# Patient Record
Sex: MALE | Race: OTHER | NOT HISPANIC OR LATINO
[De-identification: names, ages, dates, MRNs, and addresses within clinical notes are randomized per-mention and may not be internally consistent; named-entity substitution may affect disease eponyms.]

---

## 2017-03-13 ENCOUNTER — APPOINTMENT (OUTPATIENT)
Dept: UROLOGY | Facility: CLINIC | Age: 82
End: 2017-03-13

## 2017-03-13 VITALS
TEMPERATURE: 98.2 F | WEIGHT: 200 LBS | HEIGHT: 70 IN | SYSTOLIC BLOOD PRESSURE: 172 MMHG | BODY MASS INDEX: 28.63 KG/M2 | DIASTOLIC BLOOD PRESSURE: 84 MMHG | HEART RATE: 59 BPM | RESPIRATION RATE: 16 BRPM

## 2017-03-14 LAB
PSA SERPL-MCNC: <0.01 NG/ML
TESTOST SERPL-MCNC: <2.5 NG/DL

## 2017-09-11 ENCOUNTER — APPOINTMENT (OUTPATIENT)
Dept: UROLOGY | Facility: CLINIC | Age: 82
End: 2017-09-11
Payer: MEDICARE

## 2017-09-11 ENCOUNTER — OUTPATIENT (OUTPATIENT)
Dept: OUTPATIENT SERVICES | Facility: HOSPITAL | Age: 82
LOS: 1 days | End: 2017-09-11
Payer: MEDICARE

## 2017-09-11 VITALS
TEMPERATURE: 97.9 F | SYSTOLIC BLOOD PRESSURE: 171 MMHG | RESPIRATION RATE: 16 BRPM | WEIGHT: 190 LBS | HEIGHT: 70 IN | HEART RATE: 55 BPM | DIASTOLIC BLOOD PRESSURE: 79 MMHG | BODY MASS INDEX: 27.2 KG/M2

## 2017-09-11 VITALS — DIASTOLIC BLOOD PRESSURE: 92 MMHG | HEART RATE: 58 BPM | SYSTOLIC BLOOD PRESSURE: 195 MMHG

## 2017-09-11 DIAGNOSIS — R35.0 FREQUENCY OF MICTURITION: ICD-10-CM

## 2017-09-11 LAB
PSA SERPL-MCNC: <0.01 NG/ML
TESTOST SERPL-MCNC: 2.9 NG/DL

## 2017-09-11 PROCEDURE — 11983 REMOVE/INSERT DRUG IMPLANT: CPT

## 2017-09-11 PROCEDURE — 99213 OFFICE O/P EST LOW 20 MIN: CPT | Mod: 25

## 2017-09-11 RX ORDER — HISTRELIN ACETATE 50 MG/1
50 IMPLANT SUBCUTANEOUS
Qty: 0 | Refills: 0 | Status: COMPLETED | OUTPATIENT
Start: 2017-09-11

## 2017-09-11 RX ORDER — HISTRELIN ACETATE 50 MG/1
50 IMPLANT SUBCUTANEOUS
Qty: 1 | Refills: 0 | Status: COMPLETED | OUTPATIENT
Start: 2017-09-11 | End: 2017-09-11

## 2017-09-11 RX ADMIN — HISTRELIN ACETATE 0 MG: 50 IMPLANT SUBCUTANEOUS at 00:00

## 2017-09-12 PROCEDURE — 11983 REMOVE/INSERT DRUG IMPLANT: CPT

## 2017-09-14 DIAGNOSIS — C61 MALIGNANT NEOPLASM OF PROSTATE: ICD-10-CM

## 2018-03-19 ENCOUNTER — APPOINTMENT (OUTPATIENT)
Dept: UROLOGY | Facility: CLINIC | Age: 83
End: 2018-03-19
Payer: MEDICARE

## 2018-03-19 VITALS
HEART RATE: 46 BPM | BODY MASS INDEX: 27.2 KG/M2 | RESPIRATION RATE: 17 BRPM | TEMPERATURE: 97.4 F | HEIGHT: 70 IN | SYSTOLIC BLOOD PRESSURE: 179 MMHG | WEIGHT: 190 LBS | DIASTOLIC BLOOD PRESSURE: 79 MMHG

## 2018-03-19 PROCEDURE — 99214 OFFICE O/P EST MOD 30 MIN: CPT

## 2018-04-08 LAB
PSA SERPL-MCNC: <0.01 NG/ML
TESTOST SERPL-MCNC: <2.5 NG/DL

## 2018-07-02 ENCOUNTER — APPOINTMENT (OUTPATIENT)
Dept: CARDIOLOGY | Facility: CLINIC | Age: 83
End: 2018-07-02
Payer: MEDICARE

## 2018-07-02 ENCOUNTER — NON-APPOINTMENT (OUTPATIENT)
Age: 83
End: 2018-07-02

## 2018-07-02 VITALS
BODY MASS INDEX: 33.64 KG/M2 | WEIGHT: 235 LBS | DIASTOLIC BLOOD PRESSURE: 88 MMHG | HEIGHT: 70 IN | RESPIRATION RATE: 16 BRPM | SYSTOLIC BLOOD PRESSURE: 170 MMHG | HEART RATE: 58 BPM

## 2018-07-02 PROCEDURE — 93306 TTE W/DOPPLER COMPLETE: CPT

## 2018-07-02 PROCEDURE — 93000 ELECTROCARDIOGRAM COMPLETE: CPT

## 2018-07-02 PROCEDURE — 99204 OFFICE O/P NEW MOD 45 MIN: CPT

## 2018-09-14 ENCOUNTER — CLINICAL ADVICE (OUTPATIENT)
Age: 83
End: 2018-09-14

## 2018-09-17 ENCOUNTER — APPOINTMENT (OUTPATIENT)
Dept: UROLOGY | Facility: CLINIC | Age: 83
End: 2018-09-17
Payer: MEDICARE

## 2018-09-17 ENCOUNTER — APPOINTMENT (OUTPATIENT)
Dept: UROLOGY | Facility: CLINIC | Age: 83
End: 2018-09-17

## 2018-09-17 ENCOUNTER — OUTPATIENT (OUTPATIENT)
Dept: OUTPATIENT SERVICES | Facility: HOSPITAL | Age: 83
LOS: 1 days | End: 2018-09-17
Payer: MEDICARE

## 2018-09-17 VITALS
BODY MASS INDEX: 24.34 KG/M2 | OXYGEN SATURATION: 100 % | DIASTOLIC BLOOD PRESSURE: 75 MMHG | HEIGHT: 70 IN | HEART RATE: 69 BPM | RESPIRATION RATE: 16 BRPM | WEIGHT: 170 LBS | SYSTOLIC BLOOD PRESSURE: 144 MMHG

## 2018-09-17 DIAGNOSIS — R35.0 FREQUENCY OF MICTURITION: ICD-10-CM

## 2018-09-17 LAB
ALBUMIN SERPL ELPH-MCNC: 4.5 G/DL
ALP BLD-CCNC: 96 U/L
ALT SERPL-CCNC: 17 U/L
ANION GAP SERPL CALC-SCNC: 16 MMOL/L
AST SERPL-CCNC: 17 U/L
BILIRUB SERPL-MCNC: 0.5 MG/DL
BUN SERPL-MCNC: 21 MG/DL
CALCIUM SERPL-MCNC: 10.2 MG/DL
CHLORIDE SERPL-SCNC: 104 MMOL/L
CO2 SERPL-SCNC: 26 MMOL/L
CREAT SERPL-MCNC: 0.97 MG/DL
GLUCOSE SERPL-MCNC: 109 MG/DL
POTASSIUM SERPL-SCNC: 4.8 MMOL/L
PROT SERPL-MCNC: 7.6 G/DL
PSA SERPL-MCNC: <0.01 NG/ML
SODIUM SERPL-SCNC: 146 MMOL/L
TESTOST SERPL-MCNC: 5.8 NG/DL

## 2018-09-17 PROCEDURE — 11983 REMOVE/INSERT DRUG IMPLANT: CPT

## 2018-09-17 RX ORDER — HISTRELIN ACETATE 50 MG/1
50 IMPLANT SUBCUTANEOUS
Qty: 0 | Refills: 0 | Status: COMPLETED | OUTPATIENT
Start: 2018-09-17

## 2018-09-17 RX ORDER — HISTRELIN ACETATE 50 MG/1
50 IMPLANT SUBCUTANEOUS
Qty: 1 | Refills: 0 | Status: COMPLETED | OUTPATIENT
Start: 2018-09-17 | End: 2018-09-17

## 2018-09-17 RX ADMIN — HISTRELIN ACETATE 0 MG: 50 IMPLANT SUBCUTANEOUS at 00:00

## 2018-09-18 LAB
BASOPHILS # BLD AUTO: 0.03 K/UL
BASOPHILS NFR BLD AUTO: 0.5 %
EOSINOPHIL # BLD AUTO: 0.23 K/UL
EOSINOPHIL NFR BLD AUTO: 3.6 %
HCT VFR BLD CALC: 44.2 %
HGB BLD-MCNC: 14.2 G/DL
IMM GRANULOCYTES NFR BLD AUTO: 0.3 %
LYMPHOCYTES # BLD AUTO: 1.3 K/UL
LYMPHOCYTES NFR BLD AUTO: 20.6 %
MAN DIFF?: NORMAL
MCHC RBC-ENTMCNC: 31.7 PG
MCHC RBC-ENTMCNC: 32.1 GM/DL
MCV RBC AUTO: 98.7 FL
MONOCYTES # BLD AUTO: 0.5 K/UL
MONOCYTES NFR BLD AUTO: 7.9 %
NEUTROPHILS # BLD AUTO: 4.23 K/UL
NEUTROPHILS NFR BLD AUTO: 67.1 %
PLATELET # BLD AUTO: 249 K/UL
RBC # BLD: 4.48 M/UL
RBC # FLD: 14 %
WBC # FLD AUTO: 6.31 K/UL

## 2018-09-19 DIAGNOSIS — C61 MALIGNANT NEOPLASM OF PROSTATE: ICD-10-CM

## 2019-02-20 ENCOUNTER — INPATIENT (INPATIENT)
Facility: HOSPITAL | Age: 84
LOS: 7 days | Discharge: HOME | DRG: 193 | End: 2019-02-28
Attending: GENERAL ACUTE CARE HOSPITAL | Admitting: GENERAL ACUTE CARE HOSPITAL
Payer: MEDICARE

## 2019-02-20 VITALS
HEART RATE: 78 BPM | OXYGEN SATURATION: 95 % | TEMPERATURE: 102 F | SYSTOLIC BLOOD PRESSURE: 167 MMHG | WEIGHT: 210.1 LBS | HEIGHT: 68 IN | DIASTOLIC BLOOD PRESSURE: 78 MMHG | RESPIRATION RATE: 20 BRPM

## 2019-02-20 DIAGNOSIS — E78.00 PURE HYPERCHOLESTEROLEMIA, UNSPECIFIED: ICD-10-CM

## 2019-02-20 DIAGNOSIS — I10 ESSENTIAL (PRIMARY) HYPERTENSION: ICD-10-CM

## 2019-02-20 DIAGNOSIS — R50.9 FEVER, UNSPECIFIED: ICD-10-CM

## 2019-02-20 DIAGNOSIS — Z96.649 PRESENCE OF UNSPECIFIED ARTIFICIAL HIP JOINT: Chronic | ICD-10-CM

## 2019-02-20 DIAGNOSIS — F03.90 UNSPECIFIED DEMENTIA WITHOUT BEHAVIORAL DISTURBANCE: ICD-10-CM

## 2019-02-20 DIAGNOSIS — G93.41 METABOLIC ENCEPHALOPATHY: ICD-10-CM

## 2019-02-20 DIAGNOSIS — Z29.9 ENCOUNTER FOR PROPHYLACTIC MEASURES, UNSPECIFIED: ICD-10-CM

## 2019-02-20 LAB
ALBUMIN SERPL ELPH-MCNC: 4.4 G/DL — SIGNIFICANT CHANGE UP (ref 3.3–5)
ALP SERPL-CCNC: 78 U/L — SIGNIFICANT CHANGE UP (ref 40–120)
ALT FLD-CCNC: 24 U/L — SIGNIFICANT CHANGE UP (ref 10–45)
ANION GAP SERPL CALC-SCNC: 15 MMOL/L — SIGNIFICANT CHANGE UP (ref 5–17)
APPEARANCE UR: CLEAR — SIGNIFICANT CHANGE UP
APTT BLD: 28.7 SEC — SIGNIFICANT CHANGE UP (ref 27.5–36.3)
AST SERPL-CCNC: 21 U/L — SIGNIFICANT CHANGE UP (ref 10–40)
BACTERIA # UR AUTO: NEGATIVE — SIGNIFICANT CHANGE UP
BASOPHILS # BLD AUTO: 0 K/UL — SIGNIFICANT CHANGE UP (ref 0–0.2)
BASOPHILS NFR BLD AUTO: 0 % — SIGNIFICANT CHANGE UP (ref 0–2)
BILIRUB SERPL-MCNC: 0.7 MG/DL — SIGNIFICANT CHANGE UP (ref 0.2–1.2)
BILIRUB UR-MCNC: NEGATIVE — SIGNIFICANT CHANGE UP
BUN SERPL-MCNC: 22 MG/DL — SIGNIFICANT CHANGE UP (ref 7–23)
CALCIUM SERPL-MCNC: 9.6 MG/DL — SIGNIFICANT CHANGE UP (ref 8.4–10.5)
CHLORIDE SERPL-SCNC: 102 MMOL/L — SIGNIFICANT CHANGE UP (ref 96–108)
CO2 SERPL-SCNC: 24 MMOL/L — SIGNIFICANT CHANGE UP (ref 22–31)
COLOR SPEC: YELLOW — SIGNIFICANT CHANGE UP
CREAT SERPL-MCNC: 1 MG/DL — SIGNIFICANT CHANGE UP (ref 0.5–1.3)
DIFF PNL FLD: NEGATIVE — SIGNIFICANT CHANGE UP
EOSINOPHIL # BLD AUTO: 0 K/UL — SIGNIFICANT CHANGE UP (ref 0–0.5)
EOSINOPHIL NFR BLD AUTO: 0.4 % — SIGNIFICANT CHANGE UP (ref 0–6)
EPI CELLS # UR: 1 /HPF — SIGNIFICANT CHANGE UP
FLUAV H1 2009 PAND RNA SPEC QL NAA+PROBE: DETECTED
GAS PNL BLDV: SIGNIFICANT CHANGE UP
GLUCOSE SERPL-MCNC: 134 MG/DL — HIGH (ref 70–99)
GLUCOSE UR QL: NEGATIVE — SIGNIFICANT CHANGE UP
HCT VFR BLD CALC: 39.1 % — SIGNIFICANT CHANGE UP (ref 39–50)
HGB BLD-MCNC: 13.7 G/DL — SIGNIFICANT CHANGE UP (ref 13–17)
HYALINE CASTS # UR AUTO: 1 /LPF — SIGNIFICANT CHANGE UP (ref 0–2)
INR BLD: 1 RATIO — SIGNIFICANT CHANGE UP (ref 0.88–1.16)
KETONES UR-MCNC: NEGATIVE — SIGNIFICANT CHANGE UP
LACTATE SERPL-SCNC: 1.8 MMOL/L — SIGNIFICANT CHANGE UP (ref 0.7–2)
LEUKOCYTE ESTERASE UR-ACNC: NEGATIVE — SIGNIFICANT CHANGE UP
LYMPHOCYTES # BLD AUTO: 0.4 K/UL — LOW (ref 1–3.3)
LYMPHOCYTES # BLD AUTO: 5 % — LOW (ref 13–44)
MAGNESIUM SERPL-MCNC: 2 MG/DL — SIGNIFICANT CHANGE UP (ref 1.6–2.6)
MCHC RBC-ENTMCNC: 32.1 PG — SIGNIFICANT CHANGE UP (ref 27–34)
MCHC RBC-ENTMCNC: 35.2 GM/DL — SIGNIFICANT CHANGE UP (ref 32–36)
MCV RBC AUTO: 91.2 FL — SIGNIFICANT CHANGE UP (ref 80–100)
MONOCYTES # BLD AUTO: 0.7 K/UL — SIGNIFICANT CHANGE UP (ref 0–0.9)
MONOCYTES NFR BLD AUTO: 8.4 % — SIGNIFICANT CHANGE UP (ref 2–14)
NEUTROPHILS # BLD AUTO: 6.8 K/UL — SIGNIFICANT CHANGE UP (ref 1.8–7.4)
NEUTROPHILS NFR BLD AUTO: 86.2 % — HIGH (ref 43–77)
NITRITE UR-MCNC: NEGATIVE — SIGNIFICANT CHANGE UP
NT-PROBNP SERPL-SCNC: 532 PG/ML — HIGH (ref 0–300)
PH UR: 6 — SIGNIFICANT CHANGE UP (ref 5–8)
PHOSPHATE SERPL-MCNC: 3 MG/DL — SIGNIFICANT CHANGE UP (ref 2.5–4.5)
PLATELET # BLD AUTO: 160 K/UL — SIGNIFICANT CHANGE UP (ref 150–400)
POTASSIUM SERPL-MCNC: 3.7 MMOL/L — SIGNIFICANT CHANGE UP (ref 3.5–5.3)
POTASSIUM SERPL-SCNC: 3.7 MMOL/L — SIGNIFICANT CHANGE UP (ref 3.5–5.3)
PROCALCITONIN SERPL-MCNC: 0.19 NG/ML — HIGH (ref 0.02–0.1)
PROT SERPL-MCNC: 7.4 G/DL — SIGNIFICANT CHANGE UP (ref 6–8.3)
PROT UR-MCNC: ABNORMAL
PROTHROM AB SERPL-ACNC: 11.5 SEC — SIGNIFICANT CHANGE UP (ref 10–12.9)
RAPID RVP RESULT: DETECTED
RBC # BLD: 4.28 M/UL — SIGNIFICANT CHANGE UP (ref 4.2–5.8)
RBC # FLD: 13.2 % — SIGNIFICANT CHANGE UP (ref 10.3–14.5)
RBC CASTS # UR COMP ASSIST: 0 /HPF — SIGNIFICANT CHANGE UP (ref 0–4)
SODIUM SERPL-SCNC: 141 MMOL/L — SIGNIFICANT CHANGE UP (ref 135–145)
SP GR SPEC: 1.02 — SIGNIFICANT CHANGE UP (ref 1.01–1.02)
UROBILINOGEN FLD QL: NEGATIVE — SIGNIFICANT CHANGE UP
WBC # BLD: 7.8 K/UL — SIGNIFICANT CHANGE UP (ref 3.8–10.5)
WBC # FLD AUTO: 7.8 K/UL — SIGNIFICANT CHANGE UP (ref 3.8–10.5)
WBC UR QL: 0 /HPF — SIGNIFICANT CHANGE UP (ref 0–5)

## 2019-02-20 PROCEDURE — 71250 CT THORAX DX C-: CPT | Mod: 26

## 2019-02-20 PROCEDURE — 93010 ELECTROCARDIOGRAM REPORT: CPT

## 2019-02-20 PROCEDURE — 99285 EMERGENCY DEPT VISIT HI MDM: CPT | Mod: 25

## 2019-02-20 PROCEDURE — 70450 CT HEAD/BRAIN W/O DYE: CPT | Mod: 26

## 2019-02-20 PROCEDURE — 99223 1ST HOSP IP/OBS HIGH 75: CPT

## 2019-02-20 PROCEDURE — 71045 X-RAY EXAM CHEST 1 VIEW: CPT | Mod: 26

## 2019-02-20 RX ORDER — DONEPEZIL HYDROCHLORIDE 10 MG/1
10 TABLET, FILM COATED ORAL AT BEDTIME
Qty: 0 | Refills: 0 | Status: DISCONTINUED | OUTPATIENT
Start: 2019-02-20 | End: 2019-02-28

## 2019-02-20 RX ORDER — ACETAMINOPHEN 500 MG
1000 TABLET ORAL ONCE
Qty: 0 | Refills: 0 | Status: COMPLETED | OUTPATIENT
Start: 2019-02-20 | End: 2019-02-20

## 2019-02-20 RX ORDER — ACETAMINOPHEN 500 MG
650 TABLET ORAL EVERY 6 HOURS
Qty: 0 | Refills: 0 | Status: DISCONTINUED | OUTPATIENT
Start: 2019-02-20 | End: 2019-02-28

## 2019-02-20 RX ORDER — METOPROLOL TARTRATE 50 MG
50 TABLET ORAL
Qty: 0 | Refills: 0 | Status: DISCONTINUED | OUTPATIENT
Start: 2019-02-20 | End: 2019-02-22

## 2019-02-20 RX ORDER — LOSARTAN POTASSIUM 100 MG/1
50 TABLET, FILM COATED ORAL DAILY
Qty: 0 | Refills: 0 | Status: DISCONTINUED | OUTPATIENT
Start: 2019-02-20 | End: 2019-02-25

## 2019-02-20 RX ORDER — ENOXAPARIN SODIUM 100 MG/ML
40 INJECTION SUBCUTANEOUS DAILY
Qty: 0 | Refills: 0 | Status: DISCONTINUED | OUTPATIENT
Start: 2019-02-20 | End: 2019-02-28

## 2019-02-20 RX ORDER — AZITHROMYCIN 500 MG/1
500 TABLET, FILM COATED ORAL ONCE
Qty: 0 | Refills: 0 | Status: COMPLETED | OUTPATIENT
Start: 2019-02-20 | End: 2019-02-20

## 2019-02-20 RX ORDER — SODIUM CHLORIDE 9 MG/ML
2000 INJECTION INTRAMUSCULAR; INTRAVENOUS; SUBCUTANEOUS ONCE
Qty: 0 | Refills: 0 | Status: COMPLETED | OUTPATIENT
Start: 2019-02-20 | End: 2019-02-20

## 2019-02-20 RX ORDER — CEFTRIAXONE 500 MG/1
1 INJECTION, POWDER, FOR SOLUTION INTRAMUSCULAR; INTRAVENOUS ONCE
Qty: 0 | Refills: 0 | Status: COMPLETED | OUTPATIENT
Start: 2019-02-20 | End: 2019-02-20

## 2019-02-20 RX ORDER — SIMVASTATIN 20 MG/1
40 TABLET, FILM COATED ORAL AT BEDTIME
Qty: 0 | Refills: 0 | Status: DISCONTINUED | OUTPATIENT
Start: 2019-02-20 | End: 2019-02-28

## 2019-02-20 RX ORDER — TAMSULOSIN HYDROCHLORIDE 0.4 MG/1
0.4 CAPSULE ORAL AT BEDTIME
Qty: 0 | Refills: 0 | Status: DISCONTINUED | OUTPATIENT
Start: 2019-02-20 | End: 2019-02-26

## 2019-02-20 RX ORDER — ACETAMINOPHEN 500 MG
975 TABLET ORAL ONCE
Qty: 0 | Refills: 0 | Status: COMPLETED | OUTPATIENT
Start: 2019-02-20 | End: 2019-02-20

## 2019-02-20 RX ORDER — FAMOTIDINE 10 MG/ML
20 INJECTION INTRAVENOUS
Qty: 0 | Refills: 0 | Status: DISCONTINUED | OUTPATIENT
Start: 2019-02-20 | End: 2019-02-28

## 2019-02-20 RX ORDER — ESCITALOPRAM OXALATE 10 MG/1
5 TABLET, FILM COATED ORAL DAILY
Qty: 0 | Refills: 0 | Status: DISCONTINUED | OUTPATIENT
Start: 2019-02-20 | End: 2019-02-28

## 2019-02-20 RX ORDER — MEMANTINE HYDROCHLORIDE 10 MG/1
10 TABLET ORAL
Qty: 0 | Refills: 0 | Status: DISCONTINUED | OUTPATIENT
Start: 2019-02-20 | End: 2019-02-28

## 2019-02-20 RX ORDER — HYDROCHLOROTHIAZIDE 25 MG
12.5 TABLET ORAL DAILY
Qty: 0 | Refills: 0 | Status: DISCONTINUED | OUTPATIENT
Start: 2019-02-20 | End: 2019-02-23

## 2019-02-20 RX ADMIN — TAMSULOSIN HYDROCHLORIDE 0.4 MILLIGRAM(S): 0.4 CAPSULE ORAL at 22:12

## 2019-02-20 RX ADMIN — DONEPEZIL HYDROCHLORIDE 10 MILLIGRAM(S): 10 TABLET, FILM COATED ORAL at 22:09

## 2019-02-20 RX ADMIN — Medication 400 MILLIGRAM(S): at 22:09

## 2019-02-20 RX ADMIN — MEMANTINE HYDROCHLORIDE 10 MILLIGRAM(S): 10 TABLET ORAL at 22:09

## 2019-02-20 RX ADMIN — SODIUM CHLORIDE 500 MILLILITER(S): 9 INJECTION INTRAMUSCULAR; INTRAVENOUS; SUBCUTANEOUS at 16:29

## 2019-02-20 RX ADMIN — Medication 50 MILLIGRAM(S): at 22:09

## 2019-02-20 RX ADMIN — CEFTRIAXONE 100 GRAM(S): 500 INJECTION, POWDER, FOR SOLUTION INTRAMUSCULAR; INTRAVENOUS at 17:54

## 2019-02-20 RX ADMIN — Medication 975 MILLIGRAM(S): at 16:29

## 2019-02-20 RX ADMIN — SIMVASTATIN 40 MILLIGRAM(S): 20 TABLET, FILM COATED ORAL at 22:09

## 2019-02-20 RX ADMIN — AZITHROMYCIN 250 MILLIGRAM(S): 500 TABLET, FILM COATED ORAL at 19:49

## 2019-02-20 RX ADMIN — Medication 75 MILLIGRAM(S): at 22:09

## 2019-02-20 NOTE — H&P ADULT - ASSESSMENT
83 yr old male with fevers, AMS (now resolved), and cough, with a wife who was just diagnosed with influenza.  Suspect influenza or another respiratory virus.  RVP currently pending, will start Tamiflu.  Was treated empirically for PNA in the ER, however given the CT findings would not continue abx at this time.

## 2019-02-20 NOTE — ED PROVIDER NOTE - ATTENDING CONTRIBUTION TO CARE
I have seen and evaluated this patient with the advance practice clinician.   I agree with the findings  unless other wise stated. I have amended notes where needed.  After my face to face bedside evaluation, I am noting: Pt with AMS and fever 101.8 per daughter who is at bedside. Per patient daughter patient had difficulty with normal tasks today and wife is admitted to [facility] ED for flu. Patient denies any complaints. Per daughter patient had flu shot this year Pt Pt does not have nuchal rigidity no rash Lungs scattered basal rales ? pneumonia Labs and xray findings reviewed needs to be hospitalized for definitive care --Parisi

## 2019-02-20 NOTE — H&P ADULT - PROBLEM SELECTOR PLAN 2
suspect secondary to fever, family reports he is at his baseline now.  Continue to monitor, Tylenol PRN.

## 2019-02-20 NOTE — ED ADULT NURSE REASSESSMENT NOTE - NS ED NURSE REASSESS COMMENT FT1
Aleena Doll (granddaughter) : contact if need anything,  cell:  [phone].    Daughter's number in patient profile info, call if necessary.

## 2019-02-20 NOTE — ED ADULT NURSE REASSESSMENT NOTE - NS ED NURSE REASSESS COMMENT FT1
Normal saline infusing. Pt and family aware of plan of care. Pt appears to have more color in face. Safety and comfort measures provided. Family at bedside.

## 2019-02-20 NOTE — ED ADULT NURSE NOTE - INTERVENTIONS DEFINITIONS
[place] to call system/Instruct patient to call for assistance/Monitor for mental status changes and reorient to person, place, and time/Physically safe environment: no spills, clutter or unnecessary equipment

## 2019-02-20 NOTE — H&P ADULT - NSHPLABSRESULTS_GEN_ALL_CORE
LABS:                        13.7   7.8   )-----------( 160      ( 2019 16:29 )             39.1     02-    141  |  102  |  22  ----------------------------<  134<H>  3.7   |  24  |  1.00    Ca    9.6      2019 16:29  Phos  3.0       Mg     2.0         TPro  7.4  /  Alb  4.4  /  TBili  0.7  /  DBili  x   /  AST  21  /  ALT  24  /  AlkPhos  78  02-20    PT/INR - ( 2019 16:35 )   PT: 11.5 sec;   INR: 1.00 ratio         PTT - ( 2019 16:35 )  PTT:28.7 sec      Urinalysis Basic - ( 2019 16:32 )    Color: Yellow / Appearance: Clear / S.024 / pH: x  Gluc: x / Ketone: Negative  / Bili: Negative / Urobili: Negative   Blood: x / Protein: Trace / Nitrite: Negative   Leuk Esterase: Negative / RBC: 0 /hpf / WBC 0 /HPF   Sq Epi: x / Non Sq Epi: 1 /hpf / Bacteria: Negative        RADIOLOGY & ADDITIONAL TESTS:    Imaging Personally Reviewed:  CT Chest, CXR - no infiltrate

## 2019-02-20 NOTE — ED ADULT NURSE REASSESSMENT NOTE - NS ED NURSE REASSESS COMMENT FT1
Straight cath complete. 2 RNs at bedside. Sterile technique preformed. Pt tolerated well. Successful after one attempt.

## 2019-02-20 NOTE — ED ADULT NURSE NOTE - OBJECTIVE STATEMENT
82 y/o Male presents to the ED in a wheelchair, A&Ox2, after his family found him to be febrile and confused. Pt wife is admitted to the hospital for the flu and pt family got worried after pt developed a phlegmy cough that started yesterday. Pt was also vomiting earlier today. Pt went to PCP and was found to be febrile at 101.3, PCP sent him here. Pt is oriented to person and place but not time, pt family reports this as baseline due to beginning onset of dementia. Family says he seems confused at times and does not at others. Pt appears slightly pale. Pt feels warm to touch. Pt rectal temp of 101.4. MD aware. Lung sounds diminished in all lobes. Abdomen soft, nontender, nondistended, bowel sounds present in all quadrants. Pt Heart sounds WNL. +2 pitting edema noted on bilateral lower extremities, pt family reports this as baseline. Pt has hx of HTN. Pupils 3mm PERRL, Equal strength and sensation in all extremities. Pt unable to urinate for us at this time. Pt denies any pain, chest pain, shortness of breath, numbness, tingling, dizziness, headache. Safety and comfort measures provided. Family at bedside. Call bell within reach.

## 2019-02-20 NOTE — H&P ADULT - NSHPPHYSICALEXAM_GEN_ALL_CORE
Vital Signs Last 24 Hrs  T(C): 37.6 (20 Feb 2019 18:10), Max: 38.8 (20 Feb 2019 14:23)  T(F): 99.7 (20 Feb 2019 18:10), Max: 101.8 (20 Feb 2019 14:23)  HR: 81 (20 Feb 2019 18:10) (78 - 96)  BP: 143/65 (20 Feb 2019 18:10) (143/65 - 170/91)  BP(mean): --  RR: 20 (20 Feb 2019 18:11) (20 - 20)  SpO2: 95% (20 Feb 2019 18:11) (93% - 95%)  CAPILLARY BLOOD GLUCOSE        I&O's Summary      PHYSICAL EXAM:  GENERAL: NAD, well-developed, A and O x 2 (unsure of date)  HEAD:  Atraumatic, Normocephalic  EYES: EOMI, PERRLA, conjunctiva and sclera clear  NECK: Supple, No JVD  CHEST/LUNG: Clear to auscultation bilaterally; No wheeze  HEART: Regular rate and rhythm; No murmurs, rubs, or gallops  ABDOMEN: Soft, Nontender, Nondistended; Bowel sounds present  EXTREMITIES:  2+ Peripheral Pulses, No clubbing, cyanosis, 1+ pedal edema b/l  PSYCH: AAOx2  NEUROLOGY: non-focal  SKIN: No rashes or lesions

## 2019-02-20 NOTE — H&P ADULT - PROBLEM SELECTOR PLAN 1
suspect influenza, will start empiric Tamiflu for now, droplet isolation  May consider continuing Tamiflu regardless of RVP results given his recent exposure (his wife)  No evidence of PNA on imaging, will hold on further abx for now, follow up blood cultures.  Urine Cx pending however UA was negative, no indication of a UTI.

## 2019-02-20 NOTE — ED PROVIDER NOTE - OBJECTIVE STATEMENT
82 y/o male PMHx  mitral valve regurgitation, prostate cancer, hypertension, hyperlipidemia, normal nuclear stress test 7/2018 and EF 69% brought in by EMS from PMD office Dr. Mac Narayan as patient had AMS and fever 101.8 per daughter who is at bedside. Per patient daughter patient had difficulty with normal tasks today and wife is admitted to [facility] ED for flu. Patient denies any complaints 82 y/o male PMHx  mitral valve regurgitation, prostate cancer, hypertension, hyperlipidemia, normal nuclear stress test 7/2018 and EF 69% brought in by EMS from PMD office Dr. Mac Narayan as patient had AMS and fever 101.8 per daughter who is at bedside. Per patient daughter patient had difficulty with normal tasks today and wife is admitted to [facility] ED for flu. Patient denies any complaints. Per daughter patient had flu shot this year

## 2019-02-20 NOTE — ED ADULT TRIAGE NOTE - CHIEF COMPLAINT QUOTE
cough since yesterday, confused this morning, very tired was seen by PCP Dr. Mac Narayan today and was sent  here to r/o Pneumonia

## 2019-02-20 NOTE — ED PROVIDER NOTE - CHIEF COMPLAINT
The patient is a 83y Male complaining of confusion. The patient is a 83y Male complaining of confusion. info from PMD and family

## 2019-02-20 NOTE — ED PROVIDER NOTE - CLINICAL SUMMARY MEDICAL DECISION MAKING FREE TEXT BOX
Pt with AMS and fever 101.8 per daughter who is at bedside. Per patient daughter patient had difficulty with normal tasks today and wife is admitted to [facility] ED for flu. Patient denies any complaints. Per daughter patient had flu shot this year Pt Pt does not have nuchal rigidity no rash Lungs scattered basal rales ? pneumonia Labs and xray findings reviewed needs to be hospitalized for definitive care --Parisi

## 2019-02-20 NOTE — H&P ADULT - HISTORY OF PRESENT ILLNESS
83 yr old male with PMH sig for dementia, HTN, HLD whose wife was admitted yesterday for influenza, today presents with cough, fevers and altered MS.  Patient was in his usual state of health yesterday when his son noticed that he seemed more tired than normal.  This morning he was noted to be confused, coughing, and febrile to 102 at home.  He was taken to his PCPs office where he was again noted to be confused, febrile and instructed to go to the ER to r/o PNA.  In the ED he was given Azithromycin, Ceftriaxone, and a CXR and Chest CT were done that did not reveal a pulmonary infiltrate.  RVP panel is currently pending.  His family states that he currently is at his baseline mental status, which is A and O x 2-3, somewhat forgetful, but overall functional at home with his ADLs.  Patient did receive a flu shot this year.  He denies any HA, abd pain, dysuria.  He states that he had some chest discomfort with coughing.

## 2019-02-20 NOTE — ED ADULT NURSE REASSESSMENT NOTE - NS ED NURSE REASSESS COMMENT FT1
Care assumed of patient at this time. Patient has new IV placed. Pt is given hospital gown. Patient is encouraged to stay in hospital bed.

## 2019-02-21 LAB
CULTURE RESULTS: NO GROWTH — SIGNIFICANT CHANGE UP
SPECIMEN SOURCE: SIGNIFICANT CHANGE UP

## 2019-02-21 RX ORDER — IPRATROPIUM/ALBUTEROL SULFATE 18-103MCG
3 AEROSOL WITH ADAPTER (GRAM) INHALATION EVERY 6 HOURS
Qty: 0 | Refills: 0 | Status: DISCONTINUED | OUTPATIENT
Start: 2019-02-21 | End: 2019-02-25

## 2019-02-21 RX ADMIN — Medication 50 MILLIGRAM(S): at 05:48

## 2019-02-21 RX ADMIN — FAMOTIDINE 20 MILLIGRAM(S): 10 INJECTION INTRAVENOUS at 05:47

## 2019-02-21 RX ADMIN — FAMOTIDINE 20 MILLIGRAM(S): 10 INJECTION INTRAVENOUS at 17:49

## 2019-02-21 RX ADMIN — LOSARTAN POTASSIUM 50 MILLIGRAM(S): 100 TABLET, FILM COATED ORAL at 05:47

## 2019-02-21 RX ADMIN — SIMVASTATIN 40 MILLIGRAM(S): 20 TABLET, FILM COATED ORAL at 21:37

## 2019-02-21 RX ADMIN — Medication 3 MILLILITER(S): at 17:49

## 2019-02-21 RX ADMIN — Medication 50 MILLIGRAM(S): at 17:49

## 2019-02-21 RX ADMIN — Medication 3 MILLILITER(S): at 12:22

## 2019-02-21 RX ADMIN — DONEPEZIL HYDROCHLORIDE 10 MILLIGRAM(S): 10 TABLET, FILM COATED ORAL at 21:37

## 2019-02-21 RX ADMIN — TAMSULOSIN HYDROCHLORIDE 0.4 MILLIGRAM(S): 0.4 CAPSULE ORAL at 21:37

## 2019-02-21 RX ADMIN — Medication 75 MILLIGRAM(S): at 05:47

## 2019-02-21 RX ADMIN — ESCITALOPRAM OXALATE 5 MILLIGRAM(S): 10 TABLET, FILM COATED ORAL at 08:37

## 2019-02-21 RX ADMIN — Medication 12.5 MILLIGRAM(S): at 05:47

## 2019-02-21 RX ADMIN — Medication 3 MILLILITER(S): at 23:27

## 2019-02-21 RX ADMIN — ENOXAPARIN SODIUM 40 MILLIGRAM(S): 100 INJECTION SUBCUTANEOUS at 08:37

## 2019-02-21 RX ADMIN — MEMANTINE HYDROCHLORIDE 10 MILLIGRAM(S): 10 TABLET ORAL at 05:48

## 2019-02-21 RX ADMIN — Medication 30 MILLIGRAM(S): at 21:37

## 2019-02-21 RX ADMIN — Medication 40 MILLIGRAM(S): at 12:22

## 2019-02-21 RX ADMIN — Medication 75 MILLIGRAM(S): at 17:48

## 2019-02-21 RX ADMIN — MEMANTINE HYDROCHLORIDE 10 MILLIGRAM(S): 10 TABLET ORAL at 17:49

## 2019-02-21 NOTE — PROGRESS NOTE ADULT - SUBJECTIVE AND OBJECTIVE BOX
Patient is a 83y old  Male who presents with a chief complaint of Fever (21 Feb 2019 15:15)                                                               INTERVAL HPI/OVERNIGHT EVENTS:    REVIEW OF SYSTEMS:     CONSTITUTIONAL: No weakness, fevers or chills  RESPIRATORY:irpving cough and  shortness of breath  CARDIOVASCULAR: No chest pain or palpitations  GASTROINTESTINAL: No abdominal pain  . No nausea, vomiting, or hematemesis; No diarrhea or constipation. No melena or hematochezia.  GENITOURINARY: No dysuria, frequency or hematuria  NEUROLOGICAL: No numbness or weakness                                                                                                                                                                                                                                                                                  Medications:  MEDICATIONS  (STANDING):  ALBUTerol/ipratropium for Nebulization 3 milliLiter(s) Nebulizer every 6 hours  donepezil 10 milliGRAM(s) Oral at bedtime  enoxaparin Injectable 40 milliGRAM(s) SubCutaneous daily  escitalopram 5 milliGRAM(s) Oral daily  famotidine    Tablet 20 milliGRAM(s) Oral two times a day  hydrochlorothiazide 12.5 milliGRAM(s) Oral daily  losartan 50 milliGRAM(s) Oral daily  memantine 10 milliGRAM(s) Oral two times a day  methylPREDNISolone sodium succinate Injectable 30 milliGRAM(s) IV Push three times a day  metoprolol tartrate 50 milliGRAM(s) Oral two times a day  oseltamivir 75 milliGRAM(s) Oral two times a day  simvastatin 40 milliGRAM(s) Oral at bedtime  tamsulosin 0.4 milliGRAM(s) Oral at bedtime    MEDICATIONS  (PRN):  acetaminophen   Tablet .. 650 milliGRAM(s) Oral every 6 hours PRN Temp greater or equal to 38C (100.4F)       Allergies    No Known Allergies    Intolerances      Vital Signs Last 24 Hrs  T(C): 36.9 (21 Feb 2019 19:52), Max: 37.2 (21 Feb 2019 00:14)  T(F): 98.4 (21 Feb 2019 19:52), Max: 98.9 (21 Feb 2019 00:14)  HR: 55 (21 Feb 2019 19:52) (55 - 62)  BP: 159/75 (21 Feb 2019 19:52) (138/71 - 171/67)  BP(mean): --  RR: 17 (21 Feb 2019 19:52) (17 - 20)  SpO2: 94% (21 Feb 2019 19:52) (92% - 97%)  CAPILLARY BLOOD GLUCOSE          Physical Exam:    Daily Height in cm: 175.26 (21 Feb 2019 19:52)    General: NAD  HEENT:  Nonicteric, PERRLA  CV:  RRR, S1S2   Lungs:  wheezing B   Abdomen:  Soft, non-tender, no distended, positive BS  Extremities:  2+ pulses, no c/c, no edema  Skin:  Warm and dry, no rashes  :  No mckeon  Neuro:  AAOx3, non-focal, grossly intact                                                                                                                                                                                                                                                                                                LABS:                               13.7   7.8   )-----------( 160      ( 20 Feb 2019 16:29 )             39.1                      02-20    141  |  102  |  22  ----------------------------<  134<H>  3.7   |  24  |  1.00    Ca    9.6      20 Feb 2019 16:29  Phos  3.0     02-20  Mg     2.0     02-20    TPro  7.4  /  Alb  4.4  /  TBili  0.7  /  DBili  x   /  AST  21  /  ALT  24  /  AlkPhos  78  02-20                       RADIOLOGY & ADDITIONAL TESTS         I personally reviewed: [  ]EKG   [  ]CXR    [  ] CT      A/P:         Discussed with :     [place] consultants' Notes   Time spent :

## 2019-02-21 NOTE — PROGRESS NOTE ADULT - ASSESSMENT
83 yr old male with PMH sig for prostate ca ,  dementia, HTN, HLD whose wife was admitted yesterday for influenza, today presents with cough, fevers and altered MS.  found to have influenza     1- encephhalopathy : toxic metablic sec to influenza infection superimposed on underlying dementia   improved and seems to be at baseline     2- influenza : start steroids given bronchospasms on exam   nebs  cont domenico flu     3- HTN :monitor for now     4- Dementia : cont meds  supportive.care    discussed tih np , pt and family   d/w

## 2019-02-21 NOTE — PATIENT PROFILE ADULT - NSPROMUTPARTICIPCAREFT_GEN_A_NUR
daughter to be involved daughter's (2), son, and grandaughter Aleena Doll, 8 ramses RN to be involved

## 2019-02-21 NOTE — CONSULT NOTE ADULT - SUBJECTIVE AND OBJECTIVE BOX
PULMONARY CONSULT  Ross Mitchell MD  [phone]    Initial HPI on admission:  HPI:  83 yr old male with PMH sig for dementia, HTN, HLD whose wife was admitted yesterday for influenza, today presents with cough, fevers and altered MS.  Patient was in his usual state of health yesterday when his son noticed that he seemed more tired than normal.  This morning he was noted to be confused, coughing, and febrile to 102 at home.  He was taken to his PCPs office where he was again noted to be confused, febrile and instructed to go to the ER to r/o PNA.  In the ED he was given Azithromycin, Ceftriaxone, and a CXR and Chest CT were done that did not reveal a pulmonary infiltrate.  RVP panel is currently pending.  His family states that he currently is at his baseline mental status, which is A and O x 2-3, somewhat forgetful, but overall functional at home with his ADLs.  Patient did receive a flu shot this year.  He denies any HA, abd pain, dysuria.  He states that he had some chest discomfort with coughing. (2019 18:41)    Patient admitted with dizziness, weakness, cough. Wife inpatient with influenza A. PCR positive influenza A  CT chest without evidence of pneumonia  c/o chronic LE edema    PAST MEDICAL & SURGICAL HISTORY:  Mitral valve regurgitation  Prostate cancer  HLD (hyperlipidemia)  HTN (hypertension)  History of hip replacement    FAMILY HISTORY:  No pertinent family history in first degree relatives    Social history: Non smoker, lives with wife     Review of Systems:  Other Review of Systems: All other review of systems negative, except as noted in HPI	      Allergies and Intolerances:        Allergies:  	No Known A  Medications:  MEDICATIONS  (STANDING):  ALBUTerol/ipratropium for Nebulization 3 milliLiter(s) Nebulizer every 6 hours  donepezil 10 milliGRAM(s) Oral at bedtime  enoxaparin Injectable 40 milliGRAM(s) SubCutaneous daily  escitalopram 5 milliGRAM(s) Oral daily  famotidine    Tablet 20 milliGRAM(s) Oral two times a day  hydrochlorothiazide 12.5 milliGRAM(s) Oral daily  losartan 50 milliGRAM(s) Oral daily  memantine 10 milliGRAM(s) Oral two times a day  methylPREDNISolone sodium succinate Injectable 40 milliGRAM(s) IV Push every 8 hours  metoprolol tartrate 50 milliGRAM(s) Oral two times a day  oseltamivir 75 milliGRAM(s) Oral two times a day  simvastatin 40 milliGRAM(s) Oral at bedtime  tamsulosin 0.4 milliGRAM(s) Oral at bedtime    MEDICATIONS  (PRN):  acetaminophen   Tablet .. 650 milliGRAM(s) Oral every 6 hours PRN Temp greater or equal to 38C (100.4F)    Vital Signs Last 24 Hrs  T(C): 36.9 (2019 08:13), Max: 38.4 (2019 21:45)  T(F): 98.5 (2019 08:13), Max: 101.1 (2019 21:45)  HR: 57 (2019 08:13) (55 - 81)  BP: 171/67 (2019 08:13) (119/56 - 171/67)  BP(mean): --  RR: 20 (2019 08:13) (18 - 20)  SpO2: 97% (2019 08:13) (92% - 99%)    LABS:                        13.7   7.8   )-----------( 160      ( 2019 16:29 )             39.1     02-20    141  |  102  |  22  ----------------------------<  134<H>  3.7   |  24  |  1.00    Ca    9.6      2019 16:29  Phos  3.0     02-20  Mg     2.0     02-20    TPro  7.4  /  Alb  4.4  /  TBili  0.7  /  DBili  x   /  AST  21  /  ALT  24  /  AlkPhos  78  02-20      PT/INR - ( 2019 16:35 )   PT: 11.5 sec;   INR: 1.00 ratio         PTT - ( 2019 16:35 )  PTT:28.7 sec  Urinalysis Basic - ( 2019 16:32 )    Color: Yellow / Appearance: Clear / S.024 / pH: x  Gluc: x / Ketone: Negative  / Bili: Negative / Urobili: Negative   Blood: x / Protein: Trace / Nitrite: Negative   Leuk Esterase: Negative / RBC: 0 /hpf / WBC 0 /HPF   Sq Epi: x / Non Sq Epi: 1 /hpf / Bacteria: Negative      Procalcitonin, Serum: 0.19 ng/mL (19 @ 16:32)    Serum Pro-Brain Natriuretic Peptide: 532 pg/mL (19 @ 16:29)    Physical Examination:    General: Non toxic, No acute distress.      HEENT: Pupils equal, reactive to light.  Symmetric.    PULM: scattered exp rhonchi, few wheezes, basilar predominant    CVS: Regular rate and rhythm, no murmurs, rubs, or gallops    ABD: Soft, nondistended, nontender, normoactive bowel sounds, no masses    EXT: No edema, nontender    SKIN: Warm and well perfused, no rashes noted.    NEURO: Alert, oriented, interactive, nonfocal    RADIOLOGY REVIEWED PERSONALLY  CXR:    CT chest:      PROCEDURE DATE:  2019        INTERPRETATION:  CLINICAL INFORMATION: Altered mental status with fever   and cough.    COMPARISON: Chest radiograph 2019, CT abdomen and pelvis 2006.    PROCEDURE:   CT of the Chest was performed without intravenous contrast.  Sagittal and coronal reformats were performed.      FINDINGS:    CHEST:     LUNGS AND LARGE AIRWAYS: Patent central airways. Minimal bibasilar   dependent atelectasis. Suboptimal evaluation of the lungs due to motion   artifact.  PLEURA: No pleural effusion.  VESSELS: Atheromatous disease of the thoracic and abdominal aorta with   dense calcifications at the level of the renal arteries.  HEART: Heart size is normal.No pericardial effusion. Aortic valve, mitral   annular, and coronary artery calcifications.  MEDIASTINUM AND FUENTES: No lymphadenopathy.  CHEST WALL AND LOWER NECK: Bilateral gynecomastia, left greater than   right. Clips in the left axilla.  VISUALIZED UPPER ABDOMEN: Diffuse hepatic steatosis. Moderate vascular   calcifications. Parapelvic cysts in the left kidney.  BONES: Degenerative changes of the spine.    IMPRESSION:   Grossly clear lungs.    TTE:

## 2019-02-21 NOTE — CONSULT NOTE ADULT - ASSESSMENT
Infleunza A tracheobronchitis with mild bronchospasm  No clinical suspicion or radiographic evidence of bacterial pneumonia  MVR  Chronic LE edema - ? CHF  Dementia    REC    Observe off antibiotics  Duoneb qid  Decrease solumedrol to 30 mg q 8: taper per status  Tamiflu

## 2019-02-22 LAB
ALBUMIN SERPL ELPH-MCNC: 3.8 G/DL — SIGNIFICANT CHANGE UP (ref 3.3–5)
ALP SERPL-CCNC: 69 U/L — SIGNIFICANT CHANGE UP (ref 40–120)
ALT FLD-CCNC: 31 U/L — SIGNIFICANT CHANGE UP (ref 10–45)
ANION GAP SERPL CALC-SCNC: 15 MMOL/L — SIGNIFICANT CHANGE UP (ref 5–17)
AST SERPL-CCNC: 34 U/L — SIGNIFICANT CHANGE UP (ref 10–40)
BILIRUB SERPL-MCNC: 0.3 MG/DL — SIGNIFICANT CHANGE UP (ref 0.2–1.2)
BUN SERPL-MCNC: 17 MG/DL — SIGNIFICANT CHANGE UP (ref 7–23)
CALCIUM SERPL-MCNC: 9.1 MG/DL — SIGNIFICANT CHANGE UP (ref 8.4–10.5)
CHLORIDE SERPL-SCNC: 104 MMOL/L — SIGNIFICANT CHANGE UP (ref 96–108)
CO2 SERPL-SCNC: 22 MMOL/L — SIGNIFICANT CHANGE UP (ref 22–31)
CREAT SERPL-MCNC: 0.75 MG/DL — SIGNIFICANT CHANGE UP (ref 0.5–1.3)
GLUCOSE BLDC GLUCOMTR-MCNC: 120 MG/DL — HIGH (ref 70–99)
GLUCOSE BLDC GLUCOMTR-MCNC: 168 MG/DL — HIGH (ref 70–99)
GLUCOSE BLDC GLUCOMTR-MCNC: 208 MG/DL — HIGH (ref 70–99)
GLUCOSE BLDC GLUCOMTR-MCNC: 279 MG/DL — HIGH (ref 70–99)
GLUCOSE SERPL-MCNC: 184 MG/DL — HIGH (ref 70–99)
HCT VFR BLD CALC: 37 % — LOW (ref 39–50)
HGB BLD-MCNC: 12.9 G/DL — LOW (ref 13–17)
MAGNESIUM SERPL-MCNC: 2.2 MG/DL — SIGNIFICANT CHANGE UP (ref 1.6–2.6)
MCHC RBC-ENTMCNC: 32.3 PG — SIGNIFICANT CHANGE UP (ref 27–34)
MCHC RBC-ENTMCNC: 35 GM/DL — SIGNIFICANT CHANGE UP (ref 32–36)
MCV RBC AUTO: 92.3 FL — SIGNIFICANT CHANGE UP (ref 80–100)
PHOSPHATE SERPL-MCNC: 3.9 MG/DL — SIGNIFICANT CHANGE UP (ref 2.5–4.5)
PLATELET # BLD AUTO: 136 K/UL — LOW (ref 150–400)
POTASSIUM SERPL-MCNC: 4.1 MMOL/L — SIGNIFICANT CHANGE UP (ref 3.5–5.3)
POTASSIUM SERPL-SCNC: 4.1 MMOL/L — SIGNIFICANT CHANGE UP (ref 3.5–5.3)
PROT SERPL-MCNC: 6.7 G/DL — SIGNIFICANT CHANGE UP (ref 6–8.3)
RBC # BLD: 4.01 M/UL — LOW (ref 4.2–5.8)
RBC # FLD: 13.3 % — SIGNIFICANT CHANGE UP (ref 10.3–14.5)
SODIUM SERPL-SCNC: 141 MMOL/L — SIGNIFICANT CHANGE UP (ref 135–145)
TROPONIN T, HIGH SENSITIVITY RESULT: 8 NG/L — SIGNIFICANT CHANGE UP (ref 0–51)
WBC # BLD: 4.1 K/UL — SIGNIFICANT CHANGE UP (ref 3.8–10.5)
WBC # FLD AUTO: 4.1 K/UL — SIGNIFICANT CHANGE UP (ref 3.8–10.5)

## 2019-02-22 PROCEDURE — 99232 SBSQ HOSP IP/OBS MODERATE 35: CPT | Mod: GC

## 2019-02-22 PROCEDURE — 93010 ELECTROCARDIOGRAM REPORT: CPT | Mod: 76

## 2019-02-22 RX ORDER — HYDRALAZINE HCL 50 MG
10 TABLET ORAL ONCE
Qty: 0 | Refills: 0 | Status: COMPLETED | OUTPATIENT
Start: 2019-02-22 | End: 2019-02-22

## 2019-02-22 RX ORDER — HYDRALAZINE HCL 50 MG
25 TABLET ORAL EVERY 8 HOURS
Qty: 0 | Refills: 0 | Status: DISCONTINUED | OUTPATIENT
Start: 2019-02-22 | End: 2019-02-24

## 2019-02-22 RX ORDER — HYDRALAZINE HCL 50 MG
25 TABLET ORAL ONCE
Qty: 0 | Refills: 0 | Status: COMPLETED | OUTPATIENT
Start: 2019-02-22 | End: 2019-02-22

## 2019-02-22 RX ADMIN — FAMOTIDINE 20 MILLIGRAM(S): 10 INJECTION INTRAVENOUS at 06:07

## 2019-02-22 RX ADMIN — Medication 3 MILLILITER(S): at 18:15

## 2019-02-22 RX ADMIN — Medication 25 MILLIGRAM(S): at 22:45

## 2019-02-22 RX ADMIN — SIMVASTATIN 40 MILLIGRAM(S): 20 TABLET, FILM COATED ORAL at 22:45

## 2019-02-22 RX ADMIN — Medication 25 MILLIGRAM(S): at 07:56

## 2019-02-22 RX ADMIN — Medication 12.5 MILLIGRAM(S): at 06:07

## 2019-02-22 RX ADMIN — TAMSULOSIN HYDROCHLORIDE 0.4 MILLIGRAM(S): 0.4 CAPSULE ORAL at 22:45

## 2019-02-22 RX ADMIN — LOSARTAN POTASSIUM 50 MILLIGRAM(S): 100 TABLET, FILM COATED ORAL at 06:07

## 2019-02-22 RX ADMIN — Medication 3 MILLILITER(S): at 12:10

## 2019-02-22 RX ADMIN — MEMANTINE HYDROCHLORIDE 10 MILLIGRAM(S): 10 TABLET ORAL at 06:07

## 2019-02-22 RX ADMIN — Medication 75 MILLIGRAM(S): at 06:07

## 2019-02-22 RX ADMIN — DONEPEZIL HYDROCHLORIDE 10 MILLIGRAM(S): 10 TABLET, FILM COATED ORAL at 22:45

## 2019-02-22 RX ADMIN — ESCITALOPRAM OXALATE 5 MILLIGRAM(S): 10 TABLET, FILM COATED ORAL at 15:55

## 2019-02-22 RX ADMIN — ENOXAPARIN SODIUM 40 MILLIGRAM(S): 100 INJECTION SUBCUTANEOUS at 12:10

## 2019-02-22 RX ADMIN — Medication 3 MILLILITER(S): at 06:07

## 2019-02-22 RX ADMIN — Medication 25 MILLIGRAM(S): at 13:44

## 2019-02-22 RX ADMIN — Medication 30 MILLIGRAM(S): at 06:07

## 2019-02-22 RX ADMIN — Medication 20 MILLIGRAM(S): at 18:15

## 2019-02-22 RX ADMIN — Medication 10 MILLIGRAM(S): at 10:13

## 2019-02-22 RX ADMIN — Medication 3 MILLILITER(S): at 22:45

## 2019-02-22 RX ADMIN — Medication 75 MILLIGRAM(S): at 18:38

## 2019-02-22 RX ADMIN — MEMANTINE HYDROCHLORIDE 10 MILLIGRAM(S): 10 TABLET ORAL at 18:38

## 2019-02-22 RX ADMIN — FAMOTIDINE 20 MILLIGRAM(S): 10 INJECTION INTRAVENOUS at 18:15

## 2019-02-22 NOTE — PROVIDER CONTACT NOTE (OTHER) - ACTION/TREATMENT ORDERED:
Lauren PERKINS aware and acknowledged. As per provider, administer Hydralazine 25mg PO now. Will continue to monitor.

## 2019-02-22 NOTE — CONSULT NOTE ADULT - ASSESSMENT
ASSESSMENT/PLAN:  83M PMH sig for prostate ca, dementia, HTN, HLD who presented with fevers, cough, fatigue, found to have influenza. On the morning of 2/22/19 a routine VS check revealed HR in 30s, /90. RRT was called. Patient's mentation at baseline. Asymptomatic. EKG with sinus bradycardia with arrhythmia.     #Sinus bradycardia  - Hold BB  - Check TSH  - Maintain K>4, Mg>2  - Monitor on tele	      CAROLINA Rider  Cardiology Fellow   [id number]

## 2019-02-22 NOTE — CONSULT NOTE ADULT - SUBJECTIVE AND OBJECTIVE BOX
CHIEF COMPLAINT: cough, fatigue    HISTORY OF PRESENT ILLNESS:  83M PMH sig for prostate ca, dementia, HTN, HLD who presented with fevers, cough, fatigue, found to have influenza. On the morning of 2/22/19 a routine VS check revealed HR in 30s, /90. RRT was called. Patient's mentation at baseline. He denies CP, SOB, palpitations, lightheadedness/dizziness, pre-syncope/syncope. EKG with sinus bradycardia with arrhythmia.     Allergies  No Known Allergies    MEDICATIONS:  enoxaparin Injectable 40 milliGRAM(s) SubCutaneous daily  hydrALAZINE 25 milliGRAM(s) Oral every 8 hours  hydrochlorothiazide 12.5 milliGRAM(s) Oral daily  losartan 50 milliGRAM(s) Oral daily  tamsulosin 0.4 milliGRAM(s) Oral at bedtime  oseltamivir 75 milliGRAM(s) Oral two times a day  ALBUTerol/ipratropium for Nebulization 3 milliLiter(s) Nebulizer every 6 hours  acetaminophen   Tablet .. 650 milliGRAM(s) Oral every 6 hours PRN  donepezil 10 milliGRAM(s) Oral at bedtime  escitalopram 5 milliGRAM(s) Oral daily  memantine 10 milliGRAM(s) Oral two times a day  famotidine    Tablet 20 milliGRAM(s) Oral two times a day  methylPREDNISolone sodium succinate Injectable 30 milliGRAM(s) IV Push three times a day  simvastatin 40 milliGRAM(s) Oral at bedtime      PAST MEDICAL & SURGICAL HISTORY:  Mitral valve regurgitation  Prostate cancer  HLD (hyperlipidemia)  HTN (hypertension)  History of hip replacement      FAMILY HISTORY:  No pertinent family history in first degree relatives      SOCIAL HISTORY:    Non-smoker  Denies EtOH, illicit drugs    REVIEW OF SYSTEMS:  General: no fatigue/malaise, weight loss/gain.  Skin: no rashes.  Ophthalmologic: no blurred vision, no loss of vision. 	  ENT: no sore throat, rhinorrhea, sinus congestion.  Respiratory: no SOB, cough or wheeze.  Gastrointestinal:  no N/V/D, no melena/hematemesis/hematochezia.  Genitourinary: no dysuria/hesitancy or hematuria.  Musculoskeletal: no myalgias or arthralgias.  Neurological: no changes in vision or hearing, no lightheadedness/dizziness, no syncope/near syncope	  Psychiatric: no unusual stress/anxiety.   Hematology/Lymphatics: no unusual bleeding, bruising and no lymphadenopathy.  Endocrine: no unusual thirst.   All others negative except as stated above and in HPI.    PHYSICAL EXAM:  T(C): 36.5 (02-22-19 @ 06:30), Max: 37.1 (02-21-19 @ 16:00)  HR: 35 (02-22-19 @ 06:30) (35 - 59)  BP: 198/82 (02-22-19 @ 06:30) (159/75 - 198/82)  RR: 18 (02-22-19 @ 06:30) (17 - 18)  SpO2: 97% (02-22-19 @ 06:30) (91% - 97%)  Wt(kg): --  I&O's Summary    21 Feb 2019 07:01  -  22 Feb 2019 07:00  --------------------------------------------------------  IN: 120 mL / OUT: 0 mL / NET: 120 mL        Appearance: no acute distress  HEENT:   Normal oral mucosa, PERRL, EOMI	  Lymphatic: No lymphadenopathy  Cardiovascular: bradycardic, regular, Normal S1 S2, No JVD, No murmurs, No edema  Respiratory: Lungs clear to auscultation	  Psychiatry: A & O x 3, Mood & affect appropriate  Gastrointestinal:  Soft, Non-tender, + BS	  Skin: No rashes, No ecchymoses, No cyanosis	  Neurologic: Non-focal  Extremities: Normal range of motion, No clubbing, cyanosis or edema  Vascular: Peripheral pulses palpable 2+ bilaterally      LABS:	 	    CBC Full  -  ( 22 Feb 2019 06:07 )  WBC Count : 4.1 K/uL  Hemoglobin : 12.9 g/dL  Hematocrit : 37.0 %  Platelet Count - Automated : 136 K/uL  Mean Cell Volume : 92.3 fl  Mean Cell Hemoglobin : 32.3 pg  Mean Cell Hemoglobin Concentration : 35.0 gm/dL  Auto Neutrophil # : x  Auto Lymphocyte # : x  Auto Monocyte # : x  Auto Eosinophil # : x  Auto Basophil # : x  Auto Neutrophil % : x  Auto Lymphocyte % : x  Auto Monocyte % : x  Auto Eosinophil % : x  Auto Basophil % : x    02-22    141  |  104  |  17  ----------------------------<  184<H>  4.1   |  22  |  0.75  02-20    141  |  102  |  22  ----------------------------<  134<H>  3.7   |  24  |  1.00    Ca    9.1      22 Feb 2019 06:07  Ca    9.6      20 Feb 2019 16:29  Phos  3.9     02-22  Phos  3.0     02-20  Mg     2.2     02-22  Mg     2.0     02-20    TPro  6.7  /  Alb  3.8  /  TBili  0.3  /  DBili  x   /  AST  34  /  ALT  31  /  AlkPhos  69  02-22  TPro  7.4  /  Alb  4.4  /  TBili  0.7  /  DBili  x   /  AST  21  /  ALT  24  /  AlkPhos  78  02-20      proBNP:   Lipid Profile:   HgA1c:   TSH:       CARDIAC MARKERS:            EKG:  Sinus daniel with arrhythmia       	  ASSESSMENT/PLAN:  83M PMH sig for prostate ca, dementia, HTN, HLD who presented with fevers, cough, fatigue, found to have influenza. On the morning of 2/22/19 a routine VS check revealed HR in 30s, /90. RRT was called. Patient's mentation at baseline. Asymptomatic. EKG with sinus bradycardia with arrhythmia.     #Sinus bradycardia  - Hold BB  - Check TSH  - Maintain K>4, Mg>2  - Monitor on tele	      CAROLINA Rider  Cardiology Fellow   [id number] CHIEF COMPLAINT: cough, fatigue    HISTORY OF PRESENT ILLNESS:  83M PMH sig for prostate ca, dementia, HTN, HLD who presented with fevers, cough, fatigue, found to have influenza. On the morning of 2/22/19 a routine VS check revealed HR in 30s, /90. RRT was called. Patient's mentation at baseline. He denies CP, SOB, palpitations, lightheadedness/dizziness, pre-syncope/syncope. EKG with sinus bradycardia with arrhythmia.     Allergies  No Known Allergies    MEDICATIONS:  enoxaparin Injectable 40 milliGRAM(s) SubCutaneous daily  hydrALAZINE 25 milliGRAM(s) Oral every 8 hours  hydrochlorothiazide 12.5 milliGRAM(s) Oral daily  losartan 50 milliGRAM(s) Oral daily  tamsulosin 0.4 milliGRAM(s) Oral at bedtime  oseltamivir 75 milliGRAM(s) Oral two times a day  ALBUTerol/ipratropium for Nebulization 3 milliLiter(s) Nebulizer every 6 hours  acetaminophen   Tablet .. 650 milliGRAM(s) Oral every 6 hours PRN  donepezil 10 milliGRAM(s) Oral at bedtime  escitalopram 5 milliGRAM(s) Oral daily  memantine 10 milliGRAM(s) Oral two times a day  famotidine    Tablet 20 milliGRAM(s) Oral two times a day  methylPREDNISolone sodium succinate Injectable 30 milliGRAM(s) IV Push three times a day  simvastatin 40 milliGRAM(s) Oral at bedtime      PAST MEDICAL & SURGICAL HISTORY:  Mitral valve regurgitation  Prostate cancer  HLD (hyperlipidemia)  HTN (hypertension)  History of hip replacement      FAMILY HISTORY:  No pertinent family history in first degree relatives      SOCIAL HISTORY:    Non-smoker  Denies EtOH, illicit drugs    REVIEW OF SYSTEMS:  General: no fatigue/malaise, weight loss/gain.  Skin: no rashes.  Ophthalmologic: no blurred vision, no loss of vision. 	  ENT: no sore throat, rhinorrhea, sinus congestion.  Respiratory: no SOB, cough or wheeze.  Gastrointestinal:  no N/V/D, no melena/hematemesis/hematochezia.  Genitourinary: no dysuria/hesitancy or hematuria.  Musculoskeletal: no myalgias or arthralgias.  Neurological: no changes in vision or hearing, no lightheadedness/dizziness, no syncope/near syncope	  Psychiatric: no unusual stress/anxiety.   Hematology/Lymphatics: no unusual bleeding, bruising and no lymphadenopathy.  Endocrine: no unusual thirst.   All others negative except as stated above and in HPI.    PHYSICAL EXAM:  T(C): 36.5 (02-22-19 @ 06:30), Max: 37.1 (02-21-19 @ 16:00)  HR: 35 (02-22-19 @ 06:30) (35 - 59)  BP: 198/82 (02-22-19 @ 06:30) (159/75 - 198/82)  RR: 18 (02-22-19 @ 06:30) (17 - 18)  SpO2: 97% (02-22-19 @ 06:30) (91% - 97%)  Wt(kg): --  I&O's Summary    21 Feb 2019 07:01  -  22 Feb 2019 07:00  --------------------------------------------------------  IN: 120 mL / OUT: 0 mL / NET: 120 mL        Appearance: no acute distress  HEENT:   Normal oral mucosa, PERRL, EOMI	  Lymphatic: No lymphadenopathy  Cardiovascular: bradycardic, regular, Normal S1 S2, No JVD, No murmurs, No edema  Respiratory: Lungs clear to auscultation	  Psychiatry: A & O x 3, Mood & affect appropriate  Gastrointestinal:  Soft, Non-tender, + BS	  Skin: No rashes, No ecchymoses, No cyanosis	  Neurologic: Non-focal  Extremities: Normal range of motion, No clubbing, cyanosis or edema  Vascular: Peripheral pulses palpable 2+ bilaterally      LABS:	 	    CBC Full  -  ( 22 Feb 2019 06:07 )  WBC Count : 4.1 K/uL  Hemoglobin : 12.9 g/dL  Hematocrit : 37.0 %  Platelet Count - Automated : 136 K/uL  Mean Cell Volume : 92.3 fl  Mean Cell Hemoglobin : 32.3 pg  Mean Cell Hemoglobin Concentration : 35.0 gm/dL  Auto Neutrophil # : x  Auto Lymphocyte # : x  Auto Monocyte # : x  Auto Eosinophil # : x  Auto Basophil # : x  Auto Neutrophil % : x  Auto Lymphocyte % : x  Auto Monocyte % : x  Auto Eosinophil % : x  Auto Basophil % : x    02-22    141  |  104  |  17  ----------------------------<  184<H>  4.1   |  22  |  0.75  02-20    141  |  102  |  22  ----------------------------<  134<H>  3.7   |  24  |  1.00    Ca    9.1      22 Feb 2019 06:07  Ca    9.6      20 Feb 2019 16:29  Phos  3.9     02-22  Phos  3.0     02-20  Mg     2.2     02-22  Mg     2.0     02-20    TPro  6.7  /  Alb  3.8  /  TBili  0.3  /  DBili  x   /  AST  34  /  ALT  31  /  AlkPhos  69  02-22  TPro  7.4  /  Alb  4.4  /  TBili  0.7  /  DBili  x   /  AST  21  /  ALT  24  /  AlkPhos  78  02-20      proBNP:   Lipid Profile:   HgA1c:   TSH:       CARDIAC MARKERS:            EKG:  Sinus daniel with arrhythmia

## 2019-02-22 NOTE — CHART NOTE - NSCHARTNOTEFT_GEN_A_CORE
RRT called for newly discovered marked bradycardia w/ HR of 30s-40s. In brief, patient is a 84 y/o M w/ a PMHx of dementia, HTN, and HLD who p/w fevers and AMS, found to have Influenza AH3 infection. Patient's vitals were routinely being checked this AM and he was found to have a HR in the 30s. An RRT was subsequently called.    Upon arrival of the rapid response team, patient was seen laying comfortably in bed. He denied any acute complaints which include dizziness/lightheadedness, vision changes, chest pain, shortness of breath, or nausea. Patient's other VS were significant for HTN (SBP = 200-210), SpO2 > 95%, and a FS of 168. Pacer pads were applied to the patient. An EKG was obtained which showed a sinus arrythmia/bradycardia (HR of 35) w/ no overt ischemic changes. Review of patient's chart showed that his HR yesterday fluctuated between 55-62 and his medication list included PO Lopressor 50mg BID (last dose on 2/21 at 17:49). Patient's morning labs were drawn and Cristina were added. Cardiology was consulted who evaluated the patient at bedside and recommended that patient be monitored on telemetry. For patient's BP, he was given his home BP meds Losartan 50mg x1 and HCTZ 12.5mg x1. Lopressor was discontinued. Patient was then transferred to a telemetry floor and the RRT was ended.    For follow-up:  - Telemetry monitoring  - Monitor patient's BP closely. If patient's SBP does not modestly improve, consider giving PO Hydralazine PRN  - Follow-up Cardiology recs  - Avoid AV juan luis blocking agents     Plan was d/w patient and primary team GERTRUDIS Lai MD PGY3  Pager: [phone]

## 2019-02-22 NOTE — CHART NOTE - NSCHARTNOTEFT_GEN_A_CORE
MEDICINE SHANTEL MARIN  Patient is an 83 year old male who presents with a chief complaint of fever. (21 Feb 2019 22:50)       Event Summary:  Called by RN to report patient noted with bradycardia this AM - heart rate sustaining in the 30s and hypertension - BP of 188/82.  Patient seen and examined at the bedside.  He is alert.  Patient states that he feels hungry, otherwise no other complaints at this time.  Denies dizziness, headache, chest pain, palpitations, shortness of breath, abdominal pain, nausea, vomiting and diarrhea.       Vital Signs Last 24 Hrs  T(C): 36.5 (22 Feb 2019 06:30), Max: 37.1 (21 Feb 2019 16:00)  T(F): 97.7 (22 Feb 2019 06:30), Max: 98.7 (21 Feb 2019 16:00)  HR: 35 (22 Feb 2019 06:30) (35 - 59)  BP: 186/80 (22 Feb 2019 05:32) (159/75 - 188/82)  RR: 18 (22 Feb 2019 06:30) (17 - 20)  SpO2: 97% (22 Feb 2019 06:30) (91% - 97%)      PHYSICAL EXAM:  GENERAL: Laying down, in no acute distress  HEART: +S1/S2.  Regular rate and rhythm.  +bradycardia.  No murmurs, rubs or gallops  CHEST/LUNG: Breath sounds clear to auscultation bilaterally.  No wheezes, rales, rhonchi or crackles  ABDOMEN: Bowel sounds present in all 4 quadrants.  Soft, Nontender, Nondistended      HPI:  Patient is an 83 year old male with a PMHx of dementia, HTN and HLD whose wife was admitted yesterday for influenza who presented with cough, fevers and altered mental status. (20 Feb 2019 18:41)  Now with bradycardia / hypertension.      PLAN: Bradycardia / Hypertension  - Patient asymptomatic   - STAT EKG ordered showing sinus bradycardia @35bpm  - Repeat BP note with  and heart rate maintaining low 30s  - RRT called.  Please refer to RRT note for full interventions  - Cardiology consulted by RRT team.  Follow up with recommendations  - DaughterAsia made aware  - Discussed above with Dr. Herrera.  Recommending to start PO Hydralazine 25mg TID for better BP control  - Patient transferred to [facility] for telemetry monitoring  - Will continue to monitor closely  - Primary team to follow up in AM      #[id number]  Fany Bae PA-C  Medicine Department

## 2019-02-22 NOTE — PROGRESS NOTE ADULT - SUBJECTIVE AND OBJECTIVE BOX
Follow-up Pulm Progress Note  Ross Mitchell MD  [phone]    Feels well: denies SOB/Wheezing today  AFebrile, on Tamiflu    Medications:  Vital Signs Last 24 Hrs  T(C): 36.5 (22 Feb 2019 06:30), Max: 37.1 (21 Feb 2019 16:00)  T(F): 97.7 (22 Feb 2019 06:30), Max: 98.7 (21 Feb 2019 16:00)  HR: 63 (22 Feb 2019 10:53) (35 - 63)  BP: 167/70 (22 Feb 2019 10:53) (159/75 - 198/82)  BP(mean): --  RR: 18 (22 Feb 2019 10:53) (17 - 18)  SpO2: 95% (22 Feb 2019 10:53) (91% - 97%)      02-21 @ 07:01  -  02-22 @ 07:00  --------------------------------------------------------  IN: 120 mL / OUT: 0 mL / NET: 120 mL        LABS:                        12.9   4.1   )-----------( 136      ( 22 Feb 2019 06:07 )             37.0     02-22    141  |  104  |  17  ----------------------------<  184<H>  4.1   |  22  |  0.75    Ca    9.1      22 Feb 2019 06:07  Phos  3.9     02-22  Mg     2.2     02-22    TPro  6.7  /  Alb  3.8  /  TBili  0.3  /  DBili  x   /  AST  34  /  ALT  31  /  AlkPhos  69  02-22      PT/INR - ( 20 Feb 2019 16:35 )   PT: 11.5 sec;   INR: 1.00 ratio         PTT - ( 20 Feb 2019 16:35 )  PTT:28.7 sec  Procalcitonin, Serum: 0.19 ng/mL (02-20-19 @ 16:32)    Serum Pro-Brain Natriuretic Peptide: 532 pg/mL (02-20-19 @ 16:29)      CULTURES:  Culture Results:   No growth (02-20 @ 23:25)  Culture Results:   No growth to date. (02-20 @ 19:07)  Culture Results:   No growth to date. (02-20 @ 19:07)    Most recent blood culture -- 02-20 @ 23:25   -- -- .Urine Clean Catch (Midstream) 02-20 @ 23:25  Most recent blood culture -- 02-20 @ 19:07   -- -- .Blood Blood-Peripheral 02-20 @ 19:07      Physical Examination:  PULM: Clear without wheeze or rhonchi  CVS: Regular rate and rhythm, no murmurs, rubs, or gallops  ABD: Soft, non-tender  EXT:  No clubbing, cyanosis, or edema    RADIOLOGY REVIEWED  CXR:    CT chest:    TTE:

## 2019-02-22 NOTE — CONSULT NOTE ADULT - ATTENDING COMMENTS
Patient seen and examined. Agree with assessment and plan as outlined above. Patient presents with flu. He states he is feeling much better. Last night was noted on routine vital check to be bradycardic to the 30's. BP was also elevated. Patient is on metoprolol 50 mg BID. He is asymptomatic. He denies any history that would suggest he is symptomatic from bradycardia. No dizziness. No syncope. No PERES. He exercises regularly without issue. Can hold metoprolol for now. No indication for PPM. Increase losartan to 100 mg and HCTZ to 25 mg. He was started on hydralazine, may be preferable to change to qd medication like Norvasc.

## 2019-02-22 NOTE — PROGRESS NOTE ADULT - SUBJECTIVE AND OBJECTIVE BOX
Patient is a 83y old  Male who presents with a chief complaint of Fever (22 Feb 2019 11:18)                                                               INTERVAL HPI/OVERNIGHT EVENTS: evnets noted : bradycardia overnight and HTN urgency     REVIEW OF SYSTEMS:     CONSTITUTIONAL: No weakness, fevers or chills  RESPIRATORY: mild  cough, wheezing,  No shortness of breath  CARDIOVASCULAR: No chest pain or palpitations  GASTROINTESTINAL: No abdominal pain  . No nausea, vomiting, or hematemesis; No diarrhea or constipation. No melena or hematochezia.  GENITOURINARY: No dysuria, frequency or hematuria  NEUROLOGICAL: No numbness or weakness                                                                                                                                                                                                                                                                              Medications:  MEDICATIONS  (STANDING):  ALBUTerol/ipratropium for Nebulization 3 milliLiter(s) Nebulizer every 6 hours  donepezil 10 milliGRAM(s) Oral at bedtime  enoxaparin Injectable 40 milliGRAM(s) SubCutaneous daily  escitalopram 5 milliGRAM(s) Oral daily  famotidine    Tablet 20 milliGRAM(s) Oral two times a day  hydrALAZINE 25 milliGRAM(s) Oral every 8 hours  hydrochlorothiazide 12.5 milliGRAM(s) Oral daily  losartan 50 milliGRAM(s) Oral daily  memantine 10 milliGRAM(s) Oral two times a day  oseltamivir 75 milliGRAM(s) Oral two times a day  predniSONE   Tablet 20 milliGRAM(s) Oral two times a day  simvastatin 40 milliGRAM(s) Oral at bedtime  tamsulosin 0.4 milliGRAM(s) Oral at bedtime    MEDICATIONS  (PRN):  acetaminophen   Tablet .. 650 milliGRAM(s) Oral every 6 hours PRN Temp greater or equal to 38C (100.4F)       Allergies    No Known Allergies    Intolerances      Vital Signs Last 24 Hrs  T(C): 36.4 (22 Feb 2019 12:27), Max: 37.1 (21 Feb 2019 16:00)  T(F): 97.5 (22 Feb 2019 12:27), Max: 98.7 (21 Feb 2019 16:00)  HR: 58 (22 Feb 2019 13:42) (35 - 63)  BP: 145/58 (22 Feb 2019 13:42) (145/58 - 198/82)  BP(mean): --  RR: 18 (22 Feb 2019 13:42) (17 - 18)  SpO2: 94% (22 Feb 2019 13:42) (91% - 97%)  CAPILLARY BLOOD GLUCOSE      POCT Blood Glucose.: 279 mg/dL (22 Feb 2019 11:58)  POCT Blood Glucose.: 168 mg/dL (22 Feb 2019 05:53)      02-21 @ 07:01 - 02-22 @ 07:00  --------------------------------------------------------  IN: 120 mL / OUT: 0 mL / NET: 120 mL    02-22 @ 07:01  - 02-22 @ 15:15  --------------------------------------------------------  IN: 480 mL / OUT: 0 mL / NET: 480 mL      Physical Exam:    Daily Height in cm: 175.26 (21 Feb 2019 19:52)    General:  Well appearing, NAD, not cachetic  HEENT:  Nonicteric, PERRLA  CV:  RRR, S1S2   Lungs:  scattered wheezing   Abdomen:  Soft, non-tender, no distended, positive BS  Extremities:  2+ pulses, no c/c, no edema  Skin:  Warm and dry, no rashes  :  No mckeon  Neuro:  AAOx3, non-focal, grossly intact                                                                                                                                                                                                                                                                                                LABS:                               12.9   4.1   )-----------( 136      ( 22 Feb 2019 06:07 )             37.0                      02-22    141  |  104  |  17  ----------------------------<  184<H>  4.1   |  22  |  0.75    Ca    9.1      22 Feb 2019 06:07  Phos  3.9     02-22  Mg     2.2     02-22    TPro  6.7  /  Alb  3.8  /  TBili  0.3  /  DBili  x   /  AST  34  /  ALT  31  /  AlkPhos  69  02-22

## 2019-02-22 NOTE — PROGRESS NOTE ADULT - ASSESSMENT
83 yr old male with PMH sig for prostate ca ,  dementia, HTN, HLD whose wife was admitted yesterday for influenza, today presents with cough, fevers and altered MS.  found to have influenza     1- encephhalopathy : toxic metablic sec to influenza infection superimposed on underlying dementia   improved and seems to be at baseline     2- influenza : start steroids given bronchospasms on exam   nebs  cont domenico flu       3- HTN urgency  : off bb for hbradycardia ... cont hydralazine and mnitor closely     4- Dementia : cont meds  supportive.care    5- bradycardia:  hold BB   cont tele     discussed np , pt  and grand daughter    d/w   possible dc in 24 hours if BP and HR improve

## 2019-02-23 LAB
ANION GAP SERPL CALC-SCNC: 15 MMOL/L — SIGNIFICANT CHANGE UP (ref 5–17)
BUN SERPL-MCNC: 24 MG/DL — HIGH (ref 7–23)
CALCIUM SERPL-MCNC: 9.4 MG/DL — SIGNIFICANT CHANGE UP (ref 8.4–10.5)
CHLORIDE SERPL-SCNC: 103 MMOL/L — SIGNIFICANT CHANGE UP (ref 96–108)
CO2 SERPL-SCNC: 25 MMOL/L — SIGNIFICANT CHANGE UP (ref 22–31)
CREAT SERPL-MCNC: 0.87 MG/DL — SIGNIFICANT CHANGE UP (ref 0.5–1.3)
GLUCOSE BLDC GLUCOMTR-MCNC: 134 MG/DL — HIGH (ref 70–99)
GLUCOSE BLDC GLUCOMTR-MCNC: 182 MG/DL — HIGH (ref 70–99)
GLUCOSE BLDC GLUCOMTR-MCNC: 188 MG/DL — HIGH (ref 70–99)
GLUCOSE BLDC GLUCOMTR-MCNC: 207 MG/DL — HIGH (ref 70–99)
GLUCOSE SERPL-MCNC: 160 MG/DL — HIGH (ref 70–99)
HCT VFR BLD CALC: 39.4 % — SIGNIFICANT CHANGE UP (ref 39–50)
HGB BLD-MCNC: 12.6 G/DL — LOW (ref 13–17)
MAGNESIUM SERPL-MCNC: 2 MG/DL — SIGNIFICANT CHANGE UP (ref 1.6–2.6)
MCHC RBC-ENTMCNC: 30.9 PG — SIGNIFICANT CHANGE UP (ref 27–34)
MCHC RBC-ENTMCNC: 32 GM/DL — SIGNIFICANT CHANGE UP (ref 32–36)
MCV RBC AUTO: 96.6 FL — SIGNIFICANT CHANGE UP (ref 80–100)
PHOSPHATE SERPL-MCNC: 3.1 MG/DL — SIGNIFICANT CHANGE UP (ref 2.5–4.5)
PLATELET # BLD AUTO: 161 K/UL — SIGNIFICANT CHANGE UP (ref 150–400)
POTASSIUM SERPL-MCNC: 4.2 MMOL/L — SIGNIFICANT CHANGE UP (ref 3.5–5.3)
POTASSIUM SERPL-SCNC: 4.2 MMOL/L — SIGNIFICANT CHANGE UP (ref 3.5–5.3)
RBC # BLD: 4.08 M/UL — LOW (ref 4.2–5.8)
RBC # FLD: 14.3 % — SIGNIFICANT CHANGE UP (ref 10.3–14.5)
SODIUM SERPL-SCNC: 143 MMOL/L — SIGNIFICANT CHANGE UP (ref 135–145)
WBC # BLD: 7.39 K/UL — SIGNIFICANT CHANGE UP (ref 3.8–10.5)
WBC # FLD AUTO: 7.39 K/UL — SIGNIFICANT CHANGE UP (ref 3.8–10.5)

## 2019-02-23 PROCEDURE — 99232 SBSQ HOSP IP/OBS MODERATE 35: CPT | Mod: GC

## 2019-02-23 RX ORDER — HYDRALAZINE HCL 50 MG
10 TABLET ORAL ONCE
Qty: 0 | Refills: 0 | Status: COMPLETED | OUTPATIENT
Start: 2019-02-23 | End: 2019-02-23

## 2019-02-23 RX ORDER — HYDROCHLOROTHIAZIDE 25 MG
25 TABLET ORAL DAILY
Qty: 0 | Refills: 0 | Status: DISCONTINUED | OUTPATIENT
Start: 2019-02-23 | End: 2019-02-24

## 2019-02-23 RX ADMIN — Medication 3 MILLILITER(S): at 05:58

## 2019-02-23 RX ADMIN — Medication 25 MILLIGRAM(S): at 21:39

## 2019-02-23 RX ADMIN — Medication 25 MILLIGRAM(S): at 13:46

## 2019-02-23 RX ADMIN — Medication 25 MILLIGRAM(S): at 06:03

## 2019-02-23 RX ADMIN — Medication 12.5 MILLIGRAM(S): at 05:58

## 2019-02-23 RX ADMIN — Medication 75 MILLIGRAM(S): at 16:56

## 2019-02-23 RX ADMIN — TAMSULOSIN HYDROCHLORIDE 0.4 MILLIGRAM(S): 0.4 CAPSULE ORAL at 21:39

## 2019-02-23 RX ADMIN — ESCITALOPRAM OXALATE 5 MILLIGRAM(S): 10 TABLET, FILM COATED ORAL at 11:03

## 2019-02-23 RX ADMIN — Medication 20 MILLIGRAM(S): at 16:57

## 2019-02-23 RX ADMIN — FAMOTIDINE 20 MILLIGRAM(S): 10 INJECTION INTRAVENOUS at 16:55

## 2019-02-23 RX ADMIN — Medication 25 MILLIGRAM(S): at 16:54

## 2019-02-23 RX ADMIN — MEMANTINE HYDROCHLORIDE 10 MILLIGRAM(S): 10 TABLET ORAL at 16:56

## 2019-02-23 RX ADMIN — Medication 3 MILLILITER(S): at 11:03

## 2019-02-23 RX ADMIN — Medication 3 MILLILITER(S): at 16:55

## 2019-02-23 RX ADMIN — Medication 10 MILLIGRAM(S): at 19:02

## 2019-02-23 RX ADMIN — DONEPEZIL HYDROCHLORIDE 10 MILLIGRAM(S): 10 TABLET, FILM COATED ORAL at 21:39

## 2019-02-23 RX ADMIN — Medication 75 MILLIGRAM(S): at 05:58

## 2019-02-23 RX ADMIN — SIMVASTATIN 40 MILLIGRAM(S): 20 TABLET, FILM COATED ORAL at 21:39

## 2019-02-23 RX ADMIN — Medication 20 MILLIGRAM(S): at 05:58

## 2019-02-23 RX ADMIN — LOSARTAN POTASSIUM 50 MILLIGRAM(S): 100 TABLET, FILM COATED ORAL at 05:58

## 2019-02-23 RX ADMIN — MEMANTINE HYDROCHLORIDE 10 MILLIGRAM(S): 10 TABLET ORAL at 05:58

## 2019-02-23 RX ADMIN — FAMOTIDINE 20 MILLIGRAM(S): 10 INJECTION INTRAVENOUS at 05:58

## 2019-02-23 RX ADMIN — ENOXAPARIN SODIUM 40 MILLIGRAM(S): 100 INJECTION SUBCUTANEOUS at 11:03

## 2019-02-23 NOTE — PROGRESS NOTE ADULT - ASSESSMENT
83 yr old male with PMH sig for prostate ca ,  dementia, HTN, HLD whose wife was admitted yesterday for influenza, today presents with cough, fevers and altered MS.  found to have influenza     1- encephhalopathy : toxic metablic sec to influenza infection superimposed on underlying dementia   improved and seems to be at baseline     2- influenza : start steroids given bronchospasms on exam   nebs  cont domenico flu       3- HTN urgency  : off bb for hbradycardia ... cont hydralazine and mnitor closely     4- Dementia : cont meds  supportive.care    5- bradycardia:  hold BB   cont tele     discussed np , pt  and grand daughter    d/w   possible dc in 24 hours if BP and HR improve 83 yr old male with PMH sig for prostate ca ,  dementia, HTN, HLD whose wife was admitted yesterday for influenza, today presents with cough, fevers and altered MS.  found to have influenza     1- encephhalopathy : toxic metablic sec to influenza infection superimposed on underlying dementia   improved and seems to be at baseline     2- influenza : cont  steroids given bronchospasms on exam   nebs  cont domenico flu       3- HTN urgency  : off bb for hbradycardia ... cont hydralazine and mnitor closely     4- Dementia : cont meds  supportive.care    5- bradycardia:  hold BB   cont tele :  f/u with cardio for brief episode of PAT

## 2019-02-23 NOTE — PHYSICAL THERAPY INITIAL EVALUATION ADULT - ADDITIONAL COMMENTS
Pt lives with spouse in a private house with + 3 steps to enter, + handrail and steps to second floor with + handrail.

## 2019-02-23 NOTE — PROGRESS NOTE ADULT - SUBJECTIVE AND OBJECTIVE BOX
Date of Admission:    24H hour events:     MEDICATIONS:  enoxaparin Injectable 40 milliGRAM(s) SubCutaneous daily  hydrALAZINE 25 milliGRAM(s) Oral every 8 hours  hydrochlorothiazide 12.5 milliGRAM(s) Oral daily  losartan 50 milliGRAM(s) Oral daily  tamsulosin 0.4 milliGRAM(s) Oral at bedtime  oseltamivir 75 milliGRAM(s) Oral two times a day  ALBUTerol/ipratropium for Nebulization 3 milliLiter(s) Nebulizer every 6 hours  acetaminophen   Tablet .. 650 milliGRAM(s) Oral every 6 hours PRN  donepezil 10 milliGRAM(s) Oral at bedtime  escitalopram 5 milliGRAM(s) Oral daily  memantine 10 milliGRAM(s) Oral two times a day  famotidine    Tablet 20 milliGRAM(s) Oral two times a day  predniSONE   Tablet 20 milliGRAM(s) Oral two times a day  simvastatin 40 milliGRAM(s) Oral at bedtime      REVIEW OF SYSTEMS:  Complete 10point ROS negative.    PHYSICAL EXAM:  T(C): 36.5 (02-23-19 @ 04:21), Max: 36.7 (02-22-19 @ 20:07)  HR: 59 (02-23-19 @ 05:55) (48 - 63)  BP: 176/93 (02-23-19 @ 05:55) (145/58 - 190/80)  RR: 18 (02-23-19 @ 04:21) (18 - 18)  SpO2: 92% (02-23-19 @ 04:21) (92% - 95%)  Wt(kg): --  I&O's Summary    22 Feb 2019 07:01  -  23 Feb 2019 07:00  --------------------------------------------------------  IN: 600 mL / OUT: 0 mL / NET: 600 mL        Appearance: Normal	  HEENT:   Normal oral mucosa, PERRL, EOMI	  Lymphatic: No lymphadenopathy  Cardiovascular: Normal S1 S2, No JVD, No murmurs, No edema  Respiratory: Lungs clear to auscultation	  Psychiatry: A & O x 3, Mood & affect appropriate  Gastrointestinal:  Soft, Non-tender, + BS	  Skin: No rashes, No ecchymoses, No cyanosis	  Neurologic: Non-focal  Extremities: Normal range of motion, No clubbing, cyanosis or edema  Vascular: Peripheral pulses palpable 2+ bilaterally        LABS:	 	    CBC Full  -  ( 22 Feb 2019 06:07 )  WBC Count : 4.1 K/uL  Hemoglobin : 12.9 g/dL  Hematocrit : 37.0 %  Platelet Count - Automated : 136 K/uL  Mean Cell Volume : 92.3 fl  Mean Cell Hemoglobin : 32.3 pg  Mean Cell Hemoglobin Concentration : 35.0 gm/dL    02-23    143  |  103  |  24<H>  ----------------------------<  160<H>  4.2   |  25  |  0.87  02-22    141  |  104  |  17  ----------------------------<  184<H>  4.1   |  22  |  0.75    Ca    9.4      23 Feb 2019 06:19  Ca    9.1      22 Feb 2019 06:07  Phos  3.9     02-22  Mg     2.2     02-22    TPro  6.7  /  Alb  3.8  /  TBili  0.3  /  DBili  x   /  AST  34  /  ALT  31  /  AlkPhos  69  02-22    proBNP: Serum Pro-Brain Natriuretic Peptide: 532 pg/mL (02-20 @ 16:29)    TELEMETRY: Date of Admission:    24H hour events: Hypertensive overnight. Asymptomatic. Reports feeling well improved, eager to be discharged and to visit his wife who is also hospitalized for flu.     MEDICATIONS:  enoxaparin Injectable 40 milliGRAM(s) SubCutaneous daily  hydrALAZINE 25 milliGRAM(s) Oral every 8 hours  hydrochlorothiazide 12.5 milliGRAM(s) Oral daily  losartan 50 milliGRAM(s) Oral daily  tamsulosin 0.4 milliGRAM(s) Oral at bedtime  oseltamivir 75 milliGRAM(s) Oral two times a day  ALBUTerol/ipratropium for Nebulization 3 milliLiter(s) Nebulizer every 6 hours  acetaminophen   Tablet .. 650 milliGRAM(s) Oral every 6 hours PRN  donepezil 10 milliGRAM(s) Oral at bedtime  escitalopram 5 milliGRAM(s) Oral daily  memantine 10 milliGRAM(s) Oral two times a day  famotidine    Tablet 20 milliGRAM(s) Oral two times a day  predniSONE   Tablet 20 milliGRAM(s) Oral two times a day  simvastatin 40 milliGRAM(s) Oral at bedtime      REVIEW OF SYSTEMS:  Complete 10point ROS negative.    PHYSICAL EXAM:  T(C): 36.5 (02-23-19 @ 04:21), Max: 36.7 (02-22-19 @ 20:07)  HR: 59 (02-23-19 @ 05:55) (48 - 63)  BP: 176/93 (02-23-19 @ 05:55) (145/58 - 190/80)  RR: 18 (02-23-19 @ 04:21) (18 - 18)  SpO2: 92% (02-23-19 @ 04:21) (92% - 95%)  Wt(kg): --  I&O's Summary    22 Feb 2019 07:01  -  23 Feb 2019 07:00  --------------------------------------------------------  IN: 600 mL / OUT: 0 mL / NET: 600 mL        Appearance: Normal	  HEENT:   Normal oral mucosa, PERRL, EOMI	  Lymphatic: No lymphadenopathy  Cardiovascular: Normal S1 S2, No JVD, No murmurs, No edema  Respiratory: Lungs clear to auscultation	  Psychiatry: A & O x 3, Mood & affect appropriate  Gastrointestinal:  Soft, Non-tender, + BS	  Skin: No rashes, No ecchymoses, No cyanosis	  Neurologic: Non-focal  Extremities: Normal range of motion, No clubbing, cyanosis or edema  Vascular: Peripheral pulses palpable 2+ bilaterally        LABS:	 	    CBC Full  -  ( 22 Feb 2019 06:07 )  WBC Count : 4.1 K/uL  Hemoglobin : 12.9 g/dL  Hematocrit : 37.0 %  Platelet Count - Automated : 136 K/uL  Mean Cell Volume : 92.3 fl  Mean Cell Hemoglobin : 32.3 pg  Mean Cell Hemoglobin Concentration : 35.0 gm/dL    02-23    143  |  103  |  24<H>  ----------------------------<  160<H>  4.2   |  25  |  0.87  02-22    141  |  104  |  17  ----------------------------<  184<H>  4.1   |  22  |  0.75    Ca    9.4      23 Feb 2019 06:19  Ca    9.1      22 Feb 2019 06:07  Phos  3.9     02-22  Mg     2.2     02-22    TPro  6.7  /  Alb  3.8  /  TBili  0.3  /  DBili  x   /  AST  34  /  ALT  31  /  AlkPhos  69  02-22    proBNP: Serum Pro-Brain Natriuretic Peptide: 532 pg/mL (02-20 @ 16:29)    TELEMETRY: 	SB 40-60s

## 2019-02-23 NOTE — PROGRESS NOTE ADULT - ASSESSMENT
83M PMH sig for prostate ca, dementia, HTN, HLD who presented with fevers, cough, fatigue, found to have influenza cardiology consulted for sinus bradycardia, asymptomatic.     #Sinus bradycardia with significant hypertension; asymptomatic; No hx of syncope or evidence of chronotropic incompetence; Per patient report he exercises regularly without symptom; No indication for PPM;  - Continue to hold BB  - Increase HCTZ to 25mg qd   - Currently on Hydralazine 25mg TID, would be preferable to change to daily medication Norvasc  - Will likely require outpatient uptitration of antihypertensives.     Dispo per primary team, likely home today.

## 2019-02-23 NOTE — PROGRESS NOTE ADULT - ATTENDING COMMENTS
I interviewed and examined Mr. Doll.  His case was discussed with Dr. Morris.  Please obtain TTE to assess ventricular function.

## 2019-02-23 NOTE — PHYSICAL THERAPY INITIAL EVALUATION ADULT - PERTINENT HX OF CURRENT PROBLEM, REHAB EVAL
83 yr old male with PMH sig for dementia, HTN, HLD whose wife was admitted yesterday for influenza, today presents with cough, fevers and altered MS.  Patient was in his usual state of health yesterday when his son noticed that he seemed more tired than normal.  This morning he was noted to be confused, coughing, and febrile to 102 at home.  He was taken to his PCPs office where he was again noted to be confused, febrile and instructed to go to the ER to r/o PNA.

## 2019-02-23 NOTE — PROGRESS NOTE ADULT - SUBJECTIVE AND OBJECTIVE BOX
Patient is a 83y old  Male who presents with a chief complaint of Fever (23 Feb 2019 07:36)                                                               INTERVAL HPI/OVERNIGHT EVENTS:    REVIEW OF SYSTEMS:     CONSTITUTIONAL: No weakness, fevers or chills  EYES/ENT: No visual changes , no ear ache   NECK: No pain or stiffness  RESPIRATORY: No cough, wheezing,  No shortness of breath  CARDIOVASCULAR: No chest pain or palpitations  GASTROINTESTINAL: No abdominal pain  . No nausea, vomiting, or hematemesis; No diarrhea or constipation. No melena or hematochezia.  GENITOURINARY: No dysuria, frequency or hematuria  NEUROLOGICAL: No numbness or weakness  SKIN: No itching, burning, rashes, or lesions                                                                                                                                                                                                                                                                                 Medications:  MEDICATIONS  (STANDING):  ALBUTerol/ipratropium for Nebulization 3 milliLiter(s) Nebulizer every 6 hours  donepezil 10 milliGRAM(s) Oral at bedtime  enoxaparin Injectable 40 milliGRAM(s) SubCutaneous daily  escitalopram 5 milliGRAM(s) Oral daily  famotidine    Tablet 20 milliGRAM(s) Oral two times a day  hydrALAZINE 25 milliGRAM(s) Oral every 8 hours  hydrochlorothiazide 25 milliGRAM(s) Oral daily  losartan 50 milliGRAM(s) Oral daily  memantine 10 milliGRAM(s) Oral two times a day  oseltamivir 75 milliGRAM(s) Oral two times a day  predniSONE   Tablet 20 milliGRAM(s) Oral two times a day  simvastatin 40 milliGRAM(s) Oral at bedtime  tamsulosin 0.4 milliGRAM(s) Oral at bedtime    MEDICATIONS  (PRN):  acetaminophen   Tablet .. 650 milliGRAM(s) Oral every 6 hours PRN Temp greater or equal to 38C (100.4F)       Allergies    No Known Allergies    Intolerances      Vital Signs Last 24 Hrs  T(C): 36.4 (23 Feb 2019 20:42), Max: 36.6 (23 Feb 2019 10:00)  T(F): 97.6 (23 Feb 2019 20:42), Max: 97.8 (23 Feb 2019 10:00)  HR: 63 (23 Feb 2019 20:42) (52 - 75)  BP: 152/76 (23 Feb 2019 20:42) (152/76 - 190/80)  BP(mean): --  RR: 18 (23 Feb 2019 20:42) (18 - 18)  SpO2: 90% (23 Feb 2019 20:42) (90% - 95%)  CAPILLARY BLOOD GLUCOSE      POCT Blood Glucose.: 182 mg/dL (23 Feb 2019 21:25)  POCT Blood Glucose.: 188 mg/dL (23 Feb 2019 16:32)  POCT Blood Glucose.: 207 mg/dL (23 Feb 2019 11:48)  POCT Blood Glucose.: 134 mg/dL (23 Feb 2019 07:42)      02-22 @ 07:01 - 02-23 @ 07:00  --------------------------------------------------------  IN: 600 mL / OUT: 0 mL / NET: 600 mL    02-23 @ 07:01  - 02-23 @ 23:53  --------------------------------------------------------  IN: 480 mL / OUT: 0 mL / NET: 480 mL      Physical Exam:    Daily     Daily   General:  Well appearing, NAD, not cachetic  HEENT:  Nonicteric, PERRLA  CV:  RRR, S1S2   Lungs:  CTA B/L, no wheezes, rales, rhonchi  Abdomen:  Soft, non-tender, no distended, positive BS  Extremities:  2+ pulses, no c/c, no edema  Skin:  Warm and dry, no rashes  :  No mckeon  Neuro:  AAOx3, non-focal, grossly intact                                                                                                                                                                                                                                                                                                LABS:                               12.6   7.39  )-----------( 161      ( 23 Feb 2019 09:21 )             39.4                      02-23    143  |  103  |  24<H>  ----------------------------<  160<H>  4.2   |  25  |  0.87    Ca    9.4      23 Feb 2019 06:19  Phos  3.1     02-23  Mg     2.0     02-23    TPro  6.7  /  Alb  3.8  /  TBili  0.3  /  DBili  x   /  AST  34  /  ALT  31  /  AlkPhos  69  02-22                       RADIOLOGY & ADDITIONAL TESTS         I personally reviewed: [  ]EKG   [  ]CXR    [  ] CT      A/P:         Discussed with :     [facility] consultants' Notes   Time spent : Patient is a 83y old  Male who presents with a chief complaint of Fever (23 Feb 2019 07:36)                                                               INTERVAL HPI/OVERNIGHT EVENTS:    REVIEW OF SYSTEMS:     CONSTITUTIONAL: No weakness, fevers or chills  RESPIRATORY: No cough, wheezing,  No shortness of breath  CARDIOVASCULAR: No chest pain or palpitations  GASTROINTESTINAL: No abdominal pain  . No nausea, vomiting, or hematemesis; No diarrhea or constipation. No melena or hematochezia.  GENITOURINARY: No dysuria, frequency or hematuria  NEUROLOGICAL: No numbness or weakness                                                                                                                                                                                                                                                                          Medications:  MEDICATIONS  (STANDING):  ALBUTerol/ipratropium for Nebulization 3 milliLiter(s) Nebulizer every 6 hours  donepezil 10 milliGRAM(s) Oral at bedtime  enoxaparin Injectable 40 milliGRAM(s) SubCutaneous daily  escitalopram 5 milliGRAM(s) Oral daily  famotidine    Tablet 20 milliGRAM(s) Oral two times a day  hydrALAZINE 25 milliGRAM(s) Oral every 8 hours  hydrochlorothiazide 25 milliGRAM(s) Oral daily  losartan 50 milliGRAM(s) Oral daily  memantine 10 milliGRAM(s) Oral two times a day  oseltamivir 75 milliGRAM(s) Oral two times a day  predniSONE   Tablet 20 milliGRAM(s) Oral two times a day  simvastatin 40 milliGRAM(s) Oral at bedtime  tamsulosin 0.4 milliGRAM(s) Oral at bedtime    MEDICATIONS  (PRN):  acetaminophen   Tablet .. 650 milliGRAM(s) Oral every 6 hours PRN Temp greater or equal to 38C (100.4F)       Allergies    No Known Allergies    Intolerances      Vital Signs Last 24 Hrs  T(C): 36.4 (23 Feb 2019 20:42), Max: 36.6 (23 Feb 2019 10:00)  T(F): 97.6 (23 Feb 2019 20:42), Max: 97.8 (23 Feb 2019 10:00)  HR: 63 (23 Feb 2019 20:42) (52 - 75)  BP: 152/76 (23 Feb 2019 20:42) (152/76 - 190/80)  BP(mean): --  RR: 18 (23 Feb 2019 20:42) (18 - 18)  SpO2: 90% (23 Feb 2019 20:42) (90% - 95%)  CAPILLARY BLOOD GLUCOSE      POCT Blood Glucose.: 182 mg/dL (23 Feb 2019 21:25)  POCT Blood Glucose.: 188 mg/dL (23 Feb 2019 16:32)  POCT Blood Glucose.: 207 mg/dL (23 Feb 2019 11:48)  POCT Blood Glucose.: 134 mg/dL (23 Feb 2019 07:42)      02-22 @ 07:01 - 02-23 @ 07:00  --------------------------------------------------------  IN: 600 mL / OUT: 0 mL / NET: 600 mL    02-23 @ 07:01 - 02-23 @ 23:53  --------------------------------------------------------  IN: 480 mL / OUT: 0 mL / NET: 480 mL      Physical Exam:    General:  NAD   HEENT:  Nonicteric, PERRLA  CV:  RRR, S1S2   Lungs: scattered wheezing   Abdomen:  Soft, non-tender, no distended, positive BS  Extremities:  2+ pulses, no c/c, no edema  Skin:  Warm and dry, no rashes  :  No mckeon  Neuro:  AAOx3, non-focal, grossly intact                                                                                                                                                                                                                                                                                                LABS:                               12.6   7.39  )-----------( 161      ( 23 Feb 2019 09:21 )             39.4                      02-23    143  |  103  |  24<H>  ----------------------------<  160<H>  4.2   |  25  |  0.87    Ca    9.4      23 Feb 2019 06:19  Phos  3.1     02-23  Mg     2.0     02-23    TPro  6.7  /  Alb  3.8  /  TBili  0.3  /  DBili  x   /  AST  34  /  ALT  31  /  AlkPhos  69  02-22

## 2019-02-23 NOTE — PHYSICAL THERAPY INITIAL EVALUATION ADULT - PRECAUTIONS/LIMITATIONS, REHAB EVAL
Pt started on Abx empirically butr then discontinued b/c CT Chest : neg. RVP pendingu cardiac precautions/Pt started on Abx empirically butr then discontinued b/c CT Chest : neg. RVP pendingu cardiac precautions/Pt started on Abx empirically but then discontinued b/c CT Chest : neg. RVP: + Flu started on Tamiflu. RRT 2/22 for bradycardia, cardiology managing

## 2019-02-24 LAB
ANION GAP SERPL CALC-SCNC: 17 MMOL/L — SIGNIFICANT CHANGE UP (ref 5–17)
ANION GAP SERPL CALC-SCNC: 18 MMOL/L — HIGH (ref 5–17)
BUN SERPL-MCNC: 20 MG/DL — SIGNIFICANT CHANGE UP (ref 7–23)
BUN SERPL-MCNC: 20 MG/DL — SIGNIFICANT CHANGE UP (ref 7–23)
CALCIUM SERPL-MCNC: 9.6 MG/DL — SIGNIFICANT CHANGE UP (ref 8.4–10.5)
CALCIUM SERPL-MCNC: 9.8 MG/DL — SIGNIFICANT CHANGE UP (ref 8.4–10.5)
CHLORIDE SERPL-SCNC: 96 MMOL/L — SIGNIFICANT CHANGE UP (ref 96–108)
CHLORIDE SERPL-SCNC: 98 MMOL/L — SIGNIFICANT CHANGE UP (ref 96–108)
CO2 SERPL-SCNC: 23 MMOL/L — SIGNIFICANT CHANGE UP (ref 22–31)
CO2 SERPL-SCNC: 24 MMOL/L — SIGNIFICANT CHANGE UP (ref 22–31)
CREAT SERPL-MCNC: 0.85 MG/DL — SIGNIFICANT CHANGE UP (ref 0.5–1.3)
CREAT SERPL-MCNC: 0.85 MG/DL — SIGNIFICANT CHANGE UP (ref 0.5–1.3)
GLUCOSE BLDC GLUCOMTR-MCNC: 123 MG/DL — HIGH (ref 70–99)
GLUCOSE BLDC GLUCOMTR-MCNC: 159 MG/DL — HIGH (ref 70–99)
GLUCOSE BLDC GLUCOMTR-MCNC: 174 MG/DL — HIGH (ref 70–99)
GLUCOSE BLDC GLUCOMTR-MCNC: 182 MG/DL — HIGH (ref 70–99)
GLUCOSE SERPL-MCNC: 155 MG/DL — HIGH (ref 70–99)
GLUCOSE SERPL-MCNC: 156 MG/DL — HIGH (ref 70–99)
HCT VFR BLD CALC: 42.1 % — SIGNIFICANT CHANGE UP (ref 39–50)
HGB BLD-MCNC: 13.9 G/DL — SIGNIFICANT CHANGE UP (ref 13–17)
MAGNESIUM SERPL-MCNC: 2.1 MG/DL — SIGNIFICANT CHANGE UP (ref 1.6–2.6)
MCHC RBC-ENTMCNC: 30.5 PG — SIGNIFICANT CHANGE UP (ref 27–34)
MCHC RBC-ENTMCNC: 33 GM/DL — SIGNIFICANT CHANGE UP (ref 32–36)
MCV RBC AUTO: 92.5 FL — SIGNIFICANT CHANGE UP (ref 80–100)
PHOSPHATE SERPL-MCNC: 3.2 MG/DL — SIGNIFICANT CHANGE UP (ref 2.5–4.5)
PLATELET # BLD AUTO: 185 K/UL — SIGNIFICANT CHANGE UP (ref 150–400)
POTASSIUM SERPL-MCNC: 3.3 MMOL/L — LOW (ref 3.5–5.3)
POTASSIUM SERPL-MCNC: 3.4 MMOL/L — LOW (ref 3.5–5.3)
POTASSIUM SERPL-MCNC: 4.3 MMOL/L — SIGNIFICANT CHANGE UP (ref 3.5–5.3)
POTASSIUM SERPL-SCNC: 3.3 MMOL/L — LOW (ref 3.5–5.3)
POTASSIUM SERPL-SCNC: 3.4 MMOL/L — LOW (ref 3.5–5.3)
POTASSIUM SERPL-SCNC: 4.3 MMOL/L — SIGNIFICANT CHANGE UP (ref 3.5–5.3)
RBC # BLD: 4.55 M/UL — SIGNIFICANT CHANGE UP (ref 4.2–5.8)
RBC # FLD: 14.1 % — SIGNIFICANT CHANGE UP (ref 10.3–14.5)
SODIUM SERPL-SCNC: 137 MMOL/L — SIGNIFICANT CHANGE UP (ref 135–145)
SODIUM SERPL-SCNC: 139 MMOL/L — SIGNIFICANT CHANGE UP (ref 135–145)
WBC # BLD: 8.5 K/UL — SIGNIFICANT CHANGE UP (ref 3.8–10.5)
WBC # FLD AUTO: 8.5 K/UL — SIGNIFICANT CHANGE UP (ref 3.8–10.5)

## 2019-02-24 PROCEDURE — 93010 ELECTROCARDIOGRAM REPORT: CPT

## 2019-02-24 RX ORDER — POTASSIUM CHLORIDE 20 MEQ
40 PACKET (EA) ORAL EVERY 4 HOURS
Qty: 0 | Refills: 0 | Status: COMPLETED | OUTPATIENT
Start: 2019-02-24 | End: 2019-02-24

## 2019-02-24 RX ORDER — HYDRALAZINE HCL 50 MG
10 TABLET ORAL ONCE
Qty: 0 | Refills: 0 | Status: COMPLETED | OUTPATIENT
Start: 2019-02-24 | End: 2019-02-25

## 2019-02-24 RX ORDER — METOPROLOL TARTRATE 50 MG
12.5 TABLET ORAL EVERY 12 HOURS
Qty: 0 | Refills: 0 | Status: DISCONTINUED | OUTPATIENT
Start: 2019-02-24 | End: 2019-02-25

## 2019-02-24 RX ORDER — METOPROLOL TARTRATE 50 MG
12.5 TABLET ORAL EVERY 12 HOURS
Qty: 0 | Refills: 0 | Status: DISCONTINUED | OUTPATIENT
Start: 2019-02-24 | End: 2019-02-24

## 2019-02-24 RX ORDER — HYDRALAZINE HCL 50 MG
50 TABLET ORAL THREE TIMES A DAY
Qty: 0 | Refills: 0 | Status: DISCONTINUED | OUTPATIENT
Start: 2019-02-24 | End: 2019-02-26

## 2019-02-24 RX ADMIN — Medication 75 MILLIGRAM(S): at 17:23

## 2019-02-24 RX ADMIN — Medication 25 MILLIGRAM(S): at 05:24

## 2019-02-24 RX ADMIN — ESCITALOPRAM OXALATE 5 MILLIGRAM(S): 10 TABLET, FILM COATED ORAL at 12:29

## 2019-02-24 RX ADMIN — SIMVASTATIN 40 MILLIGRAM(S): 20 TABLET, FILM COATED ORAL at 21:40

## 2019-02-24 RX ADMIN — Medication 3 MILLILITER(S): at 00:16

## 2019-02-24 RX ADMIN — ENOXAPARIN SODIUM 40 MILLIGRAM(S): 100 INJECTION SUBCUTANEOUS at 12:28

## 2019-02-24 RX ADMIN — FAMOTIDINE 20 MILLIGRAM(S): 10 INJECTION INTRAVENOUS at 05:22

## 2019-02-24 RX ADMIN — Medication 40 MILLIEQUIVALENT(S): at 09:45

## 2019-02-24 RX ADMIN — Medication 20 MILLIGRAM(S): at 17:24

## 2019-02-24 RX ADMIN — Medication 75 MILLIGRAM(S): at 05:22

## 2019-02-24 RX ADMIN — FAMOTIDINE 20 MILLIGRAM(S): 10 INJECTION INTRAVENOUS at 17:23

## 2019-02-24 RX ADMIN — Medication 50 MILLIGRAM(S): at 21:42

## 2019-02-24 RX ADMIN — LOSARTAN POTASSIUM 50 MILLIGRAM(S): 100 TABLET, FILM COATED ORAL at 09:44

## 2019-02-24 RX ADMIN — Medication 3 MILLILITER(S): at 05:22

## 2019-02-24 RX ADMIN — DONEPEZIL HYDROCHLORIDE 10 MILLIGRAM(S): 10 TABLET, FILM COATED ORAL at 21:40

## 2019-02-24 RX ADMIN — Medication 20 MILLIGRAM(S): at 05:22

## 2019-02-24 RX ADMIN — MEMANTINE HYDROCHLORIDE 10 MILLIGRAM(S): 10 TABLET ORAL at 17:24

## 2019-02-24 RX ADMIN — Medication 25 MILLIGRAM(S): at 13:29

## 2019-02-24 RX ADMIN — Medication 25 MILLIGRAM(S): at 05:22

## 2019-02-24 RX ADMIN — Medication 40 MILLIEQUIVALENT(S): at 13:30

## 2019-02-24 RX ADMIN — Medication 3 MILLILITER(S): at 17:23

## 2019-02-24 RX ADMIN — MEMANTINE HYDROCHLORIDE 10 MILLIGRAM(S): 10 TABLET ORAL at 05:22

## 2019-02-24 RX ADMIN — TAMSULOSIN HYDROCHLORIDE 0.4 MILLIGRAM(S): 0.4 CAPSULE ORAL at 21:40

## 2019-02-24 RX ADMIN — Medication 3 MILLILITER(S): at 12:28

## 2019-02-24 RX ADMIN — Medication 3 MILLILITER(S): at 23:19

## 2019-02-24 NOTE — PROGRESS NOTE ADULT - ASSESSMENT
83 yr old male with PMH sig for prostate ca ,  dementia, HTN, HLD whose wife was admitted yesterday for influenza, today presents with cough, fevers and altered MS.  found to have influenza     1- encephhalopathy : toxic metablic sec to influenza infection superimposed on underlying dementia   improved and seems to be at baseline     2- influenza : cont  steroids given bronchospasms on exam   nebs  cont domenico flu       3- HTN urgency  : off bb for hbradycardia ... increase  hydralazine and mnitor closely     4- Dementia : cont meds  supportive.care    5- bradycardia:  hold BB   cont tele :  f/u with cardio for brief episode of PAT

## 2019-02-24 NOTE — PROGRESS NOTE ADULT - SUBJECTIVE AND OBJECTIVE BOX
Patient is a 83y old  Male who presents with a chief complaint of Fever (23 Feb 2019 23:53)                                                               INTERVAL HPI/OVERNIGHT EVENTS:    REVIEW OF SYSTEMS:     CONSTITUTIONAL: No weakness, fevers or chills  EYES/ENT: No visual changes , no ear ache   NECK: No pain or stiffness  RESPIRATORY: No cough, wheezing,  No shortness of breath  CARDIOVASCULAR: No chest pain or palpitations  GASTROINTESTINAL: No abdominal pain  . No nausea, vomiting, or hematemesis; No diarrhea or constipation. No melena or hematochezia.  GENITOURINARY: No dysuria, frequency or hematuria  NEUROLOGICAL: No numbness or weakness  SKIN: No itching, burning, rashes, or lesions                                                                                                                                                                                                                                                                                 Medications:  MEDICATIONS  (STANDING):  ALBUTerol/ipratropium for Nebulization 3 milliLiter(s) Nebulizer every 6 hours  donepezil 10 milliGRAM(s) Oral at bedtime  enoxaparin Injectable 40 milliGRAM(s) SubCutaneous daily  escitalopram 5 milliGRAM(s) Oral daily  famotidine    Tablet 20 milliGRAM(s) Oral two times a day  hydrALAZINE 50 milliGRAM(s) Oral three times a day  losartan 50 milliGRAM(s) Oral daily  memantine 10 milliGRAM(s) Oral two times a day  metoprolol tartrate 12.5 milliGRAM(s) Oral every 12 hours  oseltamivir 75 milliGRAM(s) Oral two times a day  predniSONE   Tablet 20 milliGRAM(s) Oral two times a day  simvastatin 40 milliGRAM(s) Oral at bedtime  tamsulosin 0.4 milliGRAM(s) Oral at bedtime    MEDICATIONS  (PRN):  acetaminophen   Tablet .. 650 milliGRAM(s) Oral every 6 hours PRN Temp greater or equal to 38C (100.4F)       Allergies    No Known Allergies    Intolerances      Vital Signs Last 24 Hrs  T(C): 36.4 (24 Feb 2019 13:28), Max: 36.4 (23 Feb 2019 20:42)  T(F): 97.6 (24 Feb 2019 13:28), Max: 97.6 (23 Feb 2019 20:42)  HR: 74 (24 Feb 2019 13:28) (63 - 79)  BP: 168/84 (24 Feb 2019 13:28) (152/76 - 171/70)  BP(mean): --  RR: 18 (24 Feb 2019 13:28) (18 - 18)  SpO2: 95% (24 Feb 2019 13:28) (90% - 95%)  CAPILLARY BLOOD GLUCOSE      POCT Blood Glucose.: 123 mg/dL (24 Feb 2019 16:38)  POCT Blood Glucose.: 159 mg/dL (24 Feb 2019 11:36)  POCT Blood Glucose.: 182 mg/dL (24 Feb 2019 08:05)  POCT Blood Glucose.: 182 mg/dL (23 Feb 2019 21:25)      02-23 @ 07:01 - 02-24 @ 07:00  --------------------------------------------------------  IN: 480 mL / OUT: 0 mL / NET: 480 mL    02-24 @ 07:01 - 02-24 @ 19:00  --------------------------------------------------------  IN: 480 mL / OUT: 0 mL / NET: 480 mL      Physical Exam:    Daily     Daily   General:  Well appearing, NAD, not cachetic  HEENT:  Nonicteric, PERRLA  CV:  RRR, S1S2   Lungs:  CTA B/L, no wheezes, rales, rhonchi  Abdomen:  Soft, non-tender, no distended, positive BS  Extremities:  2+ pulses, no c/c, no edema  Skin:  Warm and dry, no rashes  :  No mckeon  Neuro:  AAOx3, non-focal, grossly intact                                                                                                                                                                                                                                                                                                LABS:                               13.9   8.50  )-----------( 185      ( 24 Feb 2019 09:08 )             42.1                      02-24    x   |  x   |  x   ----------------------------<  x   4.3   |  x   |  x     Ca    9.6      24 Feb 2019 08:22  Phos  3.2     02-24  Mg     2.1     02-24                         RADIOLOGY & ADDITIONAL TESTS         I personally reviewed: [  ]EKG   [  ]CXR    [  ] CT      A/P:         Discussed with :     [facility] consultants' Notes   Time spent : Patient is a 83y old  Male who presents with a chief complaint of Fever (23 Feb 2019 23:53)                                                               INTERVAL HPI/OVERNIGHT EVENTS:    REVIEW OF SYSTEMS:     CONSTITUTIONAL: No weakness, fevers or chills  RESPIRATORY: No cough, wheezing,  No shortness of breath  CARDIOVASCULAR: No chest pain or palpitations  GASTROINTESTINAL: No abdominal pain  . No nausea, vomiting, or hematemesis; No diarrhea or constipation. No melena or hematochezia.  GENITOURINARY: No dysuria, frequency or hematuria  NEUROLOGICAL: No numbness or weakness  SKIN: No itching, burning, rashes, or lesions                                                                                                                                                                                                                                                                                 Medications:  MEDICATIONS  (STANDING):  ALBUTerol/ipratropium for Nebulization 3 milliLiter(s) Nebulizer every 6 hours  donepezil 10 milliGRAM(s) Oral at bedtime  enoxaparin Injectable 40 milliGRAM(s) SubCutaneous daily  escitalopram 5 milliGRAM(s) Oral daily  famotidine    Tablet 20 milliGRAM(s) Oral two times a day  hydrALAZINE 50 milliGRAM(s) Oral three times a day  losartan 50 milliGRAM(s) Oral daily  memantine 10 milliGRAM(s) Oral two times a day  metoprolol tartrate 12.5 milliGRAM(s) Oral every 12 hours  oseltamivir 75 milliGRAM(s) Oral two times a day  predniSONE   Tablet 20 milliGRAM(s) Oral two times a day  simvastatin 40 milliGRAM(s) Oral at bedtime  tamsulosin 0.4 milliGRAM(s) Oral at bedtime    MEDICATIONS  (PRN):  acetaminophen   Tablet .. 650 milliGRAM(s) Oral every 6 hours PRN Temp greater or equal to 38C (100.4F)       Allergies    No Known Allergies    Intolerances      Vital Signs Last 24 Hrs  T(C): 36.4 (24 Feb 2019 13:28), Max: 36.4 (23 Feb 2019 20:42)  T(F): 97.6 (24 Feb 2019 13:28), Max: 97.6 (23 Feb 2019 20:42)  HR: 74 (24 Feb 2019 13:28) (63 - 79)  BP: 168/84 (24 Feb 2019 13:28) (152/76 - 171/70)  BP(mean): --  RR: 18 (24 Feb 2019 13:28) (18 - 18)  SpO2: 95% (24 Feb 2019 13:28) (90% - 95%)  CAPILLARY BLOOD GLUCOSE      POCT Blood Glucose.: 123 mg/dL (24 Feb 2019 16:38)  POCT Blood Glucose.: 159 mg/dL (24 Feb 2019 11:36)  POCT Blood Glucose.: 182 mg/dL (24 Feb 2019 08:05)  POCT Blood Glucose.: 182 mg/dL (23 Feb 2019 21:25)      02-23 @ 07:01 - 02-24 @ 07:00  --------------------------------------------------------  IN: 480 mL / OUT: 0 mL / NET: 480 mL    02-24 @ 07:01  -  02-24 @ 19:00  --------------------------------------------------------  IN: 480 mL / OUT: 0 mL / NET: 480 mL      Physical Exam:    General:  NAD   HEENT:  Nonicteric, PERRLA  CV:  RRR, S1S2   Lungs:  CTA B/L, no wheezes, rales, rhonchi  Abdomen:  Soft, non-tender, no distended, positive BS  Extremities:  2+ pulses, no c/c, no edema  Skin:  Warm and dry, no rashes  :  No mckeon  Neuro:  AAOx3, non-focal, grossly intact                                                                                                                                                                                                                                                                                                LABS:                               13.9   8.50  )-----------( 185      ( 24 Feb 2019 09:08 )             42.1                      02-24    x   |  x   |  x   ----------------------------<  x   4.3   |  x   |  x     Ca    9.6      24 Feb 2019 08:22  Phos  3.2     02-24  Mg     2.1     02-24

## 2019-02-25 LAB
ANION GAP SERPL CALC-SCNC: 17 MMOL/L — SIGNIFICANT CHANGE UP (ref 5–17)
BUN SERPL-MCNC: 25 MG/DL — HIGH (ref 7–23)
CALCIUM SERPL-MCNC: 10 MG/DL — SIGNIFICANT CHANGE UP (ref 8.4–10.5)
CHLORIDE SERPL-SCNC: 99 MMOL/L — SIGNIFICANT CHANGE UP (ref 96–108)
CO2 SERPL-SCNC: 21 MMOL/L — LOW (ref 22–31)
CREAT SERPL-MCNC: 0.89 MG/DL — SIGNIFICANT CHANGE UP (ref 0.5–1.3)
CULTURE RESULTS: SIGNIFICANT CHANGE UP
CULTURE RESULTS: SIGNIFICANT CHANGE UP
GLUCOSE BLDC GLUCOMTR-MCNC: 108 MG/DL — HIGH (ref 70–99)
GLUCOSE BLDC GLUCOMTR-MCNC: 146 MG/DL — HIGH (ref 70–99)
GLUCOSE BLDC GLUCOMTR-MCNC: 173 MG/DL — HIGH (ref 70–99)
GLUCOSE SERPL-MCNC: 205 MG/DL — HIGH (ref 70–99)
HCT VFR BLD CALC: 46.1 % — SIGNIFICANT CHANGE UP (ref 39–50)
HGB BLD-MCNC: 14.8 G/DL — SIGNIFICANT CHANGE UP (ref 13–17)
MAGNESIUM SERPL-MCNC: 2.2 MG/DL — SIGNIFICANT CHANGE UP (ref 1.6–2.6)
MCHC RBC-ENTMCNC: 30.1 PG — SIGNIFICANT CHANGE UP (ref 27–34)
MCHC RBC-ENTMCNC: 32.1 GM/DL — SIGNIFICANT CHANGE UP (ref 32–36)
MCV RBC AUTO: 93.9 FL — SIGNIFICANT CHANGE UP (ref 80–100)
PLATELET # BLD AUTO: 209 K/UL — SIGNIFICANT CHANGE UP (ref 150–400)
POTASSIUM SERPL-MCNC: 3.9 MMOL/L — SIGNIFICANT CHANGE UP (ref 3.5–5.3)
POTASSIUM SERPL-SCNC: 3.9 MMOL/L — SIGNIFICANT CHANGE UP (ref 3.5–5.3)
RBC # BLD: 4.91 M/UL — SIGNIFICANT CHANGE UP (ref 4.2–5.8)
RBC # FLD: 14 % — SIGNIFICANT CHANGE UP (ref 10.3–14.5)
SODIUM SERPL-SCNC: 137 MMOL/L — SIGNIFICANT CHANGE UP (ref 135–145)
SPECIMEN SOURCE: SIGNIFICANT CHANGE UP
SPECIMEN SOURCE: SIGNIFICANT CHANGE UP
WBC # BLD: 6.8 K/UL — SIGNIFICANT CHANGE UP (ref 3.8–10.5)
WBC # FLD AUTO: 6.8 K/UL — SIGNIFICANT CHANGE UP (ref 3.8–10.5)

## 2019-02-25 PROCEDURE — 99232 SBSQ HOSP IP/OBS MODERATE 35: CPT | Mod: GC

## 2019-02-25 RX ORDER — LOSARTAN POTASSIUM 100 MG/1
50 TABLET, FILM COATED ORAL ONCE
Qty: 0 | Refills: 0 | Status: COMPLETED | OUTPATIENT
Start: 2019-02-25 | End: 2019-02-25

## 2019-02-25 RX ORDER — AMIODARONE HYDROCHLORIDE 400 MG/1
200 TABLET ORAL EVERY 12 HOURS
Qty: 0 | Refills: 0 | Status: DISCONTINUED | OUTPATIENT
Start: 2019-02-25 | End: 2019-02-28

## 2019-02-25 RX ORDER — LOSARTAN POTASSIUM 100 MG/1
100 TABLET, FILM COATED ORAL DAILY
Qty: 0 | Refills: 0 | Status: DISCONTINUED | OUTPATIENT
Start: 2019-02-25 | End: 2019-02-28

## 2019-02-25 RX ADMIN — SIMVASTATIN 40 MILLIGRAM(S): 20 TABLET, FILM COATED ORAL at 21:59

## 2019-02-25 RX ADMIN — Medication 3 MILLILITER(S): at 06:34

## 2019-02-25 RX ADMIN — MEMANTINE HYDROCHLORIDE 10 MILLIGRAM(S): 10 TABLET ORAL at 17:58

## 2019-02-25 RX ADMIN — Medication 75 MILLIGRAM(S): at 06:34

## 2019-02-25 RX ADMIN — TAMSULOSIN HYDROCHLORIDE 0.4 MILLIGRAM(S): 0.4 CAPSULE ORAL at 21:59

## 2019-02-25 RX ADMIN — Medication 75 MILLIGRAM(S): at 17:58

## 2019-02-25 RX ADMIN — FAMOTIDINE 20 MILLIGRAM(S): 10 INJECTION INTRAVENOUS at 17:58

## 2019-02-25 RX ADMIN — ENOXAPARIN SODIUM 40 MILLIGRAM(S): 100 INJECTION SUBCUTANEOUS at 11:42

## 2019-02-25 RX ADMIN — Medication 10 MILLIGRAM(S): at 00:10

## 2019-02-25 RX ADMIN — LOSARTAN POTASSIUM 50 MILLIGRAM(S): 100 TABLET, FILM COATED ORAL at 14:03

## 2019-02-25 RX ADMIN — MEMANTINE HYDROCHLORIDE 10 MILLIGRAM(S): 10 TABLET ORAL at 06:34

## 2019-02-25 RX ADMIN — FAMOTIDINE 20 MILLIGRAM(S): 10 INJECTION INTRAVENOUS at 06:34

## 2019-02-25 RX ADMIN — AMIODARONE HYDROCHLORIDE 200 MILLIGRAM(S): 400 TABLET ORAL at 18:53

## 2019-02-25 RX ADMIN — ESCITALOPRAM OXALATE 5 MILLIGRAM(S): 10 TABLET, FILM COATED ORAL at 11:42

## 2019-02-25 RX ADMIN — Medication 20 MILLIGRAM(S): at 06:35

## 2019-02-25 RX ADMIN — DONEPEZIL HYDROCHLORIDE 10 MILLIGRAM(S): 10 TABLET, FILM COATED ORAL at 21:59

## 2019-02-25 RX ADMIN — LOSARTAN POTASSIUM 50 MILLIGRAM(S): 100 TABLET, FILM COATED ORAL at 03:18

## 2019-02-25 RX ADMIN — Medication 50 MILLIGRAM(S): at 21:59

## 2019-02-25 RX ADMIN — Medication 50 MILLIGRAM(S): at 14:03

## 2019-02-25 RX ADMIN — Medication 50 MILLIGRAM(S): at 06:34

## 2019-02-25 NOTE — CONSULT NOTE ADULT - ASSESSMENT
Patient is a 83 yr old male with PMH HTN, HLD, mitral valve regurgitation, prostate cancer,  dementia, presented with cough, fevers 102 and altered MS, treated empiracally for PNA.  On 2/22/19 a routine VS check revealed HR in 30s, /90. RRT was called. EKG with sinus bradycardia with arrhythmia and hypertensive (SBP = 200-210) his medication list included PO Lopressor 50mg BID last dose administered on 2/21 at 5pm. EP consulted for Tachy-daniel syndrome      # TBS  Tele: SR 50- 80's.  Review of tele strips consistent with PAT with - 160's Asymptomatic   - Continue to hold lopressor (last done 2/21 @ 5pm)  - Start Amiodarone 200mg BID for frequent episodes of AT and monitior telemetry closely. Monitor LFTs, TSH and will need PFT and opthalmology outpatient eval while on Amio  - Plan discussed with EP attending and medicine team.              [id number] Patient is a 83 yr old male with PMH HTN, HLD, mitral valve regurgitation, prostate cancer,  dementia, presented with cough, fevers 102 and altered MS, treated empiracally for PNA.  On 2/22/19 a routine VS check revealed HR in 30s, /90. RRT was called. EKG with sinus bradycardia. Lopressor 50mg BID last dose administered on 2/21 at 17:49). EP consulted for TBS -Tachy-daniel syndrome    # TBS  Tele: SR 50- 80's.  Review of tele strips consistent with PAT with - 160's Asymptomatic   - Continue to hold lopressor (last done 2/21 @ 5pm)  - Start Amiodarone 200mg BID for frequent episodes of AT and monitior telemetry closely. Monitor LFTs, TSH and will need PFT and opthalmology outpatient eval while on Amio  - Plan discussed with EP attending and medicine team.              [id number] Patient is a 83 yr old male with PMH HTN, HLD, mitral valve regurgitation, prostate cancer,  dementia, presented with cough, fevers 102 and altered MS, treated empiracally for PNA.  On 2/22/19 a routine VS check revealed HR in 30s, /90. RRT was called. EKG with sinus bradycardia. Lopressor 50mg BID last dose administered on 2/21 at 17:49). EP consulted for TBS -Tachy-daniel syndrome    # TBS  Tele: SR 50- 80's.  Review of tele strips consistent with PAT with - 160's Asymptomatic   - Continue to hold lopressor (last done 2/21 @ 5pm)  - ECHO Pending   - Start Amiodarone 200mg BID for frequent episodes of AT and monitior telemetry closely. Monitor LFTs, TSH and will need PFT and opthalmology outpatient eval while on Amio  - Plan discussed with EP attending and medicine team.              [id number]

## 2019-02-25 NOTE — CHART NOTE - NSCHARTNOTEFT_GEN_A_CORE
MEDICINE NP    SHANTEL CARRANZA  83y Male    Patient is a 83y old  Male who presents with a chief complaint of Fever (25 Feb 2019 06:09)       > Event Summary:  Notified by RN, Patient with         -Vital Signs Last 24 Hrs  T(C): 36.4 (25 Feb 2019 06:20), Max: 36.4 (24 Feb 2019 13:28)  T(F): 97.6 (25 Feb 2019 06:20), Max: 97.6 (24 Feb 2019 13:28)  HR: 51 (25 Feb 2019 06:20) (51 - 81)  BP: 170/80 (25 Feb 2019 06:20) (158/87 - 200/90)  BP(mean): --  RR: 18 (25 Feb 2019 06:20) (18 - 20)  SpO2: 95% (25 Feb 2019 06:20) (93% - 95%)    > Physical Assessment:  General: Awake, No acute distress.   Neurology: A&Ox3, nonfocal  CV: +S1S2, RRR.  No peripheral edema  Respiratory: Even, unlabored.  CTA B/L.    Abdomen:  +BS.  Soft, NT, ND.  No palpable mass  MSK: CHRISTINE x4.   Skin: Warm and Dry         > Assessment & Plan:  - HPI:     -Plan:               DENILSON Rodriguez-BC  Medicine Department MEDICINE NP    SHANTEL CARRANZA  83y Male    Patient is a 83y old  Male who presents with a chief complaint of Fever (25 Feb 2019 06:09)       > Event Summary:  11P:30:  Notified by RN, Patient with HR 37 x 6secs on tele.  VS  200/90 - HR 81.    RN reports patient asymptomatic, noted w/ 's on ambulation and 50's at rest.    Patient seen at bedside, AAOX3, denies headache, dizziness, visual changes, chest pain, sob.   -Hydralazine 10mg IV x1 and re-evaluated    02:39A:  Notified by RN, Patient w/ repeat /80 - 81.    Patient seen at bedside, no changes from baseline.  Repeat manual /80, HR 50's on Tele overnight  -@6A Cozaar now and re-evaluated      Betty Ku KATIA-BC  Medicine Department  #[id number]      >> ADDEND:  (25 Feb 2019 06: 40)   Notified by RN, repeat /80 - HR 51  AM Vital Signs :  T(C): 36.4 T(F): 97.6 HR: 51 BP: 170/80  (158/87 - 200/90)  RR: 18  SpO2: 95% (25 Feb 2019 06:20)   -Will give AM Hydralazine now and re-evaluate  -Will hold Lopressor given Bradycardia overnight and f/u with Cardiology  -Consider EPS in AM for Tachy-Scottie syndrome  -D/w Attending  -Will endorse to incoming day team     DENILSON Rodriguez-BC  Medicine Department  #[id number]

## 2019-02-25 NOTE — PROGRESS NOTE ADULT - ASSESSMENT
83 yr old male with PMH sig for prostate ca ,  dementia, HTN, HLD whose wife was admitted yesterday for influenza, today presents with cough, fevers and altered MS.  found to have influenza     1- encephhalopathy : toxic metablic sec to influenza infection superimposed on underlying dementia   improved and seems to be at baseline     2- influenza : cont  steroids given bronchospasms on exam   nebs  cont domenico flu       3- HTN urgency  : off bb for hbradycardia ... increased  hydralazine,     increase losartan and hold off on HCTZ     4- Dementia : cont meds  supportive.care    5- bradycardia:   appreciate EP inpu t  monitor for now 83 yr old male with PMH sig for prostate ca ,  dementia, HTN, HLD whose wife was admitted yesterday for influenza, today presents with cough, fevers and altered MS.  found to have influenza     1- encephhalopathy : toxic metablic sec to influenza infection superimposed on underlying dementia   improved and seems to be at baseline     2- influenza : cont  steroids given bronchospasms on exam   nebs  cont domenico flu       3- HTN urgency  : off bb for hbradycardia ... increased  hydralazine,     increase losartan and hold off on HCTZ     4- Dementia : cont meds  supportive.care    5- Tachy -daniel :   appreciate EP inpu t  cont amio   holding BB

## 2019-02-25 NOTE — PROGRESS NOTE ADULT - SUBJECTIVE AND OBJECTIVE BOX
Date of Admission:    24H hour events: HR down to 37 overnight while sleeping. 1 episode up to 140 BPM, appears afib. Feels well this morning.     MEDICATIONS:  ALBUTerol/ipratropium for Nebulization 3 milliLiter(s) Nebulizer every 6 hours  donepezil 10 milliGRAM(s) Oral at bedtime  enoxaparin Injectable 40 milliGRAM(s) SubCutaneous daily  escitalopram 5 milliGRAM(s) Oral daily  famotidine    Tablet 20 milliGRAM(s) Oral two times a day  hydrALAZINE 50 milliGRAM(s) Oral three times a day  losartan 50 milliGRAM(s) Oral daily  memantine 10 milliGRAM(s) Oral two times a day  metoprolol tartrate 12.5 milliGRAM(s) Oral every 12 hours  oseltamivir 75 milliGRAM(s) Oral two times a day  predniSONE   Tablet 20 milliGRAM(s) Oral two times a day  simvastatin 40 milliGRAM(s) Oral at bedtime  tamsulosin 0.4 milliGRAM(s) Oral at bedtime    MEDICATIONS  (PRN):  acetaminophen   Tablet .. 650 milliGRAM(s) Oral every 6 hours PRN Temp greater or equal to 38C (100.4F)      REVIEW OF SYSTEMS:  General: no fatigue/malaise, weight loss/gain.  Skin: no rashes.  Ophthalmologic: no blurred vision, no loss of vision. 	  ENT: no sore throat, rhinorrhea, sinus congestion.  Respiratory: no SOB, cough or wheeze.  Gastrointestinal:  no N/V/D, no melena/hematemesis/hematochezia.  Genitourinary: no dysuria/hesitancy or hematuria.  Musculoskeletal: no myalgias or arthralgias.  Neurological: no changes in vision or hearing, no lightheadedness/dizziness, no syncope/near syncope	  Psychiatric: no unusual stress/anxiety.   Hematology/Lymphatics: no unusual bleeding, bruising and no lymphadenopathy.  Endocrine: no unusual thirst.   All others negative except as stated above and in HPI.    PHYSICAL EXAM:  Vital Signs Last 24 Hrs  T(C): 36.4 (24 Feb 2019 20:34), Max: 36.4 (24 Feb 2019 13:28)  T(F): 97.6 (24 Feb 2019 20:34), Max: 97.6 (24 Feb 2019 13:28)  HR: 54 (25 Feb 2019 03:10) (54 - 81)  BP: 172/80 (25 Feb 2019 03:10) (158/87 - 200/90)  BP(mean): --  RR: 20 (25 Feb 2019 03:10) (18 - 20)  SpO2: 94% (24 Feb 2019 20:34) (93% - 95%)    I&O's Summary    23 Feb 2019 07:01  -  24 Feb 2019 07:00  --------------------------------------------------------  IN: 480 mL / OUT: 0 mL / NET: 480 mL    24 Feb 2019 07:01  -  25 Feb 2019 06:12  --------------------------------------------------------  IN: 960 mL / OUT: 500 mL / NET: 460 mL      Appearance: Normal	  HEENT:   Normal oral mucosa, PERRL, EOMI	  Lymphatic: No lymphadenopathy  Cardiovascular: Normal S1 S2, No JVD, No murmurs, No edema  Respiratory: Lungs clear to auscultation	  Psychiatry: A & O x 3, Mood & affect appropriate  Gastrointestinal:  Soft, Non-tender, + BS	  Skin: No rashes, No ecchymoses, No cyanosis	  Neurologic: Non-focal  Extremities: Normal range of motion, No clubbing, cyanosis or edema  Vascular: Peripheral pulses palpable 2+ bilaterally        LABS:	 	                          13.9   8.50  )-----------( 185      ( 24 Feb 2019 09:08 )             42.1     02-24    x   |  x   |  x   ----------------------------<  x   4.3   |  x   |  x     Ca    9.6      24 Feb 2019 08:22  Phos  3.2     02-24  Mg     2.1     02-24      02-23    143  |  103  |  24<H>  ----------------------------<  160<H>  4.2   |  25  |  0.87  02-22    141  |  104  |  17  ----------------------------<  184<H>  4.1   |  22  |  0.75    Ca    9.4      23 Feb 2019 06:19  Ca    9.1      22 Feb 2019 06:07  Phos  3.9     02-22  Mg     2.2     02-22    TPro  6.7  /  Alb  3.8  /  TBili  0.3  /  DBili  x   /  AST  34  /  ALT  31  /  AlkPhos  69  02-22    proBNP: Serum Pro-Brain Natriuretic Peptide: 532 pg/mL (02-20 @ 16:29)    TELEMETRY: 	Sinus 37-50. ?Afib 140 Date of Admission:    24H hour events: HR down to 37 overnight while sleeping. Several episodes of narrow complex tachycardia up to 160 BPM, appears afib. No complaints this morning. Anxious to go home     MEDICATIONS:  ALBUTerol/ipratropium for Nebulization 3 milliLiter(s) Nebulizer every 6 hours  donepezil 10 milliGRAM(s) Oral at bedtime  enoxaparin Injectable 40 milliGRAM(s) SubCutaneous daily  escitalopram 5 milliGRAM(s) Oral daily  famotidine    Tablet 20 milliGRAM(s) Oral two times a day  hydrALAZINE 50 milliGRAM(s) Oral three times a day  losartan 50 milliGRAM(s) Oral daily  memantine 10 milliGRAM(s) Oral two times a day  metoprolol tartrate 12.5 milliGRAM(s) Oral every 12 hours  oseltamivir 75 milliGRAM(s) Oral two times a day  predniSONE   Tablet 20 milliGRAM(s) Oral two times a day  simvastatin 40 milliGRAM(s) Oral at bedtime  tamsulosin 0.4 milliGRAM(s) Oral at bedtime    MEDICATIONS  (PRN):  acetaminophen   Tablet .. 650 milliGRAM(s) Oral every 6 hours PRN Temp greater or equal to 38C (100.4F)      REVIEW OF SYSTEMS:  General: no fatigue/malaise, weight loss/gain.  Skin: no rashes.  Ophthalmologic: no blurred vision, no loss of vision. 	  ENT: no sore throat, rhinorrhea, sinus congestion.  Respiratory: no SOB, cough or wheeze.  Gastrointestinal:  no N/V/D, no melena/hematemesis/hematochezia.  Genitourinary: no dysuria/hesitancy or hematuria.  Musculoskeletal: no myalgias or arthralgias.  Neurological: no changes in vision or hearing, no lightheadedness/dizziness, no syncope/near syncope	  Psychiatric: no unusual stress/anxiety.   Hematology/Lymphatics: no unusual bleeding, bruising and no lymphadenopathy.  Endocrine: no unusual thirst.   All others negative except as stated above and in HPI.    PHYSICAL EXAM:  Vital Signs Last 24 Hrs  T(C): 36.4 (24 Feb 2019 20:34), Max: 36.4 (24 Feb 2019 13:28)  T(F): 97.6 (24 Feb 2019 20:34), Max: 97.6 (24 Feb 2019 13:28)  HR: 54 (25 Feb 2019 03:10) (54 - 81)  BP: 172/80 (25 Feb 2019 03:10) (158/87 - 200/90)  BP(mean): --  RR: 20 (25 Feb 2019 03:10) (18 - 20)  SpO2: 94% (24 Feb 2019 20:34) (93% - 95%)    I&O's Summary    23 Feb 2019 07:01  -  24 Feb 2019 07:00  --------------------------------------------------------  IN: 480 mL / OUT: 0 mL / NET: 480 mL    24 Feb 2019 07:01  -  25 Feb 2019 06:12  --------------------------------------------------------  IN: 960 mL / OUT: 500 mL / NET: 460 mL      Appearance: Normal	  HEENT:   Normal oral mucosa, PERRL, EOMI	  Lymphatic: No lymphadenopathy  Cardiovascular: Normal S1 S2, No JVD, No murmurs, No edema  Respiratory: Lungs clear to auscultation	  Psychiatry: A & O x 3, Mood & affect appropriate  Gastrointestinal:  Soft, Non-tender, + BS	  Skin: No rashes, No ecchymoses, No cyanosis	  Neurologic: Non-focal  Extremities: Normal range of motion, No clubbing, cyanosis or edema  Vascular: Peripheral pulses palpable 2+ bilaterally        LABS:	 	                          13.9   8.50  )-----------( 185      ( 24 Feb 2019 09:08 )             42.1     02-24    x   |  x   |  x   ----------------------------<  x   4.3   |  x   |  x     Ca    9.6      24 Feb 2019 08:22  Phos  3.2     02-24  Mg     2.1     02-24      02-23    143  |  103  |  24<H>  ----------------------------<  160<H>  4.2   |  25  |  0.87  02-22    141  |  104  |  17  ----------------------------<  184<H>  4.1   |  22  |  0.75    Ca    9.4      23 Feb 2019 06:19  Ca    9.1      22 Feb 2019 06:07  Phos  3.9     02-22  Mg     2.2     02-22    TPro  6.7  /  Alb  3.8  /  TBili  0.3  /  DBili  x   /  AST  34  /  ALT  31  /  AlkPhos  69  02-22    proBNP: Serum Pro-Brain Natriuretic Peptide: 532 pg/mL (02-20 @ 16:29)    TELEMETRY: 	Sinus 37-50. ?Afib 140 Date of Admission:    24H hour events: HR down to 37 overnight while sleeping. Several episodes of narrow complex tachycardia up to 160 BPM, appears afib. No complaints this morning. Anxious to go home     MEDICATIONS:  ALBUTerol/ipratropium for Nebulization 3 milliLiter(s) Nebulizer every 6 hours  donepezil 10 milliGRAM(s) Oral at bedtime  enoxaparin Injectable 40 milliGRAM(s) SubCutaneous daily  escitalopram 5 milliGRAM(s) Oral daily  famotidine    Tablet 20 milliGRAM(s) Oral two times a day  hydrALAZINE 50 milliGRAM(s) Oral three times a day  losartan 50 milliGRAM(s) Oral daily  memantine 10 milliGRAM(s) Oral two times a day  metoprolol tartrate 12.5 milliGRAM(s) Oral every 12 hours  oseltamivir 75 milliGRAM(s) Oral two times a day  predniSONE   Tablet 20 milliGRAM(s) Oral two times a day  simvastatin 40 milliGRAM(s) Oral at bedtime  tamsulosin 0.4 milliGRAM(s) Oral at bedtime    MEDICATIONS  (PRN):  acetaminophen   Tablet .. 650 milliGRAM(s) Oral every 6 hours PRN Temp greater or equal to 38C (100.4F)      REVIEW OF SYSTEMS:  General: no fatigue/malaise, weight loss/gain.  Skin: no rashes.  Ophthalmologic: no blurred vision, no loss of vision. 	  ENT: no sore throat, rhinorrhea, sinus congestion.  Respiratory: no SOB, cough or wheeze.  Gastrointestinal:  no N/V/D, no melena/hematemesis/hematochezia.  Genitourinary: no dysuria/hesitancy or hematuria.  Musculoskeletal: no myalgias or arthralgias.  Neurological: no changes in vision or hearing, no lightheadedness/dizziness, no syncope/near syncope	  Psychiatric: no unusual stress/anxiety.   Hematology/Lymphatics: no unusual bleeding, bruising and no lymphadenopathy.  Endocrine: no unusual thirst.   All others negative except as stated above and in HPI.    PHYSICAL EXAM:  Vital Signs Last 24 Hrs  T(C): 36.4 (24 Feb 2019 20:34), Max: 36.4 (24 Feb 2019 13:28)  T(F): 97.6 (24 Feb 2019 20:34), Max: 97.6 (24 Feb 2019 13:28)  HR: 54 (25 Feb 2019 03:10) (54 - 81)  BP: 172/80 (25 Feb 2019 03:10) (158/87 - 200/90)  RR: 20 (25 Feb 2019 03:10) (18 - 20)  SpO2: 94% (24 Feb 2019 20:34) (93% - 95%)    Appearance: Normal	  HEENT:   Normal oral mucosa, PERRL, EOMI	  Lymphatic: No lymphadenopathy  Cardiovascular: Normal S1 S2, No JVD, No murmurs, No edema  Respiratory: Lungs clear to auscultation	  Psychiatry: A & O x 3, Mood & affect appropriate  Gastrointestinal:  Soft, Non-tender, + BS	  Skin: No rashes, No ecchymoses, No cyanosis	  Neurologic: Non-focal  Extremities: Normal range of motion, No clubbing, cyanosis or edema  Vascular: Peripheral pulses palpable 2+ bilaterally      I&O's Summary  24 Feb 2019 07:01  -  25 Feb 2019 06:12  --------------------------------------------------------  IN: 960 mL / OUT: 500 mL / NET: 460 mL    TELEMETRY: 	Sinus 37-50. ?Afib 140      LABS:	 	                      13.9   8.50  )-----------( 185      ( 24 Feb 2019 09:08 )             42.1     02-24  x   |  x   |  x   ----------------------------<  x   4.3   |  x   |  x     Ca    9.6      24 Feb 2019 08:22  Phos  3.2     02-24  Mg     2.1     02-24    02-23  143  |  103  |  24<H>  ----------------------------<  160<H>  4.2   |  25  |  0.87    02-22  141  |  104  |  17  ----------------------------<  184<H>  4.1   |  22  |  0.75    Ca    9.4      23 Feb 2019 06:19  Ca    9.1      22 Feb 2019 06:07    Phos  3.9     02-22  Mg     2.2     02-22    TPro  6.7  /  Alb  3.8  /  TBili  0.3  /  DBili  x   /  AST  34  /  ALT  31  /  AlkPhos  69  02-22    proBNP: Serum Pro-Brain Natriuretic Peptide: 532 pg/mL (02-20 @ 16:29)

## 2019-02-25 NOTE — PROGRESS NOTE ADULT - ASSESSMENT
83M PMH sig for prostate ca, dementia, HTN, HLD who presented with fevers, cough, fatigue, found to have influenza cardiology consulted for sinus bradycardia, asymptomatic.     #Sinus bradycardia with significant hypertension; asymptomatic; No hx of syncope or evidence of chronotropic incompetence; Per patient report he exercises regularly without symptom; 1 episode of tachycardia to 140 overnight  - Continue to hold BB  - Continue HCTZ to 25mg qd   - Currently on Hydralazine 25mg TID, would be preferable to change to daily medication Norvasc  - Will likely require outpatient uptitration of antihypertensives.     Chance Salgado MD   Cardiology Fellow  #[id number] 83M PMH sig for prostate ca, dementia, HTN, HLD who presented with fevers, cough, fatigue, found to have influenza cardiology consulted for sinus bradycardia, asymptomatic.     #Sinus bradycardia with significant hypertension; asymptomatic; No hx of syncope or evidence of chronotropic incompetence; Per patient report he exercises regularly without symptom; 1 episode of tachycardia to 140 overnight  - Continue to hold BB  - Continue HCTZ to 25mg qd   - Currently on Hydralazine 25mg TID, would be preferable to change to daily medication Norvasc  - F/u TTE  - Will likely require outpatient uptitration of antihypertensives.     Chance Salgado MD   Cardiology Fellow  #[id number] 83M PMH sig for prostate ca, dementia, HTN, HLD who presented with fevers, cough, fatigue, found to have influenza cardiology consulted for sinus bradycardia, asymptomatic.     #Tachy daniel syndrome: P/w sinus bradycardia to 30s with significant hypertension; asymptomatic; No hx of syncope or evidence of chronotropic incompetence; Per patient report he exercises regularly without symptom; Several episodes of narrow complex tachycardia up to 160s overnight, appears to be afib w/ RVR  -BB have been held since admission due to continued intermittent bradycardia to the 30s  -EP consult   -F/u TTE    #HTN  - Continue HCTZ to 25mg qd   - Currently on Hydralazine 25mg TID, would be preferable to change to daily medication Norvasc  - Will likely require outpatient uptitration of antihypertensives.     Chance Salgado MD   Cardiology Fellow  #[id number] 83M PMH sig for prostate ca, dementia, HTN, HLD.  Presented with fevers, cough, fatigue and found to have influenza.  Cardiology consulted for sinus bradycardia, asymptomatic.     #1. Tachy daniel syndrome: P/w sinus bradycardia to 30s with significant hypertension; asymptomatic; No hx of syncope or evidence of chronotropic incompetence; Per patient report he exercises regularly without symptom; Several episodes of narrow complex tachycardia up to 160s overnight, appears to be afib w/ RVR  -BB have been held since admission due to continued intermittent bradycardia to the 30s  -EP consult   -F/u TTE    #2. HTN  - Continue HCTZ to 25mg qd   - Currently on hydralazine 25mg TID, would be preferable to change to daily medication Norvasc  - Will likely require outpatient uptitration of antihypertensives.       Chance Salgado MD   Cardiology Fellow  #[id number]    Craig Pollard M.D.  Cardiology Consult Service  [phone]

## 2019-02-25 NOTE — PROGRESS NOTE ADULT - SUBJECTIVE AND OBJECTIVE BOX
Follow-up Pulm Progress Note  Ross Mitchell MD  [phone]    Events noted: Tachy/Scottie noted 2/24  Today OOB, feels well, no resp complaints    Vital Signs Last 24 Hrs  T(C): 36.6 (25 Feb 2019 11:41), Max: 36.6 (25 Feb 2019 11:41)  T(F): 97.8 (25 Feb 2019 11:41), Max: 97.8 (25 Feb 2019 11:41)  HR: 65 (25 Feb 2019 11:41) (51 - 81)  BP: 186/86 (25 Feb 2019 11:41) (158/87 - 200/90)  BP(mean): --  RR: 18 (25 Feb 2019 11:41) (18 - 20)  SpO2: 96% (25 Feb 2019 11:41) (94% - 96%)                         13.9   8.50  )-----------( 185      ( 24 Feb 2019 09:08 )             42.1       02-24    x   |  x   |  x   ----------------------------<  x   4.3   |  x   |  x     Ca    9.6      24 Feb 2019 08:22  Phos  3.2     02-24  Mg     2.1     02-24      TPro  6.7  /  Alb  3.8  /  TBili  0.3  /  DBili  x   /  AST  34  /  ALT  31  /  AlkPhos  69  02-22      PT/INR - ( 20 Feb 2019 16:35 )   PT: 11.5 sec;   INR: 1.00 ratio         PTT - ( 20 Feb 2019 16:35 )  PTT:28.7 sec  Procalcitonin, Serum: 0.19 ng/mL (02-20-19 @ 16:32)    Serum Pro-Brain Natriuretic Peptide: 532 pg/mL (02-20-19 @ 16:29)      CULTURES:  Culture Results:   No growth (02-20 @ 23:25)  Culture Results:   No growth to date. (02-20 @ 19:07)  Culture Results:   No growth to date. (02-20 @ 19:07)    Most recent blood culture -- 02-20 @ 23:25   -- -- .Urine Clean Catch (Midstream) 02-20 @ 23:25  Most recent blood culture -- 02-20 @ 19:07   -- -- .Blood Blood-Peripheral 02-20 @ 19:07      Physical Examination:  PULM: Clear without wheeze or rhonchi  CVS: Regular rate and rhythm, no murmurs, rubs, or gallops  ABD: Soft, non-tender  EXT:  No clubbing, cyanosis, or edema    RADIOLOGY REVIEWED  CXR:    CT chest:    TTE:

## 2019-02-25 NOTE — CONSULT NOTE ADULT - SUBJECTIVE AND OBJECTIVE BOX
HPI:  83 yr old male with PMH sig for dementia, HTN, HLD whose wife was admitted for influenza, presented with cough, fevers 102 and altered MS.  In the ED he was given Azithromycin, Ceftriaxone, and a CXR and Chest CT were done that did not reveal a pulmonary infiltrate.          PAST MEDICAL & SURGICAL HISTORY:  Mitral valve regurgitation  Prostate cancer  HLD (hyperlipidemia)  HTN (hypertension)  History of hip replacement      ROS:  General: Pt denies recent weight loss/fever/chills  Neurological: denies numbness or  sensation loss  Cardiovascular: denies chest pain/palpitations/leg edema  Respiratory and Thorax: denies SOB/cough/wheezing  Gastrointestinal: denies abdominal pain/diarrhea/constipation/bloody stool  Hematologic: denies abnormal bleeding  	      MEDICATIONS  (STANDING):  donepezil 10 milliGRAM(s) Oral at bedtime  enoxaparin Injectable 40 milliGRAM(s) SubCutaneous daily  escitalopram 5 milliGRAM(s) Oral daily  famotidine    Tablet 20 milliGRAM(s) Oral two times a day  hydrALAZINE 50 milliGRAM(s) Oral three times a day  losartan 100 milliGRAM(s) Oral daily  losartan 50 milliGRAM(s) Oral once  memantine 10 milliGRAM(s) Oral two times a day  oseltamivir 75 milliGRAM(s) Oral two times a day  simvastatin 40 milliGRAM(s) Oral at bedtime  tamsulosin 0.4 milliGRAM(s) Oral at bedtime    MEDICATIONS  (PRN):  acetaminophen   Tablet .. 650 milliGRAM(s) Oral every 6 hours PRN Temp greater or equal to 38C (100.4F)      Allergies    No Known Allergies    Intolerances        SOCIAL HISTORY:    FAMILY HISTORY:  No pertinent family history in first degree relatives      Vital Signs Last 24 Hrs  T(C): 36.6 (25 Feb 2019 11:41), Max: 36.6 (25 Feb 2019 11:41)  T(F): 97.8 (25 Feb 2019 11:41), Max: 97.8 (25 Feb 2019 11:41)  HR: 65 (25 Feb 2019 11:41) (51 - 81)  BP: 186/86 (25 Feb 2019 11:41) (158/87 - 200/90)  BP(mean): --  RR: 18 (25 Feb 2019 11:41) (18 - 20)  SpO2: 96% (25 Feb 2019 11:41) (94% - 96%)    Physical Exam:  Neuro: Alert and oriented x 2  Cardiac: S1, S2  Resp: bilateral lung sounds clear  Abd: Soft, non- distended  Ext: No edema noted.       LABS:                        13.9   8.50  )-----------( 185      ( 24 Feb 2019 09:08 )             42.1     02-25    137  |  99  |  25<H>  ----------------------------<  205<H>  3.9   |  21<L>  |  0.89    Ca    10.0      25 Feb 2019 11:33  Phos  3.2     02-24  Mg     2.2     02-25            RADIOLOGY & ADDITIONAL STUDIES: HPI:  Patient is a 83 yr old male with PMH HTN, HLD, prostate cancer,  dementia, whose wife was admitted for influenza, presented with cough, fevers 102 and altered MS.  In the ED he was given Azithromycin, Ceftriaxone, and a CXR and Chest CT were done that did not reveal a pulmonary infiltrate.          PAST MEDICAL & SURGICAL HISTORY:  Mitral valve regurgitation  Prostate cancer  HLD (hyperlipidemia)  HTN (hypertension)  History of hip replacement      ROS:  General: Pt denies recent weight loss/fever/chills  Neurological: denies numbness or  sensation loss  Cardiovascular: denies chest pain/palpitations/leg edema  Respiratory and Thorax: denies SOB/cough/wheezing  Gastrointestinal: denies abdominal pain/diarrhea/constipation/bloody stool  Hematologic: denies abnormal bleeding  	      MEDICATIONS  (STANDING):  donepezil 10 milliGRAM(s) Oral at bedtime  enoxaparin Injectable 40 milliGRAM(s) SubCutaneous daily  escitalopram 5 milliGRAM(s) Oral daily  famotidine    Tablet 20 milliGRAM(s) Oral two times a day  hydrALAZINE 50 milliGRAM(s) Oral three times a day  losartan 100 milliGRAM(s) Oral daily  losartan 50 milliGRAM(s) Oral once  memantine 10 milliGRAM(s) Oral two times a day  oseltamivir 75 milliGRAM(s) Oral two times a day  simvastatin 40 milliGRAM(s) Oral at bedtime  tamsulosin 0.4 milliGRAM(s) Oral at bedtime    MEDICATIONS  (PRN):  acetaminophen   Tablet .. 650 milliGRAM(s) Oral every 6 hours PRN Temp greater or equal to 38C (100.4F)      Allergies    No Known Allergies    Intolerances        SOCIAL HISTORY:    FAMILY HISTORY:  No pertinent family history in first degree relatives      Vital Signs Last 24 Hrs  T(C): 36.6 (25 Feb 2019 11:41), Max: 36.6 (25 Feb 2019 11:41)  T(F): 97.8 (25 Feb 2019 11:41), Max: 97.8 (25 Feb 2019 11:41)  HR: 65 (25 Feb 2019 11:41) (51 - 81)  BP: 186/86 (25 Feb 2019 11:41) (158/87 - 200/90)  BP(mean): --  RR: 18 (25 Feb 2019 11:41) (18 - 20)  SpO2: 96% (25 Feb 2019 11:41) (94% - 96%)    Physical Exam:  Neuro: Alert and oriented x 2  Cardiac: S1, S2  Resp: bilateral lung sounds clear  Abd: Soft, non- distended  Ext: No edema noted.       LABS:                        13.9   8.50  )-----------( 185      ( 24 Feb 2019 09:08 )             42.1     02-25    137  |  99  |  25<H>  ----------------------------<  205<H>  3.9   |  21<L>  |  0.89    Ca    10.0      25 Feb 2019 11:33  Phos  3.2     02-24  Mg     2.2     02-25            RADIOLOGY & ADDITIONAL STUDIES: HPI:  Patient is a 83 yr old male with PMH HTN, HLD, mitral valve regurgitation, prostate cancer,  dementia, whose wife was admitted for influenza, presented with cough, fevers 102 and altered MS.  In the ED he was given Azithromycin, Ceftriaxone, and a CXR and Chest CT were done that did not reveal a pulmonary infiltrate.  On 2/22/19 a routine VS check revealed HR in 30s, /90. RRT was called. . EKG with sinus bradycardia with arrhythmia and hypertensive (SBP = 200-210) his medication list included PO Lopressor 50mg BID (last dose on 2/21 at 17:49). EP consulted for TBS           PAST MEDICAL & SURGICAL HISTORY:  Mitral valve regurgitation  Prostate cancer  HLD (hyperlipidemia)  HTN (hypertension)  History of hip replacement      ROS:  General: Pt denies recent weight loss/fever/chills  Neurological: denies numbness or  sensation loss  Cardiovascular: denies chest pain/palpitations/leg edema  Respiratory and Thorax: denies SOB/cough/wheezing  Gastrointestinal: denies abdominal pain/diarrhea/constipation/bloody stool  Hematologic: denies abnormal bleeding  	      MEDICATIONS  (STANDING):  donepezil 10 milliGRAM(s) Oral at bedtime  enoxaparin Injectable 40 milliGRAM(s) SubCutaneous daily  escitalopram 5 milliGRAM(s) Oral daily  famotidine    Tablet 20 milliGRAM(s) Oral two times a day  hydrALAZINE 50 milliGRAM(s) Oral three times a day  losartan 100 milliGRAM(s) Oral daily  losartan 50 milliGRAM(s) Oral once  memantine 10 milliGRAM(s) Oral two times a day  oseltamivir 75 milliGRAM(s) Oral two times a day  simvastatin 40 milliGRAM(s) Oral at bedtime  tamsulosin 0.4 milliGRAM(s) Oral at bedtime    MEDICATIONS  (PRN):  acetaminophen   Tablet .. 650 milliGRAM(s) Oral every 6 hours PRN Temp greater or equal to 38C (100.4F)      Allergies    No Known Allergies    Intolerances        SOCIAL HISTORY:    FAMILY HISTORY:  No pertinent family history in first degree relatives      Vital Signs Last 24 Hrs  T(C): 36.6 (25 Feb 2019 11:41), Max: 36.6 (25 Feb 2019 11:41)  T(F): 97.8 (25 Feb 2019 11:41), Max: 97.8 (25 Feb 2019 11:41)  HR: 65 (25 Feb 2019 11:41) (51 - 81)  BP: 186/86 (25 Feb 2019 11:41) (158/87 - 200/90)  BP(mean): --  RR: 18 (25 Feb 2019 11:41) (18 - 20)  SpO2: 96% (25 Feb 2019 11:41) (94% - 96%)    Physical Exam:  Neuro: Alert and oriented x 2-3, forgetfulness  Cardiac: S1, S2 RR  Resp: bilateral lung sounds clear  Abd: Soft, non- distended  Ext: No edema noted.       LABS:                        13.9   8.50  )-----------( 185      ( 24 Feb 2019 09:08 )             42.1     02-25    137  |  99  |  25<H>  ----------------------------<  205<H>  3.9   |  21<L>  |  0.89    Ca    10.0      25 Feb 2019 11:33  Phos  3.2     02-24  Mg     2.2     02-25            RADIOLOGY & ADDITIONAL STUDIES: HPI:  Patient is a 83 yr old male with PMH HTN, HLD, mitral valve regurgitation, prostate cancer,  dementia, presented with cough, fevers 102 and altered MS, treated empiracally for PNA.  On 2/22/19 a routine VS check revealed HR in 30s, /90. RRT was called. EKG with sinus bradycardia with arrhythmia and hypertensive (SBP = 200-210) his medication list included PO Lopressor 50mg BID last dose administered on 2/21 at 17:49). EP consulted for TBS     Outpatient Cards: Dr. Osito Winston  PCP Dr. Mac Narayan ([facility])        PAST MEDICAL & SURGICAL HISTORY:  Mitral valve regurgitation  Prostate cancer  HLD (hyperlipidemia)  HTN (hypertension)  History of hip replacement      ROS:  General: Pt denies recent weight loss/fever/chills  Neurological: denies numbness or  sensation loss  Cardiovascular: denies chest pain/palpitations/leg edema  Respiratory and Thorax: denies SOB/cough/wheezing  Gastrointestinal: denies abdominal pain/diarrhea/constipation/bloody stool  Hematologic: denies abnormal bleeding  	      MEDICATIONS  (STANDING):  donepezil 10 milliGRAM(s) Oral at bedtime  enoxaparin Injectable 40 milliGRAM(s) SubCutaneous daily  escitalopram 5 milliGRAM(s) Oral daily  famotidine    Tablet 20 milliGRAM(s) Oral two times a day  hydrALAZINE 50 milliGRAM(s) Oral three times a day  losartan 100 milliGRAM(s) Oral daily  losartan 50 milliGRAM(s) Oral once  memantine 10 milliGRAM(s) Oral two times a day  oseltamivir 75 milliGRAM(s) Oral two times a day  simvastatin 40 milliGRAM(s) Oral at bedtime  tamsulosin 0.4 milliGRAM(s) Oral at bedtime    MEDICATIONS  (PRN):  acetaminophen   Tablet .. 650 milliGRAM(s) Oral every 6 hours PRN Temp greater or equal to 38C (100.4F)      Allergies    No Known Allergies    Intolerances        SOCIAL HISTORY:    FAMILY HISTORY:  No pertinent family history in first degree relatives      Vital Signs Last 24 Hrs  T(C): 36.6 (25 Feb 2019 11:41), Max: 36.6 (25 Feb 2019 11:41)  T(F): 97.8 (25 Feb 2019 11:41), Max: 97.8 (25 Feb 2019 11:41)  HR: 65 (25 Feb 2019 11:41) (51 - 81)  BP: 186/86 (25 Feb 2019 11:41) (158/87 - 200/90)  BP(mean): --  RR: 18 (25 Feb 2019 11:41) (18 - 20)  SpO2: 96% (25 Feb 2019 11:41) (94% - 96%)    Physical Exam:  Neuro: Alert and oriented x 2-3, forgetfulness at times  Cardiac: S1, S2 RR  Resp: bilateral lung sounds clear  Abd: Soft, non- distended  Ext: No edema noted.       LABS:                        13.9   8.50  )-----------( 185      ( 24 Feb 2019 09:08 )             42.1     02-25    137  |  99  |  25<H>  ----------------------------<  205<H>  3.9   |  21<L>  |  0.89    Ca    10.0      25 Feb 2019 11:33  Phos  3.2     02-24  Mg     2.2     02-25            RADIOLOGY & ADDITIONAL STUDIES: HPI:  Patient is a 83 yr old male with PMH HTN, HLD, mitral valve regurgitation, prostate cancer,  dementia, presented with cough, fevers 102 and altered MS, treated empiracally for PNA.  On 2/22/19 a routine VS check revealed HR in 30s, /90. RRT was called. EKG with sinus bradycardia. Lopressor 50mg BID last dose administered on 2/21 at 17:49). EP consulted for TBS     Outpatient Cards: Dr. Osito Winston  PCP Dr. Mac Narayan ([facility])        PAST MEDICAL & SURGICAL HISTORY:  Mitral valve regurgitation  Prostate cancer  HLD (hyperlipidemia)  HTN (hypertension)  History of hip replacement      ROS:  General: Pt denies recent weight loss/fever/chills  Neurological: denies numbness or  sensation loss  Cardiovascular: denies chest pain/palpitations/leg edema  Respiratory and Thorax: denies SOB/cough/wheezing  Gastrointestinal: denies abdominal pain/diarrhea/constipation/bloody stool  Hematologic: denies abnormal bleeding  	      MEDICATIONS  (STANDING):  donepezil 10 milliGRAM(s) Oral at bedtime  enoxaparin Injectable 40 milliGRAM(s) SubCutaneous daily  escitalopram 5 milliGRAM(s) Oral daily  famotidine    Tablet 20 milliGRAM(s) Oral two times a day  hydrALAZINE 50 milliGRAM(s) Oral three times a day  losartan 100 milliGRAM(s) Oral daily  losartan 50 milliGRAM(s) Oral once  memantine 10 milliGRAM(s) Oral two times a day  oseltamivir 75 milliGRAM(s) Oral two times a day  simvastatin 40 milliGRAM(s) Oral at bedtime  tamsulosin 0.4 milliGRAM(s) Oral at bedtime    MEDICATIONS  (PRN):  acetaminophen   Tablet .. 650 milliGRAM(s) Oral every 6 hours PRN Temp greater or equal to 38C (100.4F)      Allergies    No Known Allergies    Intolerances        SOCIAL HISTORY:    FAMILY HISTORY:  No pertinent family history in first degree relatives      Vital Signs Last 24 Hrs  T(C): 36.6 (25 Feb 2019 11:41), Max: 36.6 (25 Feb 2019 11:41)  T(F): 97.8 (25 Feb 2019 11:41), Max: 97.8 (25 Feb 2019 11:41)  HR: 65 (25 Feb 2019 11:41) (51 - 81)  BP: 186/86 (25 Feb 2019 11:41) (158/87 - 200/90)  BP(mean): --  RR: 18 (25 Feb 2019 11:41) (18 - 20)  SpO2: 96% (25 Feb 2019 11:41) (94% - 96%)    Physical Exam:  Neuro: Alert and oriented x 2-3, forgetfulness at times  Cardiac: S1, S2 RR  Resp: bilateral lung sounds clear  Abd: Soft, non- distended  Ext: No edema noted.       LABS:                        13.9   8.50  )-----------( 185      ( 24 Feb 2019 09:08 )             42.1     02-25    137  |  99  |  25<H>  ----------------------------<  205<H>  3.9   |  21<L>  |  0.89    Ca    10.0      25 Feb 2019 11:33  Phos  3.2     02-24  Mg     2.2     02-25            RADIOLOGY & ADDITIONAL STUDIES: HPI:  Patient is a 83 yr old male with PMH HTN, HLD, mitral valve regurgitation, prostate cancer,  dementia, presented with cough, fevers 102 and altered MS, treated empiracally for PNA. Noted to be + FLU and completed TamiFlu course. On 2/22/19 a routine VS check revealed HR in 30s, /90. RRT was called. EKG with sinus bradycardia. Lopressor 50mg BID last dose administered on 2/21 at 17:49). EP consulted for TBS. Pt denies any lightheadedness, dizziness, CP, palpitations or SOB.     Outpatient Cards: Dr. Osito Winston  PCP Dr. Mac Narayan ([facility])        PAST MEDICAL & SURGICAL HISTORY:  Mitral valve regurgitation  Prostate cancer  HLD (hyperlipidemia)  HTN (hypertension)  History of hip replacement      ROS:  General: Pt denies recent weight loss/fever/chills  Neurological: denies numbness or  sensation loss  Cardiovascular: denies chest pain/palpitations/leg edema  Respiratory and Thorax: denies SOB/cough/wheezing  Gastrointestinal: denies abdominal pain/diarrhea/constipation/bloody stool  Hematologic: denies abnormal bleeding  	      MEDICATIONS  (STANDING):  donepezil 10 milliGRAM(s) Oral at bedtime  enoxaparin Injectable 40 milliGRAM(s) SubCutaneous daily  escitalopram 5 milliGRAM(s) Oral daily  famotidine    Tablet 20 milliGRAM(s) Oral two times a day  hydrALAZINE 50 milliGRAM(s) Oral three times a day  losartan 100 milliGRAM(s) Oral daily  losartan 50 milliGRAM(s) Oral once  memantine 10 milliGRAM(s) Oral two times a day  oseltamivir 75 milliGRAM(s) Oral two times a day  simvastatin 40 milliGRAM(s) Oral at bedtime  tamsulosin 0.4 milliGRAM(s) Oral at bedtime    MEDICATIONS  (PRN):  acetaminophen   Tablet .. 650 milliGRAM(s) Oral every 6 hours PRN Temp greater or equal to 38C (100.4F)      Allergies    No Known Allergies    Intolerances        SOCIAL HISTORY:    FAMILY HISTORY:  No pertinent family history in first degree relatives      Vital Signs Last 24 Hrs  T(C): 36.6 (25 Feb 2019 11:41), Max: 36.6 (25 Feb 2019 11:41)  T(F): 97.8 (25 Feb 2019 11:41), Max: 97.8 (25 Feb 2019 11:41)  HR: 65 (25 Feb 2019 11:41) (51 - 81)  BP: 186/86 (25 Feb 2019 11:41) (158/87 - 200/90)  BP(mean): --  RR: 18 (25 Feb 2019 11:41) (18 - 20)  SpO2: 96% (25 Feb 2019 11:41) (94% - 96%)    Physical Exam:  Neuro: Alert and oriented x 2-3, forgetfulness at times  Cardiac: S1, S2 RR  Resp: bilateral lung sounds clear  Abd: Soft, non- distended  Ext: No edema noted.       LABS:                        13.9   8.50  )-----------( 185      ( 24 Feb 2019 09:08 )             42.1     02-25    137  |  99  |  25<H>  ----------------------------<  205<H>  3.9   |  21<L>  |  0.89    Ca    10.0      25 Feb 2019 11:33  Phos  3.2     02-24  Mg     2.2     02-25            RADIOLOGY & ADDITIONAL STUDIES:

## 2019-02-25 NOTE — PROGRESS NOTE ADULT - SUBJECTIVE AND OBJECTIVE BOX
Patient is a 83y old  Male who presents with a chief complaint of Fever (25 Feb 2019 12:59)                                                               INTERVAL HPI/OVERNIGHT EVENTS:    REVIEW OF SYSTEMS:     CONSTITUTIONAL: No weakness, fevers or chills    RESPIRATORY: No cough, wheezing,  No shortness of breath  CARDIOVASCULAR: No chest pain or palpitations  GASTROINTESTINAL: No abdominal pain  . No nausea, vomiting, or hematemesis; No diarrhea or constipation. No melena or hematochezia.  GENITOURINARY: No dysuria, frequency or hematuria  NEUROLOGICAL: No numbness or weakness                                                                                                                                                                                                                                                                                  Medications:  MEDICATIONS  (STANDING):  amiodarone    Tablet 200 milliGRAM(s) Oral every 12 hours  donepezil 10 milliGRAM(s) Oral at bedtime  enoxaparin Injectable 40 milliGRAM(s) SubCutaneous daily  escitalopram 5 milliGRAM(s) Oral daily  famotidine    Tablet 20 milliGRAM(s) Oral two times a day  hydrALAZINE 50 milliGRAM(s) Oral three times a day  losartan 100 milliGRAM(s) Oral daily  memantine 10 milliGRAM(s) Oral two times a day  simvastatin 40 milliGRAM(s) Oral at bedtime  tamsulosin 0.4 milliGRAM(s) Oral at bedtime    MEDICATIONS  (PRN):  acetaminophen   Tablet .. 650 milliGRAM(s) Oral every 6 hours PRN Temp greater or equal to 38C (100.4F)       Allergies    No Known Allergies    Intolerances      Vital Signs Last 24 Hrs  T(C): 36.3 (25 Feb 2019 20:20), Max: 36.6 (25 Feb 2019 11:41)  T(F): 97.3 (25 Feb 2019 20:20), Max: 97.8 (25 Feb 2019 11:41)  HR: 62 (25 Feb 2019 21:57) (51 - 81)  BP: 159/82 (25 Feb 2019 21:57) (157/90 - 200/90)  BP(mean): --  RR: 18 (25 Feb 2019 20:20) (18 - 20)  SpO2: 94% (25 Feb 2019 20:20) (94% - 96%)  CAPILLARY BLOOD GLUCOSE      POCT Blood Glucose.: 173 mg/dL (25 Feb 2019 16:22)  POCT Blood Glucose.: 146 mg/dL (25 Feb 2019 11:51)  POCT Blood Glucose.: 108 mg/dL (25 Feb 2019 07:38)      02-24 @ 07:01 - 02-25 @ 07:00  --------------------------------------------------------  IN: 1200 mL / OUT: 500 mL / NET: 700 mL    02-25 @ 07:01 - 02-25 @ 23:34  --------------------------------------------------------  IN: 240 mL / OUT: 0 mL / NET: 240 mL      Physical Exam:    General: NAD   HEENT:  Nonicteric, PERRLA  CV:  RRR, S1S2   Lungs:  CTA B/L, no wheezes, rales, rhonchi  Abdomen:  Soft, non-tender, no distended, positive BS  Extremities:  2+ pulses, no c/c, no edema  Skin:  Warm and dry, no rashes  :  No mckeon  Neuro:  AAOx3, non-focal, grossly intact                                                                                                                                                                                                                                                                                                LABS:                               14.8   6.80  )-----------( 209      ( 25 Feb 2019 16:47 )             46.1                      02-25    137  |  99  |  25<H>  ----------------------------<  205<H>  3.9   |  21<L>  |  0.89    Ca    10.0      25 Feb 2019 11:33  Phos  3.2     02-24  Mg     2.2     02-25                      Discussed with :     [employer] consultants' Notes   Time spent :

## 2019-02-25 NOTE — PROGRESS NOTE ADULT - NSHPATTENDINGPLANDISCUSS_GEN_ALL_CORE
Plan discussed with cardiology fellow, Dr. JORDANA Salgado; patient seen and examined.       I was physically present for the key portions of the evaluation and management (E/M) service provided.    I agree with the above history, physical, and plan which I have reviewed and edited where appropriate.

## 2019-02-25 NOTE — PROGRESS NOTE ADULT - ASSESSMENT
Influenza A infection: rapidly improved, wheezing fully resolved on solumedrol    REC    DC duoneb  DC presnisone  Clear for DC from pulm POV - Influenza A infection: rapidly improved, wheezing fully resolved on solumedrol  Tchy-daniel on telemtry: AF noted this AM  REC    DC duoneb  DC presnisone  Clear for DC from pulm POV -   AF and cardiac managment per primary team

## 2019-02-26 LAB
ANION GAP SERPL CALC-SCNC: 16 MMOL/L — SIGNIFICANT CHANGE UP (ref 5–17)
BUN SERPL-MCNC: 23 MG/DL — SIGNIFICANT CHANGE UP (ref 7–23)
CALCIUM SERPL-MCNC: 9.4 MG/DL — SIGNIFICANT CHANGE UP (ref 8.4–10.5)
CHLORIDE SERPL-SCNC: 100 MMOL/L — SIGNIFICANT CHANGE UP (ref 96–108)
CO2 SERPL-SCNC: 24 MMOL/L — SIGNIFICANT CHANGE UP (ref 22–31)
CREAT SERPL-MCNC: 1 MG/DL — SIGNIFICANT CHANGE UP (ref 0.5–1.3)
GLUCOSE BLDC GLUCOMTR-MCNC: 101 MG/DL — HIGH (ref 70–99)
GLUCOSE BLDC GLUCOMTR-MCNC: 118 MG/DL — HIGH (ref 70–99)
GLUCOSE BLDC GLUCOMTR-MCNC: 119 MG/DL — HIGH (ref 70–99)
GLUCOSE BLDC GLUCOMTR-MCNC: 146 MG/DL — HIGH (ref 70–99)
GLUCOSE BLDC GLUCOMTR-MCNC: 178 MG/DL — HIGH (ref 70–99)
GLUCOSE SERPL-MCNC: 107 MG/DL — HIGH (ref 70–99)
MAGNESIUM SERPL-MCNC: 2.2 MG/DL — SIGNIFICANT CHANGE UP (ref 1.6–2.6)
POTASSIUM SERPL-MCNC: 3.4 MMOL/L — LOW (ref 3.5–5.3)
POTASSIUM SERPL-SCNC: 3.4 MMOL/L — LOW (ref 3.5–5.3)
SODIUM SERPL-SCNC: 140 MMOL/L — SIGNIFICANT CHANGE UP (ref 135–145)
TSH SERPL-MCNC: 4.34 UIU/ML — HIGH (ref 0.27–4.2)

## 2019-02-26 PROCEDURE — 93306 TTE W/DOPPLER COMPLETE: CPT | Mod: 26

## 2019-02-26 PROCEDURE — 99232 SBSQ HOSP IP/OBS MODERATE 35: CPT | Mod: GC

## 2019-02-26 PROCEDURE — 93010 ELECTROCARDIOGRAM REPORT: CPT

## 2019-02-26 RX ORDER — TERAZOSIN HYDROCHLORIDE 10 MG/1
5 CAPSULE ORAL AT BEDTIME
Qty: 0 | Refills: 0 | Status: DISCONTINUED | OUTPATIENT
Start: 2019-02-26 | End: 2019-02-28

## 2019-02-26 RX ORDER — HYDRALAZINE HCL 50 MG
100 TABLET ORAL THREE TIMES A DAY
Qty: 0 | Refills: 0 | Status: DISCONTINUED | OUTPATIENT
Start: 2019-02-26 | End: 2019-02-26

## 2019-02-26 RX ORDER — METOPROLOL TARTRATE 50 MG
12.5 TABLET ORAL
Qty: 0 | Refills: 0 | Status: DISCONTINUED | OUTPATIENT
Start: 2019-02-26 | End: 2019-02-27

## 2019-02-26 RX ORDER — POTASSIUM CHLORIDE 20 MEQ
40 PACKET (EA) ORAL ONCE
Qty: 0 | Refills: 0 | Status: COMPLETED | OUTPATIENT
Start: 2019-02-26 | End: 2019-02-26

## 2019-02-26 RX ORDER — HYDROCHLOROTHIAZIDE 25 MG
12.5 TABLET ORAL DAILY
Qty: 0 | Refills: 0 | Status: DISCONTINUED | OUTPATIENT
Start: 2019-02-26 | End: 2019-02-28

## 2019-02-26 RX ORDER — HYDRALAZINE HCL 50 MG
50 TABLET ORAL THREE TIMES A DAY
Qty: 0 | Refills: 0 | Status: DISCONTINUED | OUTPATIENT
Start: 2019-02-26 | End: 2019-02-28

## 2019-02-26 RX ADMIN — Medication 50 MILLIGRAM(S): at 22:25

## 2019-02-26 RX ADMIN — TERAZOSIN HYDROCHLORIDE 5 MILLIGRAM(S): 10 CAPSULE ORAL at 22:25

## 2019-02-26 RX ADMIN — DONEPEZIL HYDROCHLORIDE 10 MILLIGRAM(S): 10 TABLET, FILM COATED ORAL at 22:25

## 2019-02-26 RX ADMIN — Medication 12.5 MILLIGRAM(S): at 17:02

## 2019-02-26 RX ADMIN — FAMOTIDINE 20 MILLIGRAM(S): 10 INJECTION INTRAVENOUS at 17:02

## 2019-02-26 RX ADMIN — ENOXAPARIN SODIUM 40 MILLIGRAM(S): 100 INJECTION SUBCUTANEOUS at 11:44

## 2019-02-26 RX ADMIN — SIMVASTATIN 40 MILLIGRAM(S): 20 TABLET, FILM COATED ORAL at 22:25

## 2019-02-26 RX ADMIN — LOSARTAN POTASSIUM 100 MILLIGRAM(S): 100 TABLET, FILM COATED ORAL at 06:15

## 2019-02-26 RX ADMIN — AMIODARONE HYDROCHLORIDE 200 MILLIGRAM(S): 400 TABLET ORAL at 06:11

## 2019-02-26 RX ADMIN — Medication 50 MILLIGRAM(S): at 06:10

## 2019-02-26 RX ADMIN — ESCITALOPRAM OXALATE 5 MILLIGRAM(S): 10 TABLET, FILM COATED ORAL at 11:44

## 2019-02-26 RX ADMIN — Medication 40 MILLIEQUIVALENT(S): at 09:17

## 2019-02-26 RX ADMIN — MEMANTINE HYDROCHLORIDE 10 MILLIGRAM(S): 10 TABLET ORAL at 17:02

## 2019-02-26 RX ADMIN — Medication 12.5 MILLIGRAM(S): at 14:28

## 2019-02-26 RX ADMIN — MEMANTINE HYDROCHLORIDE 10 MILLIGRAM(S): 10 TABLET ORAL at 06:10

## 2019-02-26 RX ADMIN — FAMOTIDINE 20 MILLIGRAM(S): 10 INJECTION INTRAVENOUS at 06:10

## 2019-02-26 RX ADMIN — AMIODARONE HYDROCHLORIDE 200 MILLIGRAM(S): 400 TABLET ORAL at 17:02

## 2019-02-26 RX ADMIN — Medication 50 MILLIGRAM(S): at 13:04

## 2019-02-26 NOTE — PROGRESS NOTE ADULT - ASSESSMENT
Influenza A infection: rapidly improved, wheezing fully resolved on solumedrol  Tchy-daniel on telemtry: AF noted this AM  REC      Clear for DC from pulm POV -   AF and cardiac managment per primary team

## 2019-02-26 NOTE — PROGRESS NOTE ADULT - SUBJECTIVE AND OBJECTIVE BOX
INTERVAL HPI/OVERNIGHT EVENTS: Pt seen resting comfortably in bed    MEDICATIONS  (STANDING):  amiodarone    Tablet 200 milliGRAM(s) Oral every 12 hours  donepezil 10 milliGRAM(s) Oral at bedtime  enoxaparin Injectable 40 milliGRAM(s) SubCutaneous daily  escitalopram 5 milliGRAM(s) Oral daily  famotidine    Tablet 20 milliGRAM(s) Oral two times a day  hydrALAZINE 50 milliGRAM(s) Oral three times a day  losartan 100 milliGRAM(s) Oral daily  memantine 10 milliGRAM(s) Oral two times a day  simvastatin 40 milliGRAM(s) Oral at bedtime  tamsulosin 0.4 milliGRAM(s) Oral at bedtime    MEDICATIONS  (PRN):  acetaminophen   Tablet .. 650 milliGRAM(s) Oral every 6 hours PRN Temp greater or equal to 38C (100.4F)      Allergies    No Known Allergies    Intolerances      ROS:  General: Pt denies fever/chills  Cardiovascular: denies chest pain/palpitations/leg edema  Respiratory and Thorax: denies SOB/cough/wheezing  Gastrointestinal: denies abdominal pain/diarrhea/constipation/bloody stool  Skin: denies rashes/sores  Hematologic: denies abnormal bleeding    Vital Signs Last 24 Hrs  T(C): 36.3 (26 Feb 2019 04:44), Max: 36.6 (25 Feb 2019 11:41)  T(F): 97.3 (26 Feb 2019 04:44), Max: 97.8 (25 Feb 2019 11:41)  HR: 58 (26 Feb 2019 08:09) (55 - 70)  BP: 164/82 (26 Feb 2019 08:09) (157/90 - 194/87)  BP(mean): --  RR: 18 (26 Feb 2019 04:44) (18 - 18)  SpO2: 94% (26 Feb 2019 04:44) (94% - 96%)    Physical Exam:  Neurological: Alert & Oriented x 3  Respiratory: CTA B/L, No wheezing/crackles/rhonchi  Cardiovascular: (+) S1 & S2, RRR  Gastrointestinal: soft, NT, nondistended, (+) BS  Extremities: No pedal edema  Skin:  normal skin color and pigmentation, no skin lesions          LABS:                        14.8   6.80  )-----------( 209      ( 25 Feb 2019 16:47 )             46.1     02-26    140  |  100  |  23  ----------------------------<  107<H>  3.4<L>   |  24  |  1.00    Ca    9.4      26 Feb 2019 06:31  Mg     2.2     02-26

## 2019-02-26 NOTE — PROGRESS NOTE ADULT - SUBJECTIVE AND OBJECTIVE BOX
Patient is a 83y old  Male who presents with a chief complaint of Fever (26 Feb 2019 10:43)                                                               INTERVAL HPI/OVERNIGHT EVENTS:    REVIEW OF SYSTEMS:     CONSTITUTIONAL: No weakness, fevers or chills  RESPIRATORY: No cough, wheezing,  No shortness of breath  CARDIOVASCULAR: No chest pain or palpitations  GASTROINTESTINAL: No abdominal pain  . No nausea, vomiting, or hematemesis; No diarrhea or constipation. No melena or hematochezia.  GENITOURINARY: No dysuria, frequency or hematuria  NEUROLOGICAL: No numbness or weakness                                                                                                                                                                                                                                                                                 Medications:  MEDICATIONS  (STANDING):  amiodarone    Tablet 200 milliGRAM(s) Oral every 12 hours  donepezil 10 milliGRAM(s) Oral at bedtime  enoxaparin Injectable 40 milliGRAM(s) SubCutaneous daily  escitalopram 5 milliGRAM(s) Oral daily  famotidine    Tablet 20 milliGRAM(s) Oral two times a day  hydrALAZINE 50 milliGRAM(s) Oral three times a day  hydrochlorothiazide 12.5 milliGRAM(s) Oral daily  losartan 100 milliGRAM(s) Oral daily  memantine 10 milliGRAM(s) Oral two times a day  metoprolol tartrate 12.5 milliGRAM(s) Oral two times a day  simvastatin 40 milliGRAM(s) Oral at bedtime  terazosin 5 milliGRAM(s) Oral at bedtime    MEDICATIONS  (PRN):  acetaminophen   Tablet .. 650 milliGRAM(s) Oral every 6 hours PRN Temp greater or equal to 38C (100.4F)       Allergies    No Known Allergies    Intolerances      Vital Signs Last 24 Hrs  T(C): 36.4 (26 Feb 2019 20:35), Max: 36.4 (26 Feb 2019 11:42)  T(F): 97.6 (26 Feb 2019 20:35), Max: 97.6 (26 Feb 2019 20:35)  HR: 57 (26 Feb 2019 20:35) (55 - 82)  BP: 144/83 (26 Feb 2019 20:35) (144/83 - 194/87)  BP(mean): --  RR: 18 (26 Feb 2019 20:35) (18 - 18)  SpO2: 95% (26 Feb 2019 20:35) (94% - 95%)  CAPILLARY BLOOD GLUCOSE      POCT Blood Glucose.: 119 mg/dL (26 Feb 2019 21:10)  POCT Blood Glucose.: 118 mg/dL (26 Feb 2019 16:28)  POCT Blood Glucose.: 146 mg/dL (26 Feb 2019 11:52)  POCT Blood Glucose.: 101 mg/dL (26 Feb 2019 07:51)      02-25 @ 07:01 - 02-26 @ 07:00  --------------------------------------------------------  IN: 240 mL / OUT: 0 mL / NET: 240 mL    02-26 @ 07:01 - 02-26 @ 22:53  --------------------------------------------------------  IN: 480 mL / OUT: 0 mL / NET: 480 mL      Physical Exam:    General:  NAD  HEENT:  Nonicteric, PERRLA  CV:  RRR, S1S2   Lungs:  CTA B/L, no wheezes, rales, rhonchi  Abdomen:  Soft, non-tender, no distended, positive BS  Extremities:  2+ pulses, no c/c, no edema  Skin:  Warm and dry, no rashes  :  No mckeon                                                                                                                                                                                                                                                                                              LABS:                               14.8   6.80  )-----------( 209      ( 25 Feb 2019 16:47 )             46.1                      02-26    140  |  100  |  23  ----------------------------<  107<H>  3.4<L>   |  24  |  1.00    Ca    9.4      26 Feb 2019 06:31  Mg     2.2     02-26

## 2019-02-26 NOTE — PROGRESS NOTE ADULT - SUBJECTIVE AND OBJECTIVE BOX
Date of Admission: 2/20/19    24H hour events: No events, patient w/o complaint. No CP, SOB, PERES, LE swelling, palpitations. Telemetry monitoring w/only brief episode of tachycardia 110 - 130 for a minute this AM.     MEDICATIONS:  amiodarone    Tablet 200 milliGRAM(s) Oral every 12 hours  enoxaparin Injectable 40 milliGRAM(s) SubCutaneous daily  hydrALAZINE 50 milliGRAM(s) Oral three times a day  losartan 100 milliGRAM(s) Oral daily  tamsulosin 0.4 milliGRAM(s) Oral at bedtime        acetaminophen   Tablet .. 650 milliGRAM(s) Oral every 6 hours PRN  donepezil 10 milliGRAM(s) Oral at bedtime  escitalopram 5 milliGRAM(s) Oral daily  memantine 10 milliGRAM(s) Oral two times a day    famotidine    Tablet 20 milliGRAM(s) Oral two times a day    simvastatin 40 milliGRAM(s) Oral at bedtime        REVIEW OF SYSTEMS:  General: no fatigue/malaise, weight loss/gain.  Skin: no rashes.  Ophthalmologic: no blurred vision, no loss of vision. 	  ENT: no sore throat, rhinorrhea, sinus congestion.  Respiratory: no SOB, cough or wheeze.  Gastrointestinal:  no N/V/D, no melena/hematemesis/hematochezia.  Genitourinary: no dysuria/hesitancy or hematuria.  Musculoskeletal: no myalgias or arthralgias.  Neurological: no changes in vision or hearing, no lightheadedness/dizziness, no syncope/near syncope	  Psychiatric: no unusual stress/anxiety.   Hematology/Lymphatics: no unusual bleeding, bruising and no lymphadenopathy.  Endocrine: no unusual thirst.   All others negative except as stated above and in HPI.      PHYSICAL EXAM:  T(C): 36.4 (02-26-19 @ 11:42), Max: 36.4 (02-26-19 @ 11:42)  HR: 59 (02-26-19 @ 11:42) (55 - 70)  BP: 163/90 (02-26-19 @ 11:42) (157/90 - 194/87)  RR: 18 (02-26-19 @ 11:42) (18 - 18)  SpO2: 94% (02-26-19 @ 11:42) (94% - 94%)  Wt(kg): --  I&O's Summary    25 Feb 2019 07:01  -  26 Feb 2019 07:00  --------------------------------------------------------  IN: 240 mL / OUT: 0 mL / NET: 240 mL    26 Feb 2019 07:01  -  26 Feb 2019 13:30  --------------------------------------------------------  IN: 480 mL / OUT: 0 mL / NET: 480 mL        Appearance: Normal	  HEENT:   Normal oral mucosa, PERRL, EOMI	  Lymphatic: No lymphadenopathy  Cardiovascular: Normal S1 S2, No JVD, No murmurs, No edema  Respiratory: Lungs clear to auscultation	  Psychiatry: A & O x 3, Mood & affect appropriate  Gastrointestinal:  Soft, Non-tender, + BS	  Skin: No rashes, No ecchymoses, No cyanosis	  Neurologic: Non-focal  Extremities: Normal range of motion, No clubbing, cyanosis or edema  Vascular: Peripheral pulses palpable 2+ bilaterally        LABS:	 	    CBC Full  -  ( 25 Feb 2019 16:47 )  WBC Count : 6.80 K/uL  Hemoglobin : 14.8 g/dL  Hematocrit : 46.1 %  Platelet Count - Automated : 209 K/uL  Mean Cell Volume : 93.9 fl  Mean Cell Hemoglobin : 30.1 pg  Mean Cell Hemoglobin Concentration : 32.1 gm/dL  Auto Neutrophil # : x  Auto Lymphocyte # : x  Auto Monocyte # : x  Auto Eosinophil # : x  Auto Basophil # : x  Auto Neutrophil % : x  Auto Lymphocyte % : x  Auto Monocyte % : x  Auto Eosinophil % : x  Auto Basophil % : x    02-26    140  |  100  |  23  ----------------------------<  107<H>  3.4<L>   |  24  |  1.00  02-25    137  |  99  |  25<H>  ----------------------------<  205<H>  3.9   |  21<L>  |  0.89    Ca    9.4      26 Feb 2019 06:31  Ca    10.0      25 Feb 2019 11:33  Mg     2.2     02-26  Mg     2.2     02-25        TSH: Thyroid Stimulating Hormone, Serum: 4.34 uIU/mL (02-26-19) Date of Admission: 2/20/19    24H hour events: No events, patient w/o complaint. No CP, SOB, PERES, LE swelling, palpitations. Telemetry monitoring w/only brief episode of tachycardia 110 - 130 for a minute this AM.     MEDICATIONS:  amiodarone    Tablet 200 milliGRAM(s) Oral every 12 hours  enoxaparin Injectable 40 milliGRAM(s) SubCutaneous daily  hydrALAZINE 50 milliGRAM(s) Oral three times a day  losartan 100 milliGRAM(s) Oral daily  tamsulosin 0.4 milliGRAM(s) Oral at bedtime  acetaminophen   Tablet .. 650 milliGRAM(s) Oral every 6 hours PRN  donepezil 10 milliGRAM(s) Oral at bedtime  escitalopram 5 milliGRAM(s) Oral daily  memantine 10 milliGRAM(s) Oral two times a day  famotidine    Tablet 20 milliGRAM(s) Oral two times a day  simvastatin 40 milliGRAM(s) Oral at bedtime      REVIEW OF SYSTEMS:  General: no fatigue/malaise, weight loss/gain.  Skin: no rashes.  Ophthalmologic: no blurred vision, no loss of vision. 	  ENT: no sore throat, rhinorrhea, sinus congestion.  Respiratory: no SOB, cough or wheeze.  Gastrointestinal:  no N/V/D, no melena/hematemesis/hematochezia.  Genitourinary: no dysuria/hesitancy or hematuria.  Musculoskeletal: no myalgias or arthralgias.  Neurological: no changes in vision or hearing, no lightheadedness/dizziness, no syncope/near syncope	  Psychiatric: no unusual stress/anxiety.   Hematology/Lymphatics: no unusual bleeding, bruising and no lymphadenopathy.  Endocrine: no unusual thirst.   All others negative except as stated above and in HPI.      PHYSICAL EXAM:  T(C): 36.4 (02-26-19 @ 11:42), Max: 36.4 (02-26-19 @ 11:42)  HR: 59 (02-26-19 @ 11:42) (55 - 70)  BP: 163/90 (02-26-19 @ 11:42) (157/90 - 194/87)  RR: 18 (02-26-19 @ 11:42) (18 - 18)  SpO2: 94% (02-26-19 @ 11:42) (94% - 94%)    Appearance: Normal	  HEENT:   Normal oral mucosa, PERRL, EOMI	  Lymphatic: No lymphadenopathy  Cardiovascular: Normal S1 S2, No JVD, No murmurs, No edema  Respiratory: Lungs clear to auscultation	  Psychiatry: A & O x 3, Mood & affect appropriate  Gastrointestinal:  Soft, Non-tender, + BS	  Skin: No rashes, No ecchymoses, No cyanosis	  Neurologic: Non-focal  Extremities: Normal range of motion, No clubbing, cyanosis or edema  Vascular: Peripheral pulses palpable 2+ bilaterally      I&O's Summary  25 Feb 2019 07:01  -  26 Feb 2019 07:00  --------------------------------------------------------  IN: 240 mL / OUT: 0 mL / NET: 240 mL    26 Feb 2019 07:01  -  26 Feb 2019 13:30  --------------------------------------------------------  IN: 480 mL / OUT: 0 mL / NET: 480 mL      LABS:	 	  CBC Full  -  ( 25 Feb 2019 16:47 )  WBC Count : 6.80 K/uL  Hemoglobin : 14.8 g/dL  Hematocrit : 46.1 %  Platelet Count - Automated : 209 K/uL    02-26  140  |  100  |  23  ----------------------------<  107<H>  3.4<L>   |  24  |  1.00    02-25  137  |  99  |  25<H>  ----------------------------<  205<H>  3.9   |  21<L>  |  0.89    Ca    9.4      26 Feb 2019 06:31  Ca    10.0      25 Feb 2019 11:33    Mg     2.2     02-26  Mg     2.2     02-25    TSH: Thyroid Stimulating Hormone, Serum: 4.34 uIU/mL (02-26-19)

## 2019-02-26 NOTE — PROGRESS NOTE ADULT - ASSESSMENT
Patient is a 83 yr old male with PMH HTN, HLD, mitral valve regurgitation, prostate cancer,  dementia, presented with cough, fevers 102 and altered MS, treated empiracally for PNA.  On 2/22/19 a routine VS check revealed HR in 30s, /90. RRT was called. EKG with sinus bradycardia. Lopressor 50mg BID last dose administered on 2/21 at 17:49). EP consulted for TBS -Tachy-daniel syndrome    # TBS  Tele: Review of tele strips reveals episodes of AT HR up to 150's  - Continue to hold AVN, home regimen of lopressor (last done 2/21 @ 5pm)  - ECHO still Pending   - Started Amiodarone 200mg BID yesterday for frequent episodes of AT and monitor telemetry closely.  - Monitor LFTs, TSH and will need PFT and opthalmology outpatient eval while on Amio  - Plan discussed with EP attending and medicine team.              [id number] Patient is a 83 yr old male with PMH HTN, HLD, mitral valve regurgitation, prostate cancer,  dementia, presented with cough, fevers 102 and altered MS, treated empiracally for PNA.  On 2/22/19 a routine VS check revealed HR in 30s, /90. RRT was called. EKG with sinus bradycardia. Lopressor 50mg BID last dose administered on 2/21 at 17:49). EP consulted for TBS -Tachy-daniel syndrome    # TBS  Tele:  SR, HR 80' and Noted to have episodes of atrial tachycardia HR up to 150's  - Continue to hold AVN, home regimen of lopressor (last done 2/21 @ 5pm)  - ECHO still Pending   - Started Amiodarone 200mg BID yesterday for frequent episodes of AT and monitor telemetry closely.  - Monitor LFTs, TSH and will need PFT and opthalmology outpatient eval while on Amio  - Plan discussed with EP attending and medicine team.        1800  Plan discussed with attending Dr. Herrera and . Pt placed on lopressor 12.5mg BID for beta blocker challenge.  Will montior telemetry closely.       [id number]

## 2019-02-26 NOTE — PROGRESS NOTE ADULT - NSHPATTENDINGPLANDISCUSS_GEN_ALL_CORE
cardiology resident, Dr. JOSLEITO Magallanes and fellow, Dr. JORDANA Salgado; patient seen and examined.  Hx., exam and labs as above.  I agree with the assessment and recommendations. cardiology resident, Dr. JOSELITO Perdomo and fellow, Dr. JORDANA Salgado; patient seen and examined.  Hx., exam and labs as above.  I agree with the assessment and recommendations.

## 2019-02-26 NOTE — PROGRESS NOTE ADULT - ASSESSMENT
83M PMH Prostate ca, dementia, HTN, HLD, MVR who p/w fevers and cough, found to be influenza+ and treated empirically for PNA, hospital course complicated by bradycardia to the 30's and PAT to 150's, and uncontrolled HTN.    # Tachy daniel syndrome: P/w sinus bradycardia to 30s with significant hypertension; asymptomatic, episodes of PAT to 150's, one episode this AM to 130's. Amiodarone started yesterday evening  - F/u TTE  - Hold AV juan luis blocking agents  - Improvement on Amio w/only one brief episode this AM. Continue to monitor  - Monitor LFTs, TSH and will need PFT and opthalmology outpatient eval while on Amio  - EP following    #2. HTN: HCTZ 25 mg QD discontinued, on hydralazine 100 mg TID just uptitrated from 50 mg TID, Losartan uptitrated from 50 mg 1day to 100 mg. Off metoprolol. Some improvement but patient remains hypertensive  - C/w Losartan 100 mg daily  - C/w Hydralazine 100 mg TID  - Would put on DASH diet    Official recs pending d/w cardiology fellow and attending 83M PMH Prostate ca, dementia, HTN, HLD, MVR who p/w fevers and cough, found to be influenza+ and treated empirically for PNA, hospital course complicated by bradycardia to the 30's and PAT to 150's, and uncontrolled HTN.    # Tachy daniel syndrome: P/w sinus bradycardia to 30s with significant hypertension; asymptomatic, episodes of PAT to 150's, one episode this AM to 130's. Amiodarone started yesterday evening  - F/u TTE  - Improvement on amio w/only one brief episode this AM. Continue to monitor  - Monitor LFTs, TSH and will need PFT and opthalmology outpatient eval while on amiodarone  - EP following    #2. HTN: HCTZ 25 mg QD discontinued, on hydralazine 100 mg TID just uptitrated from 50 mg TID, losartan uptitrated from 50 mg 1day to 100 mg. Off metoprolol. Some improvement but patient remains hypertensive  - C/w losartan 100 mg daily  - C/w hydralazine 100 mg TID  - Would put on DASH diet    Official recs pending d/w cardiology fellow and attending      JOSELITO Perdomo M.D.  Cardiology resident    Craig Pollard M.D.  Cardiology Consult Service  [phone]

## 2019-02-26 NOTE — PROGRESS NOTE ADULT - SUBJECTIVE AND OBJECTIVE BOX
Follow-up Pulm Progress Note  Ross Mitchell MD  [phone]    Afebrile  Today OOB, feels well, no resp complaints      Vital Signs Last 24 Hrs  T(C): 36.3 (26 Feb 2019 04:44), Max: 36.6 (25 Feb 2019 11:41)  T(F): 97.3 (26 Feb 2019 04:44), Max: 97.8 (25 Feb 2019 11:41)  HR: 58 (26 Feb 2019 08:09) (55 - 70)  BP: 164/82 (26 Feb 2019 08:09) (157/90 - 194/87)  BP(mean): --  RR: 18 (26 Feb 2019 04:44) (18 - 18)  SpO2: 94% (26 Feb 2019 04:44) (94% - 96%)                          14.8   6.80  )-----------( 209      ( 25 Feb 2019 16:47 )             46.1     02-26    140  |  100  |  23  ----------------------------<  107<H>  3.4<L>   |  24  |  1.00    Ca    9.4      26 Feb 2019 06:31  Mg     2.2     02-26          PT/INR - ( 20 Feb 2019 16:35 )   PT: 11.5 sec;   INR: 1.00 ratio         PTT - ( 20 Feb 2019 16:35 )  PTT:28.7 sec  Procalcitonin, Serum: 0.19 ng/mL (02-20-19 @ 16:32)    Serum Pro-Brain Natriuretic Peptide: 532 pg/mL (02-20-19 @ 16:29)      CULTURES:  Culture Results:   No growth (02-20 @ 23:25)  Culture Results:   No growth to date. (02-20 @ 19:07)  Culture Results:   No growth to date. (02-20 @ 19:07)    Most recent blood culture -- 02-20 @ 23:25   -- -- .Urine Clean Catch (Midstream) 02-20 @ 23:25  Most recent blood culture -- 02-20 @ 19:07   -- -- .Blood Blood-Peripheral 02-20 @ 19:07      Physical Examination:  PULM: Clear without wheeze or rhonchi  CVS: Regular rate and rhythm, no murmurs, rubs, or gallops  ABD: Soft, non-tender  EXT:  No clubbing, cyanosis, or edema    RADIOLOGY REVIEWED  CXR:    CT chest:    TTE:

## 2019-02-26 NOTE — PROGRESS NOTE ADULT - ASSESSMENT
83 yr old male with PMH sig for prostate ca ,  dementia, HTN, HLD whose wife was admitted yesterday for influenza, today presents with cough, fevers and altered MS.  found to have influenza     1- encephhalopathy : toxic metablic sec to influenza infection superimposed on underlying dementia   improved and now  baseline     2- influenza : cont  steroids taper : resoved bronchospasms   nebs  cont domenico flu       3- HTN urgency  : BP not well controlled : called his pmd anad discuss   BP seems to have been reasonably well controlled as o/p.  reviewed meds   will resttart HCTZ  restart terzaxosin   d/w EP : will challenge with small dose BB      4- Dementia : cont meds  supportive.care    5- Tachy -daniel :   no evidence of Afib    tachy more consistent iwth PAT  will challenge with small dose BB   will cont amio   consider dcing aricept since it might contribute to daniel

## 2019-02-27 DIAGNOSIS — E03.9 HYPOTHYROIDISM, UNSPECIFIED: ICD-10-CM

## 2019-02-27 DIAGNOSIS — R50.9 FEVER, UNSPECIFIED: ICD-10-CM

## 2019-02-27 LAB
ANION GAP SERPL CALC-SCNC: 17 MMOL/L — SIGNIFICANT CHANGE UP (ref 5–17)
BUN SERPL-MCNC: 28 MG/DL — HIGH (ref 7–23)
CALCIUM SERPL-MCNC: 9.4 MG/DL — SIGNIFICANT CHANGE UP (ref 8.4–10.5)
CHLORIDE SERPL-SCNC: 106 MMOL/L — SIGNIFICANT CHANGE UP (ref 96–108)
CO2 SERPL-SCNC: 21 MMOL/L — LOW (ref 22–31)
CREAT SERPL-MCNC: 1.07 MG/DL — SIGNIFICANT CHANGE UP (ref 0.5–1.3)
GLUCOSE BLDC GLUCOMTR-MCNC: 116 MG/DL — HIGH (ref 70–99)
GLUCOSE BLDC GLUCOMTR-MCNC: 127 MG/DL — HIGH (ref 70–99)
GLUCOSE BLDC GLUCOMTR-MCNC: 141 MG/DL — HIGH (ref 70–99)
GLUCOSE SERPL-MCNC: 140 MG/DL — HIGH (ref 70–99)
HCT VFR BLD CALC: 41.4 % — SIGNIFICANT CHANGE UP (ref 39–50)
HGB BLD-MCNC: 13.6 G/DL — SIGNIFICANT CHANGE UP (ref 13–17)
MCHC RBC-ENTMCNC: 30.9 PG — SIGNIFICANT CHANGE UP (ref 27–34)
MCHC RBC-ENTMCNC: 32.9 GM/DL — SIGNIFICANT CHANGE UP (ref 32–36)
MCV RBC AUTO: 94.1 FL — SIGNIFICANT CHANGE UP (ref 80–100)
PLATELET # BLD AUTO: 182 K/UL — SIGNIFICANT CHANGE UP (ref 150–400)
POTASSIUM SERPL-MCNC: 4.2 MMOL/L — SIGNIFICANT CHANGE UP (ref 3.5–5.3)
POTASSIUM SERPL-SCNC: 4.2 MMOL/L — SIGNIFICANT CHANGE UP (ref 3.5–5.3)
RBC # BLD: 4.4 M/UL — SIGNIFICANT CHANGE UP (ref 4.2–5.8)
RBC # FLD: 14 % — SIGNIFICANT CHANGE UP (ref 10.3–14.5)
SODIUM SERPL-SCNC: 144 MMOL/L — SIGNIFICANT CHANGE UP (ref 135–145)
WBC # BLD: 8.5 K/UL — SIGNIFICANT CHANGE UP (ref 3.8–10.5)
WBC # FLD AUTO: 8.5 K/UL — SIGNIFICANT CHANGE UP (ref 3.8–10.5)

## 2019-02-27 RX ORDER — METOPROLOL TARTRATE 50 MG
25 TABLET ORAL
Qty: 0 | Refills: 0 | Status: DISCONTINUED | OUTPATIENT
Start: 2019-02-27 | End: 2019-02-28

## 2019-02-27 RX ORDER — METOPROLOL TARTRATE 50 MG
12.5 TABLET ORAL ONCE
Qty: 0 | Refills: 0 | Status: COMPLETED | OUTPATIENT
Start: 2019-02-27 | End: 2019-02-27

## 2019-02-27 RX ADMIN — SIMVASTATIN 40 MILLIGRAM(S): 20 TABLET, FILM COATED ORAL at 22:45

## 2019-02-27 RX ADMIN — Medication 12.5 MILLIGRAM(S): at 17:51

## 2019-02-27 RX ADMIN — Medication 50 MILLIGRAM(S): at 22:45

## 2019-02-27 RX ADMIN — MEMANTINE HYDROCHLORIDE 10 MILLIGRAM(S): 10 TABLET ORAL at 06:19

## 2019-02-27 RX ADMIN — Medication 12.5 MILLIGRAM(S): at 05:03

## 2019-02-27 RX ADMIN — DONEPEZIL HYDROCHLORIDE 10 MILLIGRAM(S): 10 TABLET, FILM COATED ORAL at 22:45

## 2019-02-27 RX ADMIN — AMIODARONE HYDROCHLORIDE 200 MILLIGRAM(S): 400 TABLET ORAL at 06:19

## 2019-02-27 RX ADMIN — AMIODARONE HYDROCHLORIDE 200 MILLIGRAM(S): 400 TABLET ORAL at 17:27

## 2019-02-27 RX ADMIN — FAMOTIDINE 20 MILLIGRAM(S): 10 INJECTION INTRAVENOUS at 06:19

## 2019-02-27 RX ADMIN — Medication 12.5 MILLIGRAM(S): at 17:27

## 2019-02-27 RX ADMIN — ENOXAPARIN SODIUM 40 MILLIGRAM(S): 100 INJECTION SUBCUTANEOUS at 11:04

## 2019-02-27 RX ADMIN — Medication 12.5 MILLIGRAM(S): at 13:48

## 2019-02-27 RX ADMIN — TERAZOSIN HYDROCHLORIDE 5 MILLIGRAM(S): 10 CAPSULE ORAL at 22:45

## 2019-02-27 RX ADMIN — MEMANTINE HYDROCHLORIDE 10 MILLIGRAM(S): 10 TABLET ORAL at 17:27

## 2019-02-27 RX ADMIN — ESCITALOPRAM OXALATE 5 MILLIGRAM(S): 10 TABLET, FILM COATED ORAL at 11:04

## 2019-02-27 RX ADMIN — FAMOTIDINE 20 MILLIGRAM(S): 10 INJECTION INTRAVENOUS at 17:27

## 2019-02-27 RX ADMIN — LOSARTAN POTASSIUM 100 MILLIGRAM(S): 100 TABLET, FILM COATED ORAL at 11:04

## 2019-02-27 RX ADMIN — Medication 50 MILLIGRAM(S): at 13:48

## 2019-02-27 RX ADMIN — Medication 50 MILLIGRAM(S): at 05:03

## 2019-02-27 NOTE — PROVIDER CONTACT NOTE (OTHER) - BACKGROUND
84 yo male admitted for fever, flu +. 2/22 RRT asymptomatic sinus daniel hear rate 36. 's. transferred to telemetry.
82 yo male admitted for fever, + flu on droplet precautions.  2/22 RRT for sinus daniel heart rate 36- asymptomatic.  2/23 2 minute of questionable PAF heart up to 150's.- asymptomatic
Admit Dx: Fever, AMS, +flu, bradycardia.
Fevers, sepsis, influenza positive, prostate CA
admitted with metabolic encephalopathy 2/2 flu
tachy/daniel syndrome

## 2019-02-27 NOTE — PROGRESS NOTE ADULT - ASSESSMENT
83 yr old male with PMH sig for prostate ca ,  dementia, HTN, HLD whose wife was admitted yesterday for influenza, today presents with cough, fevers and altered MS.  found to have influenza     1- encephhalopathy : toxic metablic sec to influenza infection superimposed on underlying dementia   improved and now  baseline     2- influenza :   steroids taper : resolved bronchospasms   nebs  cont domenico flu       3- HTN urgency  :  BP seems to have been reasonably well controlled as o/p.  reviewed meds    resttarted HCTZ  restarted terzaxosin   BP better controlled now     4- Dementia : cont meds  supportive.care    5- Tachy -daniel :   no evidence of Afib    tachy more consistent iwth PAT  tolerating  challenge with small dose BB : will increase and monitor for daniel  will cont amio ( 200 bid for two weeks then dialy ) per EP    consider dcing aricept since it might contribute to daniel 45

## 2019-02-27 NOTE — PROGRESS NOTE ADULT - SUBJECTIVE AND OBJECTIVE BOX
Follow-up Pulm Progress Note  Ross Mitchell MD  [phone]    Afebrile  Today OOB, feels well, no resp complaints      Vital Signs Last 24 Hrs  T(C): 36.4 (27 Feb 2019 10:35), Max: 36.4 (26 Feb 2019 20:35)  T(F): 97.6 (27 Feb 2019 10:35), Max: 97.6 (26 Feb 2019 20:35)  HR: 71 (27 Feb 2019 13:44) (57 - 82)  BP: 146/79 (27 Feb 2019 13:44) (105/69 - 161/82)  BP(mean): --  RR: 18 (27 Feb 2019 13:44) (18 - 18)  SpO2: 97% (27 Feb 2019 13:44) (94% - 97%)                              13.6   8.50  )-----------( 182      ( 27 Feb 2019 08:25 )             41.4       02-27    144  |  106  |  28<H>  ----------------------------<  140<H>  4.2   |  21<L>  |  1.07    Ca    9.4      27 Feb 2019 05:42  Mg     2.2     02-26      PT/INR - ( 20 Feb 2019 16:35 )   PT: 11.5 sec;   INR: 1.00 ratio         PTT - ( 20 Feb 2019 16:35 )  PTT:28.7 sec  Procalcitonin, Serum: 0.19 ng/mL (02-20-19 @ 16:32)    Serum Pro-Brain Natriuretic Peptide: 532 pg/mL (02-20-19 @ 16:29)      CULTURES:  Culture Results:   No growth (02-20 @ 23:25)  Culture Results:   No growth to date. (02-20 @ 19:07)  Culture Results:   No growth to date. (02-20 @ 19:07)    Most recent blood culture -- 02-20 @ 23:25   -- -- .Urine Clean Catch (Midstream) 02-20 @ 23:25  Most recent blood culture -- 02-20 @ 19:07   -- -- .Blood Blood-Peripheral 02-20 @ 19:07      Physical Examination:  PULM: Clear without wheeze or rhonchi  CVS: Regular rate and rhythm, no murmurs, rubs, or gallops  ABD: Soft, non-tender  EXT:  No clubbing, cyanosis, or edema    RADIOLOGY REVIEWED  CXR:    CT chest:    TTE:

## 2019-02-27 NOTE — PROVIDER CONTACT NOTE (OTHER) - DATE AND TIME:
22-Feb-2019 05:29
22-Feb-2019 06:32
23-Feb-2019 12:11
24-Feb-2019 07:10
27-Feb-2019 00:10
23-Feb-2019 10:06

## 2019-02-27 NOTE — PROVIDER CONTACT NOTE (OTHER) - REASON
Patient hypertensive BP: 198/82mmHg
Pt had SVTs on tele up to 200s x 2 and a half minutes
Wide complex tachycardia
bradycardia, htn
pt c/o dizziness while using bathroom
questionable PAF

## 2019-02-27 NOTE — CONSULT NOTE ADULT - ASSESSMENT
Assessment  Hyperglycemia: 83y Male with with hyperglycemia, blood sugars improving, no hypoglycemic episode, eating meals, non compliant with low carb diet.  Fever: Resolved, on medications, stable, monitored.  Hypothyroidism:  Subclinical now, not on synthroid, asymptomatic.  HTN: Controlled, no headaches, on medications.      Micheline Crowley MD  Cell: [phone]  Office: [phone]

## 2019-02-27 NOTE — PROGRESS NOTE ADULT - ASSESSMENT
Patient is a 83 yr old male with PMH HTN, HLD, mitral valve regurgitation, prostate cancer,  dementia, presented with cough, fevers 102 and altered MS, treated empiracally for PNA.  On 2/22/19 a routine VS check revealed HR in 30s, /90. RRT was called. EKG with sinus bradycardia. Lopressor 50mg BID last dose administered on 2/21 at 17:49). EP consulted for TBS -Tachy-daniel syndrome    # TBS  Tele:  SR 60-90s and Noted to have episodes of atrial tachycardia HR up to 150's, last episode on 2/26 around 4pm  - lopressor 12.5mg BID started per primary team for BP control  - TTE with EF 70%, normal LVSD  - Continue Amiodarone 200mg BID   - monitor telemetry closely      To be discussed with Dr Chemo Vogel, [facility]  [phone] Patient is a 83 yr old male with PMH HTN, HLD, mitral valve regurgitation, prostate cancer,  dementia, presented with cough, fevers 102 and altered MS, treated empiracally for PNA.  On 2/22/19 a routine VS check revealed HR in 30s, /90. RRT was called. EKG with sinus bradycardia. Lopressor 50mg BID last dose administered on 2/21 at 17:49). EP consulted for TBS -Tachy-daniel syndrome    # TBS  Tele:  SR 60-90s and Noted to have episodes of atrial tachycardia HR up to 150's, last episode on 2/26 around 4pm  - lopressor 12.5mg BID started per primary team for BP control. Can uptitrate as BP tolerates for rate control.   - TTE with EF 70%, normal LVSD  - Continue Amiodarone 200mg PO BID x 2 weeks then 200mg PO QD  - monitor telemetry closely    Case discussed with Dr Chemo Morejon.      Rosalina Vogel, [facility]  [phone] Patient is a 83 yr old male with PMH HTN, HLD, mitral valve regurgitation, prostate cancer,  dementia, presented with cough, fevers 102 and altered MS, treated empiracally for PNA.  On 2/22/19 a routine VS check revealed HR in 30s, /90. RRT was called. EKG with sinus bradycardia. Lopressor 50mg BID last dose administered on 2/21 at 17:49). EP consulted for TBS -Tachy-daniel syndrome    # TBS  Tele:  SR 60-90s and Noted to have episodes of atrial tachycardia HR up to 150's, last episode on 2/26 around 4pm  - lopressor 12.5mg BID started per primary team for BP control. Can uptitrate as BP tolerates for rate control.   - TTE with EF 70%, normal LVSD  - Continue Amiodarone 200mg PO BID x 2 weeks then 200mg PO QD  - monitor telemetry closely    Case discussed with Dr Chemo Morejon and medicine team.      Rosalina Vogel, [facility]  [phone]

## 2019-02-27 NOTE — PROGRESS NOTE ADULT - SUBJECTIVE AND OBJECTIVE BOX
24H hour events: No acute events overnight. Patient resting in chair, NAD. Denies dizziness, SOB, chest pain, or palpitations.    MEDICATIONS:  amiodarone    Tablet 200 milliGRAM(s) Oral every 12 hours  enoxaparin Injectable 40 milliGRAM(s) SubCutaneous daily  hydrALAZINE 50 milliGRAM(s) Oral three times a day  hydrochlorothiazide 12.5 milliGRAM(s) Oral daily  losartan 100 milliGRAM(s) Oral daily  metoprolol tartrate 12.5 milliGRAM(s) Oral two times a day  terazosin 5 milliGRAM(s) Oral at bedtime  acetaminophen   Tablet .. 650 milliGRAM(s) Oral every 6 hours PRN  donepezil 10 milliGRAM(s) Oral at bedtime  escitalopram 5 milliGRAM(s) Oral daily  memantine 10 milliGRAM(s) Oral two times a day  famotidine    Tablet 20 milliGRAM(s) Oral two times a day  simvastatin 40 milliGRAM(s) Oral at bedtime    REVIEW OF SYSTEMS:  See HPI, otherwise ROS negative.    PHYSICAL EXAM:  T(C): 36.4 (02-27-19 @ 04:36), Max: 36.4 (02-26-19 @ 11:42)  HR: 61 (02-27-19 @ 06:05) (57 - 82)  BP: 137/85 (02-27-19 @ 06:05) (105/69 - 167/69)  RR: 18 (02-27-19 @ 04:36) (18 - 18)  SpO2: 95% (02-27-19 @ 04:36) (94% - 95%)  Wt(kg): --  I&O's Summary    26 Feb 2019 07:01  -  27 Feb 2019 07:00  --------------------------------------------------------  IN: 720 mL / OUT: 0 mL / NET: 720 mL    Appearance: Alert. NAD	  Cardiovascular: +S1S2 RRR no m/g/r  Respiratory: CTA B/L	  Psychiatry: A & O x 3, Mood & affect appropriate  Gastrointestinal:  Soft, NT. ND. +BS	  Skin: No rashes	  Neurologic: Non-focal  Extremities: no edema BLE  Vascular: Peripheral pulses palpable 2+ bilaterally      LABS:	 	  CBC Full  -  ( 27 Feb 2019 08:25 )  WBC Count : 8.50 K/uL  Hemoglobin : 13.6 g/dL  Hematocrit : 41.4 %  Platelet Count - Automated : 182 K/uL  Mean Cell Volume : 94.1 fl  Mean Cell Hemoglobin : 30.9 pg  Mean Cell Hemoglobin Concentration : 32.9 gm/dL  Auto Neutrophil # : x  Auto Lymphocyte # : x  Auto Monocyte # : x  Auto Eosinophil # : x  Auto Basophil # : x  Auto Neutrophil % : x  Auto Lymphocyte % : x  Auto Monocyte % : x  Auto Eosinophil % : x  Auto Basophil % : x    02-27    144  |  106  |  28<H>  ----------------------------<  140<H>  4.2   |  21<L>  |  1.07  02-26    140  |  100  |  23  ----------------------------<  107<H>  3.4<L>   |  24  |  1.00    Ca    9.4      27 Feb 2019 05:42  Ca    9.4      26 Feb 2019 06:31  Mg     2.2     02-26  Mg     2.2     02-25    TELEMETRY: SR 60-90s.    ECHO: 2/26 TTE EF 70%  Conclusions:  1. Mitral annular calcification. Thickened anterior mitral  leaflet tip with normal leaflet opening. Minimal mitral  regurgitation.  2. Calcified trileaflet aortic valve with normal opening.  No aortic valve regurgitation seen.  3. Normal left ventricular systolic function. No segmental  wall motion abnormalities.  4. Normal right ventricular size and function.  *** No previous Echo exam.

## 2019-02-27 NOTE — PROGRESS NOTE ADULT - SUBJECTIVE AND OBJECTIVE BOX
Patient is a 83y old  Male who presents with a chief complaint of Fever (27 Feb 2019 16:18)                                                               INTERVAL HPI/OVERNIGHT EVENTS:    REVIEW OF SYSTEMS:     CONSTITUTIONAL: No weakness, fevers or chills  RESPIRATORY: No cough, wheezing,  No shortness of breath  CARDIOVASCULAR: No chest pain or palpitations  GASTROINTESTINAL: No abdominal pain  . No nausea, vomiting, or hematemesis; No diarrhea or constipation. No melena or hematochezia.  GENITOURINARY: No dysuria, frequency or hematuria  NEUROLOGICAL: No numbness or weakness                                                                                                                                                                                                                                                                                 Medications:  MEDICATIONS  (STANDING):  amiodarone    Tablet 200 milliGRAM(s) Oral every 12 hours  donepezil 10 milliGRAM(s) Oral at bedtime  enoxaparin Injectable 40 milliGRAM(s) SubCutaneous daily  escitalopram 5 milliGRAM(s) Oral daily  famotidine    Tablet 20 milliGRAM(s) Oral two times a day  hydrALAZINE 50 milliGRAM(s) Oral three times a day  hydrochlorothiazide 12.5 milliGRAM(s) Oral daily  losartan 100 milliGRAM(s) Oral daily  memantine 10 milliGRAM(s) Oral two times a day  metoprolol tartrate 25 milliGRAM(s) Oral two times a day  simvastatin 40 milliGRAM(s) Oral at bedtime  terazosin 5 milliGRAM(s) Oral at bedtime    MEDICATIONS  (PRN):  acetaminophen   Tablet .. 650 milliGRAM(s) Oral every 6 hours PRN Temp greater or equal to 38C (100.4F)       Allergies    No Known Allergies    Intolerances      Vital Signs Last 24 Hrs  T(C): 36.4 (27 Feb 2019 10:35), Max: 36.4 (26 Feb 2019 20:35)  T(F): 97.6 (27 Feb 2019 10:35), Max: 97.6 (26 Feb 2019 20:35)  HR: 82 (27 Feb 2019 17:24) (57 - 82)  BP: 124/63 (27 Feb 2019 17:24) (105/69 - 161/82)  BP(mean): --  RR: 18 (27 Feb 2019 13:44) (18 - 18)  SpO2: 97% (27 Feb 2019 13:44) (94% - 97%)  CAPILLARY BLOOD GLUCOSE      POCT Blood Glucose.: 127 mg/dL (27 Feb 2019 16:05)  POCT Blood Glucose.: 116 mg/dL (27 Feb 2019 12:06)  POCT Blood Glucose.: 141 mg/dL (27 Feb 2019 07:45)  POCT Blood Glucose.: 178 mg/dL (26 Feb 2019 23:41)  POCT Blood Glucose.: 119 mg/dL (26 Feb 2019 21:10)      02-26 @ 07:01 - 02-27 @ 07:00  --------------------------------------------------------  IN: 720 mL / OUT: 0 mL / NET: 720 mL    02-27 @ 07:01  -  02-27 @ 20:16  --------------------------------------------------------  IN: 790 mL / OUT: 0 mL / NET: 790 mL      Physical Exam:    General:  NAD   HEENT:  Nonicteric, PERRLA  CV:  RRR, S1S2   Lungs:  CTA B/L, no wheezes, rales, rhonchi  Abdomen:  Soft, non-tender, no distended, positive BS  Extremities:  2+ pulses, no c/c, no edema  Skin:  Warm and dry, no rashes  :  No mckeon                                                                                                                                                                                                                                                                                             LABS:                               13.6   8.50  )-----------( 182      ( 27 Feb 2019 08:25 )             41.4                      02-27    144  |  106  |  28<H>  ----------------------------<  140<H>  4.2   |  21<L>  |  1.07    Ca    9.4      27 Feb 2019 05:42  Mg     2.2     02-26

## 2019-02-27 NOTE — PROVIDER CONTACT NOTE (OTHER) - SITUATION
Patient had 6 beats of WCT recorded telemetry, VSS, asymptomatic
Patient hypertensive BP: 198/82mmHg
Pt had SVTs on tele up to 200s x 2 and a half minutes
pt c/o dizziness while using bathroom
pt has palpated hr of 36, and electronic bp of 188/82
Patient with questionable PAF on telemetry with HR up to 150's for 2 minutes

## 2019-02-27 NOTE — CHART NOTE - NSCHARTNOTEFT_GEN_A_CORE
Interval events    C/o dizziness overnight while ambulated to bathroom  Evaluated the pt at  bedside. Pt denies dizziness now  Orthostatics negative      Vital Signs Last 24 Hrs  T(C): 36.4 (27 Feb 2019 04:36), Max: 36.4 (26 Feb 2019 11:42)  T(F): 97.5 (27 Feb 2019 04:36), Max: 97.6 (26 Feb 2019 20:35)  HR: 61 (27 Feb 2019 06:05) (57 - 82)  BP: 137/85 (27 Feb 2019 06:05) (105/69 - 167/69)  BP(mean): --  RR: 18 (27 Feb 2019 04:36) (18 - 18)  SpO2: 95% (27 Feb 2019 04:36) (94% - 95%)    83M PMH Prostate ca, dementia, HTN, HLD, MVR who p/w fevers and cough, found to be influenza+ and treated empirically for PNA, hospital course complicated by bradycardia to the 30's and PAT to 150's, and uncontrolled HTN.   Tachy daniel syndrome: P/w sinus bradycardia to 30s with significant hypertension; asymptomatic, episodes of PAT to 150's, one episode this AM to 130's. Amiodarone started yesterday evening    Patient now with c/o dizziness  Noted SBP 'S and pt received antihypertensives at same time  Also likely from Flu, fatigue, ?role of HCTZ/dizziness  Patient with tachy daniel syndrome, no evens on tele while pt dizzy, Baseline HR in high 50's to 70's.  No PAT overnight  C/W tele monitoring  Spilt timing of antihypertensives and not to administer all antihypertensives at same time  Fall precautions  OOB to bathroom with assistance  F/U with cardiology in am  Will follow    Garrett Lenoard [facility]  [id number] 02/27/2019 0100    Interval events    C/o dizziness overnight while ambulated to bathroom  Evaluated the pt at  bedside. Pt denies dizziness now  Orthostatics negative      Vital Signs Last 24 Hrs  T(C): 36.4 (27 Feb 2019 04:36), Max: 36.4 (26 Feb 2019 11:42)  T(F): 97.5 (27 Feb 2019 04:36), Max: 97.6 (26 Feb 2019 20:35)  HR: 61 (27 Feb 2019 06:05) (57 - 82)  BP: 137/85 (27 Feb 2019 06:05) (105/69 - 167/69)  BP(mean): --  RR: 18 (27 Feb 2019 04:36) (18 - 18)  SpO2: 95% (27 Feb 2019 04:36) (94% - 95%)    83M PMH Prostate ca, dementia, HTN, HLD, MVR who p/w fevers and cough, found to be influenza+ and treated empirically for PNA, hospital course complicated by bradycardia to the 30's and PAT to 150's, and uncontrolled HTN.   Tachy daniel syndrome: P/w sinus bradycardia to 30s with significant hypertension; asymptomatic, episodes of PAT to 150's, one episode this AM to 130's. Amiodarone started yesterday evening    Patient now with c/o dizziness  Noted SBP 'S and pt received antihypertensives at same time  Also likely from Flu, fatigue, ?role of HCTZ/dizziness  Patient with tachy daniel syndrome, no evens on tele while pt dizzy, Baseline HR in high 50's to 70's.  No PAT overnight  C/W tele monitoring  Spilt timing of antihypertensives and not to administer all antihypertensives at same time  Fall precautions  OOB to bathroom with assistance  F/U with cardiology in am  Will follow    Garrett Leonard [facility]  [id number]

## 2019-02-27 NOTE — CONSULT NOTE ADULT - SUBJECTIVE AND OBJECTIVE BOX
HPI:  83 yr old male with PMH sig for dementia, HTN, HLD whose wife was admitted yesterday for influenza, today presents with cough, fevers and altered MS.  Patient was in his usual state of health yesterday when his son noticed that he seemed more tired than normal.  This morning he was noted to be confused, coughing, and febrile to 102 at home.  He was taken to his PCPs office where he was again noted to be confused, febrile and instructed to go to the ER to r/o PNA.  In the ED he was given Azithromycin, Ceftriaxone, and a CXR and Chest CT were done that did not reveal a pulmonary infiltrate.  RVP panel is currently pending.  His family states that he currently is at his baseline mental status, which is A and O x 2-3, somewhat forgetful, but overall functional at home with his ADLs.  Patient did receive a flu shot this year.  He denies any HA, abd pain, dysuria.  He states that he had some chest discomfort with coughing. (20 Feb 2019 18:41)  Patient apparently has no history of diabetes, no polyuria polydipsia. Patient follows up with PCP for health management.    PAST MEDICAL & SURGICAL HISTORY:  Mitral valve regurgitation  Prostate cancer  HLD (hyperlipidemia)  HTN (hypertension)  History of hip replacement      FAMILY HISTORY:  No pertinent family history in first degree relatives      Social History:    Outpatient Medications:    MEDICATIONS  (STANDING):  amiodarone    Tablet 200 milliGRAM(s) Oral every 12 hours  donepezil 10 milliGRAM(s) Oral at bedtime  enoxaparin Injectable 40 milliGRAM(s) SubCutaneous daily  escitalopram 5 milliGRAM(s) Oral daily  famotidine    Tablet 20 milliGRAM(s) Oral two times a day  hydrALAZINE 50 milliGRAM(s) Oral three times a day  hydrochlorothiazide 12.5 milliGRAM(s) Oral daily  losartan 100 milliGRAM(s) Oral daily  memantine 10 milliGRAM(s) Oral two times a day  metoprolol tartrate 12.5 milliGRAM(s) Oral two times a day  simvastatin 40 milliGRAM(s) Oral at bedtime  terazosin 5 milliGRAM(s) Oral at bedtime    MEDICATIONS  (PRN):  acetaminophen   Tablet .. 650 milliGRAM(s) Oral every 6 hours PRN Temp greater or equal to 38C (100.4F)      Allergies    No Known Allergies    Intolerances      Review of Systems:  Constitutional: No fever, no chills  Eyes: No blurry vision  Neuro: No tremors  HEENT: No pain, no neck swelling  Cardiovascular: No chest pain, no palpitations  Respiratory: Has SOB, no cough  GI: No nausea, vomiting, abdominal pain  : No dysuria  Skin: no rash  MSK: Has leg swelling.  Psych: no depression  Endocrine: no polyuria, polydipsia    ALL OTHER SYSTEMS REVIEWED AND NEGATIVE    UNABLE TO OBTAIN    PHYSICAL EXAM:  VITALS: T(C): 36.4 (02-27-19 @ 10:35)  T(F): 97.6 (02-27-19 @ 10:35), Max: 97.6 (02-26-19 @ 20:35)  HR: 71 (02-27-19 @ 13:44) (57 - 82)  BP: 146/79 (02-27-19 @ 13:44) (105/69 - 161/82)  RR:  (18 - 18)  SpO2:  (94% - 97%)  Wt(kg): --  GENERAL: NAD, well-groomed, well-developed  EYES: No proptosis, no lid lag  HEENT:  Atraumatic, Normocephalic  THYROID: Normal size, no palpable nodules  RESPIRATORY: Clear to auscultation bilaterally; No rales, rhonchi, wheezing  CARDIOVASCULAR: Si S2, No murmurs;  GI: Soft, non distended, normal bowel sounds  SKIN: Dry, intact, No rashes or lesions  MUSCULOSKELETAL: Has BL lower extremity edema.  NEURO:  no tremor, sensation decreased in feet BL,    POCT Blood Glucose.: 127 mg/dL (02-27-19 @ 16:05)  POCT Blood Glucose.: 116 mg/dL (02-27-19 @ 12:06)  POCT Blood Glucose.: 141 mg/dL (02-27-19 @ 07:45)  POCT Blood Glucose.: 178 mg/dL (02-26-19 @ 23:41)  POCT Blood Glucose.: 119 mg/dL (02-26-19 @ 21:10)  POCT Blood Glucose.: 118 mg/dL (02-26-19 @ 16:28)  POCT Blood Glucose.: 146 mg/dL (02-26-19 @ 11:52)  POCT Blood Glucose.: 101 mg/dL (02-26-19 @ 07:51)  POCT Blood Glucose.: 173 mg/dL (02-25-19 @ 16:22)  POCT Blood Glucose.: 146 mg/dL (02-25-19 @ 11:51)  POCT Blood Glucose.: 108 mg/dL (02-25-19 @ 07:38)  POCT Blood Glucose.: 174 mg/dL (02-24-19 @ 21:09)  POCT Blood Glucose.: 123 mg/dL (02-24-19 @ 16:38)                            13.6   8.50  )-----------( 182      ( 27 Feb 2019 08:25 )             41.4       02-27    144  |  106  |  28<H>  ----------------------------<  140<H>  4.2   |  21<L>  |  1.07    EGFR if : 75  EGFR if non : 65    Ca    9.4      02-27  Mg     2.2     02-26        Thyroid Function Tests:  02-26 @ 10:04 TSH 4.34 FreeT4 -- T3 -- Anti TPO -- Anti Thyroglobulin Ab -- TSI --              Radiology:

## 2019-02-27 NOTE — PROVIDER CONTACT NOTE (OTHER) - ACTION/TREATMENT ORDERED:
orthostatic BP  fall risk precautions   continue to monitor pt orthostatic BP  fall risk precautions   continue to monitor pt  split antihypertensives, do not administer all at once

## 2019-02-27 NOTE — PROVIDER CONTACT NOTE (OTHER) - ASSESSMENT
Patient up walking around, slightly agitated, history of dementia. No complaints of chest pain or SOB.  VSS
AxOx2-3, forgetful  VS stable
Patient alert and oriented x 2-3, VSS, no complaints of chest pain or SOB.
Patient is A&Ox2 at baseline. HR 30s-50s on telemetry. He is asymptomatic.
Patient is A&Ox3, forgetful. V/S: /73/ P84  R20. Pt denies any chest pain, SOB/palpitations. Pt was upset stated that he is upset because his wife is not there with him. Pt reassured that wife will visit him today. Pt agreed to cooperate and remain calm.
asymptomatic, pt mentating. will check manual bp

## 2019-02-28 ENCOUNTER — TRANSCRIPTION ENCOUNTER (OUTPATIENT)
Age: 84
End: 2019-02-28

## 2019-02-28 VITALS — SYSTOLIC BLOOD PRESSURE: 165 MMHG | DIASTOLIC BLOOD PRESSURE: 95 MMHG | HEART RATE: 75 BPM

## 2019-02-28 LAB
ANION GAP SERPL CALC-SCNC: 15 MMOL/L — SIGNIFICANT CHANGE UP (ref 5–17)
BUN SERPL-MCNC: 26 MG/DL — HIGH (ref 7–23)
CALCIUM SERPL-MCNC: 9.1 MG/DL — SIGNIFICANT CHANGE UP (ref 8.4–10.5)
CHLORIDE SERPL-SCNC: 103 MMOL/L — SIGNIFICANT CHANGE UP (ref 96–108)
CO2 SERPL-SCNC: 22 MMOL/L — SIGNIFICANT CHANGE UP (ref 22–31)
CREAT SERPL-MCNC: 1.06 MG/DL — SIGNIFICANT CHANGE UP (ref 0.5–1.3)
GLUCOSE BLDC GLUCOMTR-MCNC: 138 MG/DL — HIGH (ref 70–99)
GLUCOSE BLDC GLUCOMTR-MCNC: 156 MG/DL — HIGH (ref 70–99)
GLUCOSE SERPL-MCNC: 126 MG/DL — HIGH (ref 70–99)
HBA1C BLD-MCNC: 6.9 % — HIGH (ref 4–5.6)
HCT VFR BLD CALC: 41.1 % — SIGNIFICANT CHANGE UP (ref 39–50)
HGB BLD-MCNC: 13.4 G/DL — SIGNIFICANT CHANGE UP (ref 13–17)
MCHC RBC-ENTMCNC: 31 PG — SIGNIFICANT CHANGE UP (ref 27–34)
MCHC RBC-ENTMCNC: 32.6 GM/DL — SIGNIFICANT CHANGE UP (ref 32–36)
MCV RBC AUTO: 95.1 FL — SIGNIFICANT CHANGE UP (ref 80–100)
PLATELET # BLD AUTO: 197 K/UL — SIGNIFICANT CHANGE UP (ref 150–400)
POTASSIUM SERPL-MCNC: 3.5 MMOL/L — SIGNIFICANT CHANGE UP (ref 3.5–5.3)
POTASSIUM SERPL-SCNC: 3.5 MMOL/L — SIGNIFICANT CHANGE UP (ref 3.5–5.3)
RBC # BLD: 4.32 M/UL — SIGNIFICANT CHANGE UP (ref 4.2–5.8)
RBC # FLD: 14.2 % — SIGNIFICANT CHANGE UP (ref 10.3–14.5)
SODIUM SERPL-SCNC: 140 MMOL/L — SIGNIFICANT CHANGE UP (ref 135–145)
T4 FREE SERPL-MCNC: 1.2 NG/DL — SIGNIFICANT CHANGE UP (ref 0.9–1.8)
WBC # BLD: 6.53 K/UL — SIGNIFICANT CHANGE UP (ref 3.8–10.5)
WBC # FLD AUTO: 6.53 K/UL — SIGNIFICANT CHANGE UP (ref 3.8–10.5)

## 2019-02-28 PROCEDURE — 87581 M.PNEUMON DNA AMP PROBE: CPT

## 2019-02-28 PROCEDURE — 82330 ASSAY OF CALCIUM: CPT

## 2019-02-28 PROCEDURE — 84100 ASSAY OF PHOSPHORUS: CPT

## 2019-02-28 PROCEDURE — 84295 ASSAY OF SERUM SODIUM: CPT

## 2019-02-28 PROCEDURE — 87633 RESP VIRUS 12-25 TARGETS: CPT

## 2019-02-28 PROCEDURE — 70450 CT HEAD/BRAIN W/O DYE: CPT

## 2019-02-28 PROCEDURE — 80048 BASIC METABOLIC PNL TOTAL CA: CPT

## 2019-02-28 PROCEDURE — 85014 HEMATOCRIT: CPT

## 2019-02-28 PROCEDURE — 81001 URINALYSIS AUTO W/SCOPE: CPT

## 2019-02-28 PROCEDURE — 82962 GLUCOSE BLOOD TEST: CPT

## 2019-02-28 PROCEDURE — 82435 ASSAY OF BLOOD CHLORIDE: CPT

## 2019-02-28 PROCEDURE — 83880 ASSAY OF NATRIURETIC PEPTIDE: CPT

## 2019-02-28 PROCEDURE — 85610 PROTHROMBIN TIME: CPT

## 2019-02-28 PROCEDURE — 83036 HEMOGLOBIN GLYCOSYLATED A1C: CPT

## 2019-02-28 PROCEDURE — 71250 CT THORAX DX C-: CPT

## 2019-02-28 PROCEDURE — 93005 ELECTROCARDIOGRAM TRACING: CPT | Mod: XU

## 2019-02-28 PROCEDURE — 82947 ASSAY GLUCOSE BLOOD QUANT: CPT

## 2019-02-28 PROCEDURE — 87086 URINE CULTURE/COLONY COUNT: CPT

## 2019-02-28 PROCEDURE — 84443 ASSAY THYROID STIM HORMONE: CPT

## 2019-02-28 PROCEDURE — 94640 AIRWAY INHALATION TREATMENT: CPT

## 2019-02-28 PROCEDURE — 83735 ASSAY OF MAGNESIUM: CPT

## 2019-02-28 PROCEDURE — 83605 ASSAY OF LACTIC ACID: CPT

## 2019-02-28 PROCEDURE — 51701 INSERT BLADDER CATHETER: CPT

## 2019-02-28 PROCEDURE — 84439 ASSAY OF FREE THYROXINE: CPT

## 2019-02-28 PROCEDURE — 87040 BLOOD CULTURE FOR BACTERIA: CPT

## 2019-02-28 PROCEDURE — 85730 THROMBOPLASTIN TIME PARTIAL: CPT

## 2019-02-28 PROCEDURE — 80053 COMPREHEN METABOLIC PANEL: CPT

## 2019-02-28 PROCEDURE — 82803 BLOOD GASES ANY COMBINATION: CPT

## 2019-02-28 PROCEDURE — 84484 ASSAY OF TROPONIN QUANT: CPT

## 2019-02-28 PROCEDURE — 87798 DETECT AGENT NOS DNA AMP: CPT

## 2019-02-28 PROCEDURE — 87486 CHLMYD PNEUM DNA AMP PROBE: CPT

## 2019-02-28 PROCEDURE — 93306 TTE W/DOPPLER COMPLETE: CPT

## 2019-02-28 PROCEDURE — 97162 PT EVAL MOD COMPLEX 30 MIN: CPT

## 2019-02-28 PROCEDURE — 99285 EMERGENCY DEPT VISIT HI MDM: CPT | Mod: 25

## 2019-02-28 PROCEDURE — 84145 PROCALCITONIN (PCT): CPT

## 2019-02-28 PROCEDURE — 84132 ASSAY OF SERUM POTASSIUM: CPT

## 2019-02-28 PROCEDURE — 85027 COMPLETE CBC AUTOMATED: CPT

## 2019-02-28 PROCEDURE — 71045 X-RAY EXAM CHEST 1 VIEW: CPT

## 2019-02-28 RX ORDER — METOPROLOL TARTRATE 50 MG
1 TABLET ORAL
Qty: 0 | Refills: 0 | COMMUNITY

## 2019-02-28 RX ORDER — HYDRALAZINE HCL 50 MG
1 TABLET ORAL
Qty: 90 | Refills: 0
Start: 2019-02-28 | End: 2019-03-29

## 2019-02-28 RX ORDER — LOSARTAN/HYDROCHLOROTHIAZIDE 100MG-25MG
1 TABLET ORAL
Qty: 30 | Refills: 0
Start: 2019-02-28 | End: 2019-03-29

## 2019-02-28 RX ORDER — AMIODARONE HYDROCHLORIDE 400 MG/1
1 TABLET ORAL
Qty: 50 | Refills: 0
Start: 2019-02-28 | End: 2019-03-29

## 2019-02-28 RX ORDER — METOPROLOL TARTRATE 50 MG
1 TABLET ORAL
Qty: 60 | Refills: 0
Start: 2019-02-28 | End: 2019-03-29

## 2019-02-28 RX ORDER — LOSARTAN/HYDROCHLOROTHIAZIDE 100MG-25MG
1 TABLET ORAL
Qty: 0 | Refills: 0 | COMMUNITY

## 2019-02-28 RX ADMIN — AMIODARONE HYDROCHLORIDE 200 MILLIGRAM(S): 400 TABLET ORAL at 05:14

## 2019-02-28 RX ADMIN — Medication 50 MILLIGRAM(S): at 05:14

## 2019-02-28 RX ADMIN — Medication 25 MILLIGRAM(S): at 17:05

## 2019-02-28 RX ADMIN — FAMOTIDINE 20 MILLIGRAM(S): 10 INJECTION INTRAVENOUS at 17:05

## 2019-02-28 RX ADMIN — ESCITALOPRAM OXALATE 5 MILLIGRAM(S): 10 TABLET, FILM COATED ORAL at 13:17

## 2019-02-28 RX ADMIN — Medication 25 MILLIGRAM(S): at 05:17

## 2019-02-28 RX ADMIN — MEMANTINE HYDROCHLORIDE 10 MILLIGRAM(S): 10 TABLET ORAL at 05:14

## 2019-02-28 RX ADMIN — LOSARTAN POTASSIUM 100 MILLIGRAM(S): 100 TABLET, FILM COATED ORAL at 09:32

## 2019-02-28 RX ADMIN — Medication 12.5 MILLIGRAM(S): at 09:32

## 2019-02-28 RX ADMIN — Medication 50 MILLIGRAM(S): at 13:17

## 2019-02-28 RX ADMIN — ENOXAPARIN SODIUM 40 MILLIGRAM(S): 100 INJECTION SUBCUTANEOUS at 13:17

## 2019-02-28 RX ADMIN — FAMOTIDINE 20 MILLIGRAM(S): 10 INJECTION INTRAVENOUS at 05:14

## 2019-02-28 RX ADMIN — MEMANTINE HYDROCHLORIDE 10 MILLIGRAM(S): 10 TABLET ORAL at 17:05

## 2019-02-28 RX ADMIN — AMIODARONE HYDROCHLORIDE 200 MILLIGRAM(S): 400 TABLET ORAL at 17:05

## 2019-02-28 NOTE — PROGRESS NOTE ADULT - SUBJECTIVE AND OBJECTIVE BOX
Patient is a 83y old  Male who presents with a chief complaint of Fever (2019 12:37)                                                               INTERVAL HPI/OVERNIGHT EVENTS:    REVIEW OF SYSTEMS:     CONSTITUTIONAL: No weakness, fevers or chills  EYES/ENT: No visual changes , no ear ache   NECK: No pain or stiffness  RESPIRATORY: No cough, wheezing,  No shortness of breath  CARDIOVASCULAR: No chest pain or palpitations  GASTROINTESTINAL: No abdominal pain  . No nausea, vomiting, or hematemesis; No diarrhea or constipation. No melena or hematochezia.  GENITOURINARY: No dysuria, frequency or hematuria  NEUROLOGICAL: No numbness or weakness                                                                                                                                                                                                                                                                               Medications:  MEDICATIONS  (STANDING):  amiodarone    Tablet 200 milliGRAM(s) Oral every 12 hours  donepezil 10 milliGRAM(s) Oral at bedtime  enoxaparin Injectable 40 milliGRAM(s) SubCutaneous daily  escitalopram 5 milliGRAM(s) Oral daily  famotidine    Tablet 20 milliGRAM(s) Oral two times a day  hydrALAZINE 50 milliGRAM(s) Oral three times a day  hydrochlorothiazide 12.5 milliGRAM(s) Oral daily  losartan 100 milliGRAM(s) Oral daily  memantine 10 milliGRAM(s) Oral two times a day  metoprolol tartrate 25 milliGRAM(s) Oral two times a day  simvastatin 40 milliGRAM(s) Oral at bedtime  terazosin 5 milliGRAM(s) Oral at bedtime    MEDICATIONS  (PRN):  acetaminophen   Tablet .. 650 milliGRAM(s) Oral every 6 hours PRN Temp greater or equal to 38C (100.4F)       Allergies    No Known Allergies    Intolerances      Vital Signs Last 24 Hrs  T(C): 36.8 (2019 12:27), Max: 36.8 (2019 12:27)  T(F): 98.3 (2019 12:27), Max: 98.3 (2019 12:27)  HR: 77 (2019 12:27) (54 - 82)  BP: 147/79 (2019 12:27) (124/63 - 176/78)  BP(mean): --  RR: 18 (2019 12:27) (18 - 18)  SpO2: 93% (2019 12:27) (91% - 93%)  CAPILLARY BLOOD GLUCOSE      POCT Blood Glucose.: 156 mg/dL (2019 12:02)  POCT Blood Glucose.: 138 mg/dL (2019 08:03)  POCT Blood Glucose.: 127 mg/dL (2019 16:05)       @ 07: @ 07:00  --------------------------------------------------------  IN: 790 mL / OUT: 550 mL / NET: 240 mL     @ 07: @ 14:18  --------------------------------------------------------  IN: 325 mL / OUT: 0 mL / NET: 325 mL      Physical Exam:      Daily Weight in k.5 (2019 11:00)  General:  Well appearing, NAD, not cachetic  HEENT:  Nonicteric, PERRLA  CV:  RRR, S1S2   Lungs:  CTA B/L, no wheezes, rales, rhonchi  Abdomen:  Soft, non-tender, no distended, positive BS  Extremities:  2+ pulses, no c/c, no edema  Skin:  Warm and dry, no rashes    Neuro:  AAOx3, non-focal, grossly intact                                                                                                                                                                                                                                                                                                LABS:                               13.4   6.53  )-----------( 197      ( 2019 09:31 )             41.1                          140  |  103  |  26<H>  ----------------------------<  126<H>  3.5   |  22  |  1.06    Ca    9.1      2019 06:10

## 2019-02-28 NOTE — DISCHARGE NOTE ADULT - CARE PLAN
Principal Discharge DX:	Tachy-daniel syndrome  Goal:	rate controlled  Assessment and plan of treatment:	Amiodarone 200 mg two times a day for 10 more days THEN ONLY ONE TABLET DAILY  follow up with PCP and cardiology in 1 week  return to hospital if have dizziness, chest pain, difficulty breathing or worsens  Secondary Diagnosis:	Influenza A  Goal:	resolved  Assessment and plan of treatment:	treated with Tamiflu  Have PCP do pulmonary functions test  Secondary Diagnosis:	Hypothyroidism  Assessment and plan of treatment:	follow up with endocrine for repeat TFT's lab in 4 week  Secondary Diagnosis:	Metabolic encephalopathy  Goal:	resolved  Assessment and plan of treatment:	follow up with PCP  Secondary Diagnosis:	Hypertensive urgency  Goal:	resolved  Assessment and plan of treatment:	resolved. Medication adjusted. follow up with PCP  Medication compliance stressed as patient was not compliant before

## 2019-02-28 NOTE — PROGRESS NOTE ADULT - ASSESSMENT
Influenza A infection: rapidly improved, wheezing fully resolved   Tchy-daniel on telemtry: AF noted this AM  REC      Clear for DC from pulm POV -   AF and cardiac managment per primary team

## 2019-02-28 NOTE — DISCHARGE NOTE ADULT - MEDICATION SUMMARY - MEDICATIONS TO TAKE
I will START or STAY ON the medications listed below when I get home from the hospital:    terazosin 5 mg oral capsule  -- 1 cap(s) by mouth once a day (at bedtime)  -- Indication: For BPH    amiodarone 200 mg oral tablet  -- 1 tab(s) by mouth every 12 hours for 10 more days then one tablet daily  -- Indication: For tachy daniel syndrome    escitalopram 5 mg oral tablet  -- 1 tab(s) by mouth once a day  -- Indication: For Anxiety    Zocor 40 mg oral tablet  -- 1 tab(s) by mouth once a day (at bedtime)  -- Indication: For Hyperlipidemia    losartan-hydrochlorothiazide 100mg-12.5mg oral tablet  -- 1 tab(s) by mouth once a day   -- Avoid prolonged or excessive exposure to direct and/or artificial sunlight while taking this medication.  Do not take this drug if you are pregnant.  It is very important that you take or use this exactly as directed.  Do not skip doses or discontinue unless directed by your doctor.  Some non-prescription drugs may aggravate your condition.  Read all labels carefully.  If a warning appears, check with your doctor before taking.    -- Indication: For HLD (hyperlipidemia)    metoprolol tartrate 25 mg oral tablet  -- 1 tab(s) by mouth 2 times a day  -- Indication: For HTN (hypertension)    Aricept 10 mg oral tablet  -- 1 tab(s) by mouth once a day (at bedtime)  -- Indication: For Memory impairment    Pepcid 20 mg oral tablet  -- 1 tab(s) by mouth 2 times a day  -- Indication: For Dyspepsia    memantine 10 mg oral tablet  -- 1 tab(s) by mouth 2 times a day  -- Indication: For Memory impairment    hydrALAZINE 50 mg oral tablet  -- 1 tab(s) by mouth 3 times a day  -- Indication: For HTN (hypertension)

## 2019-02-28 NOTE — DISCHARGE NOTE ADULT - PATIENT PORTAL LINK FT
You can access the [facility] Patient Portal, offered by [facility], by registering with the following website: [email or web address]

## 2019-02-28 NOTE — PROGRESS NOTE ADULT - PROVIDER SPECIALTY LIST ADULT
Cardiology
Electrophysiology
Internal Medicine
Pulmonology
Internal Medicine
Endocrinology

## 2019-02-28 NOTE — PROGRESS NOTE ADULT - ASSESSMENT
Patient is a 83 yr old male with PMH HTN, HLD, mitral valve regurgitation, prostate cancer,  dementia, presented with cough, fevers 102 and altered MS, treated empiracally for PNA.  On 2/22/19 a routine VS check revealed HR in 30s, /90. RRT was called. EKG with sinus bradycardia. Lopressor 50mg BID last dose administered on 2/21 at 17:49). EP consulted for TBS -Tachy-daniel syndrome.    # TBS  Tele:  SR 50-80 and noted to have episode of PAT up to 180s for 2.4 sec last night, pt was asympotmatic  --Lopressor 12.5mg BID started per primary team for BP control. Can uptitrate as BP tolerates for rate control.   --Continue Amiodarone 200mg PO BID x 2 weeks then 200mg PO QD  --Continue to monitor telemetry    To be discussed with Dr. Jean-Pierre Vogel, [facility]  [id number] Patient is a 83 yr old male with PMH HTN, HLD, mitral valve regurgitation, prostate cancer,  dementia, presented with cough, fevers 102 and altered MS, treated empiracally for PNA.  On 2/22/19 a routine VS check revealed HR in 30s, /90. RRT was called. EKG with sinus bradycardia. Lopressor 50mg BID last dose administered on 2/21 at 17:49). EP consulted for TBS -Tachy-daniel syndrome.    # TBS  Tele:  currently SR 50-80 and noted to have episode of PAT up to 180s for 2.4 sec last night, pt was asymptomatic  --Can continue with Lopressor 25mg PO BID. Would not increase dose as patient is still being amio loaded. PAT burden not significant at this time and patient has been asymptomatic.  --Continue Amiodarone 200mg PO BID x 2 weeks then 200mg PO QD  --PPM not indicated at this time  --Continue to monitor telemetry     EP to sign off. Please call back if questions.    Rosalina Vogel, [facility]  [id number]

## 2019-02-28 NOTE — DIETITIAN INITIAL EVALUATION ADULT. - PT NOT SOURCE
other (specify)/pt agitated at time of visit. Yelling he wants to go home, " no one is helping me, none of my relatives come to help me, I was supposed to go home today..."  and team aware.

## 2019-02-28 NOTE — PROGRESS NOTE ADULT - SUBJECTIVE AND OBJECTIVE BOX
24H hour events: One episode of 2.4 sec PAF up to 180 overnight. Patient ambulating around room, NAD. Says he feels good and wants to go home. Denies dizziness, SOB, chest pain, or palpitations.    MEDICATIONS:  amiodarone    Tablet 200 milliGRAM(s) Oral every 12 hours  enoxaparin Injectable 40 milliGRAM(s) SubCutaneous daily  hydrALAZINE 50 milliGRAM(s) Oral three times a day  hydrochlorothiazide 12.5 milliGRAM(s) Oral daily  losartan 100 milliGRAM(s) Oral daily  metoprolol tartrate 25 milliGRAM(s) Oral two times a day  terazosin 5 milliGRAM(s) Oral at bedtime  acetaminophen   Tablet .. 650 milliGRAM(s) Oral every 6 hours PRN  donepezil 10 milliGRAM(s) Oral at bedtime  escitalopram 5 milliGRAM(s) Oral daily  memantine 10 milliGRAM(s) Oral two times a day  famotidine    Tablet 20 milliGRAM(s) Oral two times a day  simvastatin 40 milliGRAM(s) Oral at bedtime    REVIEW OF SYSTEMS:  See HPI, otherwise ROS negative.    PHYSICAL EXAM:  T(C): 36.4 (02-28-19 @ 04:20), Max: 36.6 (02-27-19 @ 22:43)  HR: 65 (02-28-19 @ 04:20) (54 - 82)  BP: 147/77 (02-28-19 @ 04:20) (124/63 - 161/52)  RR: 18 (02-28-19 @ 04:20) (18 - 18)  SpO2: 91% (02-28-19 @ 04:20) (91% - 97%)  Wt(kg): --  I&O's Summary    27 Feb 2019 07:01  -  28 Feb 2019 07:00  --------------------------------------------------------  IN: 790 mL / OUT: 550 mL / NET: 240 mL    28 Feb 2019 07:01  -  28 Feb 2019 09:12  --------------------------------------------------------  IN: 325 mL / OUT: 0 mL / NET: 325 mL    Appearance: Alert. NAD	  Cardiovascular: +S1S2 RRR no m/g/r  Respiratory: CTA B/L	  Psychiatry: A & O x 3, Mood & affect appropriate  Gastrointestinal:  Soft, NT. ND. +BS	  Skin: No rashes	  Neurologic: Non-focal  Extremities: No edema BLE  Vascular: Peripheral pulses palpable 2+ bilaterally    LABS:	 	  CBC Full  -  ( 27 Feb 2019 08:25 )  WBC Count : 8.50 K/uL  Hemoglobin : 13.6 g/dL  Hematocrit : 41.4 %  Platelet Count - Automated : 182 K/uL  Mean Cell Volume : 94.1 fl  Mean Cell Hemoglobin : 30.9 pg  Mean Cell Hemoglobin Concentration : 32.9 gm/dL  Auto Neutrophil # : x  Auto Lymphocyte # : x  Auto Monocyte # : x  Auto Eosinophil # : x  Auto Basophil # : x  Auto Neutrophil % : x  Auto Lymphocyte % : x  Auto Monocyte % : x  Auto Eosinophil % : x  Auto Basophil % : x    02-28    140  |  103  |  26<H>  ----------------------------<  126<H>  3.5   |  22  |  1.06  02-27    144  |  106  |  28<H>  ----------------------------<  140<H>  4.2   |  21<L>  |  1.07    Ca    9.1      28 Feb 2019 06:10  Ca    9.4      27 Feb 2019 05:42    TELEMETRY: SR 50-80s

## 2019-02-28 NOTE — DIETITIAN INITIAL EVALUATION ADULT. - ENERGY NEEDS
Height: 69 inches, Weight: 242 pounds (dosing)  BMI: 35.8 kg/m2 IBW: 160 pounds (+/-10%), %IBW: 151%  Pertinent Info: No edema noted, no pressure injuries noted at this time in nursing flow sheet.  Other pertinent info: Pt is 83yoM with PMH significant for dementia, HTN, HLD, MVR, presenting with cough, fevers, AMS, pt s/p treatment for PNA with improvement. Mental status at baseline per chart. Course c/b Tachy-Scottie syndrome, no EP intervention at this time. Also with hyperglycemia earlier in hospitalization, endocrinology following, no h/o DM, no HgbA1c available, per chart subclinical hypothyroid.

## 2019-02-28 NOTE — DISCHARGE NOTE ADULT - HOSPITAL COURSE
83 yr old male with PMH sig for prostate ca ,  dementia, HTN, HLD whose wife was admitted  influenza A, presents with cough, fevers and altered MS.  also found to have influenza A    1- encephalopathy : toxic metabolic seconday to influenza infection superimposed on underlying dementia   improved and now  baseline     2- influenza A:   steroids taper : resolved bronchospasms   nebs, completed tamiflu. To have outpatient TFT's in 4 weeks       3- HTN urgency  :  BP seems to have been reasonably well controlled as o/p.  reviewed meds    restarted HCTZ  restarted terazosin   BP better controlled now   Losartan increased    4- Dementia : cont meds  supportive care    5- Tachy -daniel :   no evidence of Afib    tachy more consistent with PAT  tolerating  challenge with small dose BB : will increase and monitor for daniel  will cont amiodarone  ( 200 bid for two weeks then daily ) per EP    Discharge home

## 2019-02-28 NOTE — DISCHARGE NOTE ADULT - CARE PROVIDER_API CALL
Mac Narayan ()  Family Medicine  [address], [address]  [place] [address]  Phone: [phone]  Fax: [phone]  Follow Up Time:     Micheline Crowley)  EndocrinologyMetabDiabetes; Internal Medicine  [address] [place] [address]  Phone: [phone]  Fax: [phone]  Follow Up Time:     Jhoan Morejon)  Cardiac Electrophysiology; Cardiology; Internal Medicine  [address] [place] [address]  Phone: [phone]  Follow Up Time:

## 2019-02-28 NOTE — DIETITIAN INITIAL EVALUATION ADULT. - PHYSICAL APPEARANCE
other (specify)/obese/BMI 35.8, Pt visually appears well nourished with no signs of muscle wasting or fat depletion.

## 2019-02-28 NOTE — PROGRESS NOTE ADULT - ASSESSMENT
83 yr old male with PMH sig for prostate ca ,  dementia, HTN, HLD whose wife was admitted yesterday for influenza, today presents with cough, fevers and altered MS.  found to have influenza     1- encephhalopathy : toxic metablic sec to influenza infection superimposed on underlying dementia   improved and now  baseline     2- influenza :   steroids taper : resolved bronchospasms   nebs  completed  domenico flu       3- HTN urgency  :  BP seems to have been reasonably well controlled as o/p.  reviewed meds    resttarted HCTZ  restarted terzaxosin   BP better controlled now   tolerating smaller dose of BB     4- Dementia : cont meds  supportive.care    5- Tachy -daniel :   no evidence of Afib    tachy more consistent iwth PAT  tolerating  challenge with small dose BB ( at 25 now )   will cont amio ( 200 bid for two weeks then dialy ) per EP : f/u with EP as o/p.  PFT /TFT as o./p   consider dcing aricept since it might contribute to daniel if recurrs     dc home   d/w pt , daughter ,, NP and CM

## 2019-02-28 NOTE — DIETITIAN INITIAL EVALUATION ADULT. - OTHER INFO
Pt seen for length of stay on 6TOW. Limited subjective information available. Per chart, pt consuming ~100% of meals. No chewing or swallowing difficulties noted and no acute GI distress noted. Last BM noted 2/27. Pt dosing wt noted as 242 lbs, no wt history available. Unable to provide nutrition education at this time given pt emotional disposition. See recommendations below.

## 2019-02-28 NOTE — PROGRESS NOTE ADULT - ASSESSMENT
Assessment  Hyperglycemia: 83y Male with with hyperglycemia, blood sugars improving, no hypoglycemic episode, eating meals, non compliant with low carb diet.  Fever: Resolved, on medications, stable, monitored.  Hypothyroidism:  Subclinical now, not on synthroid, asymptomatic.  HTN: Controlled, no headaches, on medications.      Micheline Crowley MD  Cell: [phone]  Office: [phone]

## 2019-02-28 NOTE — PROGRESS NOTE ADULT - REASON FOR ADMISSION
Fever

## 2019-02-28 NOTE — PROGRESS NOTE ADULT - SUBJECTIVE AND OBJECTIVE BOX
Follow-up Pulm Progress Note  Ross Mitchell MD  [phone]    Afebrile  OOB, feels well, no resp complaints    Vital Signs Last 24 Hrs  T(C): 36.8 (28 Feb 2019 12:27), Max: 36.8 (28 Feb 2019 12:27)  T(F): 98.3 (28 Feb 2019 12:27), Max: 98.3 (28 Feb 2019 12:27)  HR: 77 (28 Feb 2019 12:27) (54 - 82)  BP: 147/79 (28 Feb 2019 12:27) (124/63 - 176/78)  BP(mean): --  RR: 18 (28 Feb 2019 12:27) (18 - 18)  SpO2: 93% (28 Feb 2019 12:27) (91% - 97%)                        13.4   6.53  )-----------( 197      ( 28 Feb 2019 09:31 )             41.1       02-28    140  |  103  |  26<H>  ----------------------------<  126<H>  3.5   |  22  |  1.06    Ca    9.1      28 Feb 2019 06:10      PT/INR - ( 20 Feb 2019 16:35 )   PT: 11.5 sec;   INR: 1.00 ratio         PTT - ( 20 Feb 2019 16:35 )  PTT:28.7 sec  Procalcitonin, Serum: 0.19 ng/mL (02-20-19 @ 16:32)    Serum Pro-Brain Natriuretic Peptide: 532 pg/mL (02-20-19 @ 16:29)    CULTURES:  Culture Results:   No growth (02-20 @ 23:25)  Culture Results:   No growth to date. (02-20 @ 19:07)  Culture Results:   No growth to date. (02-20 @ 19:07)    Most recent blood culture -- 02-20 @ 23:25   -- -- .Urine Clean Catch (Midstream) 02-20 @ 23:25  Most recent blood culture -- 02-20 @ 19:07   -- -- .Blood Blood-Peripheral 02-20 @ 19:07      Physical Examination:  PULM: Clear without wheeze or rhonchi  CVS: Regular rate and rhythm, no murmurs, rubs, or gallops  ABD: Soft, non-tender  EXT:  No clubbing, cyanosis, or edema    RADIOLOGY REVIEWED  CXR:    CT chest:    TTE:

## 2019-02-28 NOTE — PROGRESS NOTE ADULT - SUBJECTIVE AND OBJECTIVE BOX
Chief complaint  Patient is a 83y old  Male who presents with a chief complaint of Fever (28 Feb 2019 09:12)   Review of systems  Patient in bed, looks comfortable, no fever, no hypoglycemia.    Labs and Fingersticks  CAPILLARY BLOOD GLUCOSE      POCT Blood Glucose.: 156 mg/dL (28 Feb 2019 12:02)  POCT Blood Glucose.: 138 mg/dL (28 Feb 2019 08:03)  POCT Blood Glucose.: 127 mg/dL (27 Feb 2019 16:05)      Anion Gap, Serum: 15 (02-28 @ 06:10)  Anion Gap, Serum: 17 (02-27 @ 05:42)      Calcium, Total Serum: 9.1 (02-28 @ 06:10)  Calcium, Total Serum: 9.4 (02-27 @ 05:42)          02-28    140  |  103  |  26<H>  ----------------------------<  126<H>  3.5   |  22  |  1.06    Ca    9.1      28 Feb 2019 06:10                          13.4   6.53  )-----------( 197      ( 28 Feb 2019 09:31 )             41.1     Medications  MEDICATIONS  (STANDING):  amiodarone    Tablet 200 milliGRAM(s) Oral every 12 hours  donepezil 10 milliGRAM(s) Oral at bedtime  enoxaparin Injectable 40 milliGRAM(s) SubCutaneous daily  escitalopram 5 milliGRAM(s) Oral daily  famotidine    Tablet 20 milliGRAM(s) Oral two times a day  hydrALAZINE 50 milliGRAM(s) Oral three times a day  hydrochlorothiazide 12.5 milliGRAM(s) Oral daily  losartan 100 milliGRAM(s) Oral daily  memantine 10 milliGRAM(s) Oral two times a day  metoprolol tartrate 25 milliGRAM(s) Oral two times a day  simvastatin 40 milliGRAM(s) Oral at bedtime  terazosin 5 milliGRAM(s) Oral at bedtime      Physical Exam  General: Patient comfortable in bed  Vital Signs Last 12 Hrs  T(F): 98.3 (02-28-19 @ 12:27), Max: 98.3 (02-28-19 @ 12:27)  HR: 77 (02-28-19 @ 12:27) (65 - 77)  BP: 147/79 (02-28-19 @ 12:27) (142/70 - 176/78)  BP(mean): --  RR: 18 (02-28-19 @ 12:27) (18 - 18)  SpO2: 93% (02-28-19 @ 12:27) (91% - 93%)  Neck: No palpable thyroid nodules.  CVS: S1S2, No murmurs  Respiratory: No wheezing, no crepitations  GI: Abdomen soft, bowel sounds positive  Musculoskeletal:  edema lower extremities.   Skin: No skin rashes, no ecchymosis    Diagnostics    Free Thyroxine, Serum: AM Sched. Collection: 28-Feb-2019 06:00 (02-27 @ 12:20)  Hemoglobin A1C, Whole Blood: AM Sched. Collection: 28-Feb-2019 06:00 (02-27 @ 12:29)

## 2019-02-28 NOTE — DIETITIAN INITIAL EVALUATION ADULT. - NS AS NUTRI INTERV ED CONTENT
Purpose of the nutrition education/Recommend provide nutrition education as feasible on follow up./Other (specify)

## 2019-02-28 NOTE — DISCHARGE NOTE ADULT - MEDICATION SUMMARY - MEDICATIONS TO CHANGE
I will SWITCH the dose or number of times a day I take the medications listed below when I get home from the hospital:    losartan-hydrochlorothiazide 50mg-12.5mg oral tablet  -- 1 tab(s) by mouth once a day

## 2019-02-28 NOTE — DISCHARGE NOTE ADULT - CARE PROVIDERS DIRECT ADDRESSES
[email or web address]

## 2019-03-06 PROBLEM — E78.5 HYPERLIPIDEMIA, UNSPECIFIED: Chronic | Status: ACTIVE | Noted: 2019-02-20

## 2019-03-06 PROBLEM — I10 ESSENTIAL (PRIMARY) HYPERTENSION: Chronic | Status: ACTIVE | Noted: 2019-02-20

## 2019-03-06 PROBLEM — I34.0 NONRHEUMATIC MITRAL (VALVE) INSUFFICIENCY: Chronic | Status: ACTIVE | Noted: 2019-02-20

## 2019-03-06 PROBLEM — C61 MALIGNANT NEOPLASM OF PROSTATE: Chronic | Status: ACTIVE | Noted: 2019-02-20

## 2019-03-07 ENCOUNTER — APPOINTMENT (OUTPATIENT)
Dept: CARDIOLOGY | Facility: CLINIC | Age: 84
End: 2019-03-07
Payer: MEDICARE

## 2019-03-07 VITALS
DIASTOLIC BLOOD PRESSURE: 75 MMHG | BODY MASS INDEX: 35.92 KG/M2 | HEIGHT: 68 IN | SYSTOLIC BLOOD PRESSURE: 146 MMHG | RESPIRATION RATE: 16 BRPM | HEART RATE: 66 BPM | WEIGHT: 237 LBS

## 2019-03-07 DIAGNOSIS — Z83.3 FAMILY HISTORY OF DIABETES MELLITUS: ICD-10-CM

## 2019-03-07 DIAGNOSIS — Z82.49 FAMILY HISTORY OF ISCHEMIC HEART DISEASE AND OTHER DISEASES OF THE CIRCULATORY SYSTEM: ICD-10-CM

## 2019-03-07 PROCEDURE — 99214 OFFICE O/P EST MOD 30 MIN: CPT

## 2019-03-07 PROCEDURE — 93000 ELECTROCARDIOGRAM COMPLETE: CPT

## 2019-03-08 ENCOUNTER — CLINICAL ADVICE (OUTPATIENT)
Age: 84
End: 2019-03-08

## 2019-03-08 DIAGNOSIS — R41.3 OTHER AMNESIA: ICD-10-CM

## 2019-03-08 DIAGNOSIS — F41.9 ANXIETY DISORDER, UNSPECIFIED: ICD-10-CM

## 2019-03-08 RX ORDER — DONEPEZIL HYDROCHLORIDE 10 MG/1
10 TABLET, FILM COATED ORAL
Refills: 0 | Status: ACTIVE | COMMUNITY
Start: 2019-03-08

## 2019-03-08 RX ORDER — MEMANTINE HYDROCHLORIDE 10 MG/1
10 TABLET, FILM COATED ORAL TWICE DAILY
Refills: 0 | Status: ACTIVE | COMMUNITY
Start: 2019-03-08

## 2019-03-13 ENCOUNTER — APPOINTMENT (OUTPATIENT)
Dept: CARDIOLOGY | Facility: CLINIC | Age: 84
End: 2019-03-13
Payer: MEDICARE

## 2019-03-13 ENCOUNTER — APPOINTMENT (OUTPATIENT)
Dept: UROLOGY | Facility: CLINIC | Age: 84
End: 2019-03-13
Payer: MEDICARE

## 2019-03-13 PROCEDURE — 99214 OFFICE O/P EST MOD 30 MIN: CPT

## 2019-03-13 PROCEDURE — 93224 XTRNL ECG REC UP TO 48 HRS: CPT

## 2019-03-13 NOTE — PHYSICAL EXAM
[General Appearance - Well Developed] : well developed [General Appearance - Well Nourished] : well nourished [Normal Appearance] : normal appearance [Well Groomed] : well groomed [General Appearance - In No Acute Distress] : no acute distress [Abdomen Soft] : soft [Abdomen Tenderness] : non-tender [Abdomen Mass (___ Cm)] : no abdominal mass palpated [Abdomen Hernia] : no hernia was discovered [Costovertebral Angle Tenderness] : no ~M costovertebral angle tenderness [Urethral Meatus] : meatus normal [Penis Abnormality] : normal uncircumcised penis [Scrotum] : the scrotum was normal [Testes Tenderness] : no tenderness of the testes [Testes Mass (___cm)] : there were no testicular masses [Anus Abnormality] : the anus and perineum were normal [Rectal Exam - Rectum] : digital rectal exam was normal [Skin Color & Pigmentation] : normal skin color and pigmentation [Heart Rate And Rhythm] : Heart rate and rhythm were normal [Edema] : no peripheral edema [] : no respiratory distress [Respiration, Rhythm And Depth] : normal respiratory rhythm and effort [Exaggerated Use Of Accessory Muscles For Inspiration] : no accessory muscle use [Oriented To Time, Place, And Person] : oriented to person, place, and time [Affect] : the affect was normal [Mood] : the mood was normal [Not Anxious] : not anxious [Normal Station and Gait] : the gait and station were normal for the patient's age [Inguinal Lymph Nodes Enlarged Bilaterally] : inguinal

## 2019-03-13 NOTE — HISTORY OF PRESENT ILLNESS
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 Marshall, left arm .\par PSA has remained undetectable.\par Most recent PSA <0.01 from Sept 2018.\par No new c/o.\par \par Hospitalized in [place] 10 days ago for the flu.

## 2019-03-13 NOTE — ASSESSMENT
[FreeTextEntry1] : Plan:\par \par Vantas implant exchange in Sept 2019\par PSA and testosterone today.

## 2019-03-14 LAB
PSA SERPL-MCNC: <0.01 NG/ML
TESTOST SERPL-MCNC: 7 NG/DL

## 2019-03-21 ENCOUNTER — APPOINTMENT (OUTPATIENT)
Dept: CARDIOLOGY | Facility: CLINIC | Age: 84
End: 2019-03-21
Payer: MEDICARE

## 2019-03-21 VITALS
HEART RATE: 68 BPM | WEIGHT: 228 LBS | SYSTOLIC BLOOD PRESSURE: 134 MMHG | RESPIRATION RATE: 15 BRPM | DIASTOLIC BLOOD PRESSURE: 83 MMHG | HEIGHT: 70 IN | BODY MASS INDEX: 32.64 KG/M2

## 2019-03-21 DIAGNOSIS — R10.13 EPIGASTRIC PAIN: ICD-10-CM

## 2019-03-21 PROCEDURE — 93000 ELECTROCARDIOGRAM COMPLETE: CPT

## 2019-03-21 PROCEDURE — 99214 OFFICE O/P EST MOD 30 MIN: CPT

## 2019-03-22 ENCOUNTER — RX RENEWAL (OUTPATIENT)
Age: 84
End: 2019-03-22

## 2019-03-29 ENCOUNTER — CLINICAL ADVICE (OUTPATIENT)
Age: 84
End: 2019-03-29

## 2019-04-10 ENCOUNTER — APPOINTMENT (OUTPATIENT)
Dept: CARDIOLOGY | Facility: CLINIC | Age: 84
End: 2019-04-10
Payer: MEDICARE

## 2019-04-10 PROCEDURE — 93224 XTRNL ECG REC UP TO 48 HRS: CPT

## 2019-04-22 ENCOUNTER — NON-APPOINTMENT (OUTPATIENT)
Age: 84
End: 2019-04-22

## 2019-04-25 ENCOUNTER — APPOINTMENT (OUTPATIENT)
Dept: CARDIOLOGY | Facility: CLINIC | Age: 84
End: 2019-04-25
Payer: MEDICARE

## 2019-04-25 VITALS
WEIGHT: 235 LBS | SYSTOLIC BLOOD PRESSURE: 132 MMHG | HEIGHT: 70 IN | DIASTOLIC BLOOD PRESSURE: 82 MMHG | BODY MASS INDEX: 33.64 KG/M2 | HEART RATE: 68 BPM | RESPIRATION RATE: 15 BRPM

## 2019-04-25 PROCEDURE — 99213 OFFICE O/P EST LOW 20 MIN: CPT

## 2019-06-06 NOTE — PATIENT PROFILE ADULT - FLU SEASON?
Ventricular Rate : 85  Atrial Rate : 366  QRS Duration : 84  Q-T Interval : 384  QTC Calculation(Bezet) : 456  R Axis : 50  T Axis : 18  Diagnosis : Atrial flutter with variable A-V block  Abnormal ECG  When compared with ECG of 24-DEC-1996 06:51,  Atrial flutter has replaced Sinus rhythm  Non-specific change in ST segment in Inferior leads  ST no longer elevated in Anterolateral leads  QT has lengthened  Confirmed by SANDRA GANDHI ([id number]) on 6/7/2019 3:04:08 PM  
Yes...

## 2019-08-17 ENCOUNTER — EMERGENCY (EMERGENCY)
Facility: HOSPITAL | Age: 84
LOS: 1 days | Discharge: HOME | End: 2019-08-17
Attending: EMERGENCY MEDICINE
Payer: MEDICARE

## 2019-08-17 VITALS
TEMPERATURE: 98 F | HEART RATE: 55 BPM | WEIGHT: 229.94 LBS | OXYGEN SATURATION: 97 % | DIASTOLIC BLOOD PRESSURE: 71 MMHG | HEIGHT: 70 IN | SYSTOLIC BLOOD PRESSURE: 141 MMHG | RESPIRATION RATE: 16 BRPM

## 2019-08-17 VITALS
OXYGEN SATURATION: 96 % | RESPIRATION RATE: 16 BRPM | TEMPERATURE: 98 F | HEART RATE: 58 BPM | DIASTOLIC BLOOD PRESSURE: 77 MMHG | SYSTOLIC BLOOD PRESSURE: 136 MMHG

## 2019-08-17 DIAGNOSIS — Z96.649 PRESENCE OF UNSPECIFIED ARTIFICIAL HIP JOINT: Chronic | ICD-10-CM

## 2019-08-17 PROCEDURE — 93010 ELECTROCARDIOGRAM REPORT: CPT

## 2019-08-17 PROCEDURE — 99284 EMERGENCY DEPT VISIT MOD MDM: CPT

## 2019-08-17 PROCEDURE — 93005 ELECTROCARDIOGRAM TRACING: CPT

## 2019-08-17 PROCEDURE — 99285 EMERGENCY DEPT VISIT HI MDM: CPT

## 2019-08-17 NOTE — ED PROVIDER NOTE - CLINICAL SUMMARY MEDICAL DECISION MAKING FREE TEXT BOX
84yr M with inadvertent overmedication and resultant fall. no ext signs of trauma, low concern for head trauma, no neck ttp. low concern for toxicity given low dose of meds taken. took hydral, lisinopril and metop today.   will do ekg, if no dysrythmia, will likely dc. Post-Care Instructions: I reviewed with the patient in detail post-care instructions. Patient is not to engage in any heavy lifting, exercise, or swimming for the next 14 days. Should the patient develop any fevers, chills, bleeding, severe pain patient will contact the office immediately.

## 2019-08-17 NOTE — ED PROVIDER NOTE - OBJECTIVE STATEMENT
84yr M hx of dementia (baseline fairly functional and living with wife who is not admitted here for GIB), htn, hlm and MVR, remote prostate Cancer had a fall today after accidentally taking 3 times the dose of his meds.   daughter confirmed he did take this amount and found him behind the door, according to her, he was trying to open the door for her but fell down. He reports lightheadedness but no head trauma, no LOC and is not on any antithrombotic and anticoag. daughter says he was initially more confused but now clearing up and back to his baseline. no vomiting, no worse amnesia, no cp, sob, abd pain, no change in bowel habits and is not reporting infectious symptoms.

## 2019-08-17 NOTE — ED ADULT NURSE NOTE - NSIMPLEMENTINTERV_GEN_ALL_ED
Implemented All Fall Risk Interventions:  [place] to call system. Call bell, personal items and telephone within reach. Instruct patient to call for assistance. Room bathroom lighting operational. Non-slip footwear when patient is off stretcher. Physically safe environment: no spills, clutter or unnecessary equipment. Stretcher in lowest position, wheels locked, appropriate side rails in place. Provide visual cue, wrist band, yellow gown, etc. Monitor gait and stability. Monitor for mental status changes and reorient to person, place, and time. Review medications for side effects contributing to fall risk. Reinforce activity limits and safety measures with patient and family.

## 2019-08-17 NOTE — ED ADULT NURSE NOTE - OBJECTIVE STATEMENT
84 y.o. Male presents to the ED accompanied by daughter for fall. Hx dementia- A&Ox2 - baseline unknown time, HTN, HLD, GERD. Around 1230pm today, daughter at bedside states she rang the doorbell and noticed pt falling from the inside of the door. Daughter reports pt was verbalizing the whole time and did not pass out. Daughter states pt seemed confused after fall for a few minutes and pt appears more at baseline now. Pt reports taking three morning doses of hydralazine, losartan, memantine, pepcid, metoprolol this morning. Denies dizziness at the moment, CP, SOB, N/V/D, urinary/bowel complications, fever/chills. Daughter states pt was ambulatory after fall. Pt is in no current distress. Comfort and safety provided. Will continue to monitor. Awaiting ED MD consult.

## 2019-08-17 NOTE — ED ADULT TRIAGE NOTE - CHIEF COMPLAINT QUOTE
Took 3x the dose of hydralazine, losartan/ hydrochlorothiazide, memantine, Metroprolol and Pepcid -  sometime this morning. Also fell, denies hitting Head

## 2019-08-17 NOTE — ED PROVIDER NOTE - PROGRESS NOTE DETAILS
discussed with him and daughter the results of EKG. explained low concern for intracranial pathology and reviewed strict return precautions. daughter is going to take him to her house and watch over. will see primary and skip night meds and restart tomorrow. agreeable to plan.

## 2019-08-17 NOTE — ED ADULT NURSE REASSESSMENT NOTE - NS ED NURSE REASSESS COMMENT FT1
Report received from LEESA Caban pt discharged by MD Mijares prior to RN evaluation or another set of vitals. Per MD Mijares- IV was removed.

## 2019-08-22 ENCOUNTER — APPOINTMENT (OUTPATIENT)
Dept: CARDIOLOGY | Facility: CLINIC | Age: 84
End: 2019-08-22

## 2019-09-16 ENCOUNTER — RX RENEWAL (OUTPATIENT)
Age: 84
End: 2019-09-16

## 2019-09-16 ENCOUNTER — RX CHANGE (OUTPATIENT)
Age: 84
End: 2019-09-16

## 2019-09-18 ENCOUNTER — APPOINTMENT (OUTPATIENT)
Dept: UROLOGY | Facility: CLINIC | Age: 84
End: 2019-09-18

## 2019-10-02 ENCOUNTER — OUTPATIENT (OUTPATIENT)
Dept: OUTPATIENT SERVICES | Facility: HOSPITAL | Age: 84
LOS: 1 days | End: 2019-10-02
Payer: MEDICARE

## 2019-10-02 ENCOUNTER — APPOINTMENT (OUTPATIENT)
Dept: UROLOGY | Facility: CLINIC | Age: 84
End: 2019-10-02
Payer: MEDICARE

## 2019-10-02 VITALS
DIASTOLIC BLOOD PRESSURE: 85 MMHG | SYSTOLIC BLOOD PRESSURE: 145 MMHG | RESPIRATION RATE: 16 BRPM | HEART RATE: 75 BPM | TEMPERATURE: 97.5 F

## 2019-10-02 DIAGNOSIS — Z96.649 PRESENCE OF UNSPECIFIED ARTIFICIAL HIP JOINT: Chronic | ICD-10-CM

## 2019-10-02 DIAGNOSIS — R35.0 FREQUENCY OF MICTURITION: ICD-10-CM

## 2019-10-02 PROCEDURE — 99212 OFFICE O/P EST SF 10 MIN: CPT | Mod: 25

## 2019-10-02 PROCEDURE — 11983 REMOVE/INSERT DRUG IMPLANT: CPT

## 2019-10-03 RX ORDER — HISTRELIN ACETATE 50 MG/1
50 IMPLANT SUBCUTANEOUS
Qty: 0 | Refills: 0 | Status: COMPLETED | OUTPATIENT
Start: 2019-10-03

## 2019-10-03 RX ORDER — HISTRELIN ACETATE 50 MG/1
50 IMPLANT SUBCUTANEOUS
Qty: 1 | Refills: 1 | Status: COMPLETED | OUTPATIENT
Start: 2019-10-03 | End: 2019-10-03

## 2019-10-03 RX ADMIN — HISTRELIN ACETATE 0 MG: 50 IMPLANT SUBCUTANEOUS at 00:00

## 2019-10-03 NOTE — HISTORY OF PRESENT ILLNESS
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

## 2019-10-03 NOTE — PHYSICAL EXAM
[General Appearance - Well Developed] : well developed [General Appearance - Well Nourished] : well nourished [Normal Appearance] : normal appearance [Well Groomed] : well groomed [General Appearance - In No Acute Distress] : no acute distress [Abdomen Soft] : soft [Abdomen Tenderness] : non-tender [Abdomen Mass (___ Cm)] : no abdominal mass palpated [Abdomen Hernia] : no hernia was discovered [Costovertebral Angle Tenderness] : no ~M costovertebral angle tenderness [Urethral Meatus] : meatus normal [Penis Abnormality] : normal uncircumcised penis [Urinary Bladder Findings] : the bladder was normal on palpation [Scrotum] : the scrotum was normal [Testes Tenderness] : no tenderness of the testes [Testes Mass (___cm)] : there were no testicular masses [Anus Abnormality] : the anus and perineum were normal [Rectal Exam - Rectum] : digital rectal exam was normal [Prostate Tenderness] : the prostate was not tender [No Prostate Nodules] : no prostate nodules [Skin Color & Pigmentation] : normal skin color and pigmentation [Skin Turgor] : supple [] : no respiratory distress [Respiration, Rhythm And Depth] : normal respiratory rhythm and effort [Exaggerated Use Of Accessory Muscles For Inspiration] : no accessory muscle use [Oriented To Time, Place, And Person] : oriented to person, place, and time [Not Anxious] : not anxious [Normal Station and Gait] : the gait and station were normal for the patient's age [No Focal Deficits] : no focal deficits [Inguinal Lymph Nodes Enlarged Bilaterally] : inguinal [FreeTextEntry1] : Left upper implant site unremarkable

## 2019-10-07 LAB — PSA SERPL-MCNC: <0.01 NG/ML

## 2019-10-11 ENCOUNTER — RX RENEWAL (OUTPATIENT)
Age: 84
End: 2019-10-11

## 2019-10-17 DIAGNOSIS — C61 MALIGNANT NEOPLASM OF PROSTATE: ICD-10-CM

## 2019-10-28 ENCOUNTER — RX RENEWAL (OUTPATIENT)
Age: 84
End: 2019-10-28

## 2019-10-31 ENCOUNTER — APPOINTMENT (OUTPATIENT)
Dept: CARDIOLOGY | Facility: CLINIC | Age: 84
End: 2019-10-31
Payer: MEDICARE

## 2019-10-31 VITALS
DIASTOLIC BLOOD PRESSURE: 83 MMHG | HEART RATE: 66 BPM | HEIGHT: 70 IN | WEIGHT: 242 LBS | BODY MASS INDEX: 34.65 KG/M2 | RESPIRATION RATE: 15 BRPM | SYSTOLIC BLOOD PRESSURE: 134 MMHG

## 2019-10-31 PROCEDURE — 93000 ELECTROCARDIOGRAM COMPLETE: CPT

## 2019-10-31 PROCEDURE — 99214 OFFICE O/P EST MOD 30 MIN: CPT

## 2019-11-25 ENCOUNTER — RX RENEWAL (OUTPATIENT)
Age: 84
End: 2019-11-25

## 2019-12-06 ENCOUNTER — RX CHANGE (OUTPATIENT)
Age: 84
End: 2019-12-06

## 2019-12-19 ENCOUNTER — RX RENEWAL (OUTPATIENT)
Age: 84
End: 2019-12-19

## 2019-12-20 ENCOUNTER — RX RENEWAL (OUTPATIENT)
Age: 84
End: 2019-12-20

## 2020-07-08 ENCOUNTER — APPOINTMENT (OUTPATIENT)
Dept: UROLOGY | Facility: CLINIC | Age: 85
End: 2020-07-08
Payer: MEDICARE

## 2020-07-08 VITALS
HEIGHT: 70 IN | DIASTOLIC BLOOD PRESSURE: 88 MMHG | WEIGHT: 242 LBS | HEART RATE: 53 BPM | BODY MASS INDEX: 34.65 KG/M2 | SYSTOLIC BLOOD PRESSURE: 133 MMHG | RESPIRATION RATE: 16 BRPM

## 2020-07-08 VITALS — TEMPERATURE: 97 F

## 2020-07-08 DIAGNOSIS — C61 MALIGNANT NEOPLASM OF PROSTATE: ICD-10-CM

## 2020-07-08 PROCEDURE — 99214 OFFICE O/P EST MOD 30 MIN: CPT

## 2020-07-08 NOTE — PHYSICAL EXAM
[General Appearance - Well Developed] : well developed [General Appearance - Well Nourished] : well nourished [Normal Appearance] : normal appearance [Well Groomed] : well groomed [General Appearance - In No Acute Distress] : no acute distress [Abdomen Soft] : soft [Abdomen Tenderness] : non-tender [Abdomen Mass (___ Cm)] : no abdominal mass palpated [Abdomen Hernia] : no hernia was discovered [Costovertebral Angle Tenderness] : no ~M costovertebral angle tenderness [Urethral Meatus] : meatus normal [Penis Abnormality] : normal uncircumcised penis [Anus Abnormality] : the anus and perineum were normal [Testes Tenderness] : no tenderness of the testes [Scrotum] : the scrotum was normal [Testes Mass (___cm)] : there were no testicular masses [Rectal Exam - Rectum] : digital rectal exam was normal [Skin Color & Pigmentation] : normal skin color and pigmentation [Heart Rate And Rhythm] : Heart rate and rhythm were normal [] : no respiratory distress [Respiration, Rhythm And Depth] : normal respiratory rhythm and effort [Edema] : no peripheral edema [Oriented To Time, Place, And Person] : oriented to person, place, and time [Affect] : the affect was normal [Exaggerated Use Of Accessory Muscles For Inspiration] : no accessory muscle use [Mood] : the mood was normal [Not Anxious] : not anxious [Inguinal Lymph Nodes Enlarged Bilaterally] : inguinal [Normal Station and Gait] : the gait and station were normal for the patient's age

## 2020-07-11 NOTE — ASSESSMENT
[FreeTextEntry1] : Plan:\par \par Vantas implant exchange in Sept 2019\par PSA and testosterone today.\par Yearly visits going forward after Vantas exchange in Sept 2019.

## 2020-07-11 NOTE — HISTORY OF PRESENT ILLNESS
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

## 2020-07-30 ENCOUNTER — APPOINTMENT (OUTPATIENT)
Dept: CARDIOLOGY | Facility: CLINIC | Age: 85
End: 2020-07-30
Payer: MEDICARE

## 2020-07-30 VITALS
DIASTOLIC BLOOD PRESSURE: 80 MMHG | HEIGHT: 70 IN | HEART RATE: 60 BPM | WEIGHT: 234 LBS | BODY MASS INDEX: 33.5 KG/M2 | RESPIRATION RATE: 15 BRPM | SYSTOLIC BLOOD PRESSURE: 128 MMHG

## 2020-07-30 PROCEDURE — 99214 OFFICE O/P EST MOD 30 MIN: CPT

## 2020-07-30 PROCEDURE — 93000 ELECTROCARDIOGRAM COMPLETE: CPT

## 2020-09-11 ENCOUNTER — LABORATORY RESULT (OUTPATIENT)
Age: 85
End: 2020-09-11

## 2020-09-11 ENCOUNTER — APPOINTMENT (OUTPATIENT)
Dept: CARDIOLOGY | Facility: CLINIC | Age: 85
End: 2020-09-11
Payer: MEDICARE

## 2020-09-11 VITALS
HEART RATE: 61 BPM | RESPIRATION RATE: 16 BRPM | WEIGHT: 233 LBS | BODY MASS INDEX: 33.36 KG/M2 | SYSTOLIC BLOOD PRESSURE: 160 MMHG | DIASTOLIC BLOOD PRESSURE: 90 MMHG | HEIGHT: 70 IN

## 2020-09-11 DIAGNOSIS — I65.29 OCCLUSION AND STENOSIS OF UNSPECIFIED CAROTID ARTERY: ICD-10-CM

## 2020-09-11 DIAGNOSIS — R09.89 OTHER SPECIFIED SYMPTOMS AND SIGNS INVOLVING THE CIRCULATORY AND RESPIRATORY SYSTEMS: ICD-10-CM

## 2020-09-11 PROCEDURE — 99214 OFFICE O/P EST MOD 30 MIN: CPT

## 2020-09-14 ENCOUNTER — APPOINTMENT (OUTPATIENT)
Dept: CARDIOLOGY | Facility: CLINIC | Age: 85
End: 2020-09-14
Payer: MEDICARE

## 2020-09-14 DIAGNOSIS — R06.00 DYSPNEA, UNSPECIFIED: ICD-10-CM

## 2020-09-14 DIAGNOSIS — R42 DIZZINESS AND GIDDINESS: ICD-10-CM

## 2020-09-14 PROCEDURE — 93306 TTE W/DOPPLER COMPLETE: CPT

## 2020-09-23 ENCOUNTER — APPOINTMENT (OUTPATIENT)
Dept: UROLOGY | Facility: CLINIC | Age: 85
End: 2020-09-23
Payer: MEDICARE

## 2020-09-23 VITALS — RESPIRATION RATE: 16 BRPM | SYSTOLIC BLOOD PRESSURE: 162 MMHG | DIASTOLIC BLOOD PRESSURE: 76 MMHG | HEART RATE: 58 BPM

## 2020-09-23 VITALS — RESPIRATION RATE: 16 BRPM | HEART RATE: 60 BPM | DIASTOLIC BLOOD PRESSURE: 79 MMHG | SYSTOLIC BLOOD PRESSURE: 165 MMHG

## 2020-09-23 PROCEDURE — 99213 OFFICE O/P EST LOW 20 MIN: CPT | Mod: 25

## 2020-09-23 RX ORDER — HISTRELIN ACETATE 50 MG/1
50 IMPLANT SUBCUTANEOUS
Qty: 0 | Refills: 0 | Status: COMPLETED | OUTPATIENT
Start: 2020-09-23

## 2020-09-23 RX ORDER — HISTRELIN ACETATE 50 MG/1
50 IMPLANT SUBCUTANEOUS
Qty: 1 | Refills: 0 | Status: COMPLETED | OUTPATIENT
Start: 2020-09-23 | End: 2020-09-23

## 2020-09-23 RX ADMIN — HISTRELIN ACETATE 0 MG: 50 IMPLANT SUBCUTANEOUS at 00:00

## 2020-09-24 LAB — PSA SERPL-MCNC: <0.01 NG/ML

## 2020-09-24 NOTE — HISTORY OF PRESENT ILLNESS
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

## 2020-09-24 NOTE — ASSESSMENT
[FreeTextEntry1] : Plan:\par \par Vantas implant exchanged today\par PSA and testosterone today.\par f/u 12 months for Vantas exchange and PSA, testosterone.

## 2020-09-26 LAB
TESTOST BND SERPL-MCNC: 1.5 PG/ML
TESTOST SERPL-MCNC: 5.9 NG/DL

## 2020-10-05 PROBLEM — R42 DIZZINESS: Status: ACTIVE | Noted: 2020-09-11

## 2020-10-05 PROBLEM — R06.00 DOE (DYSPNEA ON EXERTION): Status: ACTIVE | Noted: 2018-07-02

## 2020-12-30 PROBLEM — I10 HTN (HYPERTENSION): Status: ACTIVE | Noted: 2018-07-02

## 2020-12-30 PROBLEM — I47.1 PAROXYSMAL SVT (SUPRAVENTRICULAR TACHYCARDIA): Status: ACTIVE | Noted: 2019-03-25

## 2020-12-31 ENCOUNTER — APPOINTMENT (OUTPATIENT)
Dept: CARDIOLOGY | Facility: CLINIC | Age: 85
End: 2020-12-31
Payer: MEDICARE

## 2020-12-31 VITALS
BODY MASS INDEX: 35.07 KG/M2 | SYSTOLIC BLOOD PRESSURE: 134 MMHG | WEIGHT: 245 LBS | RESPIRATION RATE: 16 BRPM | HEIGHT: 70 IN | DIASTOLIC BLOOD PRESSURE: 88 MMHG | TEMPERATURE: 97.2 F | HEART RATE: 74 BPM

## 2020-12-31 DIAGNOSIS — Z01.810 ENCOUNTER FOR PREPROCEDURAL CARDIOVASCULAR EXAMINATION: ICD-10-CM

## 2020-12-31 DIAGNOSIS — I10 ESSENTIAL (PRIMARY) HYPERTENSION: ICD-10-CM

## 2020-12-31 DIAGNOSIS — I47.1 SUPRAVENTRICULAR TACHYCARDIA: ICD-10-CM

## 2020-12-31 PROCEDURE — 99214 OFFICE O/P EST MOD 30 MIN: CPT

## 2021-01-24 ENCOUNTER — FORM ENCOUNTER (OUTPATIENT)
Age: 86
End: 2021-01-24

## 2021-01-25 ENCOUNTER — TRANSCRIPTION ENCOUNTER (OUTPATIENT)
Age: 86
End: 2021-01-25

## 2021-01-27 ENCOUNTER — OUTPATIENT (OUTPATIENT)
Dept: OUTPATIENT SERVICES | Facility: HOSPITAL | Age: 86
LOS: 1 days | End: 2021-01-27
Payer: MEDICARE

## 2021-01-27 ENCOUNTER — APPOINTMENT (OUTPATIENT)
Dept: DISASTER EMERGENCY | Facility: HOSPITAL | Age: 86
End: 2021-01-27

## 2021-01-27 VITALS
SYSTOLIC BLOOD PRESSURE: 160 MMHG | DIASTOLIC BLOOD PRESSURE: 78 MMHG | HEART RATE: 58 BPM | OXYGEN SATURATION: 95 % | RESPIRATION RATE: 18 BRPM | TEMPERATURE: 99 F

## 2021-01-27 VITALS
OXYGEN SATURATION: 97 % | RESPIRATION RATE: 18 BRPM | SYSTOLIC BLOOD PRESSURE: 162 MMHG | HEART RATE: 55 BPM | DIASTOLIC BLOOD PRESSURE: 75 MMHG | TEMPERATURE: 98 F

## 2021-01-27 DIAGNOSIS — Z96.649 PRESENCE OF UNSPECIFIED ARTIFICIAL HIP JOINT: Chronic | ICD-10-CM

## 2021-01-27 DIAGNOSIS — U07.1 COVID-19: ICD-10-CM

## 2021-01-27 PROCEDURE — M0239: CPT

## 2021-01-27 RX ORDER — BAMLANIVIMAB 35 MG/ML
700 INJECTION, SOLUTION INTRAVENOUS ONCE
Refills: 0 | Status: COMPLETED | OUTPATIENT
Start: 2021-01-27 | End: 2021-01-27

## 2021-01-27 RX ORDER — SODIUM CHLORIDE 9 MG/ML
250 INJECTION INTRAMUSCULAR; INTRAVENOUS; SUBCUTANEOUS
Refills: 0 | Status: DISCONTINUED | OUTPATIENT
Start: 2021-01-27 | End: 2021-02-11

## 2021-01-27 RX ADMIN — BAMLANIVIMAB 270 MILLIGRAM(S): 35 INJECTION, SOLUTION INTRAVENOUS at 14:09

## 2021-01-27 RX ADMIN — SODIUM CHLORIDE 25 MILLILITER(S): 9 INJECTION INTRAMUSCULAR; INTRAVENOUS; SUBCUTANEOUS at 15:39

## 2021-01-27 NOTE — CHART NOTE - NSCHARTNOTEFT_GEN_A_CORE
CC: Monoclonal Antibody Infusion/COVID 19 Positive    85yMale with PMHx ______ sent in by ________ to outpatient infusion clinic for Monoclonal Antibody Infusion with Bamlanivimab. Patient with onset of symptoms _______ (endorses _________). Tested positive for COVID-19 on _________.      Vital signs:  T(C): --  HR: --  BP: --  RR: --  SpO2: --      Physical Exam:   Appearance: NAD	, A&O x3  HEENT:  Normal oral mucosa  Lymphatic: No lymphadenopathy  Cardiovascular: Normal S1 S2, RRR, No JVD  Respiratory: Lungs clear to auscultation	  Gastrointestinal:  Soft, Non-tender, + BS	  Skin: Warm and dry  Neurologic: Non-focal  Extremities: Normal range of motion, no edema      ASSESSMENT:  Pt is a ---------------  Covid +  referred by who presents to infusion center for Monoclonal antibody infusion (Bamlanivimab).    Symptoms/ Criteria:   Risk Profile includes:         PLAN:  - Infusion procedure explained to patient   - Consent for monoclonal antibody infusion obtained   - Risks & benefits discussed/all questions answered  - Infuse  Bamlanivimab 700mg  IV over one hour   - Observe patient for one hour post infusion    I have reviewed the Bamlanivimab Emergency Use Authorization (EUA) and I have provided the patient or patient's caregiver with the following information:      1. FDA has authorized emergency use Bamlanivimab, which is not an FDA-approved biological product.  2. The patient or patient's caregiver has the option to accept or refuse administration of Bamlanivimab.   3. The significant known and potential risks and benefits of Bamlanivimab and the extent to which such risks and benefits are unknown.  4. Information on available alternative treatments and risks and benefits of those alternatives.          Patient tolerated infusion well denies complaints of chest pain/SOB/dizziness/ palpitations  VSS for discharge home  D/C instructions given/ fact sheet included.  Patient to follow-up with PCP as needed.          Leoncio Mathur PA-C CC: Monoclonal Antibody Infusion/COVID 19 Positive    85year old male with PMHx HTN, HLD, AFib, SSS, hypothyroid, and dementia who presents to outpatient infusion clinic for Monoclonal Antibody Infusion with Bamlanivimab. Patient with onset of symptoms 1/24/2021 (endorses malaise). Tested positive for COVID-19 on 1/22/2021.      Vital signs:  T(C): --  HR: --  BP: --  RR: --  SpO2: --      Physical Exam:   Appearance: Obese, elderly male, NAD, A&O x2  HEENT:  Normal oral mucosa  Lymphatic: No lymphadenopathy  Cardiovascular: Normal S1 S2, RRR, No JVD  Respiratory: Lungs clear to auscultation	  Gastrointestinal: Large, soft abdomen, Non-tender, + BS	  Skin: Warm and dry  Neurologic: Non-focal  Extremities: Normal range of motion, no edema      ASSESSMENT:  Pt is a 85yoM who is COVID-19 positive who presents to infusion center for Monoclonal antibody infusion (Bamlanivimab).    Symptoms/ Criteria: malaise  Risk Profile includes: age >65 years old        PLAN:  - Infusion procedure explained to patient   - Consent for monoclonal antibody infusion obtained verbally by daughter Asia Patel over the phone as patient has underlying dementia with moments of confusion  - Risks & benefits discussed/all questions answered  - Infuse  Bamlanivimab 700mg  IV over one hour   - Observe patient for one hour post infusion    I have reviewed the Bamlanivimab Emergency Use Authorization (EUA) and I have provided the patient or patient's caregiver with the following information:  1. FDA has authorized emergency use Bamlanivimab, which is not an FDA-approved biological product.  2. The patient or patient's caregiver has the option to accept or refuse administration of Bamlanivimab.   3. The significant known and potential risks and benefits of Bamlanivimab and the extent to which such risks and benefits are unknown.  4. Information on available alternative treatments and risks and benefits of those alternatives.        Patient tolerated infusion well denies complaints of chest pain/SOB/dizziness/ palpitations  Vital signs hemodynamically stable for discharge home  D/C instructions given/ fact sheet included.  Patient to follow-up with PCP as needed.          Leoncio Mathur PA-C CC: Monoclonal Antibody Infusion/COVID 19 Positive    85year old male with PMHx HTN, HLD, AFib, SSS, hypothyroid, and dementia who presents to outpatient infusion clinic for Monoclonal Antibody Infusion with Bamlanivimab. Patient with onset of symptoms 1/24/2021 (endorses malaise). Tested positive for COVID-19 on 1/22/2021.      Vital Signs Last 24 Hrs  T(C): 36.6 (27 Jan 2021 14:08), Max: 36.6 (27 Jan 2021 13:57)  T(F): 97.8 (27 Jan 2021 14:08), Max: 97.8 (27 Jan 2021 13:57)  HR: 58 (27 Jan 2021 14:08) (55 - 58)  BP: 170/77 (27 Jan 2021 14:08) (162/75 - 170/77)  RR: 16 (27 Jan 2021 14:08) (16 - 18)  SpO2: 94% (27 Jan 2021 14:08) (94% - 97%)      Physical Exam:   Appearance: Obese, elderly male, NAD, A&O x2  HEENT:  Normal oral mucosa  Lymphatic: No lymphadenopathy  Cardiovascular: Normal S1 S2, RRR, No JVD  Respiratory: Lungs clear to auscultation	  Gastrointestinal: Large, soft abdomen, Non-tender, + BS	  Skin: Warm and dry  Neurologic: Non-focal  Extremities: Normal range of motion, no edema      ASSESSMENT:  Pt is a 85yoM who is COVID-19 positive who presents to infusion center for Monoclonal antibody infusion (Bamlanivimab).    Symptoms/ Criteria: malaise  Risk Profile includes: age >65 years old        PLAN:  - Infusion procedure explained to patient   - Consent for monoclonal antibody infusion obtained verbally by daughter Asia Patel over the phone as patient has underlying dementia with moments of confusion  - Risks & benefits discussed/all questions answered  - Infuse  Bamlanivimab 700mg  IV over one hour   - Observe patient for one hour post infusion    I have reviewed the Bamlanivimab Emergency Use Authorization (EUA) and I have provided the patient or patient's caregiver with the following information:  1. FDA has authorized emergency use Bamlanivimab, which is not an FDA-approved biological product.  2. The patient or patient's caregiver has the option to accept or refuse administration of Bamlanivimab.   3. The significant known and potential risks and benefits of Bamlanivimab and the extent to which such risks and benefits are unknown.  4. Information on available alternative treatments and risks and benefits of those alternatives.        Patient tolerated infusion well denies complaints of chest pain/SOB/dizziness/ palpitations  Vital signs hemodynamically stable for discharge home  D/C instructions given/ fact sheet included.  Patient to follow-up with PCP as needed.          Leoncio Mathur PA-C CC: Monoclonal Antibody Infusion/COVID 19 Positive    85year old male with PMHx HTN, HLD, AFib, SSS, hypothyroid, and dementia who presents to outpatient infusion clinic for Monoclonal Antibody Infusion with Bamlanivimab. Patient with onset of symptoms 1/24/2021 (endorses malaise). Tested positive for COVID-19 on 1/22/2021.      Vital Signs Last 24 Hrs  T(C): 36.6 (27 Jan 2021 14:08), Max: 36.6 (27 Jan 2021 13:57)  T(F): 97.8 (27 Jan 2021 14:08), Max: 97.8 (27 Jan 2021 13:57)  HR: 58 (27 Jan 2021 14:08) (55 - 58)  BP: 170/77 (27 Jan 2021 14:08) (162/75 - 170/77)  RR: 16 (27 Jan 2021 14:08) (16 - 18)  SpO2: 94% (27 Jan 2021 14:08) (94% - 97%)      Physical Exam:   Appearance: Obese, elderly male, NAD, A&O x2  HEENT:  Normal oral mucosa  Lymphatic: No lymphadenopathy  Cardiovascular: Normal S1 S2, RRR, No JVD  Respiratory: Lungs clear to auscultation	  Gastrointestinal: Large, soft abdomen, Non-tender, + BS	  Skin: Warm and dry  Neurologic: Non-focal  Extremities: Normal range of motion, no edema      ASSESSMENT:  Pt is a 85yoM who is COVID-19 positive who presents to infusion center for Monoclonal antibody infusion (Bamlanivimab).    Symptoms/ Criteria: malaise  Risk Profile includes: age >65 years old        PLAN:  - Infusion procedure explained to patient   - Consent for monoclonal antibody infusion obtained verbally by daughter Asia Patel over the phone as patient has underlying dementia with moments of confusion  - Risks & benefits discussed/all questions answered  - Infuse  Bamlanivimab 700mg  IV over one hour   - Observe patient for one hour post infusion    I have reviewed the Bamlanivimab Emergency Use Authorization (EUA) and I have provided the patient or patient's caregiver with the following information:  1. FDA has authorized emergency use Bamlanivimab, which is not an FDA-approved biological product.  2. The patient or patient's caregiver has the option to accept or refuse administration of Bamlanivimab.   3. The significant known and potential risks and benefits of Bamlanivimab and the extent to which such risks and benefits are unknown.  4. Information on available alternative treatments and risks and benefits of those alternatives.        Patient tolerated infusion well denies complaints of chest pain/SOB/dizziness/ palpitations  Vital signs Vital Signs Last 24 Hrs  T(C): 37 (27 Jan 2021 15:08), Max: 37 (27 Jan 2021 15:08)  T(F): 98.6 (27 Jan 2021 15:08), Max: 98.6 (27 Jan 2021 15:08)  HR: 60 (27 Jan 2021 15:08) (55 - 60)  BP: 163/83 (27 Jan 2021 15:08) (152/76 - 170/77)  BP(mean): --  RR: 18 (27 Jan 2021 15:08) (16 - 18)  SpO2: 95% (27 Jan 2021 15:08) (94% - 97%)hemodynamically stable for discharge home at 1600  D/C instructions given/ fact sheet included.  Patient to follow-up with PCP as needed.          Leoncio Mathur PA-C

## 2021-01-28 ENCOUNTER — TRANSCRIPTION ENCOUNTER (OUTPATIENT)
Age: 86
End: 2021-01-28

## 2021-02-02 ENCOUNTER — TRANSCRIPTION ENCOUNTER (OUTPATIENT)
Age: 86
End: 2021-02-02

## 2021-02-15 NOTE — PATIENT PROFILE ADULT - NSPROPTRIGHTCAREGIVER_GEN_A_NUR
[facility].  Pelon Villarreal MD  Signing Provider: MD ALFREDO Baum/codey ([id number])  DD: 02/15/2021 13:01:35 TD: 02/15/2021 13:11:35    Copy Sent To:      no

## 2021-06-10 NOTE — ED ADULT TRIAGE NOTE - PRO INTERPRETER NEED 2
Pt arrives EMS from home. Per EMS, pt fell 2 days ago x2 and ever since has been hurting and not feeling well. Pt supposedly fell this am but EMS was unsure situation. Pt is demented at baseline, pt knows person place and time but has periodic confusion and anxiety and is unable to explain what happened at home pta (incident) or meds at this time. Pt states \"I feel bad everywhere, I feel sick\". Pt denies cp, sob, fevers, chills at this time. Pt resting in bed with side rails up x2, bed in lowest position, call bell within reach and door cracked near nurses station for safety. English

## 2021-09-09 ENCOUNTER — APPOINTMENT (OUTPATIENT)
Dept: CARDIOLOGY | Facility: CLINIC | Age: 86
End: 2021-09-09
Payer: MEDICARE

## 2021-09-09 VITALS
DIASTOLIC BLOOD PRESSURE: 82 MMHG | OXYGEN SATURATION: 95 % | RESPIRATION RATE: 16 BRPM | BODY MASS INDEX: 34.22 KG/M2 | TEMPERATURE: 98 F | HEIGHT: 70 IN | HEART RATE: 57 BPM | WEIGHT: 239 LBS | SYSTOLIC BLOOD PRESSURE: 120 MMHG

## 2021-09-09 DIAGNOSIS — I49.9 CARDIAC ARRHYTHMIA, UNSPECIFIED: ICD-10-CM

## 2021-09-09 DIAGNOSIS — E78.1 PURE HYPERGLYCERIDEMIA: ICD-10-CM

## 2021-09-09 PROCEDURE — 93000 ELECTROCARDIOGRAM COMPLETE: CPT

## 2021-09-09 PROCEDURE — 99214 OFFICE O/P EST MOD 30 MIN: CPT | Mod: 25

## 2021-09-09 NOTE — DISCUSSION/SUMMARY
[FreeTextEntry1] : This is a 86 year old male with a pmhx significant for arthritis, metastatic prostate adenocarcinoma, HTN, s/p left hip replacement surgery, HLD, murmur, premature ventricular contractions who comes in for preoperative cardiac evaluation. \par  He denies chest pain, shortness of breath, dizziness or syncope.\par The patient been stable on his current medications.\par The patient has been stable on his current medications.\par He will go for new blood work including lipid panel in the next few weeks.\par Electrocardiogram done September 9, 2021 demonstrated sinus bradycardia rate of 50 beats per minute is otherwise remarkable for 1st degree atrioventricular block, and nonspecific ST wave flattening.\par \par Electrocardiogram done July 30, 2020 demonstrates evidence for normal sinus rhythm at a rate of 60 beats per minute otherwise remarkable for atrial premature contractions and nonspecific ST-T wave changes.\par \par The patient had a normal nuclear stress test July 6, 2018.\par The patient had an echo Doppler examination done September 14, 2020 which demonstrated an estimated ejection fraction 64%.\par PMH:\par The patient was hospitalized at [facility] with influenza.  This hospitalization was complicated by supraventricular arrhythmia.  Details of the hospitalization are not available.\par \par The patient had an echo Doppler examination July 2, 2018 which demonstrated mitral valve annular calcification with minimal mitral valve regurgitation, mild tricuspid valve regurgitation, and aortic valve sclerosis.  His estimated ejection fraction of 64%.\par ADDENDUM: 7/9/2018\par The patient had a normal nuclear stress test July 6, 2018. The patient had an estimated ejection fraction 69%.\par \par The patient is an accomplished drummer.\par

## 2021-09-09 NOTE — PHYSICAL EXAM
[Well Developed] : well developed [Well Nourished] : well nourished [No Acute Distress] : no acute distress [Normal Venous Pressure] : normal venous pressure [No Carotid Bruit] : no carotid bruit [Normal S1, S2] : normal S1, S2 [No Murmur] : no murmur [No Rub] : no rub [Bradycardia] : bradycardic [II] : a grade 2 [No Pitting Edema] : no pitting edema present [Clear Lung Fields] : clear lung fields [Good Air Entry] : good air entry [No Respiratory Distress] : no respiratory distress  [Soft] : abdomen soft [Non Tender] : non-tender [No Masses/organomegaly] : no masses/organomegaly [Normal Bowel Sounds] : normal bowel sounds [Normal Gait] : normal gait [No Edema] : no edema [No Cyanosis] : no cyanosis [No Clubbing] : no clubbing [No Varicosities] : no varicosities [No Rash] : no rash [No Skin Lesions] : no skin lesions [Moves all extremities] : moves all extremities [No Focal Deficits] : no focal deficits [Normal Speech] : normal speech [Alert and Oriented] : alert and oriented [Normal memory] : normal memory [General Appearance - Well Developed] : well developed [Normal Appearance] : normal appearance [Well Groomed] : well groomed [General Appearance - Well Nourished] : well nourished [No Deformities] : no deformities [General Appearance - In No Acute Distress] : no acute distress [Normal Conjunctiva] : the conjunctiva exhibited no abnormalities [Normal Oral Mucosa] : normal oral mucosa [No Oral Pallor] : no oral pallor [No Oral Cyanosis] : no oral cyanosis [Normal Jugular Venous A Waves Present] : normal jugular venous A waves present [Normal Jugular Venous V Waves Present] : normal jugular venous V waves present [No Jugular Venous Tay A Waves] : no jugular venous tay A waves [Respiration, Rhythm And Depth] : normal respiratory rhythm and effort [Exaggerated Use Of Accessory Muscles For Inspiration] : no accessory muscle use [Auscultation Breath Sounds / Voice Sounds] : lungs were clear to auscultation bilaterally [Bowel Sounds] : normal bowel sounds [Abdomen Soft] : soft [Abnormal Walk] : normal gait [Nail Clubbing] : no clubbing of the fingernails [Cyanosis, Localized] : no localized cyanosis [Skin Color & Pigmentation] : normal skin color and pigmentation [Skin Turgor] : normal skin turgor [] : no rash [Oriented To Time, Place, And Person] : oriented to person, place, and time [Impaired Insight] : insight and judgment were intact [Affect] : the affect was normal [No Anxiety] : not feeling anxious [5th Left ICS - MCL] : palpated at the 5th LICS in the midclavicular line [Normal] : normal [No Precordial Heave] : no precordial heave was noted [Normal Rate] : normal [Heart Rate ___] : [unfilled] bpm [Rhythm Regular] : regular [Normal S1] : normal S1 [Normal S2] : normal S2 [No Gallop] : no gallop heard [I] : a grade 1 [2+] : left 2+ [No Abnormalities] : the abdominal aorta was not enlarged and no bruit was heard [___ +] : bilateral [unfilled]U+ pitting edema to the ankles [Apical Thrill] : no thrill palpable at the apex [S3] : no S3 [S4] : no S4 [Click] : no click [Pericardial Rub] : no pericardial rub [Right Carotid Bruit] : no bruit heard over the right carotid [Left Carotid Bruit] : no bruit heard over the left carotid

## 2021-09-09 NOTE — REASON FOR VISIT
[CV Risk Factors and Non-Cardiac Disease] : CV risk factors and non-cardiac disease [Arrhythmia/ECG Abnorrmalities] : arrhythmia/ECG abnormalities [Structural Heart and Valve Disease] : structural heart and valve disease [Follow-Up - Clinic] : a clinic follow-up of [Abnormal ECG] : an abnormal ECG [Hyperlipidemia] : hyperlipidemia [Hypertension] : hypertension [Mitral Regurgitation] : mitral regurgitation [Family Member] : family member [FreeTextEntry1] : murmur, premature ventricular contractions

## 2021-09-10 RX ORDER — ESCITALOPRAM OXALATE 10 MG/1
10 TABLET ORAL DAILY
Refills: 0 | Status: ACTIVE | COMMUNITY
Start: 2019-03-08

## 2021-11-13 NOTE — H&P ADULT - NSHPSOURCEINFORD_GEN_ALL_CORE
Chart(s)/Patient/Child EP     DATE OF SERVICE: 11-13-21  s/p Bio-MVR, Maze, and LA appendage clip on 11/8.  Feeling well, telemetry stable, pacer box disconnected.  Has pleural effusions, awaiting pigtail chest tubes today which will delay discharge.    acetaminophen     Tablet .. 650 milliGRAM(s) Oral every 6 hours PRN  aMIOdarone    Tablet 200 milliGRAM(s) Oral daily  aspirin enteric coated 81 milliGRAM(s) Oral daily  bisacodyl Suppository 10 milliGRAM(s) Rectal once  chlorhexidine 2% Cloths 1 Application(s) Topical daily  dextrose 50% Injectable 50 milliLiter(s) IV Push every 15 minutes  dextrose 50% Injectable 25 milliLiter(s) IV Push every 15 minutes  oxyCODONE    IR 5 milliGRAM(s) Oral every 4 hours PRN  oxyCODONE    IR 10 milliGRAM(s) Oral every 4 hours PRN  pantoprazole    Tablet 40 milliGRAM(s) Oral before breakfast  polyethylene glycol 3350 17 Gram(s) Oral daily  senna 2 Tablet(s) Oral at bedtime  sodium chloride 0.9%. 1000 milliLiter(s) IV Continuous <Continuous>  spironolactone 50 milliGRAM(s) Oral every 12 hours  torsemide 20 milliGRAM(s) Oral two times a day                          11.3   11.84 )-----------( 156      ( 13 Nov 2021 05:13 )             34.5       11-13    135  |  90<L>  |  27<H>  ----------------------------<  114<H>  4.5   |  30  |  1.02    Ca    9.5      13 Nov 2021 05:13    TPro  6.7  /  Alb  4.3  /  TBili  0.6  /  DBili  x   /  AST  24  /  ALT  69<H>  /  AlkPhos  126<H>  11-13      T(C): 36.8 (11-13-21 @ 11:25), Max: 37 (11-12-21 @ 23:02)  HR: 72 (11-13-21 @ 11:25) (66 - 82)  BP: 112/70 (11-13-21 @ 11:25) (112/55 - 128/62)  RR: 18 (11-13-21 @ 11:25) (18 - 20)  SpO2: 95% (11-13-21 @ 11:25) (92% - 96%)  Wt(kg): --    I&O's Summary    12 Nov 2021 07:01  -  13 Nov 2021 07:00  --------------------------------------------------------  IN: 930 mL / OUT: 1600 mL / NET: -670 mL    13 Nov 2021 07:01  -  13 Nov 2021 11:40  --------------------------------------------------------  IN: 360 mL / OUT: 0 mL / NET: 360 mL      Gen: awake and alert, cooperative.  HEENT:  (-)icterus (-)pallor  CV: Regular S1 S2 1/6 ZITA (+)2 Pulses  pacer wires,  Resp:  Clear to auscultation B/L, normal effort  GI: (+) BS Soft, NT, ND  Lymph:  (-)Edema, (-)obvious lymphadenopathy  Skin: Warm to touch, Normal turgor  Psych: Mood is "I feel good", affect is normal, in good spirits.    TELEMETRY: Sinus rhythm 55-80bpm.  Rare AV pacing overnight.    ASSESSMENT/PLAN: Patient is a 79 y/o Female with PMH of HTN and HLD who presented with SOB found to have new onset atrial fibrillation and severe MR. EP consulted for Afib management.    - s/p MVR, Moser Maze IV procedure, and LISBET clip on 11/8.  - Discharge on Apixaban 5mg BID, and Amiodarone 200mg daily  - No permanent cardiac pacing indicated.  Sinus node function is stable, with good chronotropic competence during ambulation.  - No signs of pathologic AV block.  She is almost having Wenckebach overnight if not for her epicardial wires.  This is physiologic during sleep.  - Zio patch for short-term monitoring.  Gave our group's answering service # for the contact phone number.  - Will follow.  From my perspective, OK for discharge when Stepdown unit is ready.  Pigtail chest tubes first.    Perez Bolton M.D.  Cardiac Electrophysiology  [phone]

## 2022-01-01 ENCOUNTER — RX RENEWAL (OUTPATIENT)
Age: 87
End: 2022-01-01

## 2022-01-01 ENCOUNTER — NON-APPOINTMENT (OUTPATIENT)
Age: 87
End: 2022-01-01

## 2022-01-01 DIAGNOSIS — E78.5 HYPERLIPIDEMIA, UNSPECIFIED: ICD-10-CM

## 2022-01-01 DIAGNOSIS — I10 ESSENTIAL (PRIMARY) HYPERTENSION: ICD-10-CM

## 2022-01-01 RX ORDER — HYDROCHLOROTHIAZIDE 12.5 MG/1
12.5 TABLET ORAL
Qty: 30 | Refills: 0 | Status: ACTIVE | COMMUNITY
Start: 2019-12-19 | End: 1900-01-01

## 2022-01-01 RX ORDER — METOPROLOL TARTRATE 25 MG/1
25 TABLET, FILM COATED ORAL
Qty: 60 | Refills: 0 | Status: ACTIVE | COMMUNITY
Start: 1900-01-01 | End: 1900-01-01

## 2022-01-01 RX ORDER — HYDRALAZINE HYDROCHLORIDE 50 MG/1
50 TABLET ORAL 3 TIMES DAILY
Qty: 90 | Refills: 0 | Status: ACTIVE | COMMUNITY
Start: 2019-03-08 | End: 1900-01-01

## 2022-02-17 ENCOUNTER — APPOINTMENT (OUTPATIENT)
Dept: CARDIOLOGY | Facility: CLINIC | Age: 87
End: 2022-02-17
Payer: MEDICARE

## 2022-02-17 VITALS
WEIGHT: 237 LBS | TEMPERATURE: 97 F | BODY MASS INDEX: 33.93 KG/M2 | SYSTOLIC BLOOD PRESSURE: 126 MMHG | HEIGHT: 70 IN | HEART RATE: 58 BPM | DIASTOLIC BLOOD PRESSURE: 78 MMHG | OXYGEN SATURATION: 96 % | RESPIRATION RATE: 15 BRPM

## 2022-02-17 DIAGNOSIS — I49.3 VENTRICULAR PREMATURE DEPOLARIZATION: ICD-10-CM

## 2022-02-17 DIAGNOSIS — I34.0 NONRHEUMATIC MITRAL (VALVE) INSUFFICIENCY: ICD-10-CM

## 2022-02-17 PROCEDURE — 93000 ELECTROCARDIOGRAM COMPLETE: CPT

## 2022-02-17 PROCEDURE — 99214 OFFICE O/P EST MOD 30 MIN: CPT

## 2022-02-17 NOTE — DISCUSSION/SUMMARY
[FreeTextEntry1] : This is a 86 year old male with a pmhx significant for arthritis, metastatic prostate adenocarcinoma, HTN, s/p left hip replacement surgery, HLD, murmur, premature ventricular contractions who comes in for follow up cardiac evaluation.  He denies chest pain, shortness of breath, dizziness or syncope.\par Electrocardiogram done February 17, 2022 demonstrated sinus cardioverted 59 beats per minute is otherwise remarkable for 3 premature ventricular contractions.\par The patient has been treated with Toprol tartrate 25 mg twice a day has been stable on this medical regimen.\par He does drink multiple cups of decaffeinated coffee a day with enough water.\par Stable on his current medications.  He will do blood work done in the next month for electrolytes, and lipid profile.\par He will followup with his primary care physician.  He is currently hemodynamically stable asymptomatic from a cardiac standpoint. the\par \par Electrocardiogram done September 9, 2021 demonstrated sinus bradycardia rate of 50 beats per minute is otherwise remarkable for 1st degree atrioventricular block, and nonspecific ST wave flattening.\par \par Electrocardiogram done July 30, 2020 demonstrates evidence for normal sinus rhythm at a rate of 60 beats per minute otherwise remarkable for atrial premature contractions and nonspecific ST-T wave changes.\par \par The patient had a normal nuclear stress test July 6, 2018.\par The patient had an echo Doppler examination done September 14, 2020 which demonstrated an estimated ejection fraction 64%.\par The patient had an echo Doppler examination July 2, 2018 which demonstrated mitral valve annular calcification with minimal mitral valve regurgitation, mild tricuspid valve regurgitation, and aortic valve sclerosis.  His estimated ejection fraction of 64%.\par ADDENDUM: 7/9/2018\par The patient had a normal nuclear stress test July 6, 2018. The patient had an estimated ejection fraction 69%.\par \par The patient is an accomplished drummer.\par

## 2022-02-22 RX ORDER — FAMOTIDINE 20 MG/1
20 TABLET, FILM COATED ORAL TWICE DAILY
Refills: 0 | Status: DISCONTINUED | COMMUNITY
Start: 2019-03-08 | End: 2022-02-22

## 2022-06-24 ENCOUNTER — NON-APPOINTMENT (OUTPATIENT)
Age: 87
End: 2022-06-24

## 2022-06-25 ENCOUNTER — INPATIENT (INPATIENT)
Facility: HOSPITAL | Age: 87
LOS: 5 days | Discharge: NURSING, REHAB OR LONG-TERM CARE | DRG: 178 | End: 2022-07-01
Attending: INTERNAL MEDICINE | Admitting: INTERNAL MEDICINE
Payer: MEDICARE

## 2022-06-25 VITALS
SYSTOLIC BLOOD PRESSURE: 135 MMHG | WEIGHT: 240.08 LBS | RESPIRATION RATE: 16 BRPM | DIASTOLIC BLOOD PRESSURE: 62 MMHG | TEMPERATURE: 100 F | HEART RATE: 64 BPM | OXYGEN SATURATION: 95 %

## 2022-06-25 DIAGNOSIS — Z29.9 ENCOUNTER FOR PROPHYLACTIC MEASURES, UNSPECIFIED: ICD-10-CM

## 2022-06-25 DIAGNOSIS — Z96.649 PRESENCE OF UNSPECIFIED ARTIFICIAL HIP JOINT: Chronic | ICD-10-CM

## 2022-06-25 DIAGNOSIS — U07.1 COVID-19: ICD-10-CM

## 2022-06-25 DIAGNOSIS — R62.7 ADULT FAILURE TO THRIVE: ICD-10-CM

## 2022-06-25 DIAGNOSIS — I10 ESSENTIAL (PRIMARY) HYPERTENSION: ICD-10-CM

## 2022-06-25 DIAGNOSIS — E78.5 HYPERLIPIDEMIA, UNSPECIFIED: ICD-10-CM

## 2022-06-25 LAB
ALBUMIN SERPL ELPH-MCNC: 2.8 G/DL — LOW (ref 3.5–5)
ALP SERPL-CCNC: 71 U/L — SIGNIFICANT CHANGE UP (ref 40–120)
ALT FLD-CCNC: 16 U/L DA — SIGNIFICANT CHANGE UP (ref 10–60)
ANION GAP SERPL CALC-SCNC: 9 MMOL/L — SIGNIFICANT CHANGE UP (ref 5–17)
APPEARANCE UR: CLEAR — SIGNIFICANT CHANGE UP
APTT BLD: 29.6 SEC — SIGNIFICANT CHANGE UP (ref 27.5–35.5)
AST SERPL-CCNC: 13 U/L — SIGNIFICANT CHANGE UP (ref 10–40)
BASOPHILS # BLD AUTO: 0.02 K/UL — SIGNIFICANT CHANGE UP (ref 0–0.2)
BASOPHILS NFR BLD AUTO: 0.3 % — SIGNIFICANT CHANGE UP (ref 0–2)
BILIRUB SERPL-MCNC: 0.5 MG/DL — SIGNIFICANT CHANGE UP (ref 0.2–1.2)
BILIRUB UR-MCNC: NEGATIVE — SIGNIFICANT CHANGE UP
BUN SERPL-MCNC: 31 MG/DL — HIGH (ref 7–18)
CALCIUM SERPL-MCNC: 8.8 MG/DL — SIGNIFICANT CHANGE UP (ref 8.4–10.5)
CHLORIDE SERPL-SCNC: 106 MMOL/L — SIGNIFICANT CHANGE UP (ref 96–108)
CO2 SERPL-SCNC: 25 MMOL/L — SIGNIFICANT CHANGE UP (ref 22–31)
COLOR SPEC: YELLOW — SIGNIFICANT CHANGE UP
CREAT SERPL-MCNC: 1.9 MG/DL — HIGH (ref 0.5–1.3)
D DIMER BLD IA.RAPID-MCNC: 241 NG/ML DDU — HIGH
DIFF PNL FLD: ABNORMAL
EGFR: 34 ML/MIN/1.73M2 — LOW
EOSINOPHIL # BLD AUTO: 0.11 K/UL — SIGNIFICANT CHANGE UP (ref 0–0.5)
EOSINOPHIL NFR BLD AUTO: 1.7 % — SIGNIFICANT CHANGE UP (ref 0–6)
GLUCOSE SERPL-MCNC: 160 MG/DL — HIGH (ref 70–99)
GLUCOSE UR QL: NEGATIVE — SIGNIFICANT CHANGE UP
HCT VFR BLD CALC: 35.3 % — LOW (ref 39–50)
HGB BLD-MCNC: 11.8 G/DL — LOW (ref 13–17)
IMM GRANULOCYTES NFR BLD AUTO: 0.5 % — SIGNIFICANT CHANGE UP (ref 0–1.5)
INR BLD: 1.02 RATIO — SIGNIFICANT CHANGE UP (ref 0.88–1.16)
KETONES UR-MCNC: NEGATIVE — SIGNIFICANT CHANGE UP
LACTATE SERPL-SCNC: 1.3 MMOL/L — SIGNIFICANT CHANGE UP (ref 0.7–2)
LEUKOCYTE ESTERASE UR-ACNC: NEGATIVE — SIGNIFICANT CHANGE UP
LYMPHOCYTES # BLD AUTO: 1 K/UL — SIGNIFICANT CHANGE UP (ref 1–3.3)
LYMPHOCYTES # BLD AUTO: 15.8 % — SIGNIFICANT CHANGE UP (ref 13–44)
MCHC RBC-ENTMCNC: 31.1 PG — SIGNIFICANT CHANGE UP (ref 27–34)
MCHC RBC-ENTMCNC: 33.4 GM/DL — SIGNIFICANT CHANGE UP (ref 32–36)
MCV RBC AUTO: 93.1 FL — SIGNIFICANT CHANGE UP (ref 80–100)
MONOCYTES # BLD AUTO: 0.83 K/UL — SIGNIFICANT CHANGE UP (ref 0–0.9)
MONOCYTES NFR BLD AUTO: 13.1 % — SIGNIFICANT CHANGE UP (ref 2–14)
NEUTROPHILS # BLD AUTO: 4.33 K/UL — SIGNIFICANT CHANGE UP (ref 1.8–7.4)
NEUTROPHILS NFR BLD AUTO: 68.6 % — SIGNIFICANT CHANGE UP (ref 43–77)
NITRITE UR-MCNC: NEGATIVE — SIGNIFICANT CHANGE UP
NRBC # BLD: 0 /100 WBCS — SIGNIFICANT CHANGE UP (ref 0–0)
NT-PROBNP SERPL-SCNC: 890 PG/ML — HIGH (ref 0–450)
PH UR: 5 — SIGNIFICANT CHANGE UP (ref 5–8)
PLATELET # BLD AUTO: 160 K/UL — SIGNIFICANT CHANGE UP (ref 150–400)
POTASSIUM SERPL-MCNC: 3.8 MMOL/L — SIGNIFICANT CHANGE UP (ref 3.5–5.3)
POTASSIUM SERPL-SCNC: 3.8 MMOL/L — SIGNIFICANT CHANGE UP (ref 3.5–5.3)
PROT SERPL-MCNC: 6.7 G/DL — SIGNIFICANT CHANGE UP (ref 6–8.3)
PROT UR-MCNC: NEGATIVE — SIGNIFICANT CHANGE UP
PROTHROM AB SERPL-ACNC: 12.2 SEC — SIGNIFICANT CHANGE UP (ref 10.5–13.4)
RAPID RVP RESULT: DETECTED
RBC # BLD: 3.79 M/UL — LOW (ref 4.2–5.8)
RBC # FLD: 13.4 % — SIGNIFICANT CHANGE UP (ref 10.3–14.5)
SARS-COV-2 RNA SPEC QL NAA+PROBE: DETECTED
SODIUM SERPL-SCNC: 140 MMOL/L — SIGNIFICANT CHANGE UP (ref 135–145)
SP GR SPEC: 1.02 — SIGNIFICANT CHANGE UP (ref 1.01–1.02)
TROPONIN I, HIGH SENSITIVITY RESULT: 12.2 NG/L — SIGNIFICANT CHANGE UP
UROBILINOGEN FLD QL: NEGATIVE — SIGNIFICANT CHANGE UP
WBC # BLD: 6.32 K/UL — SIGNIFICANT CHANGE UP (ref 3.8–10.5)
WBC # FLD AUTO: 6.32 K/UL — SIGNIFICANT CHANGE UP (ref 3.8–10.5)

## 2022-06-25 PROCEDURE — 99284 EMERGENCY DEPT VISIT MOD MDM: CPT | Mod: CS

## 2022-06-25 PROCEDURE — 71045 X-RAY EXAM CHEST 1 VIEW: CPT | Mod: 26

## 2022-06-25 RX ORDER — AZITHROMYCIN 500 MG/1
500 TABLET, FILM COATED ORAL ONCE
Refills: 0 | Status: COMPLETED | OUTPATIENT
Start: 2022-06-25 | End: 2022-06-25

## 2022-06-25 RX ORDER — FOLIC ACID 0.8 MG
1 TABLET ORAL DAILY
Refills: 0 | Status: DISCONTINUED | OUTPATIENT
Start: 2022-06-25 | End: 2022-07-01

## 2022-06-25 RX ORDER — SODIUM CHLORIDE 9 MG/ML
1000 INJECTION INTRAMUSCULAR; INTRAVENOUS; SUBCUTANEOUS ONCE
Refills: 0 | Status: COMPLETED | OUTPATIENT
Start: 2022-06-25 | End: 2022-06-25

## 2022-06-25 RX ORDER — DONEPEZIL HYDROCHLORIDE 10 MG/1
10 TABLET, FILM COATED ORAL AT BEDTIME
Refills: 0 | Status: DISCONTINUED | OUTPATIENT
Start: 2022-06-25 | End: 2022-07-01

## 2022-06-25 RX ORDER — MEMANTINE HYDROCHLORIDE 10 MG/1
10 TABLET ORAL
Refills: 0 | Status: DISCONTINUED | OUTPATIENT
Start: 2022-06-25 | End: 2022-07-01

## 2022-06-25 RX ORDER — SODIUM CHLORIDE 9 MG/ML
1000 INJECTION INTRAMUSCULAR; INTRAVENOUS; SUBCUTANEOUS
Refills: 0 | Status: DISCONTINUED | OUTPATIENT
Start: 2022-06-25 | End: 2022-06-28

## 2022-06-25 RX ORDER — ZINC SULFATE TAB 220 MG (50 MG ZINC EQUIVALENT) 220 (50 ZN) MG
220 TAB ORAL DAILY
Refills: 0 | Status: DISCONTINUED | OUTPATIENT
Start: 2022-06-25 | End: 2022-07-01

## 2022-06-25 RX ORDER — ESCITALOPRAM OXALATE 10 MG/1
10 TABLET, FILM COATED ORAL DAILY
Refills: 0 | Status: DISCONTINUED | OUTPATIENT
Start: 2022-06-25 | End: 2022-07-01

## 2022-06-25 RX ORDER — ACETAMINOPHEN 500 MG
650 TABLET ORAL ONCE
Refills: 0 | Status: COMPLETED | OUTPATIENT
Start: 2022-06-25 | End: 2022-06-25

## 2022-06-25 RX ORDER — CEFTRIAXONE 500 MG/1
1000 INJECTION, POWDER, FOR SOLUTION INTRAMUSCULAR; INTRAVENOUS ONCE
Refills: 0 | Status: COMPLETED | OUTPATIENT
Start: 2022-06-25 | End: 2022-06-25

## 2022-06-25 RX ORDER — HEPARIN SODIUM 5000 [USP'U]/ML
5000 INJECTION INTRAVENOUS; SUBCUTANEOUS EVERY 8 HOURS
Refills: 0 | Status: DISCONTINUED | OUTPATIENT
Start: 2022-06-25 | End: 2022-07-01

## 2022-06-25 RX ADMIN — AZITHROMYCIN 500 MILLIGRAM(S): 500 TABLET, FILM COATED ORAL at 17:00

## 2022-06-25 RX ADMIN — AZITHROMYCIN 255 MILLIGRAM(S): 500 TABLET, FILM COATED ORAL at 15:35

## 2022-06-25 RX ADMIN — MEMANTINE HYDROCHLORIDE 10 MILLIGRAM(S): 10 TABLET ORAL at 22:42

## 2022-06-25 RX ADMIN — SODIUM CHLORIDE 1000 MILLILITER(S): 9 INJECTION INTRAMUSCULAR; INTRAVENOUS; SUBCUTANEOUS at 15:15

## 2022-06-25 RX ADMIN — ESCITALOPRAM OXALATE 10 MILLIGRAM(S): 10 TABLET, FILM COATED ORAL at 22:41

## 2022-06-25 RX ADMIN — CEFTRIAXONE 100 MILLIGRAM(S): 500 INJECTION, POWDER, FOR SOLUTION INTRAMUSCULAR; INTRAVENOUS at 15:15

## 2022-06-25 RX ADMIN — Medication 650 MILLIGRAM(S): at 19:18

## 2022-06-25 RX ADMIN — SODIUM CHLORIDE 100 MILLILITER(S): 9 INJECTION INTRAMUSCULAR; INTRAVENOUS; SUBCUTANEOUS at 22:42

## 2022-06-25 RX ADMIN — SODIUM CHLORIDE 1000 MILLILITER(S): 9 INJECTION INTRAMUSCULAR; INTRAVENOUS; SUBCUTANEOUS at 16:15

## 2022-06-25 RX ADMIN — DONEPEZIL HYDROCHLORIDE 10 MILLIGRAM(S): 10 TABLET, FILM COATED ORAL at 22:41

## 2022-06-25 RX ADMIN — ZINC SULFATE TAB 220 MG (50 MG ZINC EQUIVALENT) 220 MILLIGRAM(S): 220 (50 ZN) TAB at 22:41

## 2022-06-25 RX ADMIN — Medication 1 MILLIGRAM(S): at 22:41

## 2022-06-25 RX ADMIN — CEFTRIAXONE 1000 MILLIGRAM(S): 500 INJECTION, POWDER, FOR SOLUTION INTRAMUSCULAR; INTRAVENOUS at 15:45

## 2022-06-25 RX ADMIN — Medication 650 MILLIGRAM(S): at 15:34

## 2022-06-25 RX ADMIN — Medication 1 TABLET(S): at 22:41

## 2022-06-25 RX ADMIN — SODIUM CHLORIDE 100 MILLILITER(S): 9 INJECTION INTRAMUSCULAR; INTRAVENOUS; SUBCUTANEOUS at 17:48

## 2022-06-25 RX ADMIN — HEPARIN SODIUM 5000 UNIT(S): 5000 INJECTION INTRAVENOUS; SUBCUTANEOUS at 22:38

## 2022-06-25 NOTE — H&P ADULT - NSHPPHYSICALEXAM_GEN_ALL_CORE
ICU Vital Signs Last 24 Hrs  T(C): 36.8 (25 Jun 2022 15:44), Max: 37.6 (25 Jun 2022 13:52)  T(F): 98.2 (25 Jun 2022 15:44), Max: 99.6 (25 Jun 2022 13:52)  HR: 60 (25 Jun 2022 15:44) (60 - 64)  BP: 107/60 (25 Jun 2022 15:44) (107/60 - 135/62)  RR: 17 (25 Jun 2022 15:44) (16 - 17)  SpO2: 95% (25 Jun 2022 15:44) (95% - 95%)    GENERAL: NAD, lying in bed, AAOx1, on room air   HEAD:  Atraumatic, Normocephalic  EYES: EOMI, PERRLA, conjunctiva and sclera clear  ENT: Moist mucous membranes  NECK: Supple, No JVD  CHEST/LUNG: Clear to auscultation bilaterally; No rales, rhonchi, wheezing, or rubs. Unlabored respirations  HEART: Regular rate and rhythm; No murmurs, rubs, or gallops  ABDOMEN: Bowel sounds present; Soft, Nontender, Nondistended. No hepatomegally  EXTREMITIES:  2+ Peripheral Pulses, brisk capillary refill. No clubbing, cyanosis, or edema  NERVOUS SYSTEM:  Alert & Oriented X1, speech unclear.   MSK: Not following commands   SKIN: No rashes or lesions

## 2022-06-25 NOTE — ED PROVIDER NOTE - OBJECTIVE STATEMENT
Grand daughter Sara LEESA  present states pt with progressive weakness, decreased appetite, and coughing x 2 days.   Pt's HHA recently tested + for COVID.  Pt received Moderna COVID vaccine x 4.  No reported fever, chest pain, shortness of breath or vomiting.   Meds: Aricept, Lexapro, Hydralazine, Losartan, HCTZ, Memantine, Metoprolol, Terazosin., Zocar

## 2022-06-25 NOTE — H&P ADULT - PROBLEM SELECTOR PLAN 1
- Patient was brought to ED complaining of weakness   - Tested positive for COVID   - CXR looks clear   - Saturating on room air   - Will get D dimer   - Folate and zinc supplements   - Bed rest \  - Physical therapy

## 2022-06-25 NOTE — ED PROVIDER NOTE - CARE PLAN
1 Principal Discharge DX:	Adult failure to thrive   Principal Discharge DX:	Adult failure to thrive  Secondary Diagnosis:	2019 novel coronavirus disease (COVID-19)

## 2022-06-25 NOTE — ED PROVIDER NOTE - CLINICAL SUMMARY MEDICAL DECISION MAKING FREE TEXT BOX
Granddaughter states pt is weak and not at base line of health,   MAR and Dr. Donato endorsed. Pt's granddaughter (HCP) requesting admission for IV hydration. Granddaughter states pt is weak and not at base line of health,   MAR and Dr. Donato endorsed. Pt's granddaughter (HCP) requesting admission for IV hydration.  523p- RVP COVID +. Isolation room ordered.

## 2022-06-25 NOTE — H&P ADULT - ATTENDING COMMENTS
Seen and examined . Oxygenating well on RA . Labs noted. Trending creatinine. Will consult ID and Renal .

## 2022-06-25 NOTE — H&P ADULT - ASSESSMENT
Patient is 87 years old male from home with HHA with past medical history of dementia, hypertension and hyperlipidemia who was brought to ED due to weakness started couple days ago. Patient is ambulating with cane but lately he was unable to walk as baseline. Patient was tested positive for COVID but he was vaccinated, Moderna, 4 doses. Patient is on room air. Patient will be admitted for further care.

## 2022-06-25 NOTE — ED ADULT NURSE NOTE - OBJECTIVE STATEMENT
biba c/o biba c/o increased lethargy ,weakness ans cough over the past few days, patient has h/o dementia, able to follow simple instructions.

## 2022-06-25 NOTE — PATIENT PROFILE ADULT - FALL HARM RISK - HARM RISK INTERVENTIONS

## 2022-06-25 NOTE — H&P ADULT - HISTORY OF PRESENT ILLNESS
Patient is 87 years old male from home with HHA with past medical history of dementia, hypertension and hyperlipidemia who was brought to ED due to weakness started couple days ago. Patient is ambulating with cane but lately he was unable to walk as baseline. Patient was tested positive for COVID but he was vaccinated, Moderna, 4 doses. Patient doesn't seem in chest pain, abdominal pain, N/V ro change in bowel movement.

## 2022-06-25 NOTE — ED ADULT NURSE NOTE - NSIMPLEMENTINTERV_GEN_ALL_ED
Implemented All Fall with Harm Risk Interventions:  [place] to call system. Call bell, personal items and telephone within reach. Instruct patient to call for assistance. Room bathroom lighting operational. Non-slip footwear when patient is off stretcher. Physically safe environment: no spills, clutter or unnecessary equipment. Stretcher in lowest position, wheels locked, appropriate side rails in place. Provide visual cue, wrist band, yellow gown, etc. Monitor gait and stability. Monitor for mental status changes and reorient to person, place, and time. Review medications for side effects contributing to fall risk. Reinforce activity limits and safety measures with patient and family. Provide visual clues: red socks.
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
## 2022-06-26 LAB
ALBUMIN SERPL ELPH-MCNC: 2.7 G/DL — LOW (ref 3.5–5)
ALP SERPL-CCNC: 66 U/L — SIGNIFICANT CHANGE UP (ref 40–120)
ALT FLD-CCNC: 17 U/L DA — SIGNIFICANT CHANGE UP (ref 10–60)
ANION GAP SERPL CALC-SCNC: 10 MMOL/L — SIGNIFICANT CHANGE UP (ref 5–17)
AST SERPL-CCNC: 12 U/L — SIGNIFICANT CHANGE UP (ref 10–40)
BASOPHILS # BLD AUTO: 0.03 K/UL — SIGNIFICANT CHANGE UP (ref 0–0.2)
BASOPHILS NFR BLD AUTO: 0.5 % — SIGNIFICANT CHANGE UP (ref 0–2)
BILIRUB SERPL-MCNC: 0.4 MG/DL — SIGNIFICANT CHANGE UP (ref 0.2–1.2)
BUN SERPL-MCNC: 23 MG/DL — HIGH (ref 7–18)
CALCIUM SERPL-MCNC: 8.7 MG/DL — SIGNIFICANT CHANGE UP (ref 8.4–10.5)
CHLORIDE SERPL-SCNC: 110 MMOL/L — HIGH (ref 96–108)
CO2 SERPL-SCNC: 23 MMOL/L — SIGNIFICANT CHANGE UP (ref 22–31)
CREAT SERPL-MCNC: 1.51 MG/DL — HIGH (ref 0.5–1.3)
CULTURE RESULTS: SIGNIFICANT CHANGE UP
EGFR: 45 ML/MIN/1.73M2 — LOW
EOSINOPHIL # BLD AUTO: 0.28 K/UL — SIGNIFICANT CHANGE UP (ref 0–0.5)
EOSINOPHIL NFR BLD AUTO: 5.1 % — SIGNIFICANT CHANGE UP (ref 0–6)
GLUCOSE SERPL-MCNC: 115 MG/DL — HIGH (ref 70–99)
HCT VFR BLD CALC: 36 % — LOW (ref 39–50)
HGB BLD-MCNC: 12.1 G/DL — LOW (ref 13–17)
IMM GRANULOCYTES NFR BLD AUTO: 0.4 % — SIGNIFICANT CHANGE UP (ref 0–1.5)
LYMPHOCYTES # BLD AUTO: 1.04 K/UL — SIGNIFICANT CHANGE UP (ref 1–3.3)
LYMPHOCYTES # BLD AUTO: 18.8 % — SIGNIFICANT CHANGE UP (ref 13–44)
MAGNESIUM SERPL-MCNC: 2.3 MG/DL — SIGNIFICANT CHANGE UP (ref 1.6–2.6)
MCHC RBC-ENTMCNC: 31.8 PG — SIGNIFICANT CHANGE UP (ref 27–34)
MCHC RBC-ENTMCNC: 33.6 GM/DL — SIGNIFICANT CHANGE UP (ref 32–36)
MCV RBC AUTO: 94.5 FL — SIGNIFICANT CHANGE UP (ref 80–100)
MONOCYTES # BLD AUTO: 0.85 K/UL — SIGNIFICANT CHANGE UP (ref 0–0.9)
MONOCYTES NFR BLD AUTO: 15.4 % — HIGH (ref 2–14)
NEUTROPHILS # BLD AUTO: 3.31 K/UL — SIGNIFICANT CHANGE UP (ref 1.8–7.4)
NEUTROPHILS NFR BLD AUTO: 59.8 % — SIGNIFICANT CHANGE UP (ref 43–77)
NRBC # BLD: 0 /100 WBCS — SIGNIFICANT CHANGE UP (ref 0–0)
PHOSPHATE SERPL-MCNC: 3.6 MG/DL — SIGNIFICANT CHANGE UP (ref 2.5–4.5)
PLATELET # BLD AUTO: 140 K/UL — LOW (ref 150–400)
POTASSIUM SERPL-MCNC: 3.8 MMOL/L — SIGNIFICANT CHANGE UP (ref 3.5–5.3)
POTASSIUM SERPL-SCNC: 3.8 MMOL/L — SIGNIFICANT CHANGE UP (ref 3.5–5.3)
PROT SERPL-MCNC: 6.2 G/DL — SIGNIFICANT CHANGE UP (ref 6–8.3)
RBC # BLD: 3.81 M/UL — LOW (ref 4.2–5.8)
RBC # FLD: 13.2 % — SIGNIFICANT CHANGE UP (ref 10.3–14.5)
SODIUM SERPL-SCNC: 143 MMOL/L — SIGNIFICANT CHANGE UP (ref 135–145)
SPECIMEN SOURCE: SIGNIFICANT CHANGE UP
WBC # BLD: 5.53 K/UL — SIGNIFICANT CHANGE UP (ref 3.8–10.5)
WBC # FLD AUTO: 5.53 K/UL — SIGNIFICANT CHANGE UP (ref 3.8–10.5)

## 2022-06-26 RX ORDER — AMLODIPINE BESYLATE 2.5 MG/1
2.5 TABLET ORAL ONCE
Refills: 0 | Status: COMPLETED | OUTPATIENT
Start: 2022-06-26 | End: 2022-06-26

## 2022-06-26 RX ADMIN — ESCITALOPRAM OXALATE 10 MILLIGRAM(S): 10 TABLET, FILM COATED ORAL at 12:55

## 2022-06-26 RX ADMIN — ZINC SULFATE TAB 220 MG (50 MG ZINC EQUIVALENT) 220 MILLIGRAM(S): 220 (50 ZN) TAB at 12:55

## 2022-06-26 RX ADMIN — HEPARIN SODIUM 5000 UNIT(S): 5000 INJECTION INTRAVENOUS; SUBCUTANEOUS at 21:44

## 2022-06-26 RX ADMIN — DONEPEZIL HYDROCHLORIDE 10 MILLIGRAM(S): 10 TABLET, FILM COATED ORAL at 21:44

## 2022-06-26 RX ADMIN — MEMANTINE HYDROCHLORIDE 10 MILLIGRAM(S): 10 TABLET ORAL at 17:56

## 2022-06-26 RX ADMIN — Medication 1 MILLIGRAM(S): at 12:55

## 2022-06-26 RX ADMIN — HEPARIN SODIUM 5000 UNIT(S): 5000 INJECTION INTRAVENOUS; SUBCUTANEOUS at 14:46

## 2022-06-26 RX ADMIN — Medication 1 TABLET(S): at 12:55

## 2022-06-26 RX ADMIN — HEPARIN SODIUM 5000 UNIT(S): 5000 INJECTION INTRAVENOUS; SUBCUTANEOUS at 05:12

## 2022-06-26 RX ADMIN — MEMANTINE HYDROCHLORIDE 10 MILLIGRAM(S): 10 TABLET ORAL at 05:12

## 2022-06-26 RX ADMIN — AMLODIPINE BESYLATE 2.5 MILLIGRAM(S): 2.5 TABLET ORAL at 06:44

## 2022-06-26 NOTE — PHYSICAL THERAPY INITIAL EVALUATION ADULT - PLANNED THERAPY INTERVENTIONS, PT EVAL
Stair negotiation training/balance training/bed mobility training/gait training/strengthening/transfer training

## 2022-06-26 NOTE — PROGRESS NOTE ADULT - ASSESSMENT
87 years old male from home with HHA with past medical history of dementia, hypertension and hyperlipidemia who was brought to ED due to weakness started couple days ago. Patient is ambulating with cane but lately he was unable to walk as baseline. Patient was tested positive for COVID but he was vaccinated, Moderna, 4 doses. Patient is on room air. Patient will be admitted for further care.     Problem/Plan - 1:  ·  Problem: 2019 novel coronavirus disease (COVID-19).   ·  Plan: - Patient was brought to ED complaining of weakness   - Tested positive for COVID   - CXR looks clear   - Saturating well on room air   - Will get D dimer   - Folate and zinc supplements   - ID consulted.   - Physical therapy.     Problem/Plan - 2:  ·  Problem: Hypertension.   ·  Plan: - Will hold meds due to soft BP   - Dash diet.     Problem/Plan - 3:  ·  Problem: Hyperlipidemia.   ·  Plan: - Continue on statin   - DASH diet.     Problem/Plan - 4:  ·  Problem: KEV .   ·  Plan: - Resolving . Renal consulted.

## 2022-06-26 NOTE — PROGRESS NOTE ADULT - SUBJECTIVE AND OBJECTIVE BOX
Date of Service  : 22     INTERVAL HPI/OVERNIGHT EVENTS: Seen and examined earlier this AM . No new concerns.   Vital Signs Last 24 Hrs  T(C): 36.4 (2022 11:42), Max: 36.8 (2022 15:44)  T(F): 97.5 (2022 11:42), Max: 98.2 (2022 15:44)  HR: 52 (2022 11:42) (52 - 78)  BP: 129/81 (2022 11:42) (107/60 - 175/79)  BP(mean): --  RR: 18 (2022 11:42) (17 - 18)  SpO2: 94% (2022 11:42) (94% - 96%)  I&O's Summary    MEDICATIONS  (STANDING):  donepezil 10 milliGRAM(s) Oral at bedtime  escitalopram 10 milliGRAM(s) Oral daily  folic acid 1 milliGRAM(s) Oral daily  heparin   Injectable 5000 Unit(s) SubCutaneous every 8 hours  memantine 10 milliGRAM(s) Oral two times a day  multivitamin 1 Tablet(s) Oral daily  sodium chloride 0.9%. 1000 milliLiter(s) (100 mL/Hr) IV Continuous <Continuous>  zinc sulfate 220 milliGRAM(s) Oral daily    MEDICATIONS  (PRN):    LABS:                        12.1   5.53  )-----------( 140      ( 2022 08:56 )             36.0         143  |  110<H>  |  23<H>  ----------------------------<  115<H>  3.8   |  23  |  1.51<H>    Ca    8.7      2022 08:56  Phos  3.6       Mg     2.3         TPro  6.2  /  Alb  2.7<L>  /  TBili  0.4  /  DBili  x   /  AST  12  /  ALT  17  /  AlkPhos  66  26    PT/INR - ( 2022 15:31 )   PT: 12.2 sec;   INR: 1.02 ratio         PTT - ( 2022 15:31 )  PTT:29.6 sec  Urinalysis Basic - ( 2022 15:31 )    Color: Yellow / Appearance: Clear / S.020 / pH: x  Gluc: x / Ketone: Negative  / Bili: Negative / Urobili: Negative   Blood: x / Protein: Negative / Nitrite: Negative   Leuk Esterase: Negative / RBC: 0-2 /HPF / WBC 0-2 /HPF   Sq Epi: x / Non Sq Epi: Occasional /HPF / Bacteria: Trace /HPF      CAPILLARY BLOOD GLUCOSE            Urinalysis Basic - ( 2022 15:31 )    Color: Yellow / Appearance: Clear / S.020 / pH: x  Gluc: x / Ketone: Negative  / Bili: Negative / Urobili: Negative   Blood: x / Protein: Negative / Nitrite: Negative   Leuk Esterase: Negative / RBC: 0-2 /HPF / WBC 0-2 /HPF   Sq Epi: x / Non Sq Epi: Occasional /HPF / Bacteria: Trace /HPF          Consultant(s) Notes Reviewed:  [x ] YES  [ ] NO    PHYSICAL EXAM:  GENERAL: NAD, well-groomed, well-developed, not in any distress ,  HEAD:  Atraumatic, Normocephalic  NECK: Supple, No JVD, Normal thyroid  NERVOUS SYSTEM:  Alert & Oriented X1 to 2, No focal deficit   CHEST/LUNG: Good air entry bilateral with no  rales, rhonchi, wheezing, or rubs  HEART: Regular rate and rhythm; No murmurs, rubs, or gallops  ABDOMEN: Soft, Nontender, Nondistended; Bowel sounds present  EXTREMITIES:  2+ Peripheral Pulses, No clubbing, cyanosis, or edema    Care Discussed with Consultants/Other Providers [ x] YES  [ ] NO

## 2022-06-26 NOTE — CONSULT NOTE ADULT - SUBJECTIVE AND OBJECTIVE BOX
SHANTEL CARRANZA  Patient is a 87y old  Male who presents with a chief complaint of Weakness (2022 14:43)    HPI:  Admitted with weakness and was noted to have COVID. Has azotemia. His recall is poor and he does not recall CKD.  Poor appetite and weakness.    PAST MEDICAL & SURGICAL HISTORY:  HTN (hypertension)      HLD (hyperlipidemia)      Prostate cancer      Mitral valve regurgitation      Dementia      HTN (hypertension)      Dyslipidemia      History of hip replacement        MEDICATIONS  (STANDING):  donepezil 10 milliGRAM(s) Oral at bedtime  escitalopram 10 milliGRAM(s) Oral daily  folic acid 1 milliGRAM(s) Oral daily  heparin   Injectable 5000 Unit(s) SubCutaneous every 8 hours  memantine 10 milliGRAM(s) Oral two times a day  multivitamin 1 Tablet(s) Oral daily  sodium chloride 0.9%. 1000 milliLiter(s) (100 mL/Hr) IV Continuous <Continuous>  zinc sulfate 220 milliGRAM(s) Oral daily    Allergies    No Known Allergies    Intolerances      FAMILY HISTORY:  No pertinent family history in first degree relatives        REVIEW OF SYSTEMS    General:  No fever, chills or night sweats. Weakness    Ophthalmologic: No changes in vision.  	  ENMT: No difficulty swallowing. 	    Respiratory and Thorax: No cough, wheezes or dyspnea.  	  Cardiovascular: No chest pains, tiredness or palpitations.	    Gastrointestinal: No dyspepsia, constipation or diarrhea.	    Genitourinary:	 No dysuria, hematuria or frequency.    Musculoskeletal: No joint pains or swelling. No muscle pains.    Neurological:	He has weakness. No seizures.    Hematology/Lymphatics: No heat or cold intolerance.    Endocrine: No polyuria or polydipsia.	      Vital Signs Last 24 Hrs  T(C): 36.4 (2022 11:42), Max: 36.4 (2022 11:42)  T(F): 97.5 (2022 11:42), Max: 97.5 (2022 11:42)  HR: 52 (2022 11:42) (52 - 78)  BP: 129/81 (2022 11:42) (129/81 - 175/79)  BP(mean): --  RR: 18 (2022 11:42) (18 - 18)  SpO2: 94% (2022 11:42) (94% - 96%)    PHYSICAL EXAMINATION:  Constitutional: He appears comfortable and not distressed. Not diaphoretic.    Neck:  The thyroid is normal. Trachea is midline.     Respiratory: The lungs are clear to auscultation. No dullness and expansion is normal.    Cardiovascular: S1 and S2 are normal. No mummurs, rubs or gallops are present.    Gastrointestinal: The abdomen is soft. No tenderness is present. No masses are present. Bowel sounds are normal.    Genitourinary: The bladder is not distended. No CVA tenderness is present.    Extremities: No edema is noted. No deformities are present.    Neurological: Cognition is normal. Tone, power and sensation are normal.     Skin: No lesions are seen  or palpated.    Lymph Nodes: No lymphadenopathy is present.    Psychiatric: Mood is appropriate. No hallucinations or flight of ideas are noted.                            12.1   5.53  )-----------( 140      ( 2022 08:56 )             36.0     06-    143  |  110<H>  |  23<H>  ----------------------------<  115<H>  3.8   |  23  |  1.51<H>    Ca    8.7      2022 08:56  Phos  3.6     -  Mg     2.3     -    TPro  6.2  /  Alb  2.7<L>  /  TBili  0.4  /  DBili  x   /  AST  12  /  ALT  17  /  AlkPhos  66  06-26    LIVER FUNCTIONS - ( 2022 08:56 )  Alb: 2.7 g/dL / Pro: 6.2 g/dL / ALK PHOS: 66 U/L / ALT: 17 U/L DA / AST: 12 U/L / GGT: x           Urinalysis Basic - ( 2022 15:31 )    Color: Yellow / Appearance: Clear / S.020 / pH: x  Gluc: x / Ketone: Negative  / Bili: Negative / Urobili: Negative   Blood: x / Protein: Negative / Nitrite: Negative   Leuk Esterase: Negative / RBC: 0-2 /HPF / WBC 0-2 /HPF   Sq Epi: x / Non Sq Epi: Occasional /HPF / Bacteria: Trace /HPF

## 2022-06-26 NOTE — PHYSICAL THERAPY INITIAL EVALUATION ADULT - PATIENT/FAMILY/SIGNIFICANT OTHER GOALS STATEMENT, PT EVAL
Granddaughter wants him to get stronger and walk as the family is unable to care for him in his current state

## 2022-06-26 NOTE — PHYSICAL THERAPY INITIAL EVALUATION ADULT - ADDITIONAL COMMENTS
As per Granddaughter patient live with wife in private home.  Has 7 steps to negotiate to front door. Inside home there is a chairlift to the bedroom level of home.  Patient ambulated independently with rolling walker (also has a cane).  However would need a little assistance to stand when getting off a couch.  Has HHA services 24 hours 7 days.

## 2022-06-27 DIAGNOSIS — Z02.9 ENCOUNTER FOR ADMINISTRATIVE EXAMINATIONS, UNSPECIFIED: ICD-10-CM

## 2022-06-27 DIAGNOSIS — F03.90 UNSPECIFIED DEMENTIA WITHOUT BEHAVIORAL DISTURBANCE: ICD-10-CM

## 2022-06-27 DIAGNOSIS — N17.9 ACUTE KIDNEY FAILURE, UNSPECIFIED: ICD-10-CM

## 2022-06-27 LAB
ANION GAP SERPL CALC-SCNC: 9 MMOL/L — SIGNIFICANT CHANGE UP (ref 5–17)
BUN SERPL-MCNC: 18 MG/DL — SIGNIFICANT CHANGE UP (ref 7–18)
CALCIUM SERPL-MCNC: 8.6 MG/DL — SIGNIFICANT CHANGE UP (ref 8.4–10.5)
CHLORIDE SERPL-SCNC: 109 MMOL/L — HIGH (ref 96–108)
CO2 SERPL-SCNC: 23 MMOL/L — SIGNIFICANT CHANGE UP (ref 22–31)
CREAT SERPL-MCNC: 1.41 MG/DL — HIGH (ref 0.5–1.3)
EGFR: 49 ML/MIN/1.73M2 — LOW
GLUCOSE SERPL-MCNC: 130 MG/DL — HIGH (ref 70–99)
HCT VFR BLD CALC: 33.3 % — LOW (ref 39–50)
HGB BLD-MCNC: 11.4 G/DL — LOW (ref 13–17)
MCHC RBC-ENTMCNC: 31.6 PG — SIGNIFICANT CHANGE UP (ref 27–34)
MCHC RBC-ENTMCNC: 34.2 GM/DL — SIGNIFICANT CHANGE UP (ref 32–36)
MCV RBC AUTO: 92.2 FL — SIGNIFICANT CHANGE UP (ref 80–100)
MRSA PCR RESULT.: SIGNIFICANT CHANGE UP
NRBC # BLD: 0 /100 WBCS — SIGNIFICANT CHANGE UP (ref 0–0)
PLATELET # BLD AUTO: 148 K/UL — LOW (ref 150–400)
POTASSIUM SERPL-MCNC: 3.9 MMOL/L — SIGNIFICANT CHANGE UP (ref 3.5–5.3)
POTASSIUM SERPL-SCNC: 3.9 MMOL/L — SIGNIFICANT CHANGE UP (ref 3.5–5.3)
RBC # BLD: 3.61 M/UL — LOW (ref 4.2–5.8)
RBC # FLD: 12.9 % — SIGNIFICANT CHANGE UP (ref 10.3–14.5)
S AUREUS DNA NOSE QL NAA+PROBE: DETECTED
SODIUM SERPL-SCNC: 141 MMOL/L — SIGNIFICANT CHANGE UP (ref 135–145)
WBC # BLD: 5.92 K/UL — SIGNIFICANT CHANGE UP (ref 3.8–10.5)
WBC # FLD AUTO: 5.92 K/UL — SIGNIFICANT CHANGE UP (ref 3.8–10.5)

## 2022-06-27 RX ORDER — PANTOPRAZOLE SODIUM 20 MG/1
40 TABLET, DELAYED RELEASE ORAL
Refills: 0 | Status: DISCONTINUED | OUTPATIENT
Start: 2022-06-27 | End: 2022-07-01

## 2022-06-27 RX ORDER — AMLODIPINE BESYLATE 2.5 MG/1
2.5 TABLET ORAL ONCE
Refills: 0 | Status: COMPLETED | OUTPATIENT
Start: 2022-06-27 | End: 2022-06-27

## 2022-06-27 RX ORDER — SIMVASTATIN 20 MG/1
40 TABLET, FILM COATED ORAL AT BEDTIME
Refills: 0 | Status: DISCONTINUED | OUTPATIENT
Start: 2022-06-27 | End: 2022-07-01

## 2022-06-27 RX ORDER — SODIUM CHLORIDE 9 MG/ML
1000 INJECTION INTRAMUSCULAR; INTRAVENOUS; SUBCUTANEOUS
Refills: 0 | Status: DISCONTINUED | OUTPATIENT
Start: 2022-06-27 | End: 2022-06-27

## 2022-06-27 RX ORDER — AMLODIPINE BESYLATE 2.5 MG/1
2.5 TABLET ORAL DAILY
Refills: 0 | Status: DISCONTINUED | OUTPATIENT
Start: 2022-06-27 | End: 2022-06-28

## 2022-06-27 RX ADMIN — HEPARIN SODIUM 5000 UNIT(S): 5000 INJECTION INTRAVENOUS; SUBCUTANEOUS at 13:36

## 2022-06-27 RX ADMIN — MEMANTINE HYDROCHLORIDE 10 MILLIGRAM(S): 10 TABLET ORAL at 18:00

## 2022-06-27 RX ADMIN — Medication 1 MILLIGRAM(S): at 12:00

## 2022-06-27 RX ADMIN — HEPARIN SODIUM 5000 UNIT(S): 5000 INJECTION INTRAVENOUS; SUBCUTANEOUS at 22:02

## 2022-06-27 RX ADMIN — ESCITALOPRAM OXALATE 10 MILLIGRAM(S): 10 TABLET, FILM COATED ORAL at 12:00

## 2022-06-27 RX ADMIN — MEMANTINE HYDROCHLORIDE 10 MILLIGRAM(S): 10 TABLET ORAL at 05:34

## 2022-06-27 RX ADMIN — ZINC SULFATE TAB 220 MG (50 MG ZINC EQUIVALENT) 220 MILLIGRAM(S): 220 (50 ZN) TAB at 12:00

## 2022-06-27 RX ADMIN — AMLODIPINE BESYLATE 2.5 MILLIGRAM(S): 2.5 TABLET ORAL at 08:49

## 2022-06-27 RX ADMIN — AMLODIPINE BESYLATE 2.5 MILLIGRAM(S): 2.5 TABLET ORAL at 22:33

## 2022-06-27 RX ADMIN — Medication 1 TABLET(S): at 12:00

## 2022-06-27 RX ADMIN — HEPARIN SODIUM 5000 UNIT(S): 5000 INJECTION INTRAVENOUS; SUBCUTANEOUS at 05:34

## 2022-06-27 RX ADMIN — DONEPEZIL HYDROCHLORIDE 10 MILLIGRAM(S): 10 TABLET, FILM COATED ORAL at 22:03

## 2022-06-27 RX ADMIN — SIMVASTATIN 40 MILLIGRAM(S): 20 TABLET, FILM COATED ORAL at 22:02

## 2022-06-27 NOTE — CONSULT NOTE ADULT - SUBJECTIVE AND OBJECTIVE BOX
HPI:  Patient is 87 years old male from home with HHA with past medical history of dementia, hypertension and hyperlipidemia who was brought to ED due to weakness started couple days ago. Patient is ambulating with cane but lately he was unable to walk as baseline. Patient was tested positive for COVID but he was vaccinated, Moderna, 4 doses. Patient doesn't seem in chest pain, abdominal pain, N/V ro change in bowel movement.  (25 Jun 2022 19:11)      PAST MEDICAL & SURGICAL HISTORY:  HTN (hypertension)      HLD (hyperlipidemia)      Prostate cancer      Mitral valve regurgitation      Dementia      HTN (hypertension)      Dyslipidemia      History of hip replacement          No Known Allergies      Meds:  amLODIPine   Tablet 2.5 milliGRAM(s) Oral daily  donepezil 10 milliGRAM(s) Oral at bedtime  escitalopram 10 milliGRAM(s) Oral daily  folic acid 1 milliGRAM(s) Oral daily  heparin   Injectable 5000 Unit(s) SubCutaneous every 8 hours  memantine 10 milliGRAM(s) Oral two times a day  multivitamin 1 Tablet(s) Oral daily  pantoprazole    Tablet 40 milliGRAM(s) Oral before breakfast  simvastatin 40 milliGRAM(s) Oral at bedtime  sodium chloride 0.9%. 1000 milliLiter(s) IV Continuous <Continuous>  zinc sulfate 220 milliGRAM(s) Oral daily      SOCIAL HISTORY:  Smoker:  YES / NO        PACK YEARS:                         WHEN QUIT?  ETOH use:  YES / NO               FREQUENCY / QUANTITY:  Ilicit Drug use:  YES / NO  Occupation:  Assisted device use (Cane / Walker):  Live with:    FAMILY HISTORY:  No pertinent family history in first degree relatives        VITALS:  Vital Signs Last 24 Hrs  T(C): 37.2 (27 Jun 2022 12:02), Max: 37.2 (27 Jun 2022 12:02)  T(F): 98.9 (27 Jun 2022 12:02), Max: 98.9 (27 Jun 2022 12:02)  HR: 50 (27 Jun 2022 12:02) (50 - 60)  BP: 158/79 (27 Jun 2022 12:02) (118/85 - 169/64)  BP(mean): --  RR: 18 (27 Jun 2022 12:02) (17 - 18)  SpO2: 97% (27 Jun 2022 12:02) (95% - 97%)    LABS/DIAGNOSTIC TESTS:                          11.4   5.92  )-----------( 148      ( 27 Jun 2022 11:42 )             33.3     WBC Count: 5.92 K/uL (06-27 @ 11:42)  WBC Count: 5.53 K/uL (06-26 @ 08:56)  WBC Count: 6.32 K/uL (06-25 @ 15:31)      06-27    141  |  109<H>  |  18  ----------------------------<  130<H>  3.9   |  23  |  1.41<H>    Ca    8.6      27 Jun 2022 11:42  Phos  3.6     06-26  Mg     2.3     06-26    TPro  6.2  /  Alb  2.7<L>  /  TBili  0.4  /  DBili  x   /  AST  12  /  ALT  17  /  AlkPhos  66  06-26          LIVER FUNCTIONS - ( 26 Jun 2022 08:56 )  Alb: 2.7 g/dL / Pro: 6.2 g/dL / ALK PHOS: 66 U/L / ALT: 17 U/L DA / AST: 12 U/L / GGT: x                 LACTATE:    ABG -     CULTURES:   Clean Catch Clean Catch (Midstream)  06-25 @ 21:16   <10,000 CFU/mL Normal Urogenital Becka  --  --      .Blood Blood-Peripheral  06-25 @ 21:05   No growth to date.  --  --      .Blood Blood-Peripheral  06-25 @ 21:03   No growth to date.  --  --          RADIOLOGY:< from: Xray Chest 1 View-PORTABLE IMMEDIATE (06.25.22 @ 16:15) >  ACC: [id number] EXAM:  XR CHEST PORTABLE IMMED 1V                          PROCEDURE DATE:  06/25/2022          INTERPRETATION:  CLINICAL STATEMENT: Sepsis    TECHNIQUE: AP view of the chest.      COMPARISON: 2/20/2019    The cardiomediastinal silhouette may be exaggerated by AP technique.   Calcified uncoiled aorta. The lorri are not enlarged. The trachea is   midline. Suboptimal degree of inspiration with crowding of the   bronchovascular markings. There is no focal lung consolidation or sizable  pleural effusion. No significant osseous abnormality. Surgical clips   overlie the left axilla.    IMPRESSION:    Unremarkable frontal chest x ray    --- End of Report ---            FELICIA KEVIN MD; Attending Radiologist  This document has been electronically signed. Jun 26 2022  9:06AM    < end of copied text >        ROS  [  ] UNABLE TO ELICIT               HPI:  Patient is 87 years old male from home with HHA with past medical history of dementia, hypertension and hyperlipidemia who was brought to ED due to weakness started couple days ago. Patient is ambulating with cane but lately he was unable to walk as baseline. Patient was tested positive for COVID but he was vaccinated, Moderna, 4 doses. Patient doesn't seem in chest pain, abdominal pain, N/V ro change in bowel movement.  (25 Jun 2022 19:11)      History as above, pt who was admitted from home with weakness along with a dry cough and was found to be COVID + here , he has no fevers or chills, he has some diarrhea here but denies any SOB , chest pain. He has no hypoxia and so is not a candidate for Remdesivir or steroids at this time. He has KEV also which is improving.        PAST MEDICAL & SURGICAL HISTORY:  HTN (hypertension)      HLD (hyperlipidemia)      Prostate cancer      Mitral valve regurgitation      Dementia      HTN (hypertension)      Dyslipidemia      History of hip replacement          No Known Allergies      Meds:  amLODIPine   Tablet 2.5 milliGRAM(s) Oral daily  donepezil 10 milliGRAM(s) Oral at bedtime  escitalopram 10 milliGRAM(s) Oral daily  folic acid 1 milliGRAM(s) Oral daily  heparin   Injectable 5000 Unit(s) SubCutaneous every 8 hours  memantine 10 milliGRAM(s) Oral two times a day  multivitamin 1 Tablet(s) Oral daily  pantoprazole    Tablet 40 milliGRAM(s) Oral before breakfast  simvastatin 40 milliGRAM(s) Oral at bedtime  sodium chloride 0.9%. 1000 milliLiter(s) IV Continuous <Continuous>  zinc sulfate 220 milliGRAM(s) Oral daily      SOCIAL HISTORY:  Smoker:  YES   ETOH use:  YES   Ilicit Drug use:  NO          FAMILY HISTORY:  No pertinent family history in first degree relatives        VITALS:  Vital Signs Last 24 Hrs  T(C): 37.2 (27 Jun 2022 12:02), Max: 37.2 (27 Jun 2022 12:02)  T(F): 98.9 (27 Jun 2022 12:02), Max: 98.9 (27 Jun 2022 12:02)  HR: 50 (27 Jun 2022 12:02) (50 - 60)  BP: 158/79 (27 Jun 2022 12:02) (118/85 - 169/64)  BP(mean): --  RR: 18 (27 Jun 2022 12:02) (17 - 18)  SpO2: 97% (27 Jun 2022 12:02) (95% - 97%)    LABS/DIAGNOSTIC TESTS:                          11.4   5.92  )-----------( 148      ( 27 Jun 2022 11:42 )             33.3     WBC Count: 5.92 K/uL (06-27 @ 11:42)  WBC Count: 5.53 K/uL (06-26 @ 08:56)  WBC Count: 6.32 K/uL (06-25 @ 15:31)      06-27    141  |  109<H>  |  18  ----------------------------<  130<H>  3.9   |  23  |  1.41<H>    Ca    8.6      27 Jun 2022 11:42  Phos  3.6     06-26  Mg     2.3     06-26    TPro  6.2  /  Alb  2.7<L>  /  TBili  0.4  /  DBili  x   /  AST  12  /  ALT  17  /  AlkPhos  66  06-26          LIVER FUNCTIONS - ( 26 Jun 2022 08:56 )  Alb: 2.7 g/dL / Pro: 6.2 g/dL / ALK PHOS: 66 U/L / ALT: 17 U/L DA / AST: 12 U/L / GGT: x                 LACTATE:    ABG -     CULTURES:   Clean Catch Clean Catch (Midstream)  06-25 @ 21:16   <10,000 CFU/mL Normal Urogenital Becka  --  --      .Blood Blood-Peripheral  06-25 @ 21:05   No growth to date.  --  --      .Blood Blood-Peripheral  06-25 @ 21:03   No growth to date.  --  --          RADIOLOGY:< from: Xray Chest 1 View-PORTABLE IMMEDIATE (06.25.22 @ 16:15) >  ACC: [id number] EXAM:  XR CHEST PORTABLE IMMED 1V                          PROCEDURE DATE:  06/25/2022          INTERPRETATION:  CLINICAL STATEMENT: Sepsis    TECHNIQUE: AP view of the chest.      COMPARISON: 2/20/2019    The cardiomediastinal silhouette may be exaggerated by AP technique.   Calcified uncoiled aorta. The lorri are not enlarged. The trachea is   midline. Suboptimal degree of inspiration with crowding of the   bronchovascular markings. There is no focal lung consolidation or sizable  pleural effusion. No significant osseous abnormality. Surgical clips   overlie the left axilla.    IMPRESSION:    Unremarkable frontal chest x ray    --- End of Report ---            FELICIA KEVIN MD; Attending Radiologist  This document has been electronically signed. Jun 26 2022  9:06AM    < end of copied text >        ROS  [  ] UNABLE TO ELICIT

## 2022-06-27 NOTE — CONSULT NOTE ADULT - ASSESSMENT
Acute Kidney Injury:  He has no knowledge of CKD. Has COVID but is hemodynamically stable.    Low risk for ATN. he may be volume depleted but most likely he has CKD.  - Continue NS.  - Follow up BMP.  - Avoid nephrotoxins.
COVID -19 infection     Plan - No treatment at this time given he has no hypoxia or fevers
Jonathan Tian

## 2022-06-27 NOTE — PROGRESS NOTE ADULT - SUBJECTIVE AND OBJECTIVE BOX
Date of Service  : 22     INTERVAL HPI/OVERNIGHT EVENTS: I feel fine. I want to walk .   Vital Signs Last 24 Hrs  T(C): 36.4 (2022 06:27), Max: 36.6 (2022 21:33)  T(F): 97.6 (2022 06:27), Max: 97.9 (2022 21:33)  HR: 54 (2022 08:59) (52 - 60)  BP: 164/59 (2022 08:59) (118/85 - 169/64)  BP(mean): --  RR: 17 (2022 06:27) (17 - 18)  SpO2: 97% (2022 06:27) (94% - 97%)  I&O's Summary    MEDICATIONS  (STANDING):  amLODIPine   Tablet 2.5 milliGRAM(s) Oral daily  donepezil 10 milliGRAM(s) Oral at bedtime  escitalopram 10 milliGRAM(s) Oral daily  folic acid 1 milliGRAM(s) Oral daily  heparin   Injectable 5000 Unit(s) SubCutaneous every 8 hours  memantine 10 milliGRAM(s) Oral two times a day  multivitamin 1 Tablet(s) Oral daily  sodium chloride 0.9%. 1000 milliLiter(s) (100 mL/Hr) IV Continuous <Continuous>  zinc sulfate 220 milliGRAM(s) Oral daily    MEDICATIONS  (PRN):    LABS:                        12.1   5.53  )-----------( 140      ( 2022 08:56 )             36.0     26    143  |  110<H>  |  23<H>  ----------------------------<  115<H>  3.8   |  23  |  1.51<H>    Ca    8.7      2022 08:56  Phos  3.6       Mg     2.3         TPro  6.2  /  Alb  2.7<L>  /  TBili  0.4  /  DBili  x   /  AST  12  /  ALT  17  /  AlkPhos  66  -26    PT/INR - ( 2022 15:31 )   PT: 12.2 sec;   INR: 1.02 ratio         PTT - ( 2022 15:31 )  PTT:29.6 sec  Urinalysis Basic - ( 2022 15:31 )    Color: Yellow / Appearance: Clear / S.020 / pH: x  Gluc: x / Ketone: Negative  / Bili: Negative / Urobili: Negative   Blood: x / Protein: Negative / Nitrite: Negative   Leuk Esterase: Negative / RBC: 0-2 /HPF / WBC 0-2 /HPF   Sq Epi: x / Non Sq Epi: Occasional /HPF / Bacteria: Trace /HPF      CAPILLARY BLOOD GLUCOSE            Urinalysis Basic - ( 2022 15:31 )    Color: Yellow / Appearance: Clear / S.020 / pH: x  Gluc: x / Ketone: Negative  / Bili: Negative / Urobili: Negative   Blood: x / Protein: Negative / Nitrite: Negative   Leuk Esterase: Negative / RBC: 0-2 /HPF / WBC 0-2 /HPF   Sq Epi: x / Non Sq Epi: Occasional /HPF / Bacteria: Trace /HPF        Consultant(s) Notes Reviewed:  [x ] YES  [ ] NO    PHYSICAL EXAM:  GENERAL: NAD, well-groomed, well-developed,not in any distress ,  HEAD:  Atraumatic, Normocephalic  NECK: Supple, No JVD, Normal thyroid  NERVOUS SYSTEM:  Alert & Oriented X1 to 2, No focal deficit   CHEST/LUNG: Good air entry bilateral with no  rales, rhonchi, wheezing, or rubs  HEART: Regular rate and rhythm; No murmurs, rubs, or gallops  ABDOMEN: Soft, Nontender, Nondistended; Bowel sounds present  EXTREMITIES:  2+ Peripheral Pulses, No clubbing, cyanosis, or edema    Care Discussed with Consultants/Other Providers [ x] YES  [ ] NO

## 2022-06-27 NOTE — PROGRESS NOTE ADULT - PROBLEM SELECTOR PLAN 3
- Continue on statin   - DASH diet - Will hold meds due to soft BP, on hydralazine and metoprolol at home.  - Hold metoprolol due to daniel  - start amlodipine today

## 2022-06-27 NOTE — PROGRESS NOTE ADULT - SUBJECTIVE AND OBJECTIVE BOX
Patient is a 87y old  Male who presents with a chief complaint of Weakness (2022 07:21)    INTERVAL HPI/OVERNIGHT EVENTS: No cute events overnight    REVIEW OF SYSTEMS:  CONSTITUTIONAL: No fever, chills  ENMT:  No difficulty hearing, no change in vision  NECK: No pain or stiffness  RESPIRATORY: No cough, SOB  CARDIOVASCULAR: No chest pain, palpitations  GASTROINTESTINAL: No abdominal pain. No nausea, vomiting, or diarrhea  GENITOURINARY: No dysuria  NEUROLOGICAL: No HA  SKIN: No itching, burning, rashes, or lesions   LYMPH NODES: No enlarged glands  ENDOCRINE: No heat or cold intolerance; No hair loss  MUSCULOSKELETAL: No joint pain or swelling; No muscle, back, or extremity pain  PSYCHIATRIC: No depression, anxiety  HEME/LYMPH: No easy bruising, or bleeding gums    T(C): 36.4 (22 @ 06:27), Max: 36.6 (22 @ 21:33)  HR: 54 (22 @ 08:59) (52 - 60)  BP: 164/59 (22 @ 08:59) (118/85 - 169/64)  RR: 17 (22 @ 06:27) (17 - 18)  SpO2: 97% (22 @ 06:27) (94% - 97%)  Wt(kg): --Vital Signs Last 24 Hrs  T(C): 36.4 (2022 06:27), Max: 36.6 (2022 21:33)  T(F): 97.6 (2022 06:27), Max: 97.9 (2022 21:33)  HR: 54 (2022 08:59) (52 - 60)  BP: 164/59 (2022 08:59) (118/85 - 169/64)  BP(mean): --  RR: 17 (2022 06:27) (17 - 18)  SpO2: 97% (2022 06:27) (94% - 97%)    MEDICATIONS  (STANDING):  amLODIPine   Tablet 2.5 milliGRAM(s) Oral daily  donepezil 10 milliGRAM(s) Oral at bedtime  escitalopram 10 milliGRAM(s) Oral daily  folic acid 1 milliGRAM(s) Oral daily  heparin   Injectable 5000 Unit(s) SubCutaneous every 8 hours  memantine 10 milliGRAM(s) Oral two times a day  multivitamin 1 Tablet(s) Oral daily  sodium chloride 0.9%. 1000 milliLiter(s) (100 mL/Hr) IV Continuous <Continuous>  zinc sulfate 220 milliGRAM(s) Oral daily    MEDICATIONS  (PRN):      PHYSICAL EXAM:  GENERAL: NAD  EYES: clear conjunctiva; EOMI  ENMT: Moist mucous membranes  NECK: Supple, No JVD, Normal thyroid  CHEST/LUNG: Diminished breath sounds at bases  HEART: S1, S2, Regular rate and rhythm  ABDOMEN: Soft, Nontender, Nondistended; Bowel sounds present  NEURO: Alert & Oriented X3  EXTREMITIES: No LE edema, no calf tenderness  SKIN: No rashes or lesions    Consultant(s) Notes Reviewed:  [x ] YES  [ ] NO  Care Discussed with Consultants/Other Providers [ x] YES  [ ] NO    LABS:                        12.1   5.53  )-----------( 140      ( 2022 08:56 )             36.0         143  |  110<H>  |  23<H>  ----------------------------<  115<H>  3.8   |  23  |  1.51<H>    Ca    8.7      2022 08:56  Phos  3.6       Mg     2.3         TPro  6.2  /  Alb  2.7<L>  /  TBili  0.4  /  DBili  x   /  AST  12  /  ALT  17  /  AlkPhos  66      PT/INR - ( 2022 15:31 )   PT: 12.2 sec;   INR: 1.02 ratio         PTT - ( 2022 15:31 )  PTT:29.6 sec  CAPILLARY BLOOD GLUCOSE      Urinalysis Basic - ( 2022 15:31 )    Color: Yellow / Appearance: Clear / S.020 / pH: x  Gluc: x / Ketone: Negative  / Bili: Negative / Urobili: Negative   Blood: x / Protein: Negative / Nitrite: Negative   Leuk Esterase: Negative / RBC: 0-2 /HPF / WBC 0-2 /HPF   Sq Epi: x / Non Sq Epi: Occasional /HPF / Bacteria: Trace /HPF      RADIOLOGY & ADDITIONAL TESTS:    Imaging Personally Reviewed:  [x ] YES  [ ] NO    < from: Xray Chest 1 View-PORTABLE IMMEDIATE (22 @ 16:15) >    ACC: [id number] EXAM:  XR CHEST PORTABLE IMMED 1V                          PROCEDURE DATE:  2022          INTERPRETATION:  CLINICAL STATEMENT: Sepsis    TECHNIQUE: AP view of the chest.      COMPARISON: 2019    The cardiomediastinal silhouette may be exaggerated by AP technique.   Calcified uncoiled aorta. The lorri are not enlarged. The trachea is   midline. Suboptimal degree of inspiration with crowding of the   bronchovascular markings. There is no focal lung consolidation or sizable  pleural effusion. No significant osseous abnormality. Surgical clips   overlie the left axilla.    IMPRESSION:    Unremarkable frontal chest x ray    --- End of Report ---            FELICIA KEVIN MD; Attending Radiologist  This document has been electronically signed. 2022  9:06AM    < end of copied text >  < from: CT Chest No Cont (19 @ 17:18) >    EXAM:  CT CHEST                            PROCEDURE DATE:  2019            INTERPRETATION:  CLINICAL INFORMATION: Altered mental status with fever   and cough.    COMPARISON: Chest radiograph 2019, CT abdomen and pelvis 2006.    PROCEDURE:   CT of the Chest was performed without intravenous contrast.  Sagittal and coronal reformats were performed.      FINDINGS:    CHEST:     LUNGS AND LARGE AIRWAYS: Patent central airways. Minimal bibasilar   dependent atelectasis. Suboptimal evaluation of the lungs due to motion   artifact.  PLEURA: No pleural effusion.  VESSELS: Atheromatous disease of the thoracic and abdominal aorta with   dense calcifications at the level of the renal arteries.  HEART: Heart size is normal.No pericardial effusion. Aortic valve, mitral   annular, and coronary artery calcifications.  MEDIASTINUM AND LORRI: No lymphadenopathy.  CHEST WALL AND LOWER NECK: Bilateral gynecomastia, left greater than   right. Clips in the left axilla.  VISUALIZED UPPER ABDOMEN: Diffuse hepatic steatosis. Moderate vascular   calcifications. Parapelvic cysts in the left kidney.  BONES: Degenerative changes of the spine.    IMPRESSION:   Grossly clear lungs.                      CRISTINO HARDIN M.D., RADIOLOGY RESIDENT  This document has been electronically signed.  PRICILLA MEJIA M.D. ATTENDING RADIOLOGIST  This document has been electronically signed. 2019  6:10PM                < end of copied text >   Patient is a 87y old  Male who presents with a chief complaint of Weakness (2022 07:21)    INTERVAL HPI/OVERNIGHT EVENTS: No cute events overnight    REVIEW OF SYSTEMS:  CONSTITUTIONAL: No fever, chills  ENMT:  No difficulty hearing, no change in vision  NECK: No pain or stiffness  RESPIRATORY: No cough, SOB  CARDIOVASCULAR: No chest pain, palpitations  GASTROINTESTINAL: No abdominal pain. No nausea, vomiting, or diarrhea  GENITOURINARY: No dysuria  NEUROLOGICAL: No HA  SKIN: No itching, burning, rashes, or lesions   LYMPH NODES: No enlarged glands  ENDOCRINE: No heat or cold intolerance; No hair loss  MUSCULOSKELETAL: No joint pain or swelling; No muscle, back, or extremity pain  PSYCHIATRIC: No depression, anxiety  HEME/LYMPH: No easy bruising, or bleeding gums    T(C): 36.4 (22 @ 06:27), Max: 36.6 (22 @ 21:33)  HR: 54 (22 @ 08:59) (52 - 60)  BP: 164/59 (22 @ 08:59) (118/85 - 169/64)  RR: 17 (22 @ 06:27) (17 - 18)  SpO2: 97% (22 @ 06:27) (94% - 97%)  Wt(kg): --Vital Signs Last 24 Hrs  T(C): 36.4 (2022 06:27), Max: 36.6 (2022 21:33)  T(F): 97.6 (2022 06:27), Max: 97.9 (2022 21:33)  HR: 54 (2022 08:59) (52 - 60)  BP: 164/59 (2022 08:59) (118/85 - 169/64)  BP(mean): --  RR: 17 (2022 06:27) (17 - 18)  SpO2: 97% (2022 06:27) (94% - 97%)    MEDICATIONS  (STANDING):  amLODIPine   Tablet 2.5 milliGRAM(s) Oral daily  donepezil 10 milliGRAM(s) Oral at bedtime  escitalopram 10 milliGRAM(s) Oral daily  folic acid 1 milliGRAM(s) Oral daily  heparin   Injectable 5000 Unit(s) SubCutaneous every 8 hours  memantine 10 milliGRAM(s) Oral two times a day  multivitamin 1 Tablet(s) Oral daily  sodium chloride 0.9%. 1000 milliLiter(s) (100 mL/Hr) IV Continuous <Continuous>  zinc sulfate 220 milliGRAM(s) Oral daily    MEDICATIONS  (PRN):      PHYSICAL EXAM:  GENERAL: NAD  EYES: clear conjunctiva; EOMI  ENMT: Moist mucous membranes  NECK: Supple, No JVD, Normal thyroid  CHEST/LUNG: Diminished breath sounds at bases  HEART: S1, S2, Regular rate and rhythm  ABDOMEN: Soft, Nontender, Nondistended; Bowel sounds present  NEURO: Alert & Oriented X3  EXTREMITIES: No LE edema, no calf tenderness  SKIN: No rashes or lesions    Consultant(s) Notes Reviewed:  [x ] YES  [ ] NO  Care Discussed with Consultants/Other Providers [ x] YES  [ ] NO    LABS:                        12.1   5.53  )-----------( 140      ( 2022 08:56 )             36.0         143  |  110<H>  |  23<H>  ----------------------------<  115<H>  3.8   |  23  |  1.51<H>    Ca    8.7      2022 08:56  Phos  3.6       Mg     2.3         TPro  6.2  /  Alb  2.7<L>  /  TBili  0.4  /  DBili  x   /  AST  12  /  ALT  17  /  AlkPhos  66      PT/INR - ( 2022 15:31 )   PT: 12.2 sec;   INR: 1.02 ratio         PTT - ( 2022 15:31 )  PTT:29.6 sec  CAPILLARY BLOOD GLUCOSE      Urinalysis Basic - ( 2022 15:31 )    Color: Yellow / Appearance: Clear / S.020 / pH: x  Gluc: x / Ketone: Negative  / Bili: Negative / Urobili: Negative   Blood: x / Protein: Negative / Nitrite: Negative   Leuk Esterase: Negative / RBC: 0-2 /HPF / WBC 0-2 /HPF   Sq Epi: x / Non Sq Epi: Occasional /HPF / Bacteria: Trace /HPF      RADIOLOGY & ADDITIONAL TESTS:    Imaging Personally Reviewed:  [x ] YES  [ ] NO    < from: Xray Chest 1 View-PORTABLE IMMEDIATE (22 @ 16:15) >    ACC: [id number] EXAM:  XR CHEST PORTABLE IMMED 1V                          PROCEDURE DATE:  2022          INTERPRETATION:  CLINICAL STATEMENT: Sepsis    TECHNIQUE: AP view of the chest.      COMPARISON: 2019    The cardiomediastinal silhouette may be exaggerated by AP technique.   Calcified uncoiled aorta. The lorri are not enlarged. The trachea is   midline. Suboptimal degree of inspiration with crowding of the   bronchovascular markings. There is no focal lung consolidation or sizable  pleural effusion. No significant osseous abnormality. Surgical clips   overlie the left axilla.    IMPRESSION:    Unremarkable frontal chest x ray    --- End of Report ---            FELICIA KEVIN MD; Attending Radiologist  This document has been electronically signed. 2022  9:06AM    < end of copied text >  < from: CT Chest No Cont (19 @ 17:18) >    EXAM:  CT CHEST                            PROCEDURE DATE:  2019            INTERPRETATION:  CLINICAL INFORMATION: Altered mental status with fever   and cough.    COMPARISON: Chest radiograph 2019, CT abdomen and pelvis 2006.    PROCEDURE:   CT of the Chest was performed without intravenous contrast.  Sagittal and coronal reformats were performed.      FINDINGS:    CHEST:     LUNGS AND LARGE AIRWAYS: Patent central airways. Minimal bibasilar   dependent atelectasis. Suboptimal evaluation of the lungs due to motion   artifact.  PLEURA: No pleural effusion.  VESSELS: Atheromatous disease of the thoracic and abdominal aorta with   dense calcifications at the level of the renal arteries.  HEART: Heart size is normal.No pericardial effusion. Aortic valve, mitral   annular, and coronary artery calcifications.  MEDIASTINUM AND LORRI: No lymphadenopathy.  CHEST WALL AND LOWER NECK: Bilateral gynecomastia, left greater than   right. Clips in the left axilla.  VISUALIZED UPPER ABDOMEN: Diffuse hepatic steatosis. Moderate vascular   calcifications. Parapelvic cysts in the left kidney.  BONES: Degenerative changes of the spine.    IMPRESSION:   Grossly clear lungs.    CRISTINO HARDIN M.D., RADIOLOGY RESIDENT  This document has been electronically signed.  PRICILLA MEJIA M.D. ATTENDING RADIOLOGIST  This document has been electronically signed. 2019  6:10PM           < end of copied text >

## 2022-06-27 NOTE — PROGRESS NOTE ADULT - ASSESSMENT
Patient is 87 years old male from home with HHA with past medical history of dementia, hypertension and hyperlipidemia who was brought to ED due to weakness associated wit unable to ambulate. Found to have cojvid + asymtomatic, CXr unramarkle. Urine and blood curltures negive. (vacicinated with Devang   Patient is 87 years old male from home with HHA with past medical history of dementia, hypertension and hyperlipidemia who was brought to ED due to weakness associated wit unable to ambulate. Found to have covid +,  asymptomatic, CXr unremarkable. Urine and blood cultures negative. (vaccinated with Mederna with booster), Also found to have Jeremy on CKD, likle due to hypoperfusion. s/p IVF. Nephro following.

## 2022-06-27 NOTE — CONSULT NOTE ADULT - GASTROINTESTINAL
details… soft/nontender/nondistended/normal active bowel sounds/no guarding/no rigidity/no organomegaly

## 2022-06-27 NOTE — CHART NOTE - NSCHARTNOTEFT_GEN_A_CORE
- spoke with grand daughter Gutierrez  and updated regarding pt plan of care, all questions were answered.

## 2022-06-27 NOTE — PROGRESS NOTE ADULT - PROBLEM SELECTOR PLAN 1
- Patient was brought to ED complaining of weakness   - Tested positive for COVID   - CXR looks clear   - Saturating on room air   - Will get D dimer   - Folate and zinc supplements   - Bed rest \  - Physical therapy - P/w c/o weakness likely due to COVID   - asymptomatic, saturating well on RA  - CXR unremarkable  - C/w Folate and zinc supplements   - D dimer 240

## 2022-06-27 NOTE — PROGRESS NOTE ADULT - PROBLEM SELECTOR PLAN 2
- Will hold meds due to soft BP   - Dash diet - prerenal likely due to hypoperfusion, improving  - c/w IVF  - trend BMP  - Nephro Dr Kowalski following

## 2022-06-27 NOTE — PROGRESS NOTE ADULT - ASSESSMENT
87 years old male from home with HHA with past medical history of dementia, hypertension and hyperlipidemia who was brought to ED due to weakness started couple days ago. Patient is ambulating with cane but lately he was unable to walk as baseline. Patient was tested positive for COVID but he was vaccinated, Moderna, 4 doses. Patient is on room air. Patient will be admitted for further care.     Problem/Plan - 1:  ·  Problem: 2019 novel coronavirus disease (COVID-19).   ·  Plan: - Patient was brought to ED complaining of weakness   - Tested positive for COVID   - CXR looks clear   - Saturating well on room air   - Will get D dimer   - Folate and zinc supplements   - ID consulted.   - Physical therapy.     Problem/Plan - 2:  ·  Problem: Hypertension.   ·  Plan: - Resume low dose Amlodipine.  - Dash diet.     Problem/Plan - 3:  ·  Problem: Hyperlipidemia.   ·  Plan: - Continue on statin   - DASH diet.     Problem/Plan - 4:  ·  Problem: CKd stage 3 with KEV .   ·  Plan: - Resolving . Renal consulted.       Dispo : DC planning .

## 2022-06-28 LAB
ANION GAP SERPL CALC-SCNC: 10 MMOL/L — SIGNIFICANT CHANGE UP (ref 5–17)
BUN SERPL-MCNC: 16 MG/DL — SIGNIFICANT CHANGE UP (ref 7–18)
CALCIUM SERPL-MCNC: 8.8 MG/DL — SIGNIFICANT CHANGE UP (ref 8.4–10.5)
CHLORIDE SERPL-SCNC: 110 MMOL/L — HIGH (ref 96–108)
CO2 SERPL-SCNC: 22 MMOL/L — SIGNIFICANT CHANGE UP (ref 22–31)
CREAT SERPL-MCNC: 1.27 MG/DL — SIGNIFICANT CHANGE UP (ref 0.5–1.3)
EGFR: 55 ML/MIN/1.73M2 — LOW
GLUCOSE SERPL-MCNC: 114 MG/DL — HIGH (ref 70–99)
HCT VFR BLD CALC: 32.6 % — LOW (ref 39–50)
HGB BLD-MCNC: 11.1 G/DL — LOW (ref 13–17)
MCHC RBC-ENTMCNC: 31.1 PG — SIGNIFICANT CHANGE UP (ref 27–34)
MCHC RBC-ENTMCNC: 34 GM/DL — SIGNIFICANT CHANGE UP (ref 32–36)
MCV RBC AUTO: 91.3 FL — SIGNIFICANT CHANGE UP (ref 80–100)
NRBC # BLD: 0 /100 WBCS — SIGNIFICANT CHANGE UP (ref 0–0)
PLATELET # BLD AUTO: 145 K/UL — LOW (ref 150–400)
POTASSIUM SERPL-MCNC: 3.7 MMOL/L — SIGNIFICANT CHANGE UP (ref 3.5–5.3)
POTASSIUM SERPL-SCNC: 3.7 MMOL/L — SIGNIFICANT CHANGE UP (ref 3.5–5.3)
RBC # BLD: 3.57 M/UL — LOW (ref 4.2–5.8)
RBC # FLD: 12.9 % — SIGNIFICANT CHANGE UP (ref 10.3–14.5)
SARS-COV-2 RNA SPEC QL NAA+PROBE: DETECTED
SODIUM SERPL-SCNC: 142 MMOL/L — SIGNIFICANT CHANGE UP (ref 135–145)
WBC # BLD: 5.99 K/UL — SIGNIFICANT CHANGE UP (ref 3.8–10.5)
WBC # FLD AUTO: 5.99 K/UL — SIGNIFICANT CHANGE UP (ref 3.8–10.5)

## 2022-06-28 RX ORDER — AMLODIPINE BESYLATE 2.5 MG/1
5 TABLET ORAL DAILY
Refills: 0 | Status: DISCONTINUED | OUTPATIENT
Start: 2022-06-28 | End: 2022-07-01

## 2022-06-28 RX ORDER — HYDRALAZINE HCL 50 MG
25 TABLET ORAL DAILY
Refills: 0 | Status: DISCONTINUED | OUTPATIENT
Start: 2022-06-29 | End: 2022-06-30

## 2022-06-28 RX ORDER — AMLODIPINE BESYLATE 2.5 MG/1
7.5 TABLET ORAL DAILY
Refills: 0 | Status: DISCONTINUED | OUTPATIENT
Start: 2022-06-28 | End: 2022-06-28

## 2022-06-28 RX ORDER — AMLODIPINE BESYLATE 2.5 MG/1
5 TABLET ORAL DAILY
Refills: 0 | Status: DISCONTINUED | OUTPATIENT
Start: 2022-06-28 | End: 2022-06-28

## 2022-06-28 RX ORDER — SODIUM CHLORIDE 9 MG/ML
1000 INJECTION INTRAMUSCULAR; INTRAVENOUS; SUBCUTANEOUS
Refills: 0 | Status: DISCONTINUED | OUTPATIENT
Start: 2022-06-28 | End: 2022-06-30

## 2022-06-28 RX ORDER — ACETAMINOPHEN 500 MG
650 TABLET ORAL EVERY 6 HOURS
Refills: 0 | Status: DISCONTINUED | OUTPATIENT
Start: 2022-06-28 | End: 2022-07-01

## 2022-06-28 RX ORDER — HYDRALAZINE HCL 50 MG
25 TABLET ORAL ONCE
Refills: 0 | Status: COMPLETED | OUTPATIENT
Start: 2022-06-28 | End: 2022-06-28

## 2022-06-28 RX ORDER — CHLORHEXIDINE GLUCONATE 213 G/1000ML
1 SOLUTION TOPICAL
Refills: 0 | Status: DISCONTINUED | OUTPATIENT
Start: 2022-06-28 | End: 2022-07-01

## 2022-06-28 RX ADMIN — HEPARIN SODIUM 5000 UNIT(S): 5000 INJECTION INTRAVENOUS; SUBCUTANEOUS at 15:07

## 2022-06-28 RX ADMIN — MEMANTINE HYDROCHLORIDE 10 MILLIGRAM(S): 10 TABLET ORAL at 17:39

## 2022-06-28 RX ADMIN — SIMVASTATIN 40 MILLIGRAM(S): 20 TABLET, FILM COATED ORAL at 22:29

## 2022-06-28 RX ADMIN — ZINC SULFATE TAB 220 MG (50 MG ZINC EQUIVALENT) 220 MILLIGRAM(S): 220 (50 ZN) TAB at 11:53

## 2022-06-28 RX ADMIN — Medication 650 MILLIGRAM(S): at 23:30

## 2022-06-28 RX ADMIN — AMLODIPINE BESYLATE 5 MILLIGRAM(S): 2.5 TABLET ORAL at 15:48

## 2022-06-28 RX ADMIN — AMLODIPINE BESYLATE 2.5 MILLIGRAM(S): 2.5 TABLET ORAL at 06:27

## 2022-06-28 RX ADMIN — HEPARIN SODIUM 5000 UNIT(S): 5000 INJECTION INTRAVENOUS; SUBCUTANEOUS at 06:27

## 2022-06-28 RX ADMIN — MEMANTINE HYDROCHLORIDE 10 MILLIGRAM(S): 10 TABLET ORAL at 06:27

## 2022-06-28 RX ADMIN — PANTOPRAZOLE SODIUM 40 MILLIGRAM(S): 20 TABLET, DELAYED RELEASE ORAL at 06:26

## 2022-06-28 RX ADMIN — DONEPEZIL HYDROCHLORIDE 10 MILLIGRAM(S): 10 TABLET, FILM COATED ORAL at 22:29

## 2022-06-28 RX ADMIN — Medication 1 TABLET(S): at 11:53

## 2022-06-28 RX ADMIN — Medication 650 MILLIGRAM(S): at 22:28

## 2022-06-28 RX ADMIN — SODIUM CHLORIDE 60 MILLILITER(S): 9 INJECTION INTRAMUSCULAR; INTRAVENOUS; SUBCUTANEOUS at 22:45

## 2022-06-28 RX ADMIN — Medication 1 MILLIGRAM(S): at 11:52

## 2022-06-28 RX ADMIN — ESCITALOPRAM OXALATE 10 MILLIGRAM(S): 10 TABLET, FILM COATED ORAL at 11:53

## 2022-06-28 RX ADMIN — Medication 25 MILLIGRAM(S): at 17:39

## 2022-06-28 RX ADMIN — CHLORHEXIDINE GLUCONATE 1 APPLICATION(S): 213 SOLUTION TOPICAL at 17:56

## 2022-06-28 RX ADMIN — HEPARIN SODIUM 5000 UNIT(S): 5000 INJECTION INTRAVENOUS; SUBCUTANEOUS at 22:29

## 2022-06-28 NOTE — PROGRESS NOTE ADULT - PROBLEM SELECTOR PLAN 7
- Medically optimized for discharge given improvement in KEV  - Pt recs gordon  - Pending placement options and CM following. - Medically optimized for discharge given improvement in KEV  - Pt recs LOCO  - Pending placement options and CM following.

## 2022-06-28 NOTE — PROGRESS NOTE ADULT - SUBJECTIVE AND OBJECTIVE BOX
NP Note discussed with  Primary Attending    Patient is a 87y old  Male who presents with a chief complaint of Weakness (28 Jun 2022 15:55)      INTERVAL HPI/OVERNIGHT EVENTS: Hypertensive overnight. Norvasc increased.     MEDICATIONS  (STANDING):  amLODIPine   Tablet 5 milliGRAM(s) Oral daily  chlorhexidine 2% Cloths 1 Application(s) Topical <User Schedule>  donepezil 10 milliGRAM(s) Oral at bedtime  escitalopram 10 milliGRAM(s) Oral daily  folic acid 1 milliGRAM(s) Oral daily  heparin   Injectable 5000 Unit(s) SubCutaneous every 8 hours  memantine 10 milliGRAM(s) Oral two times a day  multivitamin 1 Tablet(s) Oral daily  pantoprazole    Tablet 40 milliGRAM(s) Oral before breakfast  simvastatin 40 milliGRAM(s) Oral at bedtime  sodium chloride 0.9%. 1000 milliLiter(s) (60 mL/Hr) IV Continuous <Continuous>  zinc sulfate 220 milliGRAM(s) Oral daily    MEDICATIONS  (PRN):  __________________________________________________  REVIEW OF SYSTEMS:    CONSTITUTIONAL: No fever,   EYES: no acute visual disturbances  NECK: No pain or stiffness  RESPIRATORY: No cough; No shortness of breath  CARDIOVASCULAR: No chest pain, no palpitations  GASTROINTESTINAL: No pain. No nausea or vomiting; No diarrhea   NEUROLOGICAL: No headache or numbness, no tremors  MUSCULOSKELETAL: No joint pain, no muscle pain  GENITOURINARY: no dysuria, no frequency, no hesitancy  PSYCHIATRY: no depression , no anxiety  ALL OTHER  ROS negative        Vital Signs Last 24 Hrs  T(C): 36.9 (28 Jun 2022 13:05), Max: 36.9 (28 Jun 2022 13:05)  T(F): 98.4 (28 Jun 2022 13:05), Max: 98.4 (28 Jun 2022 13:05)  HR: 54 (28 Jun 2022 14:31) (54 - 60)  BP: 130/78 (28 Jun 2022 13:05) (130/78 - 173/68)  BP(mean): --  RR: 18 (28 Jun 2022 13:05) (18 - 19)  SpO2: 94% (28 Jun 2022 14:31) (94% - 98%)    ________________________________________________  PHYSICAL EXAM:  GENERAL: NAD  HEENT: Normocephalic;  conjunctivae and sclerae clear; moist mucous membranes;   NECK : supple  CHEST/LUNG: Clear to auscultation bilaterally with good air entry   HEART: S1 S2  regular; no murmurs, gallops or rubs  ABDOMEN: Soft, Nontender, Nondistended; Bowel sounds present  EXTREMITIES: no cyanosis; no edema; no calf tenderness  SKIN: warm and dry; no rash  NERVOUS SYSTEM:  Awake and alert; Oriented  to place, person and time ; no new deficits    _________________________________________________  LABS:                        11.1   5.99  )-----------( 145      ( 28 Jun 2022 07:10 )             32.6     06-28    142  |  110<H>  |  16  ----------------------------<  114<H>  3.7   |  22  |  1.27    Ca    8.8      28 Jun 2022 07:10    CAPILLARY BLOOD GLUCOSE    RADIOLOGY & ADDITIONAL TESTS:  < from: Xray Chest 1 View-PORTABLE IMMEDIATE (06.25.22 @ 16:15) >  ACC: [id number] EXAM:  XR CHEST PORTABLE IMMED 1V                          PROCEDURE DATE:  06/25/2022          INTERPRETATION:  CLINICAL STATEMENT: Sepsis    TECHNIQUE: AP view of the chest.      COMPARISON: 2/20/2019    The cardiomediastinal silhouette may be exaggerated by AP technique.   Calcified uncoiled aorta. The lorri are not enlarged. The trachea is   midline. Suboptimal degree of inspiration with crowding of the   bronchovascular markings. There is no focal lung consolidation or sizable  pleural effusion. No significant osseous abnormality. Surgical clips   overlie the left axilla.    IMPRESSION:    Unremarkable frontal chest x ray    --- End of Report ---    FELICIA KEVIN MD; Attending Radiologist  This document has been electronically signed. Jun 26 2022  9:06AM    < end of copied text >    Imaging Personally Reviewed:  YES    Consultant(s) Notes Reviewed:   YES    Care Discussed with Consultants :     Plan of care was discussed with patient and /or primary care giver; all questions and concerns were addressed and care was aligned with patient's wishes.     NP Note discussed with  Primary Attending    Patient is a 87y old  Male who presents with a chief complaint of Weakness (28 Jun 2022 15:55).       INTERVAL HPI/OVERNIGHT EVENTS: Hypertensive overnight. Norvasc increased. No complaints at this time.     MEDICATIONS  (STANDING):  amLODIPine   Tablet 5 milliGRAM(s) Oral daily  chlorhexidine 2% Cloths 1 Application(s) Topical <User Schedule>  donepezil 10 milliGRAM(s) Oral at bedtime  escitalopram 10 milliGRAM(s) Oral daily  folic acid 1 milliGRAM(s) Oral daily  heparin   Injectable 5000 Unit(s) SubCutaneous every 8 hours  memantine 10 milliGRAM(s) Oral two times a day  multivitamin 1 Tablet(s) Oral daily  pantoprazole    Tablet 40 milliGRAM(s) Oral before breakfast  simvastatin 40 milliGRAM(s) Oral at bedtime  sodium chloride 0.9%. 1000 milliLiter(s) (60 mL/Hr) IV Continuous <Continuous>  zinc sulfate 220 milliGRAM(s) Oral daily    MEDICATIONS  (PRN):  __________________________________________________  REVIEW OF SYSTEMS:    CONSTITUTIONAL: No fever, No chills.   EYES: no acute visual disturbances  NECK: No pain or stiffness  RESPIRATORY: No cough; No shortness of breath  CARDIOVASCULAR: No chest pain, no palpitations  GASTROINTESTINAL: No pain. No nausea or vomiting; No diarrhea, no constipation.   NEUROLOGICAL: No headache  MUSCULOSKELETAL: No joint pain, no muscle pain  GENITOURINARY: no dysuria, no frequency, no hesitancy        Vital Signs Last 24 Hrs  T(C): 36.9 (28 Jun 2022 13:05), Max: 36.9 (28 Jun 2022 13:05)  T(F): 98.4 (28 Jun 2022 13:05), Max: 98.4 (28 Jun 2022 13:05)  HR: 54 (28 Jun 2022 14:31) (54 - 60)  BP: 130/78 (28 Jun 2022 13:05) (130/78 - 173/68)  BP(mean): --  RR: 18 (28 Jun 2022 13:05) (18 - 19)  SpO2: 94% (28 Jun 2022 14:31) (94% - 98%)    ________________________________________________  PHYSICAL EXAM:  GENERAL: NAD  HEENT: Normocephalic;  conjunctivae and sclerae clear; moist mucous membranes;   NECK : supple  CHEST/LUNG: Clear to auscultation bilaterally with good air entry   HEART: S1 S2  regular; no murmurs, gallops or rubs  ABDOMEN: Soft, Nontender, Nondistended; Bowel sounds present  EXTREMITIES: no cyanosis; no edema; no calf tenderness  SKIN: warm and dry; no rash  NERVOUS SYSTEM: Confused,  Awake. States he lives with his parents.  Oriented  to self. Able to follow commands.     _________________________________________________  LABS:                        11.1   5.99  )-----------( 145      ( 28 Jun 2022 07:10 )             32.6     06-28    142  |  110<H>  |  16  ----------------------------<  114<H>  3.7   |  22  |  1.27    Ca    8.8      28 Jun 2022 07:10    CAPILLARY BLOOD GLUCOSE    RADIOLOGY & ADDITIONAL TESTS:  < from: Xray Chest 1 View-PORTABLE IMMEDIATE (06.25.22 @ 16:15) >  ACC: [id number] EXAM:  XR CHEST PORTABLE IMMED 1V                          PROCEDURE DATE:  06/25/2022          INTERPRETATION:  CLINICAL STATEMENT: Sepsis    TECHNIQUE: AP view of the chest.      COMPARISON: 2/20/2019    The cardiomediastinal silhouette may be exaggerated by AP technique.   Calcified uncoiled aorta. The lorri are not enlarged. The trachea is   midline. Suboptimal degree of inspiration with crowding of the   bronchovascular markings. There is no focal lung consolidation or sizable  pleural effusion. No significant osseous abnormality. Surgical clips   overlie the left axilla.    IMPRESSION:    Unremarkable frontal chest x ray    --- End of Report ---    FELICIA KEVIN MD; Attending Radiologist  This document has been electronically signed. Jun 26 2022  9:06AM    < end of copied text >    Imaging Personally Reviewed:  YES    Consultant(s) Notes Reviewed:   YES    Care Discussed with Consultants :     Plan of care was discussed with patient and /or primary care giver; all questions and concerns were addressed and care was aligned with patient's wishes.

## 2022-06-28 NOTE — PROGRESS NOTE ADULT - PROBLEM SELECTOR PLAN 1
- P/w c/o weakness likely due to COVID   - asymptomatic, saturating well on RA  - CXR unremarkable  - C/w Folate and zinc supplements   - D dimer 240

## 2022-06-28 NOTE — PROGRESS NOTE ADULT - PROBLEM SELECTOR PLAN 1
- P/w c/o weakness likely due to COVID   - asymptomatic, saturating well on RA  - CXR unremarkable  - C/w Folate and zinc supplements   - D dimer 240  - Repeat COVID PCR prior to discharge

## 2022-06-28 NOTE — PROGRESS NOTE ADULT - ASSESSMENT
87 years old male from home with HHA with past medical history of dementia, hypertension and hyperlipidemia who was brought to ED due to weakness started couple days ago. Patient is ambulating with cane but lately he was unable to walk as baseline. Patient was tested positive for COVID but he was vaccinated, Moderna, 4 doses. Patient is on room air. Patient will be admitted for further care.     Problem/Plan - 1:  ·  Problem: 2019 novel coronavirus disease (COVID-19).   ·  Plan: - Patient was brought to ED complaining of weakness   - Tested positive for COVID   - CXR looks clear   - Saturating well on room air   - Will get D dimer   - Folate and zinc supplements   - ID following.   - Physical therapy.     Problem/Plan - 2:  ·  Problem: Hypertension.   ·  Plan: - Adjusting BP meds.   - Dash diet.     Problem/Plan - 3:  ·  Problem: Hyperlipidemia.   ·  Plan: - Continue on statin   - DASH diet.     Problem/Plan - 4:  ·  Problem: CKd stage 3 with KEV .   ·  Plan: - Resolving .   Renal helping.       Dispo : DC planning pending placement as medically stable .

## 2022-06-28 NOTE — PROGRESS NOTE ADULT - PROBLEM SELECTOR PLAN 2
- prerenal likely due to hypoperfusion, improving  - c/w IVF  - BMP in AM   - Nephro Dr Kowalski following

## 2022-06-28 NOTE — PROGRESS NOTE ADULT - ASSESSMENT
Acute Kidney Injury:  He has no knowledge of CKD. Has COVID but is hemodynamically stable.    Low risk for ATN. he may be volume depleted but most likely he has CKD.  - Follow up BMP.  - Avoid nephrotoxins.

## 2022-06-28 NOTE — PROGRESS NOTE ADULT - SUBJECTIVE AND OBJECTIVE BOX
Date of Service  : 06-28-22     INTERVAL HPI/OVERNIGHT EVENTS: No new concerns.   Vital Signs Last 24 Hrs  T(C): 36.9 (28 Jun 2022 13:05), Max: 36.9 (28 Jun 2022 13:05)  T(F): 98.4 (28 Jun 2022 13:05), Max: 98.4 (28 Jun 2022 13:05)  HR: 54 (28 Jun 2022 14:31) (54 - 60)  BP: 130/78 (28 Jun 2022 13:05) (130/78 - 173/68)  BP(mean): --  RR: 18 (28 Jun 2022 13:05) (18 - 19)  SpO2: 94% (28 Jun 2022 14:31) (94% - 98%)  I&O's Summary    MEDICATIONS  (STANDING):  amLODIPine   Tablet 5 milliGRAM(s) Oral daily  chlorhexidine 2% Cloths 1 Application(s) Topical <User Schedule>  donepezil 10 milliGRAM(s) Oral at bedtime  escitalopram 10 milliGRAM(s) Oral daily  folic acid 1 milliGRAM(s) Oral daily  heparin   Injectable 5000 Unit(s) SubCutaneous every 8 hours  memantine 10 milliGRAM(s) Oral two times a day  multivitamin 1 Tablet(s) Oral daily  pantoprazole    Tablet 40 milliGRAM(s) Oral before breakfast  simvastatin 40 milliGRAM(s) Oral at bedtime  sodium chloride 0.9%. 1000 milliLiter(s) (60 mL/Hr) IV Continuous <Continuous>  zinc sulfate 220 milliGRAM(s) Oral daily    MEDICATIONS  (PRN):    LABS:                        11.1   5.99  )-----------( 145      ( 28 Jun 2022 07:10 )             32.6     06-28    142  |  110<H>  |  16  ----------------------------<  114<H>  3.7   |  22  |  1.27    Ca    8.8      28 Jun 2022 07:10          CAPILLARY BLOOD GLUCOSE                  Consultant(s) Notes Reviewed:  [x ] YES  [ ] NO    PHYSICAL EXAM:  GENERAL: NAD, well-groomed, well-developed,not in any distress ,  HEAD:  Atraumatic, Normocephalic  NECK: Supple, No JVD, Normal thyroid  NERVOUS SYSTEM:  Alert & Oriented X1, No focal deficit   CHEST/LUNG: Good air entry bilateral with no  rales, rhonchi, wheezing, or rubs  HEART: Regular rate and rhythm; No murmurs, rubs, or gallops  ABDOMEN: Soft, Nontender, Nondistended; Bowel sounds present  EXTREMITIES:  2+ Peripheral Pulses, No clubbing, cyanosis, or edema  SKIN: No rashes or lesions    Care Discussed with Consultants/Other Providers [ x] YES  [ ] NO

## 2022-06-28 NOTE — CHART NOTE - NSCHARTNOTEFT_GEN_A_CORE
EVENT: Temp 100.9    BRIEF HPI:  87 years old male from home with HHA with past medical history of dementia, hypertension and hyperlipidemia who was brought to ED due to weakness associated inability to ambulate. Found to have covid +,  asymptomatic, CXr unremarkable. Urine and blood cultures negative. (vaccinated with Mederna with booster), Also found to have Jeremy on CKD, likle due to hypoperfusion. s/p IVF. Nephro following. JEREMY improving. Pt recommends LOCO and placement pending choices. CM following. Patient medically optimized for discharge given JEREMY improvement.     OBJECTIVE:  Vital Signs Last 24 Hrs  T(C): 38.3 (28 Jun 2022 21:50), Max: 38.3 (28 Jun 2022 21:50)  T(F): 100.9 (28 Jun 2022 21:50), Max: 100.9 (28 Jun 2022 21:50)  HR: 59 (28 Jun 2022 21:50) (54 - 60)  BP: 120/66 (28 Jun 2022 21:50) (120/66 - 169/67)  BP(mean): --  RR: 18 (28 Jun 2022 21:50) (18 - 19)  SpO2: 96% (28 Jun 2022 21:50) (94% - 97%)    FOCUSED PHYSICAL EXAM:  RESP: Even, unlabored  CV: S1 S2  NEURO: Alert, oriented X 3    LABS:                        11.1   5.99  )-----------( 145      ( 28 Jun 2022 07:10 )             32.6     06-28    142  |  110<H>  |  16  ----------------------------<  114<H>  3.7   |  22  |  1.27    Ca    8.8      28 Jun 2022 07:10    COVID-19 PCR: Detected (28 Jun 2022 09:35)  SARS-CoV-2: Detected (25 Jun 2022 15:31)    IMAGING:  ACC: [id number] EXAM:  XR CHEST PORTABLE IMMED 1V                        PROCEDURE DATE:  06/25/2022    INTERPRETATION:  CLINICAL STATEMENT: Sepsis  TECHNIQUE: AP view of the chest.  COMPARISON: 2/20/2019  The cardiomediastinal silhouette may be exaggerated by AP technique.   Calcified uncoiled aorta. The lorri are not enlarged. The trachea is   midline. Suboptimal degree of inspiration with crowding of the   bronchovascular markings. There is no focal lung consolidation or sizable  pleural effusion. No significant osseous abnormality. Surgical clips   overlie the left axilla.  IMPRESSION:  Unremarkable frontal chest x ray    PROBLEM: Fever probably due to Covid PNA  PLAN:   MEDICATIONS  (STANDING):  amLODIPine   Tablet 5 milliGRAM(s) Oral daily  chlorhexidine 2% Cloths 1 Application(s) Topical <User Schedule>  donepezil 10 milliGRAM(s) Oral at bedtime  escitalopram 10 milliGRAM(s) Oral daily  folic acid 1 milliGRAM(s) Oral daily  heparin   Injectable 5000 Unit(s) SubCutaneous every 8 hours  memantine 10 milliGRAM(s) Oral two times a day  multivitamin 1 Tablet(s) Oral daily  pantoprazole    Tablet 40 milliGRAM(s) Oral before breakfast  simvastatin 40 milliGRAM(s) Oral at bedtime  sodium chloride 0.9%. 1000 milliLiter(s) (60 mL/Hr) IV Continuous <Continuous>  zinc sulfate 220 milliGRAM(s) Oral daily    MEDICATIONS  (PRN):    FOLLOW UP / RESULT: EVENT: Temp 100.9    BRIEF HPI:  87 years old male from home with HHA with past medical history of dementia, hypertension and hyperlipidemia who was brought to ED due to weakness associated inability to ambulate. Found to have covid +,  asymptomatic, CXr unremarkable. Urine and blood cultures negative. (vaccinated with Mederna with booster), Also found to have Kev on CKD, likle due to hypoperfusion. s/p IVF. Nephro following. KEV improving. Pt recommends LOCO and placement pending choices. CM following. Patient medically optimized for discharge given KEV improvement. Now with fever, transferred to [address] [place] [address] per bed board and nurse manager.    OBJECTIVE:  Vital Signs Last 24 Hrs  T(C): 38.3 (28 Jun 2022 21:50), Max: 38.3 (28 Jun 2022 21:50)  T(F): 100.9 (28 Jun 2022 21:50), Max: 100.9 (28 Jun 2022 21:50)  HR: 59 (28 Jun 2022 21:50) (54 - 60)  BP: 120/66 (28 Jun 2022 21:50) (120/66 - 169/67)  BP(mean): --  RR: 18 (28 Jun 2022 21:50) (18 - 19)  SpO2: 96% (28 Jun 2022 21:50) (94% - 97%)    FOCUSED PHYSICAL EXAM:  RESP: Even, unlabored  CV: S1 S2  NEURO: Alert, oriented X 3    LABS:                        11.1   5.99  )-----------( 145      ( 28 Jun 2022 07:10 )             32.6     06-28    142  |  110<H>  |  16  ----------------------------<  114<H>  3.7   |  22  |  1.27    Ca    8.8      28 Jun 2022 07:10    COVID-19 PCR: Detected (28 Jun 2022 09:35)  SARS-CoV-2: Detected (25 Jun 2022 15:31)    IMAGING:  ACC: [id number] EXAM:  XR CHEST PORTABLE IMMED 1V                        PROCEDURE DATE:  06/25/2022    INTERPRETATION:  CLINICAL STATEMENT: Sepsis  TECHNIQUE: AP view of the chest.  COMPARISON: 2/20/2019  The cardiomediastinal silhouette may be exaggerated by AP technique.   Calcified uncoiled aorta. The lorri are not enlarged. The trachea is   midline. Suboptimal degree of inspiration with crowding of the   bronchovascular markings. There is no focal lung consolidation or sizable  pleural effusion. No significant osseous abnormality. Surgical clips   overlie the left axilla.  IMPRESSION:  Unremarkable frontal chest x ray    PROBLEM: Fever probably due to Covid PNA  PLAN:   1. Acetaminophen Tablet 650 valdo GRAM(s) Oral every 6 hours PRN Temp greater or equal to 38C (100.4F)  2. Cooling measures as tolerated    FOLLOW UP / RESULT: trend temp

## 2022-06-28 NOTE — PROGRESS NOTE ADULT - SUBJECTIVE AND OBJECTIVE BOX
87y Male    Meds:    Allergies    No Known Allergies    Intolerances        VITALS:  Vital Signs Last 24 Hrs  T(C): 36.9 (28 Jun 2022 13:05), Max: 36.9 (28 Jun 2022 13:05)  T(F): 98.4 (28 Jun 2022 13:05), Max: 98.4 (28 Jun 2022 13:05)  HR: 54 (28 Jun 2022 14:31) (54 - 60)  BP: 130/78 (28 Jun 2022 13:05) (130/78 - 173/68)  BP(mean): --  RR: 18 (28 Jun 2022 13:05) (18 - 19)  SpO2: 94% (28 Jun 2022 14:31) (94% - 98%)    LABS/DIAGNOSTIC TESTS:                          11.1   5.99  )-----------( 145      ( 28 Jun 2022 07:10 )             32.6         06-28    142  |  110<H>  |  16  ----------------------------<  114<H>  3.7   |  22  |  1.27    Ca    8.8      28 Jun 2022 07:10            CULTURES: Clean Catch Clean Catch (Midstream)  06-25 @ 21:16   <10,000 CFU/mL Normal Urogenital Becka  --  --      .Blood Blood-Peripheral  06-25 @ 21:05   No growth to date.  --  --      .Blood Blood-Peripheral  06-25 @ 21:03   No growth to date.  --  --            RADIOLOGY:      ROS:  [  ] UNABLE TO ELICIT 87y Male who is doing well overall but has persistent weakness. He is not walking well at all and so needs to go to subacute rehab. He has a minimal cough, no SOB, no chest pain, no fevers or chills, no diarrhea or other symptoms. His renal function has also normalized.    Meds:    Allergies    No Known Allergies    Intolerances        VITALS:  Vital Signs Last 24 Hrs  T(C): 36.9 (28 Jun 2022 13:05), Max: 36.9 (28 Jun 2022 13:05)  T(F): 98.4 (28 Jun 2022 13:05), Max: 98.4 (28 Jun 2022 13:05)  HR: 54 (28 Jun 2022 14:31) (54 - 60)  BP: 130/78 (28 Jun 2022 13:05) (130/78 - 173/68)  BP(mean): --  RR: 18 (28 Jun 2022 13:05) (18 - 19)  SpO2: 94% (28 Jun 2022 14:31) (94% - 98%)    LABS/DIAGNOSTIC TESTS:                          11.1   5.99  )-----------( 145      ( 28 Jun 2022 07:10 )             32.6         06-28    142  |  110<H>  |  16  ----------------------------<  114<H>  3.7   |  22  |  1.27    Ca    8.8      28 Jun 2022 07:10            CULTURES: Clean Catch Clean Catch (Midstream)  06-25 @ 21:16   <10,000 CFU/mL Normal Urogenital Becka  --  --      .Blood Blood-Peripheral  06-25 @ 21:05   No growth to date.  --  --      .Blood Blood-Peripheral  06-25 @ 21:03   No growth to date.  --  --            RADIOLOGY:      ROS:  [  ] UNABLE TO ELICIT

## 2022-06-28 NOTE — PROGRESS NOTE ADULT - PROBLEM SELECTOR PLAN 3
- Resume hydralazine and metoprolol.   - Amlodipine increased overnight  - Monitor BP per unit protocol

## 2022-06-28 NOTE — PROGRESS NOTE ADULT - PROBLEM SELECTOR PLAN 3
- Will hold meds due to soft BP, on hydralazine and metoprolol at home.  - Hold metoprolol due to daniel  - start amlodipine today

## 2022-06-28 NOTE — PROGRESS NOTE ADULT - ASSESSMENT
COVID -19 infection     Plan - No treatment at this time given he has no hypoxia or fevers  DC planning  reconsult prn.

## 2022-06-28 NOTE — PROGRESS NOTE ADULT - PROBLEM SELECTOR PLAN 2
- prerenal likely due to hypoperfusion, improving  - c/w IVF  - trend BMP  - Nephro Dr Kowalski following

## 2022-06-28 NOTE — PROGRESS NOTE ADULT - ASSESSMENT
Patient is 87 years old male from home with HHA with past medical history of dementia, hypertension and hyperlipidemia who was brought to ED due to weakness associated wit unable to ambulate. Found to have covid +,  asymptomatic, CXr unremarkable. Urine and blood cultures negative. (vaccinated with Mederna with booster), Also found to have Jeremy on CKD, likle due to hypoperfusion. s/p IVF. Nephro following. JEREMY improving. Pt recommends LOCO and placement pending choices. CM following. Patient medically optimized for discharge given JEREMY improvement.  Patient is 87 years old male from home with HHA with past medical history of dementia, hypertension and hyperlipidemia who was brought to ED due to weakness associated inability to ambulate. Found to have covid +,  asymptomatic, CXr unremarkable. Urine and blood cultures negative. (vaccinated with Mederna with booster), Also found to have Jeremy on CKD, likle due to hypoperfusion. s/p IVF. Nephro following. JEREMY improving. Pt recommends LOCO and placement pending choices. CM following. Patient medically optimized for discharge given JEREMY improvement.

## 2022-06-28 NOTE — PROGRESS NOTE ADULT - SUBJECTIVE AND OBJECTIVE BOX
NP Note discussed with  Primary Attending    Patient is a 87y old  Male who presents with a chief complaint of Weakness (28 Jun 2022 15:52)      INTERVAL HPI/OVERNIGHT EVENTS: No events endorsed overnight.     MEDICATIONS  (STANDING):  amLODIPine   Tablet 5 milliGRAM(s) Oral daily  chlorhexidine 2% Cloths 1 Application(s) Topical <User Schedule>  donepezil 10 milliGRAM(s) Oral at bedtime  escitalopram 10 milliGRAM(s) Oral daily  folic acid 1 milliGRAM(s) Oral daily  heparin   Injectable 5000 Unit(s) SubCutaneous every 8 hours  memantine 10 milliGRAM(s) Oral two times a day  multivitamin 1 Tablet(s) Oral daily  pantoprazole    Tablet 40 milliGRAM(s) Oral before breakfast  simvastatin 40 milliGRAM(s) Oral at bedtime  sodium chloride 0.9%. 1000 milliLiter(s) (60 mL/Hr) IV Continuous <Continuous>  zinc sulfate 220 milliGRAM(s) Oral daily    MEDICATIONS  (PRN):      _________________________________________________  REVIEW OF SYSTEMS:    CONSTITUTIONAL: No fever,   EYES: no acute visual disturbances  NECK: No pain or stiffness  RESPIRATORY: No cough; No shortness of breath  CARDIOVASCULAR: No chest pain, no palpitations  GASTROINTESTINAL: No pain. No nausea or vomiting; No diarrhea   NEUROLOGICAL: No headache or numbness, no tremors  MUSCULOSKELETAL: No joint pain, no muscle pain  GENITOURINARY: no dysuria, no frequency, no hesitancy    Vital Signs Last 24 Hrs  T(C): 36.9 (28 Jun 2022 13:05), Max: 36.9 (28 Jun 2022 13:05)  T(F): 98.4 (28 Jun 2022 13:05), Max: 98.4 (28 Jun 2022 13:05)  HR: 54 (28 Jun 2022 14:31) (54 - 60)  BP: 130/78 (28 Jun 2022 13:05) (130/78 - 173/68)  BP(mean): --  RR: 18 (28 Jun 2022 13:05) (18 - 19)  SpO2: 94% (28 Jun 2022 14:31) (94% - 98%)    ________________________________________________  PHYSICAL EXAM:  GENERAL: NAD  HEENT: Normocephalic;  conjunctivae and sclerae clear; moist mucous membranes;   NECK : supple  CHEST/LUNG: Clear to auscultation bilaterally with good air entry   HEART: S1 S2  regular; no murmurs, gallops or rubs  ABDOMEN: Soft, Nontender, Nondistended; Bowel sounds present  EXTREMITIES: no cyanosis; no edema; no calf tenderness  SKIN: warm and dry; no rash  NERVOUS SYSTEM:  Awake and alert; Oriented  to place, person and time ; no new deficits    _________________________________________________  LABS:                        11.1   5.99  )-----------( 145      ( 28 Jun 2022 07:10 )             32.6     06-28    142  |  110<H>  |  16  ----------------------------<  114<H>  3.7   |  22  |  1.27    Ca    8.8      28 Jun 2022 07:10    CAPILLARY BLOOD GLUCOSE    RADIOLOGY & ADDITIONAL TESTS:  < from: Xray Chest 1 View-PORTABLE IMMEDIATE (06.25.22 @ 16:15) >  ACC: [id number] EXAM:  XR CHEST PORTABLE IMMED 1V                          PROCEDURE DATE:  06/25/2022          INTERPRETATION:  CLINICAL STATEMENT: Sepsis    TECHNIQUE: AP view of the chest.      COMPARISON: 2/20/2019    The cardiomediastinal silhouette may be exaggerated by AP technique.   Calcified uncoiled aorta. The lorri are not enlarged. The trachea is   midline. Suboptimal degree of inspiration with crowding of the   bronchovascular markings. There is no focal lung consolidation or sizable  pleural effusion. No significant osseous abnormality. Surgical clips   overlie the left axilla.    IMPRESSION:    Unremarkable frontal chest x ray    --- End of Report ---      FELICIA KEVIN MD; Attending Radiologist  This document has been electronically signed. Jun 26 2022  9:06AM    < end of copied text >    Imaging Personally Reviewed:  YES    Consultant(s) Notes Reviewed:   YES    Care Discussed with Consultants :     Plan of care was discussed with patient and /or primary care giver; all questions and concerns were addressed and care was aligned with patient's wishes.

## 2022-06-28 NOTE — PROGRESS NOTE ADULT - RESPIRATORY
clear to auscultation bilaterally/no wheezes/no rales/no rhonchi/breath sounds equal/good air movement

## 2022-06-28 NOTE — PROGRESS NOTE ADULT - ASSESSMENT
Patient is 87 years old male from home with HHA with past medical history of dementia, hypertension and hyperlipidemia who was brought to ED due to weakness associated with not being unable to ambulate. Found to have covid +,  asymptomatic, CXr unremarkable. Urine and blood cultures negative. (vaccinated with Mederna with booster), Also found to have Jeremy on CKD, likle due to hypoperfusion. s/p IVF. Nephro following.

## 2022-06-28 NOTE — PROGRESS NOTE ADULT - SUBJECTIVE AND OBJECTIVE BOX
SHANTEL CARRANZA  87y  Patient is a 87y old  Male who presents with a chief complaint of Weakness (28 Jun 2022 08:28)    HPI:  Seen and examined. Followed for KEV and Hyponatremia in the setting of COVID. No new complaints at this time.      HEALTH ISSUES - PROBLEM Dx:  2019 novel coronavirus disease (COVID-19)    Hypertension    Hyperlipidemia    Prophylactic measure    Dementia    Discharge planning issues    KEV (acute kidney injury)          MEDICATIONS  (STANDING):  amLODIPine   Tablet 5 milliGRAM(s) Oral daily  chlorhexidine 2% Cloths 1 Application(s) Topical <User Schedule>  donepezil 10 milliGRAM(s) Oral at bedtime  escitalopram 10 milliGRAM(s) Oral daily  folic acid 1 milliGRAM(s) Oral daily  heparin   Injectable 5000 Unit(s) SubCutaneous every 8 hours  memantine 10 milliGRAM(s) Oral two times a day  multivitamin 1 Tablet(s) Oral daily  pantoprazole    Tablet 40 milliGRAM(s) Oral before breakfast  simvastatin 40 milliGRAM(s) Oral at bedtime  sodium chloride 0.9%. 1000 milliLiter(s) (60 mL/Hr) IV Continuous <Continuous>  zinc sulfate 220 milliGRAM(s) Oral daily    MEDICATIONS  (PRN):    Vital Signs Last 24 Hrs  T(C): 36.9 (28 Jun 2022 13:05), Max: 36.9 (28 Jun 2022 13:05)  T(F): 98.4 (28 Jun 2022 13:05), Max: 98.4 (28 Jun 2022 13:05)  HR: 54 (28 Jun 2022 14:31) (54 - 60)  BP: 130/78 (28 Jun 2022 13:05) (130/78 - 173/68)  BP(mean): --  RR: 18 (28 Jun 2022 13:05) (18 - 19)  SpO2: 94% (28 Jun 2022 14:31) (94% - 98%)  Daily     Daily     PHYSICAL EXAM:  Constitutional:  He  appears comfortable and not distressed. Not diaphoretic.    Neck:  The thyroid is normal. Trachea is midline.     Breasts: Normal examination.    Respiratory: The lungs are clear to auscultation. No dullness and expansion is normal.    Cardiovascular: S1 and S2 are normal. No mummurs, rubs or gallops are present.    Gastrointestinal: The abdomen is soft. No tenderness is present. No masses are present. Bowel sounds are normal.    Genitourinary: The bladder is not distended. No CVA tenderness is present.    Extremities: No edema is noted. No deformities are present.    Neurological: Cognition is normal. Tone, power and sensation are normal.     Skin: No lesions are seen  or palpated.    Lymph Nodes: No lymphadenopathy is present.                          11.1   5.99  )-----------( 145      ( 28 Jun 2022 07:10 )             32.6     06-28    142  |  110<H>  |  16  ----------------------------<  114<H>  3.7   |  22  |  1.27    Ca    8.8      28 Jun 2022 07:10

## 2022-06-28 NOTE — CHART NOTE - NSCHARTNOTEFT_GEN_A_CORE
EVENT:   6/27/22, 10pm, patient with COVID19 (+) disease, and hx. HTN and HLD, had BP - 173/68, HR - 55, R- 19, O2sat - 98%RA. Pt.'s on Norvasc 2.5 mg QD and IV Fluid. (BUN - 18,Cr - 1.41).   HPI:      87 years old male from home with HHA with past medical history of dementia, hypertension and hyperlipidemia who was brought to ED due to weakness started couple days ago. Patient is ambulating with cane but lately he was unable to walk as baseline. Patient was tested positive for COVID19 but he was vaccinated, Moderna, 4 doses. Patient doesn't seem in chest pain, abdominal pain, N/V ro change in bowel movement.  (25 Jun 2022 19:11)    OBJECTIVE:  Vital Signs Last 24 Hrs  T(C): 36.4 (27 Jun 2022 21:59), Max: 37.2 (27 Jun 2022 12:02)  T(F): 97.6 (27 Jun 2022 21:59), Max: 98.9 (27 Jun 2022 12:02)  HR: 55 (27 Jun 2022 21:59) (50 - 58)  BP: 173/68 (27 Jun 2022 21:59) (158/79 - 173/68)  BP(mean): --  RR: 19 (27 Jun 2022 21:59) (17 - 19)  SpO2: 98% (27 Jun 2022 21:59) (97% - 98%)    FOCUSED PHYSICAL EXAM:    LABS:                        11.4   5.92  )-----------( 148      ( 27 Jun 2022 11:42 )             33.3     06-27    141  |  109<H>  |  18  ----------------------------<  130<H>  3.9   |  23  |  1.41<H>    Ca    8.6      27 Jun 2022 11:42  Phos  3.6     06-26  Mg     2.3     06-26    TPro  6.2  /  Alb  2.7<L>  /  TBili  0.4  /  DBili  x   /  AST  12  /  ALT  17  /  AlkPhos  66  06-26    PLAN:   - Norvasc 2.5 mg po x 1 dose,   - Decrease IV Fluid rate to: NS 60 ml/hr.     FOLLOW UP / RESULT:   - Monitor patient VS,   - Continue supportive care.

## 2022-06-29 LAB
ANION GAP SERPL CALC-SCNC: 9 MMOL/L — SIGNIFICANT CHANGE UP (ref 5–17)
BUN SERPL-MCNC: 17 MG/DL — SIGNIFICANT CHANGE UP (ref 7–18)
CALCIUM SERPL-MCNC: 8.7 MG/DL — SIGNIFICANT CHANGE UP (ref 8.4–10.5)
CHLORIDE SERPL-SCNC: 111 MMOL/L — HIGH (ref 96–108)
CO2 SERPL-SCNC: 23 MMOL/L — SIGNIFICANT CHANGE UP (ref 22–31)
CREAT SERPL-MCNC: 1.33 MG/DL — HIGH (ref 0.5–1.3)
EGFR: 52 ML/MIN/1.73M2 — LOW
GLUCOSE SERPL-MCNC: 128 MG/DL — HIGH (ref 70–99)
HCT VFR BLD CALC: 33 % — LOW (ref 39–50)
HGB BLD-MCNC: 11.3 G/DL — LOW (ref 13–17)
MCHC RBC-ENTMCNC: 31.6 PG — SIGNIFICANT CHANGE UP (ref 27–34)
MCHC RBC-ENTMCNC: 34.2 GM/DL — SIGNIFICANT CHANGE UP (ref 32–36)
MCV RBC AUTO: 92.2 FL — SIGNIFICANT CHANGE UP (ref 80–100)
NRBC # BLD: 0 /100 WBCS — SIGNIFICANT CHANGE UP (ref 0–0)
PLATELET # BLD AUTO: 144 K/UL — LOW (ref 150–400)
POTASSIUM SERPL-MCNC: 3.6 MMOL/L — SIGNIFICANT CHANGE UP (ref 3.5–5.3)
POTASSIUM SERPL-SCNC: 3.6 MMOL/L — SIGNIFICANT CHANGE UP (ref 3.5–5.3)
RBC # BLD: 3.58 M/UL — LOW (ref 4.2–5.8)
RBC # FLD: 13 % — SIGNIFICANT CHANGE UP (ref 10.3–14.5)
SODIUM SERPL-SCNC: 143 MMOL/L — SIGNIFICANT CHANGE UP (ref 135–145)
WBC # BLD: 6.81 K/UL — SIGNIFICANT CHANGE UP (ref 3.8–10.5)
WBC # FLD AUTO: 6.81 K/UL — SIGNIFICANT CHANGE UP (ref 3.8–10.5)

## 2022-06-29 RX ADMIN — SODIUM CHLORIDE 60 MILLILITER(S): 9 INJECTION INTRAMUSCULAR; INTRAVENOUS; SUBCUTANEOUS at 06:02

## 2022-06-29 RX ADMIN — SIMVASTATIN 40 MILLIGRAM(S): 20 TABLET, FILM COATED ORAL at 22:26

## 2022-06-29 RX ADMIN — PANTOPRAZOLE SODIUM 40 MILLIGRAM(S): 20 TABLET, DELAYED RELEASE ORAL at 06:01

## 2022-06-29 RX ADMIN — ZINC SULFATE TAB 220 MG (50 MG ZINC EQUIVALENT) 220 MILLIGRAM(S): 220 (50 ZN) TAB at 11:01

## 2022-06-29 RX ADMIN — HEPARIN SODIUM 5000 UNIT(S): 5000 INJECTION INTRAVENOUS; SUBCUTANEOUS at 22:26

## 2022-06-29 RX ADMIN — HEPARIN SODIUM 5000 UNIT(S): 5000 INJECTION INTRAVENOUS; SUBCUTANEOUS at 06:01

## 2022-06-29 RX ADMIN — HEPARIN SODIUM 5000 UNIT(S): 5000 INJECTION INTRAVENOUS; SUBCUTANEOUS at 15:34

## 2022-06-29 RX ADMIN — DONEPEZIL HYDROCHLORIDE 10 MILLIGRAM(S): 10 TABLET, FILM COATED ORAL at 22:27

## 2022-06-29 RX ADMIN — CHLORHEXIDINE GLUCONATE 1 APPLICATION(S): 213 SOLUTION TOPICAL at 06:02

## 2022-06-29 RX ADMIN — MEMANTINE HYDROCHLORIDE 10 MILLIGRAM(S): 10 TABLET ORAL at 06:01

## 2022-06-29 RX ADMIN — Medication 1 MILLIGRAM(S): at 11:01

## 2022-06-29 RX ADMIN — Medication 1 TABLET(S): at 11:01

## 2022-06-29 RX ADMIN — MEMANTINE HYDROCHLORIDE 10 MILLIGRAM(S): 10 TABLET ORAL at 17:41

## 2022-06-29 RX ADMIN — ESCITALOPRAM OXALATE 10 MILLIGRAM(S): 10 TABLET, FILM COATED ORAL at 11:01

## 2022-06-29 RX ADMIN — SODIUM CHLORIDE 60 MILLILITER(S): 9 INJECTION INTRAMUSCULAR; INTRAVENOUS; SUBCUTANEOUS at 11:00

## 2022-06-29 NOTE — PROGRESS NOTE ADULT - PROBLEM SELECTOR PLAN 2
- prerenal likely due to hypoperfusion, improving  - c/w IVF  - creat 1.33  - BMP in AM   - Nephro Dr Kowalski following

## 2022-06-29 NOTE — PROGRESS NOTE ADULT - PROBLEM SELECTOR PLAN 1
- P/w c/o weakness likely due to COVID   -  febrile overnight 100.9, saturating well on RA  - CXR unremarkable  - blood and urine cultures NTD  - C/w Folate and zinc supplements   - D dimer 240  - Repeat COVID PCR prior to discharge  - PT recc LOCO - CM follwoing  - ID Dr. Rashida barber

## 2022-06-29 NOTE — PROGRESS NOTE ADULT - ASSESSMENT
87 years old male from home with HHA with past medical history of dementia, hypertension and hyperlipidemia who was brought to ED due to weakness started couple days ago. Patient is ambulating with cane but lately he was unable to walk as baseline. Patient was tested positive for COVID but he was vaccinated, Moderna, 4 doses. Patient is on room air. Patient will be admitted for further care.     Problem/Plan - 1:  ·  Problem: 2019 novel coronavirus disease (COVID-19).   ·  Plan: - Patient was brought to ED complaining of weakness   - Tested positive for COVID   - CXR looks clear   - Saturating well on room air   - Will get D dimer   - Folate and zinc supplements   - ID following.   - Physical therapy.     Problem/Plan - 2:  ·  Problem: Hypertension.   ·  Plan: - Adjusting BP meds.   - Dash diet.     Problem/Plan - 3:  ·  Problem: Hyperlipidemia.   ·  Plan: - Continue on statin   - DASH diet.     Problem/Plan - 4:  ·  Problem: CKd stage 3 with KEV .   ·  Plan: - Resolving .   Renal helping.      Problem/Plan - 5:  ·  Problem: Dementia .   ·  Plan: - Supportive care.        Dispo : DC planning pending placement as medically stable .

## 2022-06-29 NOTE — PROGRESS NOTE ADULT - PROBLEM SELECTOR PLAN 3
on home HCTZ, terazosin, hydralazine and metoprolol.   - c/w hydralazine, amlodipine  - holding all other BP meds - BP soft   - Monitor BP per unit protocol

## 2022-06-29 NOTE — PROGRESS NOTE ADULT - ASSESSMENT
Acute Kidney Injury:  He has no knowledge of CKD. Has COVID but is hemodynamically stable.    Low risk for ATN.   - Follow up BMP.  - Avoid nephrotoxins.    CKD:   Unclear baseline.  - Follow up BMP.

## 2022-06-29 NOTE — PROGRESS NOTE ADULT - SUBJECTIVE AND OBJECTIVE BOX
Date of Service  : 06-29-22     INTERVAL HPI/OVERNIGHT EVENTS: I feel fine.   Vital Signs Last 24 Hrs  T(C): 36.1 (29 Jun 2022 14:58), Max: 38.3 (28 Jun 2022 21:50)  T(F): 97 (29 Jun 2022 14:58), Max: 100.9 (28 Jun 2022 21:50)  HR: 68 (29 Jun 2022 14:58) (54 - 80)  BP: 108/77 (29 Jun 2022 14:58) (108/77 - 149/62)  BP(mean): --  RR: 18 (29 Jun 2022 14:58) (18 - 18)  SpO2: 99% (29 Jun 2022 14:58) (95% - 99%)  I&O's Summary    MEDICATIONS  (STANDING):  amLODIPine   Tablet 5 milliGRAM(s) Oral daily  chlorhexidine 2% Cloths 1 Application(s) Topical <User Schedule>  donepezil 10 milliGRAM(s) Oral at bedtime  escitalopram 10 milliGRAM(s) Oral daily  folic acid 1 milliGRAM(s) Oral daily  heparin   Injectable 5000 Unit(s) SubCutaneous every 8 hours  hydrALAZINE 25 milliGRAM(s) Oral daily  memantine 10 milliGRAM(s) Oral two times a day  multivitamin 1 Tablet(s) Oral daily  pantoprazole    Tablet 40 milliGRAM(s) Oral before breakfast  simvastatin 40 milliGRAM(s) Oral at bedtime  sodium chloride 0.9%. 1000 milliLiter(s) (60 mL/Hr) IV Continuous <Continuous>  zinc sulfate 220 milliGRAM(s) Oral daily    MEDICATIONS  (PRN):  acetaminophen     Tablet .. 650 milliGRAM(s) Oral every 6 hours PRN Temp greater or equal to 38C (100.4F)    LABS:                        11.3   6.81  )-----------( 144      ( 29 Jun 2022 08:27 )             33.0     06-29    143  |  111<H>  |  17  ----------------------------<  128<H>  3.6   |  23  |  1.33<H>    Ca    8.7      29 Jun 2022 08:27          CAPILLARY BLOOD GLUCOSE              REVIEW OF SYSTEMS:  CONSTITUTIONAL: No fever, weight loss, or fatigue  EYES: No eye pain, visual disturbances, or discharge  ENMT:  No difficulty hearing, tinnitus, vertigo; No sinus or throat pain  NECK: No pain or stiffness  RESPIRATORY: No cough, wheezing, chills or hemoptysis; No shortness of breath  CARDIOVASCULAR: No chest pain, palpitations, dizziness, or leg swelling  GASTROINTESTINAL: No abdominal or epigastric pain. No nausea, vomiting, or hematemesis; No diarrhea or constipation. No melena or hematochezia.      Consultant(s) Notes Reviewed:  [x ] YES  [ ] NO    PHYSICAL EXAM:  GENERAL: NAD, well-groomed, well-developed,not in any distress ,  HEAD:  Atraumatic, Normocephalic  NECK: Supple, No JVD, Normal thyroid  NERVOUS SYSTEM:  Alert & , No focal deficit   CHEST/LUNG: Good air entry bilateral with no  rales, rhonchi, wheezing, or rubs  HEART: Regular rate and rhythm; No murmurs, rubs, or gallops  ABDOMEN: Soft, Nontender, Nondistended; Bowel sounds present  EXTREMITIES:  2+ Peripheral Pulses, No clubbing, cyanosis, or edema  SKIN: No rashes or lesions    Care Discussed with Consultants/Other Providers [ x] YES  [ ] NO

## 2022-06-29 NOTE — PROGRESS NOTE ADULT - SUBJECTIVE AND OBJECTIVE BOX
JAYYSORAYASHANTEL SRIVASTAVA  87y  Patient is a 87y old  Male who presents with a chief complaint of Weakness (29 Jun 2022 18:02)    HPI:  Seen ans examined. No new issues, followed by Nephrology for KEV.    HEALTH ISSUES - PROBLEM Dx:  2019 novel coronavirus disease (COVID-19)    Hypertension    Hyperlipidemia    Prophylactic measure    Dementia    Discharge planning issues    KEV (acute kidney injury)          MEDICATIONS  (STANDING):  amLODIPine   Tablet 5 milliGRAM(s) Oral daily  chlorhexidine 2% Cloths 1 Application(s) Topical <User Schedule>  donepezil 10 milliGRAM(s) Oral at bedtime  escitalopram 10 milliGRAM(s) Oral daily  folic acid 1 milliGRAM(s) Oral daily  heparin   Injectable 5000 Unit(s) SubCutaneous every 8 hours  hydrALAZINE 25 milliGRAM(s) Oral daily  memantine 10 milliGRAM(s) Oral two times a day  multivitamin 1 Tablet(s) Oral daily  pantoprazole    Tablet 40 milliGRAM(s) Oral before breakfast  simvastatin 40 milliGRAM(s) Oral at bedtime  sodium chloride 0.9%. 1000 milliLiter(s) (60 mL/Hr) IV Continuous <Continuous>  zinc sulfate 220 milliGRAM(s) Oral daily    MEDICATIONS  (PRN):  acetaminophen     Tablet .. 650 milliGRAM(s) Oral every 6 hours PRN Temp greater or equal to 38C (100.4F)    Vital Signs Last 24 Hrs  T(C): 36.1 (29 Jun 2022 14:58), Max: 38.3 (28 Jun 2022 21:50)  T(F): 97 (29 Jun 2022 14:58), Max: 100.9 (28 Jun 2022 21:50)  HR: 68 (29 Jun 2022 14:58) (54 - 80)  BP: 108/77 (29 Jun 2022 14:58) (108/77 - 149/62)  BP(mean): --  RR: 18 (29 Jun 2022 14:58) (18 - 18)  SpO2: 99% (29 Jun 2022 14:58) (95% - 99%)  Daily     Daily     PHYSICAL EXAM:  Constitutional:  He appears comfortable and not distressed. Not diaphoretic.    Neck:  The thyroid is normal. Trachea is midline.     Breasts: Normal examination.    Respiratory: The lungs are clear to auscultation. No dullness and expansion is normal.    Cardiovascular: S1 and S2 are normal. No mummurs, rubs or gallops are present.    Gastrointestinal: The abdomen is soft. No tenderness is present. No masses are present. Bowel sounds are normal.    Genitourinary: The bladder is not distended. No CVA tenderness is present.    Extremities: No edema is noted. No deformities are present.    Neurological: Cognition is normal. Tone, power and sensation are normal. Gait is steady.    Skin: No leasions are seen  or palpated.    Lymph Nodes: No lymphadenopathy is present.    Psychiatric: Mood is appropriate. No hallucinations or flight of ideas are noted.                              11.3   6.81  )-----------( 144      ( 29 Jun 2022 08:27 )             33.0     06-29    143  |  111<H>  |  17  ----------------------------<  128<H>  3.6   |  23  |  1.33<H>    Ca    8.7      29 Jun 2022 08:27

## 2022-06-29 NOTE — PROGRESS NOTE ADULT - SUBJECTIVE AND OBJECTIVE BOX
Patient is a 87y old  Male who presents with a chief complaint of Weakness (29 Jun 2022 08:39)      INTERVAL HPI/OVERNIGHT EVENTS: overnight temp 100.9    I&O's Summary    Vital Signs Last 24 Hrs  T(C): 36.1 (29 Jun 2022 14:58), Max: 38.3 (28 Jun 2022 21:50)  T(F): 97 (29 Jun 2022 14:58), Max: 100.9 (28 Jun 2022 21:50)  HR: 68 (29 Jun 2022 14:58) (54 - 80)  BP: 108/77 (29 Jun 2022 14:58) (108/77 - 149/62)  BP(mean): --  RR: 18 (29 Jun 2022 14:58) (18 - 18)  SpO2: 99% (29 Jun 2022 14:58) (95% - 99%)  PAST MEDICAL & SURGICAL HISTORY:  HTN (hypertension)      HLD (hyperlipidemia)      Prostate cancer      Mitral valve regurgitation      Dementia      HTN (hypertension)      Dyslipidemia      History of hip replacement          SOCIAL HISTORY  Alcohol:  Tobacco:  Illicit substance use:      FAMILY HISTORY:      LABS:                        11.3   6.81  )-----------( 144      ( 29 Jun 2022 08:27 )             33.0     06-29    143  |  111<H>  |  17  ----------------------------<  128<H>  3.6   |  23  |  1.33<H>    Ca    8.7      29 Jun 2022 08:27          CAPILLARY BLOOD GLUCOSE                MEDICATIONS  (STANDING):  amLODIPine   Tablet 5 milliGRAM(s) Oral daily  chlorhexidine 2% Cloths 1 Application(s) Topical <User Schedule>  donepezil 10 milliGRAM(s) Oral at bedtime  escitalopram 10 milliGRAM(s) Oral daily  folic acid 1 milliGRAM(s) Oral daily  heparin   Injectable 5000 Unit(s) SubCutaneous every 8 hours  hydrALAZINE 25 milliGRAM(s) Oral daily  memantine 10 milliGRAM(s) Oral two times a day  multivitamin 1 Tablet(s) Oral daily  pantoprazole    Tablet 40 milliGRAM(s) Oral before breakfast  simvastatin 40 milliGRAM(s) Oral at bedtime  sodium chloride 0.9%. 1000 milliLiter(s) (60 mL/Hr) IV Continuous <Continuous>  zinc sulfate 220 milliGRAM(s) Oral daily    MEDICATIONS  (PRN):  acetaminophen     Tablet .. 650 milliGRAM(s) Oral every 6 hours PRN Temp greater or equal to 38C (100.4F)      REVIEW OF SYSTEMS:  CONSTITUTIONAL: No fever,  EYES: No eye pain, visual disturbances  ENMT:  No difficulty hearing, No sinus or throat pain  NECK: No pain or stiffness  RESPIRATORY: No cough, No shortness of breath  CARDIOVASCULAR: No chest pain, or leg swelling  GASTROINTESTINAL: No abdominal pain. No nausea, vomiting, No diarrhea or constipation.   GENITOURINARY: No dysuria,  NEUROLOGICAL: No headaches,  SKIN: No rashes,  MUSCULOSKELETAL: No joint pain or swelling;     RADIOLOGY & ADDITIONAL TESTS:< from: Xray Chest 1 View-PORTABLE IMMEDIATE (06.25.22 @ 16:15) >    ACC: [id number] EXAM:  XR CHEST PORTABLE IMMED 1V                          PROCEDURE DATE:  06/25/2022          INTERPRETATION:  CLINICAL STATEMENT: Sepsis    TECHNIQUE: AP view of the chest.      COMPARISON: 2/20/2019    The cardiomediastinal silhouette may be exaggerated by AP technique.   Calcified uncoiled aorta. The lorri are not enlarged. The trachea is   midline. Suboptimal degree of inspiration with crowding of the   bronchovascular markings. There is no focal lung consolidation or sizable  pleural effusion. No significant osseous abnormality. Surgical clips   overlie the left axilla.    IMPRESSION:    Unremarkable frontal chest x ray    --- End of Report ---            FELICIA KEVIN MD; Attending Radiologist  This document has been electronically signed. Jun 26 2022  9:06AM    < end of copied text >      Imaging Personally Reviewed:  [ x] YES  [ ] NO    Consultant(s) Notes Reviewed:  [x ] YES  [ ] NO    PHYSICAL EXAM:  GENERAL: NAD, well-groomed, well-developed  HEAD:  Atraumatic, Normocephalic  EYES: EOMI, PERRLA, conjunctiva and sclera clear  ENMT: No tonsillar erythema, exudates, or enlargement; Moist mucous membranes, Good dentition, No lesions  NECK: Supple, No JVD, Normal thyroid  NERVOUS SYSTEM:  Alert & Oriented X3, Good concentration; Motor Strength 5/5 B/L upper and lower extremities; DTRs 2+ intact and symmetric  CHEST/LUNG: Clear to percussion bilaterally; No rales, rhonchi, wheezing, or rubs  HEART: Regular rate and rhythm; No murmurs, rubs, or gallops  ABDOMEN: Soft, Nontender, Nondistended; Bowel sounds present  EXTREMITIES:  2+ Peripheral Pulses, No clubbing, cyanosis, or edema  LYMPH: No lymphadenopathy noted  SKIN: No rashes or lesions    Care Collaborated Discussed with Consultants/Other Providers [ ] YES  [ ] NO

## 2022-06-29 NOTE — PROGRESS NOTE ADULT - ASSESSMENT
Patient is 87 years old male from home with HHA with past medical history of dementia, hypertension and hyperlipidemia who was brought to ED due to weakness associated inability to ambulate. Found to have covid +,  asymptomatic, CXr unremarkable. Urine and blood cultures negative. (vaccinated with Mederna with booster), Also found to have Jeremy on CKD, likle due to hypoperfusion. s/p IVF. Nephro Dr. iglesias following. JEREMY improving. Pt recommends LOCO and placement pending choices. CM following. Patient medically optimized for discharge given JEREMY improvement.

## 2022-06-30 ENCOUNTER — TRANSCRIPTION ENCOUNTER (OUTPATIENT)
Age: 87
End: 2022-06-30

## 2022-06-30 LAB
ANION GAP SERPL CALC-SCNC: 8 MMOL/L — SIGNIFICANT CHANGE UP (ref 5–17)
BUN SERPL-MCNC: 17 MG/DL — SIGNIFICANT CHANGE UP (ref 7–18)
CALCIUM SERPL-MCNC: 8.9 MG/DL — SIGNIFICANT CHANGE UP (ref 8.4–10.5)
CHLORIDE SERPL-SCNC: 108 MMOL/L — SIGNIFICANT CHANGE UP (ref 96–108)
CO2 SERPL-SCNC: 25 MMOL/L — SIGNIFICANT CHANGE UP (ref 22–31)
CREAT SERPL-MCNC: 1.46 MG/DL — HIGH (ref 0.5–1.3)
CULTURE RESULTS: SIGNIFICANT CHANGE UP
CULTURE RESULTS: SIGNIFICANT CHANGE UP
EGFR: 47 ML/MIN/1.73M2 — LOW
GLUCOSE SERPL-MCNC: 114 MG/DL — HIGH (ref 70–99)
HCT VFR BLD CALC: 33.5 % — LOW (ref 39–50)
HGB BLD-MCNC: 11.4 G/DL — LOW (ref 13–17)
MAGNESIUM SERPL-MCNC: 2.2 MG/DL — SIGNIFICANT CHANGE UP (ref 1.6–2.6)
MCHC RBC-ENTMCNC: 31.2 PG — SIGNIFICANT CHANGE UP (ref 27–34)
MCHC RBC-ENTMCNC: 34 GM/DL — SIGNIFICANT CHANGE UP (ref 32–36)
MCV RBC AUTO: 91.8 FL — SIGNIFICANT CHANGE UP (ref 80–100)
NRBC # BLD: 0 /100 WBCS — SIGNIFICANT CHANGE UP (ref 0–0)
PLATELET # BLD AUTO: 156 K/UL — SIGNIFICANT CHANGE UP (ref 150–400)
POTASSIUM SERPL-MCNC: 3.7 MMOL/L — SIGNIFICANT CHANGE UP (ref 3.5–5.3)
POTASSIUM SERPL-SCNC: 3.7 MMOL/L — SIGNIFICANT CHANGE UP (ref 3.5–5.3)
RBC # BLD: 3.65 M/UL — LOW (ref 4.2–5.8)
RBC # FLD: 12.7 % — SIGNIFICANT CHANGE UP (ref 10.3–14.5)
SODIUM SERPL-SCNC: 141 MMOL/L — SIGNIFICANT CHANGE UP (ref 135–145)
SPECIMEN SOURCE: SIGNIFICANT CHANGE UP
SPECIMEN SOURCE: SIGNIFICANT CHANGE UP
WBC # BLD: 7.46 K/UL — SIGNIFICANT CHANGE UP (ref 3.8–10.5)
WBC # FLD AUTO: 7.46 K/UL — SIGNIFICANT CHANGE UP (ref 3.8–10.5)

## 2022-06-30 RX ORDER — METOPROLOL TARTRATE 50 MG
1 TABLET ORAL
Qty: 0 | Refills: 0 | DISCHARGE

## 2022-06-30 RX ORDER — HYDRALAZINE HCL 50 MG
1 TABLET ORAL
Qty: 0 | Refills: 0 | DISCHARGE

## 2022-06-30 RX ORDER — AMLODIPINE BESYLATE 2.5 MG/1
1 TABLET ORAL
Qty: 0 | Refills: 0 | DISCHARGE
Start: 2022-06-30

## 2022-06-30 RX ORDER — TERAZOSIN HYDROCHLORIDE 10 MG/1
1 CAPSULE ORAL
Qty: 0 | Refills: 0 | DISCHARGE

## 2022-06-30 RX ORDER — ASPIRIN/CALCIUM CARB/MAGNESIUM 324 MG
1 TABLET ORAL
Qty: 30 | Refills: 0
Start: 2022-06-30 | End: 2022-07-29

## 2022-06-30 RX ORDER — FOLIC ACID 0.8 MG
1 TABLET ORAL
Qty: 0 | Refills: 0 | DISCHARGE
Start: 2022-06-30

## 2022-06-30 RX ORDER — MEMANTINE HYDROCHLORIDE 10 MG/1
1 TABLET ORAL
Qty: 0 | Refills: 0 | DISCHARGE

## 2022-06-30 RX ORDER — PANTOPRAZOLE SODIUM 20 MG/1
1 TABLET, DELAYED RELEASE ORAL
Qty: 0 | Refills: 0 | DISCHARGE
Start: 2022-06-30

## 2022-06-30 RX ORDER — HYDRALAZINE HCL 50 MG
25 TABLET ORAL
Refills: 0 | Status: DISCONTINUED | OUTPATIENT
Start: 2022-06-30 | End: 2022-07-01

## 2022-06-30 RX ORDER — ESCITALOPRAM OXALATE 10 MG/1
1 TABLET, FILM COATED ORAL
Qty: 0 | Refills: 0 | DISCHARGE

## 2022-06-30 RX ORDER — FAMOTIDINE 10 MG/ML
1 INJECTION INTRAVENOUS
Qty: 0 | Refills: 0 | DISCHARGE

## 2022-06-30 RX ORDER — ZINC SULFATE TAB 220 MG (50 MG ZINC EQUIVALENT) 220 (50 ZN) MG
1 TAB ORAL
Qty: 0 | Refills: 0 | DISCHARGE
Start: 2022-06-30

## 2022-06-30 RX ORDER — HYDRALAZINE HCL 50 MG
1 TABLET ORAL
Qty: 0 | Refills: 0 | DISCHARGE
Start: 2022-06-30

## 2022-06-30 RX ORDER — DONEPEZIL HYDROCHLORIDE 10 MG/1
1 TABLET, FILM COATED ORAL
Qty: 0 | Refills: 0 | DISCHARGE

## 2022-06-30 RX ADMIN — SODIUM CHLORIDE 60 MILLILITER(S): 9 INJECTION INTRAMUSCULAR; INTRAVENOUS; SUBCUTANEOUS at 05:53

## 2022-06-30 RX ADMIN — Medication 1 TABLET(S): at 11:51

## 2022-06-30 RX ADMIN — Medication 650 MILLIGRAM(S): at 22:27

## 2022-06-30 RX ADMIN — MEMANTINE HYDROCHLORIDE 10 MILLIGRAM(S): 10 TABLET ORAL at 05:40

## 2022-06-30 RX ADMIN — HEPARIN SODIUM 5000 UNIT(S): 5000 INJECTION INTRAVENOUS; SUBCUTANEOUS at 22:27

## 2022-06-30 RX ADMIN — MEMANTINE HYDROCHLORIDE 10 MILLIGRAM(S): 10 TABLET ORAL at 17:25

## 2022-06-30 RX ADMIN — HEPARIN SODIUM 5000 UNIT(S): 5000 INJECTION INTRAVENOUS; SUBCUTANEOUS at 05:41

## 2022-06-30 RX ADMIN — SIMVASTATIN 40 MILLIGRAM(S): 20 TABLET, FILM COATED ORAL at 22:27

## 2022-06-30 RX ADMIN — SODIUM CHLORIDE 60 MILLILITER(S): 9 INJECTION INTRAMUSCULAR; INTRAVENOUS; SUBCUTANEOUS at 06:53

## 2022-06-30 RX ADMIN — HEPARIN SODIUM 5000 UNIT(S): 5000 INJECTION INTRAVENOUS; SUBCUTANEOUS at 13:17

## 2022-06-30 RX ADMIN — CHLORHEXIDINE GLUCONATE 1 APPLICATION(S): 213 SOLUTION TOPICAL at 05:41

## 2022-06-30 RX ADMIN — DONEPEZIL HYDROCHLORIDE 10 MILLIGRAM(S): 10 TABLET, FILM COATED ORAL at 22:27

## 2022-06-30 RX ADMIN — ESCITALOPRAM OXALATE 10 MILLIGRAM(S): 10 TABLET, FILM COATED ORAL at 11:50

## 2022-06-30 RX ADMIN — ZINC SULFATE TAB 220 MG (50 MG ZINC EQUIVALENT) 220 MILLIGRAM(S): 220 (50 ZN) TAB at 11:50

## 2022-06-30 RX ADMIN — AMLODIPINE BESYLATE 5 MILLIGRAM(S): 2.5 TABLET ORAL at 05:41

## 2022-06-30 RX ADMIN — PANTOPRAZOLE SODIUM 40 MILLIGRAM(S): 20 TABLET, DELAYED RELEASE ORAL at 05:40

## 2022-06-30 RX ADMIN — Medication 25 MILLIGRAM(S): at 05:40

## 2022-06-30 RX ADMIN — Medication 1 MILLIGRAM(S): at 11:51

## 2022-06-30 NOTE — CHART NOTE - NSCHARTNOTEFT_GEN_A_CORE
Spoke to granddaughter Aleena today, updated on clinical status, treatment plan/options and discharge plan/options, all questions answered

## 2022-06-30 NOTE — PROGRESS NOTE ADULT - PROVIDER SPECIALTY LIST ADULT
Infectious Disease
Internal Medicine
Nephrology
Internal Medicine
Internal Medicine
Nephrology
Internal Medicine
Internal Medicine
Nephrology
Internal Medicine

## 2022-06-30 NOTE — DISCHARGE NOTE PROVIDER - NSDCMRMEDTOKEN_GEN_ALL_CORE_FT
Adult Aspirin Regimen 81 mg oral delayed release tablet: 1 tab(s) orally once a day   amLODIPine 5 mg oral tablet: 1 tab(s) orally once a day  Aricept 10 mg oral tablet: 1 tab(s) orally once a day (at bedtime)  escitalopram 10 mg oral tablet: 1 tab(s) orally once a day  folic acid 1 mg oral tablet: 1 tab(s) orally once a day  hydrALAZINE 25 mg oral tablet: 1 tab(s) orally once a day  memantine 10 mg oral tablet: 1 tab(s) orally 2 times a day  Multiple Vitamins oral tablet: 1 tab(s) orally once a day  pantoprazole 40 mg oral delayed release tablet: 1 tab(s) orally once a day (before a meal)  zinc sulfate 220 mg oral capsule: 1 cap(s) orally once a day  Zocor 40 mg oral tablet: 1 tab(s) orally once a day (at bedtime)   Adult Aspirin Regimen 81 mg oral delayed release tablet: 1 tab(s) orally once a day   amLODIPine 5 mg oral tablet: 1 tab(s) orally once a day  Aricept 10 mg oral tablet: 1 tab(s) orally once a day (at bedtime)  escitalopram 10 mg oral tablet: 1 tab(s) orally once a day  folic acid 1 mg oral tablet: 1 tab(s) orally once a day  hydrALAZINE 25 mg oral tablet: 1 tab(s) orally 2 times a day  memantine 10 mg oral tablet: 1 tab(s) orally 2 times a day  Multiple Vitamins oral tablet: 1 tab(s) orally once a day  pantoprazole 40 mg oral delayed release tablet: 1 tab(s) orally once a day (before a meal)  zinc sulfate 220 mg oral capsule: 1 cap(s) orally once a day  Zocor 40 mg oral tablet: 1 tab(s) orally once a day (at bedtime)

## 2022-06-30 NOTE — PROGRESS NOTE ADULT - PROBLEM SELECTOR PLAN 7
- Medically optimized for discharge  - Pt recs LOCO  -  DISPO planning for AM to [facility] CM following.

## 2022-06-30 NOTE — PROGRESS NOTE ADULT - REASON FOR ADMISSION
Weakness

## 2022-06-30 NOTE — PROGRESS NOTE ADULT - PROBLEM SELECTOR PLAN 5
- Cont with home mes Namenda and Aricept and
- c/w Namenda and Aricept  - supportive care
- c/w Namenda and Aricept
- Cont with home mes Namenda and Aricept and
- c/w Namenda and Aricept  - supportive care

## 2022-06-30 NOTE — DISCHARGE NOTE PROVIDER - NPI NUMBER (FOR SYSADMIN USE ONLY) :
Reason for Call:  Other     Detailed comments: Patient missed work the 15th -19th. Is wondering if a  Note is possible. Was seen on the 16th. Is she cant get a note for the whole week she is wondering if she can at least get one for that day. Would like a call back to discuss.   Patient doesn't speak English, Sister is available to translate @ [phone]     Phone Number Patient can be reached at: Home number on file [phone] (home)    Best Time: Anytime    Can we leave a detailed message on this number? YES    Call taken on 10/19/2018 at 1:21 PM by Barbara Aviles       [[phone]]

## 2022-06-30 NOTE — PROGRESS NOTE ADULT - SUBJECTIVE AND OBJECTIVE BOX
SHANTEL CARRANZA  87y  Patient is a 87y old  Male who presents with a chief complaint of Weakness (30 Jun 2022 11:10)    HPI:  Followed for KEV on CKD.    HEALTH ISSUES - PROBLEM Dx:  2019 novel coronavirus disease (COVID-19)    Hypertension    Hyperlipidemia    Prophylactic measure    Dementia    Discharge planning issues    KEV (acute kidney injury)          MEDICATIONS  (STANDING):  amLODIPine   Tablet 5 milliGRAM(s) Oral daily  chlorhexidine 2% Cloths 1 Application(s) Topical <User Schedule>  donepezil 10 milliGRAM(s) Oral at bedtime  escitalopram 10 milliGRAM(s) Oral daily  folic acid 1 milliGRAM(s) Oral daily  heparin   Injectable 5000 Unit(s) SubCutaneous every 8 hours  hydrALAZINE 25 milliGRAM(s) Oral daily  memantine 10 milliGRAM(s) Oral two times a day  multivitamin 1 Tablet(s) Oral daily  pantoprazole    Tablet 40 milliGRAM(s) Oral before breakfast  simvastatin 40 milliGRAM(s) Oral at bedtime  zinc sulfate 220 milliGRAM(s) Oral daily    MEDICATIONS  (PRN):  acetaminophen     Tablet .. 650 milliGRAM(s) Oral every 6 hours PRN Temp greater or equal to 38C (100.4F)    Vital Signs Last 24 Hrs  T(C): 36.8 (30 Jun 2022 06:34), Max: 36.8 (30 Jun 2022 06:34)  T(F): 98.3 (30 Jun 2022 06:34), Max: 98.3 (30 Jun 2022 06:34)  HR: 61 (30 Jun 2022 11:11) (54 - 68)  BP: 152/95 (30 Jun 2022 09:19) (108/77 - 170/70)  BP(mean): --  RR: 18 (30 Jun 2022 06:34) (16 - 18)  SpO2: 98% (30 Jun 2022 11:11) (97% - 99%)  Daily     Daily     PHYSICAL EXAM:  Constitutional:   He appears comfortable and not distressed. Not diaphoretic.    Neck:  The thyroid is normal. Trachea is midline.     Breasts: Normal examination.    Respiratory: The lungs are clear to auscultation. No dullness and expansion is normal.    Cardiovascular: S1 and S2 are normal. No mummurs, rubs or gallops are present.    Gastrointestinal: The abdomen is soft. No tenderness is present. No masses are present. Bowel sounds are normal.    Genitourinary: The bladder is not distended. No CVA tenderness is present.    Extremities: No edema is noted. No deformities are present.    Neurological: Cognition is normal. Tone, power and sensation are normal.     Skin: No leasions are seen  or palpated.    Lymph Nodes: No lymphadenopathy is present.    Psychiatric: Mood is appropriate. No hallucinations or flight of ideas are noted.                              11.4   7.46  )-----------( 156      ( 30 Jun 2022 06:39 )             33.5     06-30    141  |  108  |  17  ----------------------------<  114<H>  3.7   |  25  |  1.46<H>    Ca    8.9      30 Jun 2022 06:39  Mg     2.2     06-30

## 2022-06-30 NOTE — DISCHARGE NOTE PROVIDER - NSDCCPCAREPLAN_GEN_ALL_CORE_FT
PRINCIPAL DISCHARGE DIAGNOSIS  Diagnosis: 2019 novel coronavirus disease (COVID-19)  Assessment and Plan of Treatment: CORONAVIRUS INSTRUCTIONS:   Based on your current clinical status and stability, it has been determined that you no longer need hospitalization and can recover while remaining in self-quarantine at the rehab.   1. You should restrict activities outside your home, except for getting medical care.   2. Do not go to work, school, or public areas.   3. Avoid using public transportation, ride-sharing, or taxis.   4. Separate yourself from other people and animals in your home as much as possible.  When you are around other people (e.g., sharing a room or vehicle) you should wear a facemask.  5. Wash your hands often with soap and water for at least 20 seconds, especially after blowing your nose, coughing, or sneezing; going to the bathroom; and before eating or preparing food.  6. Cover your mouth and nose with a tissue when you cough or sneeze. Throw used tissues in a lined trash can. Immediately wash your hands with soap and water for at least 20 seconds  7. High touch surfaces include counters, tabletops, doorknobs, bathroom fixtures, toilets, phones, keyboards, tablets, and bedside tables.  8. Avoid sharing dishes, drinking glasses, cups, eating utensils, towels, or bedding with other people or pets in your home. After using these items, they should be washed thoroughly with soap and water.  You are strongly advised to seek prompt medical attention if your illness worsens or you develop new symptoms like fever or difficulty breathing.   Please call   to speak with your discharging physician if you have any questions.  Please take ASA 81 mg Daily for 30 days, follow up with your PCP.        SECONDARY DISCHARGE DIAGNOSES  Diagnosis: Hyperlipidemia  Assessment and Plan of Treatment: You have a history of hyperlipidemia, which is when you have too much cholesterol in your blood. High amounts of cholesterol in your blood can put you at higher risks for heart attck, strokes and other health problems. Follow up with PCP for treatment goals, continue medication ( atorvastatin) as prescribed, have liver function testing every 3 months as anti lipid medications can cause liver irritation, eat low fat meals, avoid red meat, butter, fried foods and cheese. Get daily exercise.      Diagnosis: Hypertension  Assessment and Plan of Treatment: You have a history of high blood pressure. High blood pressure is a condition that puts you at risk for heart attack, stroke and kidney disease. Please continue to take your medications ( hydralazine, amlodipine) as prescribed. You can also help control your blood pressure by maintaining a healthy weight, eating a diet low in fat and rich in fruits and vegetables, reduce the amount of salt in your diet. Also, reduce alcohol and try to include some form of physical activity daily for at least 30 mins. Follow up with your medical doctor to establish long term blood pressure treatment goals.  Please assess blood pressure per routine, if need resume home meds metoprolol tartrate ,  hydraliaine increase frequency.  Notify your doctor if you have any of the following symptoms:   Dizziness, Lightheadedness, Blurry vision, Headache, Chest pain, Shortness of breath

## 2022-06-30 NOTE — PROGRESS NOTE ADULT - SUBJECTIVE AND OBJECTIVE BOX
Patient is a 87y old  Male who presents with a chief complaint of Weakness (30 Jun 2022 12:15)      INTERVAL HPI/OVERNIGHT EVENTS: no overnight events    I&O's Summary    29 Jun 2022 07:01  -  30 Jun 2022 07:00  --------------------------------------------------------  IN: 0 mL / OUT: 3 mL / NET: -3 mL      Vital Signs Last 24 Hrs  T(C): 37.6 (30 Jun 2022 14:21), Max: 37.6 (30 Jun 2022 14:21)  T(F): 99.6 (30 Jun 2022 14:21), Max: 99.6 (30 Jun 2022 14:21)  HR: 61 (30 Jun 2022 14:21) (54 - 61)  BP: 171/58 (30 Jun 2022 14:21) (136/99 - 171/58)  BP(mean): --  RR: 17 (30 Jun 2022 14:21) (16 - 18)  SpO2: 99% (30 Jun 2022 14:21) (97% - 99%)  PAST MEDICAL & SURGICAL HISTORY:  HTN (hypertension)      HLD (hyperlipidemia)      Prostate cancer      Mitral valve regurgitation      Dementia      HTN (hypertension)      Dyslipidemia      History of hip replacement          SOCIAL HISTORY  Alcohol:  Tobacco:  Illicit substance use:      FAMILY HISTORY:      LABS:                        11.4   7.46  )-----------( 156      ( 30 Jun 2022 06:39 )             33.5     06-30    141  |  108  |  17  ----------------------------<  114<H>  3.7   |  25  |  1.46<H>    Ca    8.9      30 Jun 2022 06:39  Mg     2.2     06-30          CAPILLARY BLOOD GLUCOSE                MEDICATIONS  (STANDING):  amLODIPine   Tablet 5 milliGRAM(s) Oral daily  chlorhexidine 2% Cloths 1 Application(s) Topical <User Schedule>  donepezil 10 milliGRAM(s) Oral at bedtime  escitalopram 10 milliGRAM(s) Oral daily  folic acid 1 milliGRAM(s) Oral daily  heparin   Injectable 5000 Unit(s) SubCutaneous every 8 hours  hydrALAZINE 25 milliGRAM(s) Oral daily  memantine 10 milliGRAM(s) Oral two times a day  multivitamin 1 Tablet(s) Oral daily  pantoprazole    Tablet 40 milliGRAM(s) Oral before breakfast  simvastatin 40 milliGRAM(s) Oral at bedtime  zinc sulfate 220 milliGRAM(s) Oral daily    MEDICATIONS  (PRN):  acetaminophen     Tablet .. 650 milliGRAM(s) Oral every 6 hours PRN Temp greater or equal to 38C (100.4F)      REVIEW OF SYSTEMS:  CONSTITUTIONAL: No fever  EYES: No eye pain, visual disturbances\  ENMT: \ No sinus or throat pain  NECK: No pain or stiffness  RESPIRATORY: No cough, No shortness of breath  CARDIOVASCULAR: No chest pain, palpitations,\swelling  GASTROINTESTINAL: No abdominal pain. No nausea, vomiting  GENITOURINARY: No dysuria  NEUROLOGICAL: No headaches  SKIN: No rashes  MUSCULOSKELETAL: No joint pain or swelling      RADIOLOGY & ADDITIONAL TESTS:< from: Xray Chest 1 View-PORTABLE IMMEDIATE (06.25.22 @ 16:15) >    ACC: [id number] EXAM:  XR CHEST PORTABLE IMMED 1V                          PROCEDURE DATE:  06/25/2022          INTERPRETATION:  CLINICAL STATEMENT: Sepsis    TECHNIQUE: AP view of the chest.      COMPARISON: 2/20/2019    The cardiomediastinal silhouette may be exaggerated by AP technique.   Calcified uncoiled aorta. The lorri are not enlarged. The trachea is   midline. Suboptimal degree of inspiration with crowding of the   bronchovascular markings. There is no focal lung consolidation or sizable  pleural effusion. No significant osseous abnormality. Surgical clips   overlie the left axilla.    IMPRESSION:    Unremarkable frontal chest x ray      < end of copied text >      Imaging Personally Reviewed:  [x ] YES  [ ] NO    Consultant(s) Notes Reviewed:  [ x] YES  [ ] NO    PHYSICAL EXAM:  GENERAL: NAD  HEAD:  Atraumatic, Normocephalic  EYES: conjunctiva and sclera clear  ENMT:  Moist mucous membranes,  NECK: Supple, No JVD,   NERVOUS SYSTEM:  Alert & Oriented X2  CHEST/LUNG: Clear to auscultation bilaterally; No rales, rhonchi, wheezing, or rubs  HEART: Regular rate and rhythm  ABDOMEN: Soft, Nontender, Nondistended; Bowel sounds present  EXTREMITIES:  2+ Peripheral Pulses, No clubbing, cyanosis, or edema  SKIN: No rashes     Care Collaborated Discussed with Consultants/Other Providers [ x] YES  [ ] NO

## 2022-06-30 NOTE — PROGRESS NOTE ADULT - ASSESSMENT
87 years old male from home with HHA with past medical history of dementia, hypertension and hyperlipidemia who was brought to ED due to weakness started couple days ago. Patient is ambulating with cane but lately he was unable to walk as baseline. Patient was tested positive for COVID but he was vaccinated, Moderna, 4 doses. Patient is on room air. Patient will be admitted for further care.     Problem/Plan - 1:  ·  Problem: 2019 novel coronavirus disease (COVID-19).   ·  Plan: - Patient was brought to ED complaining of weakness   - Tested positive for COVID   - CXR looks clear   - Saturating well on room air   - Will get D dimer   - Folate and zinc supplements   - ID following.   - Physical therapy.     Problem/Plan - 2:  ·  Problem: Uncontrolled Hypertension.   ·  Plan: - Adjusting BP meds.   - Dash diet.     Problem/Plan - 3:  ·  Problem: Hyperlipidemia.   ·  Plan: - Continue on statin   - DASH diet.     Problem/Plan - 4:  ·  Problem: CKd stage 3 with KEV .   ·  Plan: - Resolving .   Renal helping.      Problem/Plan - 5:  ·  Problem: Dementia .   ·  Plan: - Supportive care.        Dispo : DC planning pending placement as medically stable .

## 2022-06-30 NOTE — DISCHARGE NOTE PROVIDER - HOSPITAL COURSE
Patient is 87 years old male from home with HHA with past medical history of dementia, hypertension, mitral valve regurgitation, prostate CA, and hyperlipidemia who was brought to ED due to weakness associated inability to ambulate. Found to have covid +,  asymptomatic, CXr unremarkable. Urine and blood cultures negative. (vaccinated with Moderna with booster), Also found to have Jeremy on CKD, likely due to hypoperfusion. s/p IVF. Nephro Dr. iglesias following, no further recommendations prior to discharge. Pt recommends LOCO. CM following. Patient medically optimized for discharge , discussed with medical attending Dr. Donato. Please assess blood pressure per routine if needed please add home meds metoprolol and increase frequency of hydralazine.     Please note that this a brief summary of hospital course please refer to daily progress notes and consult notes for full course and events   Patient is 87 years old male from home with HHA with past medical history of dementia, hypertension, mitral valve regurgitation, prostate CA, and hyperlipidemia who was brought to ED due to weakness associated inability to ambulate. Found to have covid +,  asymptomatic, CXr unremarkable. Urine and blood cultures negative. (vaccinated with Moderna with booster), Also found to have Jeremy on CKD, likely due to hypoperfusion. s/p IVF. Nephro Dr. iglesias following, no further recommendations prior to discharge. Pt recommends LOCO. CM following. Patient medically optimized for discharge , discussed with medical attending Dr. Donato. Please assess blood pressure per routine if needed please add home meds metoprolol and increase frequency of hydralazine.     Please note that this a brief summary of hospital course please refer to daily progress notes and consult notes for full course and events.

## 2022-06-30 NOTE — PROGRESS NOTE ADULT - PROBLEM SELECTOR PLAN 1
- P/w c/o weakness likely due to COVID   -  Afebrile, saturating well on RA  - CXR unremarkable  - blood and urine cultures NTD  - C/w Folate and zinc supplements   - D dimer 240  - Repeat COVID PCR prior to discharge  - PT recc LOCO - CM follwoing  - ID Dr. Rashida barber

## 2022-06-30 NOTE — DISCHARGE NOTE PROVIDER - CARE PROVIDER_API CALL
Jeancarlos Krause)  Family Medicine  [address] [place] [address]  Phone: [phone]  Fax: [phone]  Established Patient  Follow Up Time: 2 weeks

## 2022-06-30 NOTE — PROGRESS NOTE ADULT - PROBLEM SELECTOR PLAN 6
- Heparin 5000 units q8 due to michelle  - PPI

## 2022-06-30 NOTE — PROGRESS NOTE ADULT - ASSESSMENT
Acute Kidney Injury:  Improved and is likely at baseline.  - Follow up BMP.  - Avoid nephrotoxins.    CKD:   Unclear baseline.  - Follow up BMP.

## 2022-06-30 NOTE — PROGRESS NOTE ADULT - ASSESSMENT
Patient is 87 years old male from home with HHA with past medical history of dementia, hypertension and hyperlipidemia who was brought to ED due to weakness associated inability to ambulate. Found to have covid +,  asymptomatic, CXr unremarkable. Urine and blood cultures negative. (vaccinated with Mederna with booster), Also found to have Jeremy on CKD, likle due to hypoperfusion. s/p IVF. Nephro Dr. iglesias following, no further recc. Pt recommends LOCO, pending final day of quarantine will d/c to [facility]. CM following. Patient medically optimized for discharge.

## 2022-06-30 NOTE — PROGRESS NOTE ADULT - SUBJECTIVE AND OBJECTIVE BOX
Date of Service  : 06-30-22     INTERVAL HPI/OVERNIGHT EVENTS: No new concerns.   Vital Signs Last 24 Hrs  T(C): 38 (30 Jun 2022 20:38), Max: 38 (30 Jun 2022 20:38)  T(F): 100.4 (30 Jun 2022 20:38), Max: 100.4 (30 Jun 2022 20:38)  HR: 57 (30 Jun 2022 20:38) (54 - 63)  BP: 152/83 (30 Jun 2022 20:38) (136/99 - 171/58)  BP(mean): --  RR: 18 (30 Jun 2022 20:38) (16 - 18)  SpO2: 97% (30 Jun 2022 20:38) (97% - 99%)  I&O's Summary    29 Jun 2022 07:01  -  30 Jun 2022 07:00  --------------------------------------------------------  IN: 0 mL / OUT: 3 mL / NET: -3 mL      MEDICATIONS  (STANDING):  amLODIPine   Tablet 5 milliGRAM(s) Oral daily  chlorhexidine 2% Cloths 1 Application(s) Topical <User Schedule>  donepezil 10 milliGRAM(s) Oral at bedtime  escitalopram 10 milliGRAM(s) Oral daily  folic acid 1 milliGRAM(s) Oral daily  heparin   Injectable 5000 Unit(s) SubCutaneous every 8 hours  hydrALAZINE 25 milliGRAM(s) Oral daily  memantine 10 milliGRAM(s) Oral two times a day  multivitamin 1 Tablet(s) Oral daily  pantoprazole    Tablet 40 milliGRAM(s) Oral before breakfast  simvastatin 40 milliGRAM(s) Oral at bedtime  zinc sulfate 220 milliGRAM(s) Oral daily    MEDICATIONS  (PRN):  acetaminophen     Tablet .. 650 milliGRAM(s) Oral every 6 hours PRN Temp greater or equal to 38C (100.4F)    LABS:                        11.4   7.46  )-----------( 156      ( 30 Jun 2022 06:39 )             33.5     06-30    141  |  108  |  17  ----------------------------<  114<H>  3.7   |  25  |  1.46<H>    Ca    8.9      30 Jun 2022 06:39  Mg     2.2     06-30          CAPILLARY BLOOD GLUCOSE                  Consultant(s) Notes Reviewed:  [x ] YES  [ ] NO    PHYSICAL EXAM:  GENERAL: NAD, well-groomed, well-developed,not in any distress ,  HEAD:  Atraumatic, Normocephaliclesions  NECK: Supple, No JVD, Normal thyroid  NERVOUS SYSTEM:  Alert &, No focal deficit   CHEST/LUNG: Good air entry bilateral with no  rales, rhonchi, wheezing, or rubs  HEART: Regular rate and rhythm; No murmurs, rubs, or gallops  ABDOMEN: Soft, Nontender, Nondistended; Bowel sounds present  EXTREMITIES:  2+ Peripheral Pulses, No clubbing, cyanosis, or edema    Care Discussed with Consultants/Other Providers [ x] YES  [ ] NO

## 2022-06-30 NOTE — PROGRESS NOTE ADULT - PROBLEM SELECTOR PLAN 2
- prerenal likely due to hypoperfusion, improving  -  IVF d/c  - BMP in AM   - Nephro Dr Kowalski following

## 2022-07-01 ENCOUNTER — TRANSCRIPTION ENCOUNTER (OUTPATIENT)
Age: 87
End: 2022-07-01

## 2022-07-01 VITALS
HEART RATE: 55 BPM | TEMPERATURE: 98 F | RESPIRATION RATE: 17 BRPM | SYSTOLIC BLOOD PRESSURE: 149 MMHG | DIASTOLIC BLOOD PRESSURE: 60 MMHG | OXYGEN SATURATION: 97 %

## 2022-07-01 LAB
ANION GAP SERPL CALC-SCNC: 10 MMOL/L — SIGNIFICANT CHANGE UP (ref 5–17)
BUN SERPL-MCNC: 18 MG/DL — SIGNIFICANT CHANGE UP (ref 7–18)
CALCIUM SERPL-MCNC: 9.1 MG/DL — SIGNIFICANT CHANGE UP (ref 8.4–10.5)
CHLORIDE SERPL-SCNC: 109 MMOL/L — HIGH (ref 96–108)
CO2 SERPL-SCNC: 23 MMOL/L — SIGNIFICANT CHANGE UP (ref 22–31)
CREAT SERPL-MCNC: 1.32 MG/DL — HIGH (ref 0.5–1.3)
EGFR: 53 ML/MIN/1.73M2 — LOW
GLUCOSE SERPL-MCNC: 119 MG/DL — HIGH (ref 70–99)
POTASSIUM SERPL-MCNC: 3.6 MMOL/L — SIGNIFICANT CHANGE UP (ref 3.5–5.3)
POTASSIUM SERPL-SCNC: 3.6 MMOL/L — SIGNIFICANT CHANGE UP (ref 3.5–5.3)
SODIUM SERPL-SCNC: 142 MMOL/L — SIGNIFICANT CHANGE UP (ref 135–145)

## 2022-07-01 PROCEDURE — 84484 ASSAY OF TROPONIN QUANT: CPT

## 2022-07-01 PROCEDURE — 97110 THERAPEUTIC EXERCISES: CPT

## 2022-07-01 PROCEDURE — 93005 ELECTROCARDIOGRAM TRACING: CPT

## 2022-07-01 PROCEDURE — 87040 BLOOD CULTURE FOR BACTERIA: CPT

## 2022-07-01 PROCEDURE — 0225U NFCT DS DNA&RNA 21 SARSCOV2: CPT

## 2022-07-01 PROCEDURE — 85379 FIBRIN DEGRADATION QUANT: CPT

## 2022-07-01 PROCEDURE — 81001 URINALYSIS AUTO W/SCOPE: CPT

## 2022-07-01 PROCEDURE — 80053 COMPREHEN METABOLIC PANEL: CPT

## 2022-07-01 PROCEDURE — 96367 TX/PROPH/DG ADDL SEQ IV INF: CPT

## 2022-07-01 PROCEDURE — 85025 COMPLETE CBC W/AUTO DIFF WBC: CPT

## 2022-07-01 PROCEDURE — 97530 THERAPEUTIC ACTIVITIES: CPT

## 2022-07-01 PROCEDURE — 87086 URINE CULTURE/COLONY COUNT: CPT

## 2022-07-01 PROCEDURE — 96366 THER/PROPH/DIAG IV INF ADDON: CPT

## 2022-07-01 PROCEDURE — 84100 ASSAY OF PHOSPHORUS: CPT

## 2022-07-01 PROCEDURE — 83605 ASSAY OF LACTIC ACID: CPT

## 2022-07-01 PROCEDURE — 87640 STAPH A DNA AMP PROBE: CPT

## 2022-07-01 PROCEDURE — 83735 ASSAY OF MAGNESIUM: CPT

## 2022-07-01 PROCEDURE — 85610 PROTHROMBIN TIME: CPT

## 2022-07-01 PROCEDURE — 87641 MR-STAPH DNA AMP PROBE: CPT

## 2022-07-01 PROCEDURE — 71045 X-RAY EXAM CHEST 1 VIEW: CPT

## 2022-07-01 PROCEDURE — 36415 COLL VENOUS BLD VENIPUNCTURE: CPT

## 2022-07-01 PROCEDURE — 85027 COMPLETE CBC AUTOMATED: CPT

## 2022-07-01 PROCEDURE — 82962 GLUCOSE BLOOD TEST: CPT

## 2022-07-01 PROCEDURE — 87635 SARS-COV-2 COVID-19 AMP PRB: CPT

## 2022-07-01 PROCEDURE — 97162 PT EVAL MOD COMPLEX 30 MIN: CPT

## 2022-07-01 PROCEDURE — 80048 BASIC METABOLIC PNL TOTAL CA: CPT

## 2022-07-01 PROCEDURE — 99285 EMERGENCY DEPT VISIT HI MDM: CPT

## 2022-07-01 PROCEDURE — 96365 THER/PROPH/DIAG IV INF INIT: CPT

## 2022-07-01 PROCEDURE — 85730 THROMBOPLASTIN TIME PARTIAL: CPT

## 2022-07-01 PROCEDURE — 83880 ASSAY OF NATRIURETIC PEPTIDE: CPT

## 2022-07-01 RX ORDER — METOPROLOL TARTRATE 50 MG
1 TABLET ORAL
Qty: 0 | Refills: 0 | DISCHARGE

## 2022-07-01 RX ORDER — HYDRALAZINE HCL 50 MG
10 TABLET ORAL ONCE
Refills: 0 | Status: COMPLETED | OUTPATIENT
Start: 2022-07-01 | End: 2022-07-01

## 2022-07-01 RX ORDER — HYDRALAZINE HCL 50 MG
1 TABLET ORAL
Qty: 0 | Refills: 0 | DISCHARGE
Start: 2022-07-01

## 2022-07-01 RX ADMIN — Medication 1 TABLET(S): at 12:14

## 2022-07-01 RX ADMIN — Medication 1 MILLIGRAM(S): at 12:15

## 2022-07-01 RX ADMIN — Medication 10 MILLIGRAM(S): at 12:15

## 2022-07-01 RX ADMIN — AMLODIPINE BESYLATE 5 MILLIGRAM(S): 2.5 TABLET ORAL at 06:28

## 2022-07-01 RX ADMIN — Medication 25 MILLIGRAM(S): at 06:28

## 2022-07-01 RX ADMIN — ESCITALOPRAM OXALATE 10 MILLIGRAM(S): 10 TABLET, FILM COATED ORAL at 12:16

## 2022-07-01 RX ADMIN — CHLORHEXIDINE GLUCONATE 1 APPLICATION(S): 213 SOLUTION TOPICAL at 06:28

## 2022-07-01 RX ADMIN — ZINC SULFATE TAB 220 MG (50 MG ZINC EQUIVALENT) 220 MILLIGRAM(S): 220 (50 ZN) TAB at 12:15

## 2022-07-01 RX ADMIN — MEMANTINE HYDROCHLORIDE 10 MILLIGRAM(S): 10 TABLET ORAL at 06:28

## 2022-07-01 RX ADMIN — PANTOPRAZOLE SODIUM 40 MILLIGRAM(S): 20 TABLET, DELAYED RELEASE ORAL at 06:28

## 2022-07-01 RX ADMIN — Medication 650 MILLIGRAM(S): at 00:46

## 2022-07-01 RX ADMIN — HEPARIN SODIUM 5000 UNIT(S): 5000 INJECTION INTRAVENOUS; SUBCUTANEOUS at 06:30

## 2022-07-01 NOTE — DISCHARGE NOTE NURSING/CASE MANAGEMENT/SOCIAL WORK - PATIENT PORTAL LINK FT
You can access the FollowMyHealth Patient Portal offered by [facility] by registering at the following website: [email or web address]. By joining [facility]’s FollowMyHealth portal, you will also be able to view your health information using other applications (apps) compatible with our system.

## 2022-07-01 NOTE — DISCHARGE NOTE NURSING/CASE MANAGEMENT/SOCIAL WORK - NSDCPEFALRISK_GEN_ALL_CORE
For information on Fall & Injury Prevention, visit: [email or web address] OR  [email or web address] OR  https://www.cdc.gov/steadi/patient.html

## 2022-07-27 ENCOUNTER — APPOINTMENT (OUTPATIENT)
Dept: UROLOGY | Facility: CLINIC | Age: 87
End: 2022-07-27

## 2022-08-02 RX ORDER — LOSARTAN POTASSIUM 100 MG/1
100 TABLET, FILM COATED ORAL
Qty: 90 | Refills: 0 | Status: ACTIVE | COMMUNITY
Start: 2019-12-19 | End: 1900-01-01

## 2022-09-09 NOTE — DISCHARGE NOTE ADULT - PLAN OF CARE
resolved treated with Tamiflu  Have PCP do pulmonary functions test follow up with endocrine for repeat TFT's lab in 4 week follow up with PCP resolved. Medication adjusted. follow up with PCP  Medication compliance stressed as patient was not compliant before rate controlled Amiodarone 200 mg two times a day for 10 more days THEN ONLY ONE TABLET DAILY  follow up with PCP and cardiology in 1 week  return to hospital if have dizziness, chest pain, difficulty breathing or worsens Stable

## 2022-10-07 PROBLEM — E78.5 HLD (HYPERLIPIDEMIA): Status: ACTIVE | Noted: 2018-07-02

## 2022-10-07 PROBLEM — I10 BENIGN ESSENTIAL HYPERTENSION: Status: ACTIVE | Noted: 2021-09-09

## 2022-10-11 NOTE — H&P ADULT - BIRTH SEX
Received signed and completed form from Physician's Office.    Message out to Arie to mail the completed form to [employer] [place] [phone] at [address] [place] [address].   Male

## 2023-01-01 ENCOUNTER — INPATIENT (INPATIENT)
Facility: HOSPITAL | Age: 88
LOS: 6 days | Discharge: NURSING, REHAB OR LONG-TERM CARE | DRG: 682 | End: 2023-05-11
Attending: INTERNAL MEDICINE | Admitting: INTERNAL MEDICINE
Payer: MEDICARE

## 2023-01-01 ENCOUNTER — TRANSCRIPTION ENCOUNTER (OUTPATIENT)
Age: 88
End: 2023-01-01

## 2023-01-01 ENCOUNTER — INPATIENT (INPATIENT)
Facility: HOSPITAL | Age: 88
LOS: 2 days | Discharge: HOME | DRG: 310 | End: 2023-03-03
Attending: STUDENT IN AN ORGANIZED HEALTH CARE EDUCATION/TRAINING PROGRAM | Admitting: STUDENT IN AN ORGANIZED HEALTH CARE EDUCATION/TRAINING PROGRAM
Payer: MEDICARE

## 2023-01-01 ENCOUNTER — APPOINTMENT (OUTPATIENT)
Dept: UROLOGY | Facility: CLINIC | Age: 88
End: 2023-01-01
Payer: MEDICARE

## 2023-01-01 ENCOUNTER — INPATIENT (INPATIENT)
Facility: HOSPITAL | Age: 88
LOS: 0 days | Discharge: HOME | DRG: 951 | End: 2023-09-06
Attending: INTERNAL MEDICINE | Admitting: INTERNAL MEDICINE
Payer: MEDICARE

## 2023-01-01 ENCOUNTER — INPATIENT (INPATIENT)
Facility: HOSPITAL | Age: 88
LOS: 4 days | DRG: 951 | End: 2023-09-11
Attending: FAMILY MEDICINE | Admitting: FAMILY MEDICINE
Payer: OTHER MISCELLANEOUS

## 2023-01-01 ENCOUNTER — NON-APPOINTMENT (OUTPATIENT)
Age: 88
End: 2023-01-01

## 2023-01-01 VITALS
HEART RATE: 48 BPM | SYSTOLIC BLOOD PRESSURE: 164 MMHG | WEIGHT: 237 LBS | TEMPERATURE: 98.3 F | HEIGHT: 70 IN | RESPIRATION RATE: 15 BRPM | OXYGEN SATURATION: 97 % | DIASTOLIC BLOOD PRESSURE: 86 MMHG | BODY MASS INDEX: 33.93 KG/M2

## 2023-01-01 VITALS
TEMPERATURE: 100 F | HEART RATE: 67 BPM | SYSTOLIC BLOOD PRESSURE: 117 MMHG | RESPIRATION RATE: 20 BRPM | OXYGEN SATURATION: 98 % | DIASTOLIC BLOOD PRESSURE: 63 MMHG

## 2023-01-01 VITALS
RESPIRATION RATE: 18 BRPM | SYSTOLIC BLOOD PRESSURE: 172 MMHG | OXYGEN SATURATION: 96 % | HEART RATE: 80 BPM | DIASTOLIC BLOOD PRESSURE: 90 MMHG | WEIGHT: 220.02 LBS | TEMPERATURE: 97 F | HEIGHT: 70 IN

## 2023-01-01 VITALS
HEART RATE: 122 BPM | DIASTOLIC BLOOD PRESSURE: 80 MMHG | TEMPERATURE: 101 F | OXYGEN SATURATION: 96 % | SYSTOLIC BLOOD PRESSURE: 106 MMHG | WEIGHT: 186.29 LBS | RESPIRATION RATE: 30 BRPM

## 2023-01-01 VITALS
HEART RATE: 64 BPM | DIASTOLIC BLOOD PRESSURE: 83 MMHG | OXYGEN SATURATION: 94 % | RESPIRATION RATE: 18 BRPM | TEMPERATURE: 99 F | SYSTOLIC BLOOD PRESSURE: 152 MMHG

## 2023-01-01 VITALS
RESPIRATION RATE: 17 BRPM | HEART RATE: 33 BPM | HEIGHT: 69 IN | DIASTOLIC BLOOD PRESSURE: 80 MMHG | SYSTOLIC BLOOD PRESSURE: 180 MMHG | OXYGEN SATURATION: 95 % | TEMPERATURE: 97 F | WEIGHT: 240.08 LBS

## 2023-01-01 VITALS
WEIGHT: 237 LBS | BODY MASS INDEX: 33.93 KG/M2 | OXYGEN SATURATION: 98 % | DIASTOLIC BLOOD PRESSURE: 79 MMHG | RESPIRATION RATE: 16 BRPM | SYSTOLIC BLOOD PRESSURE: 134 MMHG | TEMPERATURE: 97.6 F | HEART RATE: 59 BPM | HEIGHT: 70 IN

## 2023-01-01 VITALS
HEART RATE: 63 BPM | DIASTOLIC BLOOD PRESSURE: 63 MMHG | OXYGEN SATURATION: 97 % | RESPIRATION RATE: 18 BRPM | TEMPERATURE: 98 F | SYSTOLIC BLOOD PRESSURE: 186 MMHG

## 2023-01-01 VITALS
RESPIRATION RATE: 20 BRPM | HEART RATE: 69 BPM | DIASTOLIC BLOOD PRESSURE: 73 MMHG | TEMPERATURE: 100 F | SYSTOLIC BLOOD PRESSURE: 118 MMHG | OXYGEN SATURATION: 96 %

## 2023-01-01 VITALS — TEMPERATURE: 100 F

## 2023-01-01 DIAGNOSIS — Z96.649 PRESENCE OF UNSPECIFIED ARTIFICIAL HIP JOINT: Chronic | ICD-10-CM

## 2023-01-01 DIAGNOSIS — R33.9 RETENTION OF URINE, UNSPECIFIED: ICD-10-CM

## 2023-01-01 DIAGNOSIS — Z29.9 ENCOUNTER FOR PROPHYLACTIC MEASURES, UNSPECIFIED: ICD-10-CM

## 2023-01-01 DIAGNOSIS — R00.1 BRADYCARDIA, UNSPECIFIED: ICD-10-CM

## 2023-01-01 DIAGNOSIS — N17.9 ACUTE KIDNEY FAILURE, UNSPECIFIED: ICD-10-CM

## 2023-01-01 DIAGNOSIS — C61 MALIGNANT NEOPLASM OF PROSTATE: ICD-10-CM

## 2023-01-01 DIAGNOSIS — N13.9 OBSTRUCTIVE AND REFLUX UROPATHY, UNSPECIFIED: ICD-10-CM

## 2023-01-01 DIAGNOSIS — G31.1 SENILE DEGENERATION OF BRAIN, NOT ELSEWHERE CLASSIFIED: ICD-10-CM

## 2023-01-01 DIAGNOSIS — K62.89 OTHER SPECIFIED DISEASES OF ANUS AND RECTUM: ICD-10-CM

## 2023-01-01 DIAGNOSIS — A41.9 SEPSIS, UNSPECIFIED ORGANISM: ICD-10-CM

## 2023-01-01 DIAGNOSIS — K80.20 CALCULUS OF GALLBLADDER WITHOUT CHOLECYSTITIS WITHOUT OBSTRUCTION: ICD-10-CM

## 2023-01-01 DIAGNOSIS — K52.89 OTHER SPECIFIED NONINFECTIVE GASTROENTERITIS AND COLITIS: ICD-10-CM

## 2023-01-01 DIAGNOSIS — Z51.5 ENCOUNTER FOR PALLIATIVE CARE: ICD-10-CM

## 2023-01-01 DIAGNOSIS — I10 ESSENTIAL (PRIMARY) HYPERTENSION: ICD-10-CM

## 2023-01-01 DIAGNOSIS — K66.1 HEMOPERITONEUM: ICD-10-CM

## 2023-01-01 DIAGNOSIS — F03.90 UNSPECIFIED DEMENTIA WITHOUT BEHAVIORAL DISTURBANCE: ICD-10-CM

## 2023-01-01 DIAGNOSIS — K11.7 DISTURBANCES OF SALIVARY SECRETION: ICD-10-CM

## 2023-01-01 DIAGNOSIS — Z02.9 ENCOUNTER FOR ADMINISTRATIVE EXAMINATIONS, UNSPECIFIED: ICD-10-CM

## 2023-01-01 DIAGNOSIS — A41.50 GRAM-NEGATIVE SEPSIS, UNSPECIFIED: ICD-10-CM

## 2023-01-01 DIAGNOSIS — F03.90 UNSPECIFIED DEMENTIA, UNSPECIFIED SEVERITY, WITHOUT BEHAVIORAL DISTURBANCE, PSYCHOTIC DISTURBANCE, MOOD DISTURBANCE, AND ANXIETY: ICD-10-CM

## 2023-01-01 DIAGNOSIS — E44.0 MODERATE PROTEIN-CALORIE MALNUTRITION: ICD-10-CM

## 2023-01-01 DIAGNOSIS — R77.8 OTHER SPECIFIED ABNORMALITIES OF PLASMA PROTEINS: ICD-10-CM

## 2023-01-01 DIAGNOSIS — R33.8 OTHER RETENTION OF URINE: ICD-10-CM

## 2023-01-01 DIAGNOSIS — I21.4 NON-ST ELEVATION (NSTEMI) MYOCARDIAL INFARCTION: ICD-10-CM

## 2023-01-01 DIAGNOSIS — N39.0 URINARY TRACT INFECTION, SITE NOT SPECIFIED: ICD-10-CM

## 2023-01-01 DIAGNOSIS — N30.00 ACUTE CYSTITIS WITHOUT HEMATURIA: ICD-10-CM

## 2023-01-01 DIAGNOSIS — F03.C0 UNSPECIFIED DEMENTIA, SEVERE, WITHOUT BEHAVIORAL DISTURBANCE, PSYCHOTIC DISTURBANCE, MOOD DISTURBANCE, AND ANXIETY: ICD-10-CM

## 2023-01-01 DIAGNOSIS — J96.01 ACUTE RESPIRATORY FAILURE WITH HYPOXIA: ICD-10-CM

## 2023-01-01 DIAGNOSIS — K59.00 CONSTIPATION, UNSPECIFIED: ICD-10-CM

## 2023-01-01 DIAGNOSIS — G93.41 METABOLIC ENCEPHALOPATHY: ICD-10-CM

## 2023-01-01 DIAGNOSIS — E78.5 HYPERLIPIDEMIA, UNSPECIFIED: ICD-10-CM

## 2023-01-01 LAB
-  AMIKACIN: SIGNIFICANT CHANGE UP
-  AMPICILLIN/SULBACTAM: SIGNIFICANT CHANGE UP
-  AMPICILLIN: SIGNIFICANT CHANGE UP
-  AZTREONAM: SIGNIFICANT CHANGE UP
-  CEFAZOLIN: SIGNIFICANT CHANGE UP
-  CEFEPIME: SIGNIFICANT CHANGE UP
-  CEFOXITIN: SIGNIFICANT CHANGE UP
-  CEFTRIAXONE: SIGNIFICANT CHANGE UP
-  CIPROFLOXACIN: SIGNIFICANT CHANGE UP
-  ERTAPENEM: SIGNIFICANT CHANGE UP
-  GENTAMICIN: SIGNIFICANT CHANGE UP
-  LEVOFLOXACIN: SIGNIFICANT CHANGE UP
-  MEROPENEM: SIGNIFICANT CHANGE UP
-  PIPERACILLIN/TAZOBACTAM: SIGNIFICANT CHANGE UP
-  TOBRAMYCIN: SIGNIFICANT CHANGE UP
-  TRIMETHOPRIM/SULFAMETHOXAZOLE: SIGNIFICANT CHANGE UP
ALBUMIN SERPL ELPH-MCNC: 2 G/DL — LOW (ref 3.5–5)
ALBUMIN SERPL ELPH-MCNC: 2.1 G/DL — LOW (ref 3.5–5)
ALBUMIN SERPL ELPH-MCNC: 2.3 G/DL — LOW (ref 3.5–5)
ALBUMIN SERPL ELPH-MCNC: 2.4 G/DL — LOW (ref 3.5–5)
ALBUMIN SERPL ELPH-MCNC: 3.1 G/DL — LOW (ref 3.5–5)
ALBUMIN SERPL ELPH-MCNC: 3.2 G/DL — LOW (ref 3.5–5)
ALP SERPL-CCNC: 102 U/L — SIGNIFICANT CHANGE UP (ref 40–120)
ALP SERPL-CCNC: 111 U/L — SIGNIFICANT CHANGE UP (ref 40–120)
ALP SERPL-CCNC: 115 U/L — SIGNIFICANT CHANGE UP (ref 40–120)
ALP SERPL-CCNC: 78 U/L — SIGNIFICANT CHANGE UP (ref 40–120)
ALP SERPL-CCNC: 86 U/L — SIGNIFICANT CHANGE UP (ref 40–120)
ALP SERPL-CCNC: 92 U/L — SIGNIFICANT CHANGE UP (ref 40–120)
ALT FLD-CCNC: 12 U/L DA — SIGNIFICANT CHANGE UP (ref 10–60)
ALT FLD-CCNC: 13 U/L DA — SIGNIFICANT CHANGE UP (ref 10–60)
ALT FLD-CCNC: 14 U/L DA — SIGNIFICANT CHANGE UP (ref 10–60)
ALT FLD-CCNC: 19 U/L DA — SIGNIFICANT CHANGE UP (ref 10–60)
ALT FLD-CCNC: 19 U/L DA — SIGNIFICANT CHANGE UP (ref 10–60)
ALT FLD-CCNC: 21 U/L DA — SIGNIFICANT CHANGE UP (ref 10–60)
ANION GAP SERPL CALC-SCNC: 18 MMOL/L — HIGH (ref 5–17)
ANION GAP SERPL CALC-SCNC: 4 MMOL/L — LOW (ref 5–17)
ANION GAP SERPL CALC-SCNC: 4 MMOL/L — LOW (ref 5–17)
ANION GAP SERPL CALC-SCNC: 5 MMOL/L — SIGNIFICANT CHANGE UP (ref 5–17)
ANION GAP SERPL CALC-SCNC: 6 MMOL/L — SIGNIFICANT CHANGE UP (ref 5–17)
ANION GAP SERPL CALC-SCNC: 6 MMOL/L — SIGNIFICANT CHANGE UP (ref 5–17)
ANION GAP SERPL CALC-SCNC: 7 MMOL/L — SIGNIFICANT CHANGE UP (ref 5–17)
ANION GAP SERPL CALC-SCNC: 8 MMOL/L — SIGNIFICANT CHANGE UP (ref 5–17)
ANION GAP SERPL CALC-SCNC: 9 MMOL/L — SIGNIFICANT CHANGE UP (ref 5–17)
APPEARANCE UR: ABNORMAL
APPEARANCE UR: CLEAR — SIGNIFICANT CHANGE UP
APTT BLD: 28.1 SEC — SIGNIFICANT CHANGE UP (ref 27.5–35.5)
APTT BLD: 31.5 SEC — SIGNIFICANT CHANGE UP (ref 27.5–35.5)
APTT BLD: 35.1 SEC — SIGNIFICANT CHANGE UP (ref 24.5–35.6)
AST SERPL-CCNC: 12 U/L — SIGNIFICANT CHANGE UP (ref 10–40)
AST SERPL-CCNC: 18 U/L — SIGNIFICANT CHANGE UP (ref 10–40)
AST SERPL-CCNC: 19 U/L — SIGNIFICANT CHANGE UP (ref 10–40)
AST SERPL-CCNC: 29 U/L — SIGNIFICANT CHANGE UP (ref 10–40)
AST SERPL-CCNC: 61 U/L — HIGH (ref 10–40)
AST SERPL-CCNC: 9 U/L — LOW (ref 10–40)
BACTERIA # UR AUTO: ABNORMAL /HPF
BASOPHILS # BLD AUTO: 0 K/UL — SIGNIFICANT CHANGE UP (ref 0–0.2)
BASOPHILS # BLD AUTO: 0.04 K/UL — SIGNIFICANT CHANGE UP (ref 0–0.2)
BASOPHILS # BLD AUTO: 0.05 K/UL — SIGNIFICANT CHANGE UP (ref 0–0.2)
BASOPHILS # BLD AUTO: 0.05 K/UL — SIGNIFICANT CHANGE UP (ref 0–0.2)
BASOPHILS # BLD AUTO: 0.06 K/UL — SIGNIFICANT CHANGE UP (ref 0–0.2)
BASOPHILS NFR BLD AUTO: 0 % — SIGNIFICANT CHANGE UP (ref 0–2)
BASOPHILS NFR BLD AUTO: 0.3 % — SIGNIFICANT CHANGE UP (ref 0–2)
BASOPHILS NFR BLD AUTO: 0.4 % — SIGNIFICANT CHANGE UP (ref 0–2)
BASOPHILS NFR BLD AUTO: 0.6 % — SIGNIFICANT CHANGE UP (ref 0–2)
BASOPHILS NFR BLD AUTO: 0.7 % — SIGNIFICANT CHANGE UP (ref 0–2)
BILIRUB SERPL-MCNC: 0.5 MG/DL — SIGNIFICANT CHANGE UP (ref 0.2–1.2)
BILIRUB SERPL-MCNC: 0.6 MG/DL — SIGNIFICANT CHANGE UP (ref 0.2–1.2)
BILIRUB SERPL-MCNC: 0.6 MG/DL — SIGNIFICANT CHANGE UP (ref 0.2–1.2)
BILIRUB SERPL-MCNC: 0.8 MG/DL — SIGNIFICANT CHANGE UP (ref 0.2–1.2)
BILIRUB UR-MCNC: NEGATIVE — SIGNIFICANT CHANGE UP
BILIRUB UR-MCNC: NEGATIVE — SIGNIFICANT CHANGE UP
BLD GP AB SCN SERPL QL: SIGNIFICANT CHANGE UP
BUN SERPL-MCNC: 20 MG/DL — HIGH (ref 7–18)
BUN SERPL-MCNC: 21 MG/DL — HIGH (ref 7–18)
BUN SERPL-MCNC: 22 MG/DL — HIGH (ref 7–18)
BUN SERPL-MCNC: 23 MG/DL — HIGH (ref 7–18)
BUN SERPL-MCNC: 24 MG/DL — HIGH (ref 7–18)
BUN SERPL-MCNC: 25 MG/DL — HIGH (ref 7–18)
BUN SERPL-MCNC: 26 MG/DL — HIGH (ref 7–18)
BUN SERPL-MCNC: 27 MG/DL — HIGH (ref 7–18)
BUN SERPL-MCNC: 32 MG/DL — HIGH (ref 7–18)
BUN SERPL-MCNC: 34 MG/DL — HIGH (ref 7–18)
BUN SERPL-MCNC: 35 MG/DL — HIGH (ref 7–18)
BUN SERPL-MCNC: 49 MG/DL — HIGH (ref 7–18)
CALCIUM SERPL-MCNC: 8.4 MG/DL — SIGNIFICANT CHANGE UP (ref 8.4–10.5)
CALCIUM SERPL-MCNC: 8.4 MG/DL — SIGNIFICANT CHANGE UP (ref 8.4–10.5)
CALCIUM SERPL-MCNC: 8.6 MG/DL — SIGNIFICANT CHANGE UP (ref 8.4–10.5)
CALCIUM SERPL-MCNC: 8.7 MG/DL — SIGNIFICANT CHANGE UP (ref 8.4–10.5)
CALCIUM SERPL-MCNC: 8.9 MG/DL — SIGNIFICANT CHANGE UP (ref 8.4–10.5)
CALCIUM SERPL-MCNC: 9.1 MG/DL — SIGNIFICANT CHANGE UP (ref 8.4–10.5)
CALCIUM SERPL-MCNC: 9.3 MG/DL — SIGNIFICANT CHANGE UP (ref 8.4–10.5)
CALCIUM SERPL-MCNC: 9.4 MG/DL — SIGNIFICANT CHANGE UP (ref 8.4–10.5)
CALCIUM SERPL-MCNC: 9.8 MG/DL — SIGNIFICANT CHANGE UP (ref 8.4–10.5)
CHLORIDE SERPL-SCNC: 103 MMOL/L — SIGNIFICANT CHANGE UP (ref 96–108)
CHLORIDE SERPL-SCNC: 105 MMOL/L — SIGNIFICANT CHANGE UP (ref 96–108)
CHLORIDE SERPL-SCNC: 106 MMOL/L — SIGNIFICANT CHANGE UP (ref 96–108)
CHLORIDE SERPL-SCNC: 108 MMOL/L — SIGNIFICANT CHANGE UP (ref 96–108)
CHLORIDE SERPL-SCNC: 108 MMOL/L — SIGNIFICANT CHANGE UP (ref 96–108)
CHLORIDE SERPL-SCNC: 109 MMOL/L — HIGH (ref 96–108)
CHLORIDE SERPL-SCNC: 110 MMOL/L — HIGH (ref 96–108)
CHLORIDE SERPL-SCNC: 110 MMOL/L — HIGH (ref 96–108)
CHLORIDE SERPL-SCNC: 112 MMOL/L — HIGH (ref 96–108)
CHLORIDE SERPL-SCNC: 115 MMOL/L — HIGH (ref 96–108)
CO2 SERPL-SCNC: 18 MMOL/L — LOW (ref 22–31)
CO2 SERPL-SCNC: 22 MMOL/L — SIGNIFICANT CHANGE UP (ref 22–31)
CO2 SERPL-SCNC: 24 MMOL/L — SIGNIFICANT CHANGE UP (ref 22–31)
CO2 SERPL-SCNC: 24 MMOL/L — SIGNIFICANT CHANGE UP (ref 22–31)
CO2 SERPL-SCNC: 25 MMOL/L — SIGNIFICANT CHANGE UP (ref 22–31)
CO2 SERPL-SCNC: 25 MMOL/L — SIGNIFICANT CHANGE UP (ref 22–31)
CO2 SERPL-SCNC: 26 MMOL/L — SIGNIFICANT CHANGE UP (ref 22–31)
CO2 SERPL-SCNC: 27 MMOL/L — SIGNIFICANT CHANGE UP (ref 22–31)
CO2 SERPL-SCNC: 27 MMOL/L — SIGNIFICANT CHANGE UP (ref 22–31)
CO2 SERPL-SCNC: 28 MMOL/L — SIGNIFICANT CHANGE UP (ref 22–31)
CO2 SERPL-SCNC: 28 MMOL/L — SIGNIFICANT CHANGE UP (ref 22–31)
CO2 SERPL-SCNC: 29 MMOL/L — SIGNIFICANT CHANGE UP (ref 22–31)
COLOR SPEC: ABNORMAL
COLOR SPEC: YELLOW — SIGNIFICANT CHANGE UP
CREAT SERPL-MCNC: 1.27 MG/DL — SIGNIFICANT CHANGE UP (ref 0.5–1.3)
CREAT SERPL-MCNC: 1.31 MG/DL — HIGH (ref 0.5–1.3)
CREAT SERPL-MCNC: 1.34 MG/DL — HIGH (ref 0.5–1.3)
CREAT SERPL-MCNC: 1.35 MG/DL — HIGH (ref 0.5–1.3)
CREAT SERPL-MCNC: 1.4 MG/DL — HIGH (ref 0.5–1.3)
CREAT SERPL-MCNC: 1.41 MG/DL — HIGH (ref 0.5–1.3)
CREAT SERPL-MCNC: 1.42 MG/DL — HIGH (ref 0.5–1.3)
CREAT SERPL-MCNC: 1.48 MG/DL — HIGH (ref 0.5–1.3)
CREAT SERPL-MCNC: 1.51 MG/DL — HIGH (ref 0.5–1.3)
CREAT SERPL-MCNC: 1.83 MG/DL — HIGH (ref 0.5–1.3)
CREAT SERPL-MCNC: 2.26 MG/DL — HIGH (ref 0.5–1.3)
CREAT SERPL-MCNC: 2.37 MG/DL — HIGH (ref 0.5–1.3)
CREAT SERPL-MCNC: 2.4 MG/DL — HIGH (ref 0.5–1.3)
CREAT SERPL-MCNC: 4.14 MG/DL — HIGH (ref 0.5–1.3)
CULTURE RESULTS: SIGNIFICANT CHANGE UP
DIFF PNL FLD: ABNORMAL
DIFF PNL FLD: NEGATIVE — SIGNIFICANT CHANGE UP
EGFR: 13 ML/MIN/1.73M2 — LOW
EGFR: 26 ML/MIN/1.73M2 — LOW
EGFR: 26 ML/MIN/1.73M2 — LOW
EGFR: 28 ML/MIN/1.73M2 — LOW
EGFR: 35 ML/MIN/1.73M2 — LOW
EGFR: 45 ML/MIN/1.73M2 — LOW
EGFR: 46 ML/MIN/1.73M2 — LOW
EGFR: 48 ML/MIN/1.73M2 — LOW
EGFR: 49 ML/MIN/1.73M2 — LOW
EGFR: 49 ML/MIN/1.73M2 — LOW
EGFR: 51 ML/MIN/1.73M2 — LOW
EGFR: 52 ML/MIN/1.73M2 — LOW
EGFR: 53 ML/MIN/1.73M2 — LOW
EGFR: 55 ML/MIN/1.73M2 — LOW
EOSINOPHIL # BLD AUTO: 0 K/UL — SIGNIFICANT CHANGE UP (ref 0–0.5)
EOSINOPHIL # BLD AUTO: 0.02 K/UL — SIGNIFICANT CHANGE UP (ref 0–0.5)
EOSINOPHIL # BLD AUTO: 0.06 K/UL — SIGNIFICANT CHANGE UP (ref 0–0.5)
EOSINOPHIL # BLD AUTO: 0.35 K/UL — SIGNIFICANT CHANGE UP (ref 0–0.5)
EOSINOPHIL # BLD AUTO: 0.62 K/UL — HIGH (ref 0–0.5)
EOSINOPHIL NFR BLD AUTO: 0 % — SIGNIFICANT CHANGE UP (ref 0–6)
EOSINOPHIL NFR BLD AUTO: 0.1 % — SIGNIFICANT CHANGE UP (ref 0–6)
EOSINOPHIL NFR BLD AUTO: 0.5 % — SIGNIFICANT CHANGE UP (ref 0–6)
EOSINOPHIL NFR BLD AUTO: 4.1 % — SIGNIFICANT CHANGE UP (ref 0–6)
EOSINOPHIL NFR BLD AUTO: 7.8 % — HIGH (ref 0–6)
FLUAV AG NPH QL: SIGNIFICANT CHANGE UP
FLUBV AG NPH QL: SIGNIFICANT CHANGE UP
GLUCOSE BLDC GLUCOMTR-MCNC: 101 MG/DL — HIGH (ref 70–99)
GLUCOSE SERPL-MCNC: 108 MG/DL — HIGH (ref 70–99)
GLUCOSE SERPL-MCNC: 108 MG/DL — HIGH (ref 70–99)
GLUCOSE SERPL-MCNC: 112 MG/DL — HIGH (ref 70–99)
GLUCOSE SERPL-MCNC: 115 MG/DL — HIGH (ref 70–99)
GLUCOSE SERPL-MCNC: 120 MG/DL — HIGH (ref 70–99)
GLUCOSE SERPL-MCNC: 123 MG/DL — HIGH (ref 70–99)
GLUCOSE SERPL-MCNC: 127 MG/DL — HIGH (ref 70–99)
GLUCOSE SERPL-MCNC: 128 MG/DL — HIGH (ref 70–99)
GLUCOSE SERPL-MCNC: 132 MG/DL — HIGH (ref 70–99)
GLUCOSE SERPL-MCNC: 138 MG/DL — HIGH (ref 70–99)
GLUCOSE SERPL-MCNC: 158 MG/DL — HIGH (ref 70–99)
GLUCOSE SERPL-MCNC: 160 MG/DL — HIGH (ref 70–99)
GLUCOSE SERPL-MCNC: 160 MG/DL — HIGH (ref 70–99)
GLUCOSE SERPL-MCNC: 186 MG/DL — HIGH (ref 70–99)
GLUCOSE UR QL: NEGATIVE MG/DL — SIGNIFICANT CHANGE UP
GLUCOSE UR QL: NEGATIVE — SIGNIFICANT CHANGE UP
GRAM STN FLD: SIGNIFICANT CHANGE UP
HCT VFR BLD CALC: 31.6 % — LOW (ref 39–50)
HCT VFR BLD CALC: 31.7 % — LOW (ref 39–50)
HCT VFR BLD CALC: 32.2 % — LOW (ref 39–50)
HCT VFR BLD CALC: 33.9 % — LOW (ref 39–50)
HCT VFR BLD CALC: 35.2 % — LOW (ref 39–50)
HCT VFR BLD CALC: 35.6 % — LOW (ref 39–50)
HCT VFR BLD CALC: 36 % — LOW (ref 39–50)
HCT VFR BLD CALC: 36.3 % — LOW (ref 39–50)
HCT VFR BLD CALC: 37.4 % — LOW (ref 39–50)
HCT VFR BLD CALC: 37.4 % — LOW (ref 39–50)
HCT VFR BLD CALC: 39.1 % — SIGNIFICANT CHANGE UP (ref 39–50)
HCT VFR BLD CALC: 39.3 % — SIGNIFICANT CHANGE UP (ref 39–50)
HCT VFR BLD CALC: 40.5 % — SIGNIFICANT CHANGE UP (ref 39–50)
HCT VFR BLD CALC: 40.7 % — SIGNIFICANT CHANGE UP (ref 39–50)
HCT VFR BLD CALC: 43.7 % — SIGNIFICANT CHANGE UP (ref 39–50)
HGB BLD-MCNC: 10.6 G/DL — LOW (ref 13–17)
HGB BLD-MCNC: 10.7 G/DL — LOW (ref 13–17)
HGB BLD-MCNC: 10.9 G/DL — LOW (ref 13–17)
HGB BLD-MCNC: 11.2 G/DL — LOW (ref 13–17)
HGB BLD-MCNC: 11.5 G/DL — LOW (ref 13–17)
HGB BLD-MCNC: 12 G/DL — LOW (ref 13–17)
HGB BLD-MCNC: 12.1 G/DL — LOW (ref 13–17)
HGB BLD-MCNC: 12.2 G/DL — LOW (ref 13–17)
HGB BLD-MCNC: 12.3 G/DL — LOW (ref 13–17)
HGB BLD-MCNC: 12.6 G/DL — LOW (ref 13–17)
HGB BLD-MCNC: 13.2 G/DL — SIGNIFICANT CHANGE UP (ref 13–17)
HGB BLD-MCNC: 13.3 G/DL — SIGNIFICANT CHANGE UP (ref 13–17)
HGB BLD-MCNC: 13.5 G/DL — SIGNIFICANT CHANGE UP (ref 13–17)
HGB BLD-MCNC: 13.7 G/DL — SIGNIFICANT CHANGE UP (ref 13–17)
HGB BLD-MCNC: 14.5 G/DL — SIGNIFICANT CHANGE UP (ref 13–17)
IMM GRANULOCYTES NFR BLD AUTO: 0.2 % — SIGNIFICANT CHANGE UP (ref 0–0.9)
IMM GRANULOCYTES NFR BLD AUTO: 0.2 % — SIGNIFICANT CHANGE UP (ref 0–0.9)
IMM GRANULOCYTES NFR BLD AUTO: 0.3 % — SIGNIFICANT CHANGE UP (ref 0–0.9)
IMM GRANULOCYTES NFR BLD AUTO: 1.4 % — HIGH (ref 0–0.9)
INR BLD: 1.08 RATIO — SIGNIFICANT CHANGE UP (ref 0.88–1.16)
INR BLD: 1.14 RATIO — SIGNIFICANT CHANGE UP (ref 0.85–1.18)
INR BLD: 1.15 RATIO — SIGNIFICANT CHANGE UP (ref 0.88–1.16)
KETONES UR-MCNC: NEGATIVE MG/DL — SIGNIFICANT CHANGE UP
KETONES UR-MCNC: NEGATIVE — SIGNIFICANT CHANGE UP
LACTATE SERPL-SCNC: 0.9 MMOL/L — SIGNIFICANT CHANGE UP (ref 0.7–2)
LACTATE SERPL-SCNC: 1.4 MMOL/L — SIGNIFICANT CHANGE UP (ref 0.7–2)
LACTATE SERPL-SCNC: 7.8 MMOL/L — CRITICAL HIGH (ref 0.7–2)
LEUKOCYTE ESTERASE UR-ACNC: ABNORMAL
LEUKOCYTE ESTERASE UR-ACNC: NEGATIVE — SIGNIFICANT CHANGE UP
LIDOCAIN IGE QN: 108 U/L — SIGNIFICANT CHANGE UP (ref 73–393)
LYMPHOCYTES # BLD AUTO: 1 K/UL — SIGNIFICANT CHANGE UP (ref 1–3.3)
LYMPHOCYTES # BLD AUTO: 1 K/UL — SIGNIFICANT CHANGE UP (ref 1–3.3)
LYMPHOCYTES # BLD AUTO: 1.05 K/UL — SIGNIFICANT CHANGE UP (ref 1–3.3)
LYMPHOCYTES # BLD AUTO: 1.61 K/UL — SIGNIFICANT CHANGE UP (ref 1–3.3)
LYMPHOCYTES # BLD AUTO: 1.65 K/UL — SIGNIFICANT CHANGE UP (ref 1–3.3)
LYMPHOCYTES # BLD AUTO: 19.4 % — SIGNIFICANT CHANGE UP (ref 13–44)
LYMPHOCYTES # BLD AUTO: 20.4 % — SIGNIFICANT CHANGE UP (ref 13–44)
LYMPHOCYTES # BLD AUTO: 3 % — LOW (ref 13–44)
LYMPHOCYTES # BLD AUTO: 7.4 % — LOW (ref 13–44)
LYMPHOCYTES # BLD AUTO: 8.5 % — LOW (ref 13–44)
MAGNESIUM SERPL-MCNC: 1.8 MG/DL — SIGNIFICANT CHANGE UP (ref 1.6–2.6)
MAGNESIUM SERPL-MCNC: 2 MG/DL — SIGNIFICANT CHANGE UP (ref 1.6–2.6)
MAGNESIUM SERPL-MCNC: 2.1 MG/DL — SIGNIFICANT CHANGE UP (ref 1.6–2.6)
MAGNESIUM SERPL-MCNC: 2.2 MG/DL — SIGNIFICANT CHANGE UP (ref 1.6–2.6)
MAGNESIUM SERPL-MCNC: 2.3 MG/DL — SIGNIFICANT CHANGE UP (ref 1.6–2.6)
MAGNESIUM SERPL-MCNC: 2.3 MG/DL — SIGNIFICANT CHANGE UP (ref 1.6–2.6)
MANUAL SMEAR VERIFICATION: SIGNIFICANT CHANGE UP
MCHC RBC-ENTMCNC: 27.8 PG — SIGNIFICANT CHANGE UP (ref 27–34)
MCHC RBC-ENTMCNC: 28.7 PG — SIGNIFICANT CHANGE UP (ref 27–34)
MCHC RBC-ENTMCNC: 29 PG — SIGNIFICANT CHANGE UP (ref 27–34)
MCHC RBC-ENTMCNC: 29.1 PG — SIGNIFICANT CHANGE UP (ref 27–34)
MCHC RBC-ENTMCNC: 29.3 PG — SIGNIFICANT CHANGE UP (ref 27–34)
MCHC RBC-ENTMCNC: 29.5 PG — SIGNIFICANT CHANGE UP (ref 27–34)
MCHC RBC-ENTMCNC: 29.7 PG — SIGNIFICANT CHANGE UP (ref 27–34)
MCHC RBC-ENTMCNC: 29.7 PG — SIGNIFICANT CHANGE UP (ref 27–34)
MCHC RBC-ENTMCNC: 29.8 PG — SIGNIFICANT CHANGE UP (ref 27–34)
MCHC RBC-ENTMCNC: 30.1 PG — SIGNIFICANT CHANGE UP (ref 27–34)
MCHC RBC-ENTMCNC: 32.3 GM/DL — SIGNIFICANT CHANGE UP (ref 32–36)
MCHC RBC-ENTMCNC: 32.9 GM/DL — SIGNIFICANT CHANGE UP (ref 32–36)
MCHC RBC-ENTMCNC: 33 GM/DL — SIGNIFICANT CHANGE UP (ref 32–36)
MCHC RBC-ENTMCNC: 33.1 GM/DL — SIGNIFICANT CHANGE UP (ref 32–36)
MCHC RBC-ENTMCNC: 33.2 GM/DL — SIGNIFICANT CHANGE UP (ref 32–36)
MCHC RBC-ENTMCNC: 33.4 GM/DL — SIGNIFICANT CHANGE UP (ref 32–36)
MCHC RBC-ENTMCNC: 33.7 GM/DL — SIGNIFICANT CHANGE UP (ref 32–36)
MCHC RBC-ENTMCNC: 33.8 GM/DL — SIGNIFICANT CHANGE UP (ref 32–36)
MCHC RBC-ENTMCNC: 33.9 GM/DL — SIGNIFICANT CHANGE UP (ref 32–36)
MCHC RBC-ENTMCNC: 34.4 GM/DL — SIGNIFICANT CHANGE UP (ref 32–36)
MCHC RBC-ENTMCNC: 34.5 GM/DL — SIGNIFICANT CHANGE UP (ref 32–36)
MCV RBC AUTO: 83.9 FL — SIGNIFICANT CHANGE UP (ref 80–100)
MCV RBC AUTO: 85.7 FL — SIGNIFICANT CHANGE UP (ref 80–100)
MCV RBC AUTO: 86.5 FL — SIGNIFICANT CHANGE UP (ref 80–100)
MCV RBC AUTO: 86.7 FL — SIGNIFICANT CHANGE UP (ref 80–100)
MCV RBC AUTO: 87.1 FL — SIGNIFICANT CHANGE UP (ref 80–100)
MCV RBC AUTO: 87.3 FL — SIGNIFICANT CHANGE UP (ref 80–100)
MCV RBC AUTO: 87.5 FL — SIGNIFICANT CHANGE UP (ref 80–100)
MCV RBC AUTO: 87.6 FL — SIGNIFICANT CHANGE UP (ref 80–100)
MCV RBC AUTO: 87.6 FL — SIGNIFICANT CHANGE UP (ref 80–100)
MCV RBC AUTO: 87.9 FL — SIGNIFICANT CHANGE UP (ref 80–100)
MCV RBC AUTO: 88.5 FL — SIGNIFICANT CHANGE UP (ref 80–100)
MCV RBC AUTO: 88.8 FL — SIGNIFICANT CHANGE UP (ref 80–100)
MCV RBC AUTO: 89 FL — SIGNIFICANT CHANGE UP (ref 80–100)
MCV RBC AUTO: 89.2 FL — SIGNIFICANT CHANGE UP (ref 80–100)
MCV RBC AUTO: 89.6 FL — SIGNIFICANT CHANGE UP (ref 80–100)
METAMYELOCYTES # FLD: 2 % — HIGH (ref 0–0)
METHOD TYPE: SIGNIFICANT CHANGE UP
METHOD TYPE: SIGNIFICANT CHANGE UP
MONOCYTES # BLD AUTO: 0.69 K/UL — SIGNIFICANT CHANGE UP (ref 0–0.9)
MONOCYTES # BLD AUTO: 0.83 K/UL — SIGNIFICANT CHANGE UP (ref 0–0.9)
MONOCYTES # BLD AUTO: 0.86 K/UL — SIGNIFICANT CHANGE UP (ref 0–0.9)
MONOCYTES # BLD AUTO: 1.01 K/UL — HIGH (ref 0–0.9)
MONOCYTES # BLD AUTO: 2.09 K/UL — HIGH (ref 0–0.9)
MONOCYTES NFR BLD AUTO: 6 % — SIGNIFICANT CHANGE UP (ref 2–14)
MONOCYTES NFR BLD AUTO: 6.4 % — SIGNIFICANT CHANGE UP (ref 2–14)
MONOCYTES NFR BLD AUTO: 8.5 % — SIGNIFICANT CHANGE UP (ref 2–14)
MONOCYTES NFR BLD AUTO: 8.7 % — SIGNIFICANT CHANGE UP (ref 2–14)
MONOCYTES NFR BLD AUTO: 9.8 % — SIGNIFICANT CHANGE UP (ref 2–14)
NEUTROPHILS # BLD AUTO: 11.48 K/UL — HIGH (ref 1.8–7.4)
NEUTROPHILS # BLD AUTO: 31.03 K/UL — HIGH (ref 1.8–7.4)
NEUTROPHILS # BLD AUTO: 4.82 K/UL — SIGNIFICANT CHANGE UP (ref 1.8–7.4)
NEUTROPHILS # BLD AUTO: 5.58 K/UL — SIGNIFICANT CHANGE UP (ref 1.8–7.4)
NEUTROPHILS # BLD AUTO: 9.68 K/UL — HIGH (ref 1.8–7.4)
NEUTROPHILS NFR BLD AUTO: 61.1 % — SIGNIFICANT CHANGE UP (ref 43–77)
NEUTROPHILS NFR BLD AUTO: 65.8 % — SIGNIFICANT CHANGE UP (ref 43–77)
NEUTROPHILS NFR BLD AUTO: 81.9 % — HIGH (ref 43–77)
NEUTROPHILS NFR BLD AUTO: 84 % — HIGH (ref 43–77)
NEUTROPHILS NFR BLD AUTO: 85.5 % — HIGH (ref 43–77)
NEUTS BAND # BLD: 5 % — SIGNIFICANT CHANGE UP (ref 0–8)
NITRITE UR-MCNC: NEGATIVE — SIGNIFICANT CHANGE UP
NITRITE UR-MCNC: NEGATIVE — SIGNIFICANT CHANGE UP
NRBC # BLD: 0 /100 WBCS — SIGNIFICANT CHANGE UP (ref 0–0)
NRBC # BLD: 0 /100 — SIGNIFICANT CHANGE UP (ref 0–0)
NT-PROBNP SERPL-SCNC: 795 PG/ML — HIGH (ref 0–450)
ORGANISM # SPEC MICROSCOPIC CNT: SIGNIFICANT CHANGE UP
PH UR: 5 — SIGNIFICANT CHANGE UP (ref 5–8)
PH UR: 8.5 (ref 5–8)
PHOSPHATE SERPL-MCNC: 3.3 MG/DL — SIGNIFICANT CHANGE UP (ref 2.5–4.5)
PHOSPHATE SERPL-MCNC: 3.3 MG/DL — SIGNIFICANT CHANGE UP (ref 2.5–4.5)
PHOSPHATE SERPL-MCNC: 3.6 MG/DL — SIGNIFICANT CHANGE UP (ref 2.5–4.5)
PHOSPHATE SERPL-MCNC: 3.9 MG/DL — SIGNIFICANT CHANGE UP (ref 2.5–4.5)
PHOSPHATE SERPL-MCNC: 3.9 MG/DL — SIGNIFICANT CHANGE UP (ref 2.5–4.5)
PHOSPHATE SERPL-MCNC: 4 MG/DL — SIGNIFICANT CHANGE UP (ref 2.5–4.5)
PHOSPHATE SERPL-MCNC: 4.2 MG/DL — SIGNIFICANT CHANGE UP (ref 2.5–4.5)
PHOSPHATE SERPL-MCNC: 4.5 MG/DL — SIGNIFICANT CHANGE UP (ref 2.5–4.5)
PHOSPHATE SERPL-MCNC: 4.8 MG/DL — HIGH (ref 2.5–4.5)
PLAT MORPH BLD: NORMAL — SIGNIFICANT CHANGE UP
PLATELET # BLD AUTO: 196 K/UL — SIGNIFICANT CHANGE UP (ref 150–400)
PLATELET # BLD AUTO: 227 K/UL — SIGNIFICANT CHANGE UP (ref 150–400)
PLATELET # BLD AUTO: 234 K/UL — SIGNIFICANT CHANGE UP (ref 150–400)
PLATELET # BLD AUTO: 238 K/UL — SIGNIFICANT CHANGE UP (ref 150–400)
PLATELET # BLD AUTO: 240 K/UL — SIGNIFICANT CHANGE UP (ref 150–400)
PLATELET # BLD AUTO: 241 K/UL — SIGNIFICANT CHANGE UP (ref 150–400)
PLATELET # BLD AUTO: 243 K/UL — SIGNIFICANT CHANGE UP (ref 150–400)
PLATELET # BLD AUTO: 245 K/UL — SIGNIFICANT CHANGE UP (ref 150–400)
PLATELET # BLD AUTO: 268 K/UL — SIGNIFICANT CHANGE UP (ref 150–400)
PLATELET # BLD AUTO: 272 K/UL — SIGNIFICANT CHANGE UP (ref 150–400)
PLATELET # BLD AUTO: 296 K/UL — SIGNIFICANT CHANGE UP (ref 150–400)
PLATELET # BLD AUTO: 301 K/UL — SIGNIFICANT CHANGE UP (ref 150–400)
PLATELET # BLD AUTO: 325 K/UL — SIGNIFICANT CHANGE UP (ref 150–400)
POTASSIUM SERPL-MCNC: 3 MMOL/L — LOW (ref 3.5–5.3)
POTASSIUM SERPL-MCNC: 3.1 MMOL/L — LOW (ref 3.5–5.3)
POTASSIUM SERPL-MCNC: 3.2 MMOL/L — LOW (ref 3.5–5.3)
POTASSIUM SERPL-MCNC: 3.3 MMOL/L — LOW (ref 3.5–5.3)
POTASSIUM SERPL-MCNC: 3.3 MMOL/L — LOW (ref 3.5–5.3)
POTASSIUM SERPL-MCNC: 3.5 MMOL/L — SIGNIFICANT CHANGE UP (ref 3.5–5.3)
POTASSIUM SERPL-MCNC: 3.5 MMOL/L — SIGNIFICANT CHANGE UP (ref 3.5–5.3)
POTASSIUM SERPL-MCNC: 4 MMOL/L — SIGNIFICANT CHANGE UP (ref 3.5–5.3)
POTASSIUM SERPL-MCNC: 4.6 MMOL/L — SIGNIFICANT CHANGE UP (ref 3.5–5.3)
POTASSIUM SERPL-SCNC: 3 MMOL/L — LOW (ref 3.5–5.3)
POTASSIUM SERPL-SCNC: 3.1 MMOL/L — LOW (ref 3.5–5.3)
POTASSIUM SERPL-SCNC: 3.2 MMOL/L — LOW (ref 3.5–5.3)
POTASSIUM SERPL-SCNC: 3.3 MMOL/L — LOW (ref 3.5–5.3)
POTASSIUM SERPL-SCNC: 3.3 MMOL/L — LOW (ref 3.5–5.3)
POTASSIUM SERPL-SCNC: 3.5 MMOL/L — SIGNIFICANT CHANGE UP (ref 3.5–5.3)
POTASSIUM SERPL-SCNC: 3.5 MMOL/L — SIGNIFICANT CHANGE UP (ref 3.5–5.3)
POTASSIUM SERPL-SCNC: 4 MMOL/L — SIGNIFICANT CHANGE UP (ref 3.5–5.3)
POTASSIUM SERPL-SCNC: 4.6 MMOL/L — SIGNIFICANT CHANGE UP (ref 3.5–5.3)
PROT SERPL-MCNC: 6 G/DL — SIGNIFICANT CHANGE UP (ref 6–8.3)
PROT SERPL-MCNC: 6 G/DL — SIGNIFICANT CHANGE UP (ref 6–8.3)
PROT SERPL-MCNC: 6.6 G/DL — SIGNIFICANT CHANGE UP (ref 6–8.3)
PROT SERPL-MCNC: 7.4 G/DL — SIGNIFICANT CHANGE UP (ref 6–8.3)
PROT SERPL-MCNC: 7.5 G/DL — SIGNIFICANT CHANGE UP (ref 6–8.3)
PROT SERPL-MCNC: 8.3 G/DL — SIGNIFICANT CHANGE UP (ref 6–8.3)
PROT UR-MCNC: 15 MG/DL
PROT UR-MCNC: >=1000 MG/DL
PROTEUS SP DNA BLD POS QL NAA+NON-PROBE: SIGNIFICANT CHANGE UP
PROTHROM AB SERPL-ACNC: 12.9 SEC — SIGNIFICANT CHANGE UP (ref 10.5–13.4)
PROTHROM AB SERPL-ACNC: 13 SEC — SIGNIFICANT CHANGE UP (ref 9.5–13)
PROTHROM AB SERPL-ACNC: 13.7 SEC — HIGH (ref 10.5–13.4)
PSA SERPL-MCNC: <0.01 NG/ML
RBC # BLD: 3.62 M/UL — LOW (ref 4.2–5.8)
RBC # BLD: 3.62 M/UL — LOW (ref 4.2–5.8)
RBC # BLD: 3.68 M/UL — LOW (ref 4.2–5.8)
RBC # BLD: 3.8 M/UL — LOW (ref 4.2–5.8)
RBC # BLD: 4.01 M/UL — LOW (ref 4.2–5.8)
RBC # BLD: 4.06 M/UL — LOW (ref 4.2–5.8)
RBC # BLD: 4.1 M/UL — LOW (ref 4.2–5.8)
RBC # BLD: 4.2 M/UL — SIGNIFICANT CHANGE UP (ref 4.2–5.8)
RBC # BLD: 4.2 M/UL — SIGNIFICANT CHANGE UP (ref 4.2–5.8)
RBC # BLD: 4.27 M/UL — SIGNIFICANT CHANGE UP (ref 4.2–5.8)
RBC # BLD: 4.45 M/UL — SIGNIFICANT CHANGE UP (ref 4.2–5.8)
RBC # BLD: 4.51 M/UL — SIGNIFICANT CHANGE UP (ref 4.2–5.8)
RBC # BLD: 4.54 M/UL — SIGNIFICANT CHANGE UP (ref 4.2–5.8)
RBC # BLD: 4.68 M/UL — SIGNIFICANT CHANGE UP (ref 4.2–5.8)
RBC # BLD: 5.21 M/UL — SIGNIFICANT CHANGE UP (ref 4.2–5.8)
RBC # FLD: 12.5 % — SIGNIFICANT CHANGE UP (ref 10.3–14.5)
RBC # FLD: 12.5 % — SIGNIFICANT CHANGE UP (ref 10.3–14.5)
RBC # FLD: 12.6 % — SIGNIFICANT CHANGE UP (ref 10.3–14.5)
RBC # FLD: 12.6 % — SIGNIFICANT CHANGE UP (ref 10.3–14.5)
RBC # FLD: 12.7 % — SIGNIFICANT CHANGE UP (ref 10.3–14.5)
RBC # FLD: 13 % — SIGNIFICANT CHANGE UP (ref 10.3–14.5)
RBC # FLD: 13.2 % — SIGNIFICANT CHANGE UP (ref 10.3–14.5)
RBC # FLD: 13.6 % — SIGNIFICANT CHANGE UP (ref 10.3–14.5)
RBC # FLD: 13.6 % — SIGNIFICANT CHANGE UP (ref 10.3–14.5)
RBC # FLD: 13.7 % — SIGNIFICANT CHANGE UP (ref 10.3–14.5)
RBC # FLD: 14 % — SIGNIFICANT CHANGE UP (ref 10.3–14.5)
RBC # FLD: 15.8 % — HIGH (ref 10.3–14.5)
RBC BLD AUTO: NORMAL — SIGNIFICANT CHANGE UP
RBC CASTS # UR COMP ASSIST: 5 /HPF — HIGH (ref 0–4)
SARS-COV-2 RNA SPEC QL NAA+PROBE: DETECTED
SARS-COV-2 RNA SPEC QL NAA+PROBE: SIGNIFICANT CHANGE UP
SARS-COV-2 RNA SPEC QL NAA+PROBE: SIGNIFICANT CHANGE UP
SODIUM SERPL-SCNC: 138 MMOL/L — SIGNIFICANT CHANGE UP (ref 135–145)
SODIUM SERPL-SCNC: 139 MMOL/L — SIGNIFICANT CHANGE UP (ref 135–145)
SODIUM SERPL-SCNC: 140 MMOL/L — SIGNIFICANT CHANGE UP (ref 135–145)
SODIUM SERPL-SCNC: 142 MMOL/L — SIGNIFICANT CHANGE UP (ref 135–145)
SODIUM SERPL-SCNC: 142 MMOL/L — SIGNIFICANT CHANGE UP (ref 135–145)
SODIUM SERPL-SCNC: 143 MMOL/L — SIGNIFICANT CHANGE UP (ref 135–145)
SODIUM SERPL-SCNC: 144 MMOL/L — SIGNIFICANT CHANGE UP (ref 135–145)
SODIUM SERPL-SCNC: 144 MMOL/L — SIGNIFICANT CHANGE UP (ref 135–145)
SODIUM SERPL-SCNC: 145 MMOL/L — SIGNIFICANT CHANGE UP (ref 135–145)
SODIUM SERPL-SCNC: 145 MMOL/L — SIGNIFICANT CHANGE UP (ref 135–145)
SP GR SPEC: 1.01 — SIGNIFICANT CHANGE UP (ref 1.01–1.02)
SP GR SPEC: 1.02 — SIGNIFICANT CHANGE UP (ref 1–1.03)
SPECIMEN SOURCE: SIGNIFICANT CHANGE UP
TRI-PHOS CRY UR QL COMP ASSIST: PRESENT
TROPONIN I, HIGH SENSITIVITY RESULT: 41.3 NG/L — SIGNIFICANT CHANGE UP
TROPONIN I, HIGH SENSITIVITY RESULT: 83.2 NG/L — HIGH
TROPONIN I, HIGH SENSITIVITY RESULT: 87.7 NG/L — HIGH
TROPONIN I, HIGH SENSITIVITY RESULT: 9.1 NG/L — SIGNIFICANT CHANGE UP
TROPONIN I, HIGH SENSITIVITY RESULT: HIGH NG/L
TSH SERPL-MCNC: 2.95 UU/ML — SIGNIFICANT CHANGE UP (ref 0.34–4.82)
UROBILINOGEN FLD QL: 0.2 MG/DL — SIGNIFICANT CHANGE UP (ref 0.2–1)
UROBILINOGEN FLD QL: NEGATIVE — SIGNIFICANT CHANGE UP
WBC # BLD: 10.59 K/UL — HIGH (ref 3.8–10.5)
WBC # BLD: 10.68 K/UL — HIGH (ref 3.8–10.5)
WBC # BLD: 11.23 K/UL — HIGH (ref 3.8–10.5)
WBC # BLD: 11.76 K/UL — HIGH (ref 3.8–10.5)
WBC # BLD: 11.83 K/UL — HIGH (ref 3.8–10.5)
WBC # BLD: 13.44 K/UL — HIGH (ref 3.8–10.5)
WBC # BLD: 34.86 K/UL — HIGH (ref 3.8–10.5)
WBC # BLD: 7.9 K/UL — SIGNIFICANT CHANGE UP (ref 3.8–10.5)
WBC # BLD: 8.3 K/UL — SIGNIFICANT CHANGE UP (ref 3.8–10.5)
WBC # BLD: 8.49 K/UL — SIGNIFICANT CHANGE UP (ref 3.8–10.5)
WBC # BLD: 8.57 K/UL — SIGNIFICANT CHANGE UP (ref 3.8–10.5)
WBC # BLD: 8.62 K/UL — SIGNIFICANT CHANGE UP (ref 3.8–10.5)
WBC # BLD: 8.74 K/UL — SIGNIFICANT CHANGE UP (ref 3.8–10.5)
WBC # BLD: 9.32 K/UL — SIGNIFICANT CHANGE UP (ref 3.8–10.5)
WBC # BLD: 9.67 K/UL — SIGNIFICANT CHANGE UP (ref 3.8–10.5)
WBC # FLD AUTO: 10.59 K/UL — HIGH (ref 3.8–10.5)
WBC # FLD AUTO: 10.68 K/UL — HIGH (ref 3.8–10.5)
WBC # FLD AUTO: 11.23 K/UL — HIGH (ref 3.8–10.5)
WBC # FLD AUTO: 11.76 K/UL — HIGH (ref 3.8–10.5)
WBC # FLD AUTO: 11.83 K/UL — HIGH (ref 3.8–10.5)
WBC # FLD AUTO: 13.44 K/UL — HIGH (ref 3.8–10.5)
WBC # FLD AUTO: 34.86 K/UL — HIGH (ref 3.8–10.5)
WBC # FLD AUTO: 7.9 K/UL — SIGNIFICANT CHANGE UP (ref 3.8–10.5)
WBC # FLD AUTO: 8.3 K/UL — SIGNIFICANT CHANGE UP (ref 3.8–10.5)
WBC # FLD AUTO: 8.49 K/UL — SIGNIFICANT CHANGE UP (ref 3.8–10.5)
WBC # FLD AUTO: 8.57 K/UL — SIGNIFICANT CHANGE UP (ref 3.8–10.5)
WBC # FLD AUTO: 8.62 K/UL — SIGNIFICANT CHANGE UP (ref 3.8–10.5)
WBC # FLD AUTO: 8.74 K/UL — SIGNIFICANT CHANGE UP (ref 3.8–10.5)
WBC # FLD AUTO: 9.32 K/UL — SIGNIFICANT CHANGE UP (ref 3.8–10.5)
WBC # FLD AUTO: 9.67 K/UL — SIGNIFICANT CHANGE UP (ref 3.8–10.5)
WBC UR QL: 2 /HPF — SIGNIFICANT CHANGE UP (ref 0–5)

## 2023-01-01 PROCEDURE — 99497 ADVNCD CARE PLAN 30 MIN: CPT | Mod: 25

## 2023-01-01 PROCEDURE — 84484 ASSAY OF TROPONIN QUANT: CPT

## 2023-01-01 PROCEDURE — 51700 IRRIGATION OF BLADDER: CPT

## 2023-01-01 PROCEDURE — 99221 1ST HOSP IP/OBS SF/LOW 40: CPT

## 2023-01-01 PROCEDURE — 83735 ASSAY OF MAGNESIUM: CPT

## 2023-01-01 PROCEDURE — 99214 OFFICE O/P EST MOD 30 MIN: CPT | Mod: 25

## 2023-01-01 PROCEDURE — 93005 ELECTROCARDIOGRAM TRACING: CPT

## 2023-01-01 PROCEDURE — 83880 ASSAY OF NATRIURETIC PEPTIDE: CPT

## 2023-01-01 PROCEDURE — 85730 THROMBOPLASTIN TIME PARTIAL: CPT

## 2023-01-01 PROCEDURE — 87040 BLOOD CULTURE FOR BACTERIA: CPT

## 2023-01-01 PROCEDURE — 93010 ELECTROCARDIOGRAM REPORT: CPT

## 2023-01-01 PROCEDURE — 83605 ASSAY OF LACTIC ACID: CPT

## 2023-01-01 PROCEDURE — 85027 COMPLETE CBC AUTOMATED: CPT

## 2023-01-01 PROCEDURE — 99497 ADVNCD CARE PLAN 30 MIN: CPT

## 2023-01-01 PROCEDURE — 97110 THERAPEUTIC EXERCISES: CPT

## 2023-01-01 PROCEDURE — 36415 COLL VENOUS BLD VENIPUNCTURE: CPT

## 2023-01-01 PROCEDURE — 97530 THERAPEUTIC ACTIVITIES: CPT

## 2023-01-01 PROCEDURE — 87186 SC STD MICRODIL/AGAR DIL: CPT

## 2023-01-01 PROCEDURE — 84100 ASSAY OF PHOSPHORUS: CPT

## 2023-01-01 PROCEDURE — 85025 COMPLETE CBC W/AUTO DIFF WBC: CPT

## 2023-01-01 PROCEDURE — 83690 ASSAY OF LIPASE: CPT

## 2023-01-01 PROCEDURE — 87077 CULTURE AEROBIC IDENTIFY: CPT

## 2023-01-01 PROCEDURE — A4216: CPT | Mod: NC

## 2023-01-01 PROCEDURE — 74176 CT ABD & PELVIS W/O CONTRAST: CPT | Mod: MA

## 2023-01-01 PROCEDURE — 81001 URINALYSIS AUTO W/SCOPE: CPT

## 2023-01-01 PROCEDURE — 87086 URINE CULTURE/COLONY COUNT: CPT

## 2023-01-01 PROCEDURE — 71045 X-RAY EXAM CHEST 1 VIEW: CPT | Mod: 26

## 2023-01-01 PROCEDURE — 99285 EMERGENCY DEPT VISIT HI MDM: CPT

## 2023-01-01 PROCEDURE — 99233 SBSQ HOSP IP/OBS HIGH 50: CPT

## 2023-01-01 PROCEDURE — 97162 PT EVAL MOD COMPLEX 30 MIN: CPT

## 2023-01-01 PROCEDURE — 99232 SBSQ HOSP IP/OBS MODERATE 35: CPT

## 2023-01-01 PROCEDURE — 99291 CRITICAL CARE FIRST HOUR: CPT

## 2023-01-01 PROCEDURE — 71250 CT THORAX DX C-: CPT | Mod: MA

## 2023-01-01 PROCEDURE — 74176 CT ABD & PELVIS W/O CONTRAST: CPT | Mod: 26,MA

## 2023-01-01 PROCEDURE — 86901 BLOOD TYPING SEROLOGIC RH(D): CPT

## 2023-01-01 PROCEDURE — 99232 SBSQ HOSP IP/OBS MODERATE 35: CPT | Mod: GC

## 2023-01-01 PROCEDURE — 96375 TX/PRO/DX INJ NEW DRUG ADDON: CPT

## 2023-01-01 PROCEDURE — 96374 THER/PROPH/DIAG INJ IV PUSH: CPT

## 2023-01-01 PROCEDURE — 84443 ASSAY THYROID STIM HORMONE: CPT

## 2023-01-01 PROCEDURE — 87635 SARS-COV-2 COVID-19 AMP PRB: CPT

## 2023-01-01 PROCEDURE — 99238 HOSP IP/OBS DSCHRG MGMT 30/<: CPT

## 2023-01-01 PROCEDURE — 86850 RBC ANTIBODY SCREEN: CPT

## 2023-01-01 PROCEDURE — 99223 1ST HOSP IP/OBS HIGH 75: CPT

## 2023-01-01 PROCEDURE — 99223 1ST HOSP IP/OBS HIGH 75: CPT | Mod: GC

## 2023-01-01 PROCEDURE — 99285 EMERGENCY DEPT VISIT HI MDM: CPT | Mod: 25

## 2023-01-01 PROCEDURE — 99239 HOSP IP/OBS DSCHRG MGMT >30: CPT

## 2023-01-01 PROCEDURE — 80053 COMPREHEN METABOLIC PANEL: CPT

## 2023-01-01 PROCEDURE — 74176 CT ABD & PELVIS W/O CONTRAST: CPT

## 2023-01-01 PROCEDURE — 71250 CT THORAX DX C-: CPT | Mod: 26,MA

## 2023-01-01 PROCEDURE — 87637 SARSCOV2&INF A&B&RSV AMP PRB: CPT

## 2023-01-01 PROCEDURE — 86900 BLOOD TYPING SEROLOGIC ABO: CPT

## 2023-01-01 PROCEDURE — 71045 X-RAY EXAM CHEST 1 VIEW: CPT

## 2023-01-01 PROCEDURE — 80048 BASIC METABOLIC PNL TOTAL CA: CPT

## 2023-01-01 PROCEDURE — 99232 SBSQ HOSP IP/OBS MODERATE 35: CPT | Mod: GW

## 2023-01-01 PROCEDURE — 82962 GLUCOSE BLOOD TEST: CPT

## 2023-01-01 PROCEDURE — 99222 1ST HOSP IP/OBS MODERATE 55: CPT

## 2023-01-01 PROCEDURE — 85610 PROTHROMBIN TIME: CPT

## 2023-01-01 PROCEDURE — 99233 SBSQ HOSP IP/OBS HIGH 50: CPT | Mod: GC

## 2023-01-01 PROCEDURE — 99233 SBSQ HOSP IP/OBS HIGH 50: CPT | Mod: 25,GC

## 2023-01-01 PROCEDURE — 99214 OFFICE O/P EST MOD 30 MIN: CPT

## 2023-01-01 PROCEDURE — 87150 DNA/RNA AMPLIFIED PROBE: CPT

## 2023-01-01 PROCEDURE — 74176 CT ABD & PELVIS W/O CONTRAST: CPT | Mod: 26

## 2023-01-01 PROCEDURE — 93306 TTE W/DOPPLER COMPLETE: CPT

## 2023-01-01 RX ORDER — POTASSIUM CHLORIDE 20 MEQ
40 PACKET (EA) ORAL ONCE
Refills: 0 | Status: COMPLETED | OUTPATIENT
Start: 2023-01-01 | End: 2023-01-01

## 2023-01-01 RX ORDER — SCOPALAMINE 1 MG/3D
1 PATCH, EXTENDED RELEASE TRANSDERMAL
Refills: 0 | Status: DISCONTINUED | OUTPATIENT
Start: 2023-01-01 | End: 2023-01-01

## 2023-01-01 RX ORDER — CEFAZOLIN SODIUM 1 G
2000 VIAL (EA) INJECTION ONCE
Refills: 0 | Status: COMPLETED | OUTPATIENT
Start: 2023-01-01 | End: 2023-01-01

## 2023-01-01 RX ORDER — SODIUM CHLORIDE 9 MG/ML
500 INJECTION INTRAMUSCULAR; INTRAVENOUS; SUBCUTANEOUS ONCE
Refills: 0 | Status: COMPLETED | OUTPATIENT
Start: 2023-01-01 | End: 2023-01-01

## 2023-01-01 RX ORDER — ASPIRIN/CALCIUM CARB/MAGNESIUM 324 MG
1 TABLET ORAL
Refills: 0 | DISCHARGE

## 2023-01-01 RX ORDER — MEMANTINE HYDROCHLORIDE 10 MG/1
5 TABLET ORAL DAILY
Refills: 0 | Status: DISCONTINUED | OUTPATIENT
Start: 2023-01-01 | End: 2023-01-01

## 2023-01-01 RX ORDER — DEXTROSE MONOHYDRATE, SODIUM CHLORIDE, AND POTASSIUM CHLORIDE 50; .745; 4.5 G/1000ML; G/1000ML; G/1000ML
1000 INJECTION, SOLUTION INTRAVENOUS
Refills: 0 | Status: DISCONTINUED | OUTPATIENT
Start: 2023-01-01 | End: 2023-01-01

## 2023-01-01 RX ORDER — POLYETHYLENE GLYCOL 3350 17 G/17G
17 POWDER, FOR SOLUTION ORAL EVERY 24 HOURS
Refills: 0 | Status: DISCONTINUED | OUTPATIENT
Start: 2023-01-01 | End: 2023-01-01

## 2023-01-01 RX ORDER — SOD SULF/SODIUM/NAHCO3/KCL/PEG
2000 SOLUTION, RECONSTITUTED, ORAL ORAL ONCE
Refills: 0 | Status: COMPLETED | OUTPATIENT
Start: 2023-01-01 | End: 2023-01-01

## 2023-01-01 RX ORDER — HYDRALAZINE HCL 50 MG
100 TABLET ORAL
Refills: 0 | Status: DISCONTINUED | OUTPATIENT
Start: 2023-01-01 | End: 2023-01-01

## 2023-01-01 RX ORDER — HYDROMORPHONE HYDROCHLORIDE 2 MG/ML
0.2 INJECTION INTRAMUSCULAR; INTRAVENOUS; SUBCUTANEOUS
Qty: 100 | Refills: 0 | Status: DISCONTINUED | OUTPATIENT
Start: 2023-01-01 | End: 2023-01-01

## 2023-01-01 RX ORDER — SODIUM CHLORIDE 9 MG/ML
1000 INJECTION INTRAMUSCULAR; INTRAVENOUS; SUBCUTANEOUS ONCE
Refills: 0 | Status: COMPLETED | OUTPATIENT
Start: 2023-01-01 | End: 2023-01-01

## 2023-01-01 RX ORDER — DONEPEZIL HYDROCHLORIDE 10 MG/1
10 TABLET, FILM COATED ORAL AT BEDTIME
Refills: 0 | Status: DISCONTINUED | OUTPATIENT
Start: 2023-01-01 | End: 2023-01-01

## 2023-01-01 RX ORDER — HYDROMORPHONE HYDROCHLORIDE 2 MG/ML
1 INJECTION INTRAMUSCULAR; INTRAVENOUS; SUBCUTANEOUS
Qty: 0 | Refills: 0 | DISCHARGE
Start: 2023-01-01

## 2023-01-01 RX ORDER — ATORVASTATIN CALCIUM 80 MG/1
1 TABLET, FILM COATED ORAL
Qty: 0 | Refills: 0 | DISCHARGE

## 2023-01-01 RX ORDER — HYDROMORPHONE HYDROCHLORIDE 2 MG/ML
0.2 INJECTION INTRAMUSCULAR; INTRAVENOUS; SUBCUTANEOUS EVERY 6 HOURS
Refills: 0 | Status: DISCONTINUED | OUTPATIENT
Start: 2023-01-01 | End: 2023-01-01

## 2023-01-01 RX ORDER — MEMANTINE HYDROCHLORIDE 10 MG/1
10 TABLET ORAL
Refills: 0 | Status: DISCONTINUED | OUTPATIENT
Start: 2023-01-01 | End: 2023-01-01

## 2023-01-01 RX ORDER — POTASSIUM CHLORIDE 20 MEQ
10 PACKET (EA) ORAL
Refills: 0 | Status: DISCONTINUED | OUTPATIENT
Start: 2023-01-01 | End: 2023-01-01

## 2023-01-01 RX ORDER — HYDRALAZINE HCL 50 MG
1 TABLET ORAL
Qty: 90 | Refills: 0
Start: 2023-01-01 | End: 2023-01-01

## 2023-01-01 RX ORDER — SODIUM CHLORIDE 9 MG/ML
1000 INJECTION, SOLUTION INTRAVENOUS ONCE
Refills: 0 | Status: COMPLETED | OUTPATIENT
Start: 2023-01-01 | End: 2023-01-01

## 2023-01-01 RX ORDER — ASPIRIN/CALCIUM CARB/MAGNESIUM 324 MG
600 TABLET ORAL ONCE
Refills: 0 | Status: DISCONTINUED | OUTPATIENT
Start: 2023-01-01 | End: 2023-01-01

## 2023-01-01 RX ORDER — ATORVASTATIN CALCIUM 80 MG/1
40 TABLET, FILM COATED ORAL AT BEDTIME
Refills: 0 | Status: DISCONTINUED | OUTPATIENT
Start: 2023-01-01 | End: 2023-01-01

## 2023-01-01 RX ORDER — SENNA PLUS 8.6 MG/1
2 TABLET ORAL
Qty: 0 | Refills: 0 | DISCHARGE
Start: 2023-01-01

## 2023-01-01 RX ORDER — ONDANSETRON 8 MG/1
4 TABLET, FILM COATED ORAL EVERY 6 HOURS
Refills: 0 | Status: DISCONTINUED | OUTPATIENT
Start: 2023-01-01 | End: 2023-01-01

## 2023-01-01 RX ORDER — ESCITALOPRAM OXALATE 10 MG/1
1 TABLET, FILM COATED ORAL
Qty: 0 | Refills: 0 | DISCHARGE

## 2023-01-01 RX ORDER — MEMANTINE HYDROCHLORIDE 10 MG/1
1 TABLET ORAL
Qty: 0 | Refills: 0 | DISCHARGE

## 2023-01-01 RX ORDER — AMLODIPINE BESYLATE 2.5 MG/1
1 TABLET ORAL
Qty: 0 | Refills: 0 | DISCHARGE

## 2023-01-01 RX ORDER — AMLODIPINE BESYLATE 2.5 MG/1
10 TABLET ORAL DAILY
Refills: 0 | Status: DISCONTINUED | OUTPATIENT
Start: 2023-01-01 | End: 2023-01-01

## 2023-01-01 RX ORDER — TAMSULOSIN HYDROCHLORIDE 0.4 MG/1
0.4 CAPSULE ORAL AT BEDTIME
Refills: 0 | Status: DISCONTINUED | OUTPATIENT
Start: 2023-01-01 | End: 2023-01-01

## 2023-01-01 RX ORDER — DONEPEZIL HYDROCHLORIDE 10 MG/1
1 TABLET, FILM COATED ORAL
Qty: 0 | Refills: 0 | DISCHARGE

## 2023-01-01 RX ORDER — ONDANSETRON 8 MG/1
4 TABLET, FILM COATED ORAL ONCE
Refills: 0 | Status: COMPLETED | OUTPATIENT
Start: 2023-01-01 | End: 2023-01-01

## 2023-01-01 RX ORDER — ACETAMINOPHEN 500 MG
650 TABLET ORAL EVERY 6 HOURS
Refills: 0 | Status: DISCONTINUED | OUTPATIENT
Start: 2023-01-01 | End: 2023-01-01

## 2023-01-01 RX ORDER — POLYETHYLENE GLYCOL 3350 17 G/17G
17 POWDER, FOR SOLUTION ORAL
Refills: 0 | DISCHARGE

## 2023-01-01 RX ORDER — ACETAMINOPHEN 500 MG
650 TABLET ORAL ONCE
Refills: 0 | Status: DISCONTINUED | OUTPATIENT
Start: 2023-01-01 | End: 2023-01-01

## 2023-01-01 RX ORDER — POLYETHYLENE GLYCOL 3350 17 G/17G
17 POWDER, FOR SOLUTION ORAL
Refills: 0 | Status: DISCONTINUED | OUTPATIENT
Start: 2023-01-01 | End: 2023-01-01

## 2023-01-01 RX ORDER — ENOXAPARIN SODIUM 100 MG/ML
40 INJECTION SUBCUTANEOUS EVERY 24 HOURS
Refills: 0 | Status: DISCONTINUED | OUTPATIENT
Start: 2023-01-01 | End: 2023-01-01

## 2023-01-01 RX ORDER — SODIUM CHLORIDE 9 MG/ML
1000 INJECTION INTRAMUSCULAR; INTRAVENOUS; SUBCUTANEOUS
Refills: 0 | Status: DISCONTINUED | OUTPATIENT
Start: 2023-01-01 | End: 2023-01-01

## 2023-01-01 RX ORDER — HYDROMORPHONE HYDROCHLORIDE 2 MG/ML
0.5 INJECTION INTRAMUSCULAR; INTRAVENOUS; SUBCUTANEOUS
Refills: 0 | Status: DISCONTINUED | OUTPATIENT
Start: 2023-01-01 | End: 2023-01-01

## 2023-01-01 RX ORDER — HYDRALAZINE HCL 50 MG
50 TABLET ORAL ONCE
Refills: 0 | Status: COMPLETED | OUTPATIENT
Start: 2023-01-01 | End: 2023-01-01

## 2023-01-01 RX ORDER — METOPROLOL TARTRATE 50 MG
1 TABLET ORAL
Qty: 0 | Refills: 0 | DISCHARGE

## 2023-01-01 RX ORDER — HYDRALAZINE HCL 50 MG
50 TABLET ORAL EVERY 8 HOURS
Refills: 0 | Status: DISCONTINUED | OUTPATIENT
Start: 2023-01-01 | End: 2023-01-01

## 2023-01-01 RX ORDER — POLYETHYLENE GLYCOL 3350 17 G/17G
17 POWDER, FOR SOLUTION ORAL
Qty: 0 | Refills: 0 | DISCHARGE
Start: 2023-01-01

## 2023-01-01 RX ORDER — POTASSIUM CHLORIDE 20 MEQ
20 PACKET (EA) ORAL ONCE
Refills: 0 | Status: COMPLETED | OUTPATIENT
Start: 2023-01-01 | End: 2023-01-01

## 2023-01-01 RX ORDER — HYDRALAZINE HCL 50 MG
100 TABLET ORAL ONCE
Refills: 0 | Status: COMPLETED | OUTPATIENT
Start: 2023-01-01 | End: 2023-01-01

## 2023-01-01 RX ORDER — HYDROGEN PEROXIDE 0.3 KG/100L
1 LIQUID TOPICAL DAILY
Refills: 0 | Status: DISCONTINUED | OUTPATIENT
Start: 2023-01-01 | End: 2023-01-01

## 2023-01-01 RX ORDER — CHOLECALCIFEROL (VITAMIN D3) 125 MCG
1 CAPSULE ORAL
Qty: 0 | Refills: 0 | DISCHARGE

## 2023-01-01 RX ORDER — TAMSULOSIN HYDROCHLORIDE 0.4 MG/1
1 CAPSULE ORAL
Qty: 0 | Refills: 0 | DISCHARGE
Start: 2023-01-01

## 2023-01-01 RX ORDER — ROBINUL 0.2 MG/ML
0.4 INJECTION INTRAMUSCULAR; INTRAVENOUS EVERY 4 HOURS
Refills: 0 | Status: DISCONTINUED | OUTPATIENT
Start: 2023-01-01 | End: 2023-01-01

## 2023-01-01 RX ORDER — HYDROMORPHONE HYDROCHLORIDE 2 MG/ML
0.2 INJECTION INTRAMUSCULAR; INTRAVENOUS; SUBCUTANEOUS
Refills: 0 | Status: DISCONTINUED | OUTPATIENT
Start: 2023-01-01 | End: 2023-01-01

## 2023-01-01 RX ORDER — HEPARIN SODIUM 5000 [USP'U]/ML
5000 INJECTION INTRAVENOUS; SUBCUTANEOUS EVERY 8 HOURS
Refills: 0 | Status: DISCONTINUED | OUTPATIENT
Start: 2023-01-01 | End: 2023-01-01

## 2023-01-01 RX ORDER — SENNA PLUS 8.6 MG/1
2 TABLET ORAL AT BEDTIME
Refills: 0 | Status: DISCONTINUED | OUTPATIENT
Start: 2023-01-01 | End: 2023-01-01

## 2023-01-01 RX ORDER — HYDRALAZINE HCL 50 MG
1 TABLET ORAL
Qty: 0 | Refills: 0 | DISCHARGE

## 2023-01-01 RX ORDER — HYDROMORPHONE HYDROCHLORIDE 2 MG/ML
1 INJECTION INTRAMUSCULAR; INTRAVENOUS; SUBCUTANEOUS
Refills: 0 | Status: DISCONTINUED | OUTPATIENT
Start: 2023-01-01 | End: 2023-01-01

## 2023-01-01 RX ORDER — HYDROMORPHONE HYDROCHLORIDE 2 MG/ML
0.5 INJECTION INTRAMUSCULAR; INTRAVENOUS; SUBCUTANEOUS EVERY 8 HOURS
Refills: 0 | Status: DISCONTINUED | OUTPATIENT
Start: 2023-01-01 | End: 2023-01-01

## 2023-01-01 RX ORDER — ASPIRIN/CALCIUM CARB/MAGNESIUM 324 MG
162 TABLET ORAL ONCE
Refills: 0 | Status: COMPLETED | OUTPATIENT
Start: 2023-01-01 | End: 2023-01-01

## 2023-01-01 RX ORDER — PANTOPRAZOLE SODIUM 20 MG/1
40 TABLET, DELAYED RELEASE ORAL DAILY
Refills: 0 | Status: DISCONTINUED | OUTPATIENT
Start: 2023-01-01 | End: 2023-01-01

## 2023-01-01 RX ORDER — HYDRALAZINE HCL 50 MG
100 TABLET ORAL EVERY 8 HOURS
Refills: 0 | Status: DISCONTINUED | OUTPATIENT
Start: 2023-01-01 | End: 2023-01-01

## 2023-01-01 RX ORDER — HYDRALAZINE HCL 50 MG
1 TABLET ORAL
Refills: 0 | DISCHARGE

## 2023-01-01 RX ORDER — METRONIDAZOLE 500 MG
500 TABLET ORAL ONCE
Refills: 0 | Status: DISCONTINUED | OUTPATIENT
Start: 2023-01-01 | End: 2023-01-01

## 2023-01-01 RX ORDER — ROBINUL 0.2 MG/ML
0.4 INJECTION INTRAMUSCULAR; INTRAVENOUS
Refills: 0 | Status: DISCONTINUED | OUTPATIENT
Start: 2023-01-01 | End: 2023-01-01

## 2023-01-01 RX ORDER — ASPIRIN/CALCIUM CARB/MAGNESIUM 324 MG
81 TABLET ORAL DAILY
Refills: 0 | Status: DISCONTINUED | OUTPATIENT
Start: 2023-01-01 | End: 2023-01-01

## 2023-01-01 RX ORDER — SIMVASTATIN 20 MG/1
1 TABLET, FILM COATED ORAL
Qty: 0 | Refills: 0 | DISCHARGE

## 2023-01-01 RX ORDER — SODIUM CHLORIDE 9 MG/ML
3000 INJECTION INTRAMUSCULAR; INTRAVENOUS; SUBCUTANEOUS ONCE
Refills: 0 | Status: COMPLETED | OUTPATIENT
Start: 2023-01-01 | End: 2023-01-01

## 2023-01-01 RX ORDER — BACITRACIN ZINC 500 UNIT/G
1 OINTMENT IN PACKET (EA) TOPICAL DAILY
Refills: 0 | Status: DISCONTINUED | OUTPATIENT
Start: 2023-01-01 | End: 2023-01-01

## 2023-01-01 RX ORDER — HYDROCHLOROTHIAZIDE 25 MG
1 TABLET ORAL
Qty: 0 | Refills: 0 | DISCHARGE

## 2023-01-01 RX ORDER — SODIUM CHLORIDE 9 MG/ML
1000 INJECTION, SOLUTION INTRAVENOUS
Refills: 0 | Status: DISCONTINUED | OUTPATIENT
Start: 2023-01-01 | End: 2023-01-01

## 2023-01-01 RX ORDER — POTASSIUM CHLORIDE 20 MEQ
10 PACKET (EA) ORAL ONCE
Refills: 0 | Status: COMPLETED | OUTPATIENT
Start: 2023-01-01 | End: 2023-01-01

## 2023-01-01 RX ORDER — AMPICILLIN SODIUM AND SULBACTAM SODIUM 250; 125 MG/ML; MG/ML
3 INJECTION, POWDER, FOR SUSPENSION INTRAMUSCULAR; INTRAVENOUS ONCE
Refills: 0 | Status: COMPLETED | OUTPATIENT
Start: 2023-01-01 | End: 2023-01-01

## 2023-01-01 RX ORDER — MEMANTINE HYDROCHLORIDE 10 MG/1
1 TABLET ORAL
Qty: 0 | Refills: 0 | DISCHARGE
Start: 2023-01-01

## 2023-01-01 RX ORDER — VANCOMYCIN HCL 1 G
1000 VIAL (EA) INTRAVENOUS ONCE
Refills: 0 | Status: COMPLETED | OUTPATIENT
Start: 2023-01-01 | End: 2023-01-01

## 2023-01-01 RX ORDER — ASPIRIN/CALCIUM CARB/MAGNESIUM 324 MG
1 TABLET ORAL
Qty: 0 | Refills: 0 | DISCHARGE

## 2023-01-01 RX ORDER — CHLORHEXIDINE GLUCONATE 213 G/1000ML
1 SOLUTION TOPICAL
Refills: 0 | Status: DISCONTINUED | OUTPATIENT
Start: 2023-01-01 | End: 2023-01-01

## 2023-01-01 RX ORDER — ROBINUL 0.2 MG/ML
0.4 INJECTION INTRAMUSCULAR; INTRAVENOUS
Qty: 0 | Refills: 0 | DISCHARGE
Start: 2023-01-01

## 2023-01-01 RX ORDER — ACETAMINOPHEN 500 MG
1000 TABLET ORAL ONCE
Refills: 0 | Status: COMPLETED | OUTPATIENT
Start: 2023-01-01 | End: 2023-01-01

## 2023-01-01 RX ORDER — PANTOPRAZOLE SODIUM 20 MG/1
40 TABLET, DELAYED RELEASE ORAL
Refills: 0 | Status: DISCONTINUED | OUTPATIENT
Start: 2023-01-01 | End: 2023-01-01

## 2023-01-01 RX ORDER — POTASSIUM CHLORIDE 20 MEQ
40 PACKET (EA) ORAL EVERY 4 HOURS
Refills: 0 | Status: COMPLETED | OUTPATIENT
Start: 2023-01-01 | End: 2023-01-01

## 2023-01-01 RX ADMIN — HYDROMORPHONE HYDROCHLORIDE 1 MILLIGRAM(S): 2 INJECTION INTRAMUSCULAR; INTRAVENOUS; SUBCUTANEOUS at 23:09

## 2023-01-01 RX ADMIN — MEMANTINE HYDROCHLORIDE 10 MILLIGRAM(S): 10 TABLET ORAL at 17:55

## 2023-01-01 RX ADMIN — HYDROMORPHONE HYDROCHLORIDE 0.5 MILLIGRAM(S): 2 INJECTION INTRAMUSCULAR; INTRAVENOUS; SUBCUTANEOUS at 05:19

## 2023-01-01 RX ADMIN — ROBINUL 0.4 MILLIGRAM(S): 0.2 INJECTION INTRAMUSCULAR; INTRAVENOUS at 18:13

## 2023-01-01 RX ADMIN — Medication 50 MILLIGRAM(S): at 21:07

## 2023-01-01 RX ADMIN — HYDROMORPHONE HYDROCHLORIDE 1 MILLIGRAM(S): 2 INJECTION INTRAMUSCULAR; INTRAVENOUS; SUBCUTANEOUS at 18:13

## 2023-01-01 RX ADMIN — HYDROGEN PEROXIDE 1 APPLICATION(S): 0.3 LIQUID TOPICAL at 13:35

## 2023-01-01 RX ADMIN — Medication 40 MILLIEQUIVALENT(S): at 12:32

## 2023-01-01 RX ADMIN — ROBINUL 0.4 MILLIGRAM(S): 0.2 INJECTION INTRAMUSCULAR; INTRAVENOUS at 02:44

## 2023-01-01 RX ADMIN — HYDROMORPHONE HYDROCHLORIDE 0.5 MILLIGRAM(S): 2 INJECTION INTRAMUSCULAR; INTRAVENOUS; SUBCUTANEOUS at 13:28

## 2023-01-01 RX ADMIN — POLYETHYLENE GLYCOL 3350 17 GRAM(S): 17 POWDER, FOR SOLUTION ORAL at 21:39

## 2023-01-01 RX ADMIN — MEMANTINE HYDROCHLORIDE 10 MILLIGRAM(S): 10 TABLET ORAL at 06:49

## 2023-01-01 RX ADMIN — DEXTROSE MONOHYDRATE, SODIUM CHLORIDE, AND POTASSIUM CHLORIDE 75 MILLILITER(S): 50; .745; 4.5 INJECTION, SOLUTION INTRAVENOUS at 10:51

## 2023-01-01 RX ADMIN — AMLODIPINE BESYLATE 10 MILLIGRAM(S): 2.5 TABLET ORAL at 05:39

## 2023-01-01 RX ADMIN — Medication 100 MILLIEQUIVALENT(S): at 02:29

## 2023-01-01 RX ADMIN — MEMANTINE HYDROCHLORIDE 10 MILLIGRAM(S): 10 TABLET ORAL at 18:35

## 2023-01-01 RX ADMIN — Medication 162 MILLIGRAM(S): at 20:40

## 2023-01-01 RX ADMIN — ROBINUL 0.4 MILLIGRAM(S): 0.2 INJECTION INTRAMUSCULAR; INTRAVENOUS at 14:52

## 2023-01-01 RX ADMIN — HYDROMORPHONE HYDROCHLORIDE 1 MILLIGRAM(S): 2 INJECTION INTRAMUSCULAR; INTRAVENOUS; SUBCUTANEOUS at 22:34

## 2023-01-01 RX ADMIN — Medication 100 MILLIGRAM(S): at 15:32

## 2023-01-01 RX ADMIN — POLYETHYLENE GLYCOL 3350 17 GRAM(S): 17 POWDER, FOR SOLUTION ORAL at 22:03

## 2023-01-01 RX ADMIN — Medication 100 MILLIGRAM(S): at 13:04

## 2023-01-01 RX ADMIN — Medication 100 MILLIGRAM(S): at 10:53

## 2023-01-01 RX ADMIN — DONEPEZIL HYDROCHLORIDE 10 MILLIGRAM(S): 10 TABLET, FILM COATED ORAL at 21:26

## 2023-01-01 RX ADMIN — ATORVASTATIN CALCIUM 40 MILLIGRAM(S): 80 TABLET, FILM COATED ORAL at 21:41

## 2023-01-01 RX ADMIN — SODIUM CHLORIDE 1000 MILLILITER(S): 9 INJECTION INTRAMUSCULAR; INTRAVENOUS; SUBCUTANEOUS at 20:40

## 2023-01-01 RX ADMIN — Medication 81 MILLIGRAM(S): at 11:13

## 2023-01-01 RX ADMIN — Medication 100 MILLIEQUIVALENT(S): at 11:15

## 2023-01-01 RX ADMIN — HYDROMORPHONE HYDROCHLORIDE 0.5 MILLIGRAM(S): 2 INJECTION INTRAMUSCULAR; INTRAVENOUS; SUBCUTANEOUS at 03:10

## 2023-01-01 RX ADMIN — Medication 1000 MILLIGRAM(S): at 02:47

## 2023-01-01 RX ADMIN — Medication 1 APPLICATION(S): at 13:15

## 2023-01-01 RX ADMIN — SODIUM CHLORIDE 70 MILLILITER(S): 9 INJECTION INTRAMUSCULAR; INTRAVENOUS; SUBCUTANEOUS at 18:15

## 2023-01-01 RX ADMIN — SCOPALAMINE 1 PATCH: 1 PATCH, EXTENDED RELEASE TRANSDERMAL at 19:22

## 2023-01-01 RX ADMIN — HYDROMORPHONE HYDROCHLORIDE 0.2 MILLIGRAM(S): 2 INJECTION INTRAMUSCULAR; INTRAVENOUS; SUBCUTANEOUS at 13:07

## 2023-01-01 RX ADMIN — HYDROMORPHONE HYDROCHLORIDE 1 MILLIGRAM(S): 2 INJECTION INTRAMUSCULAR; INTRAVENOUS; SUBCUTANEOUS at 06:05

## 2023-01-01 RX ADMIN — SENNA PLUS 2 TABLET(S): 8.6 TABLET ORAL at 21:39

## 2023-01-01 RX ADMIN — HYDROGEN PEROXIDE 1 APPLICATION(S): 0.3 LIQUID TOPICAL at 11:16

## 2023-01-01 RX ADMIN — ROBINUL 0.4 MILLIGRAM(S): 0.2 INJECTION INTRAMUSCULAR; INTRAVENOUS at 14:55

## 2023-01-01 RX ADMIN — POLYETHYLENE GLYCOL 3350 17 GRAM(S): 17 POWDER, FOR SOLUTION ORAL at 18:01

## 2023-01-01 RX ADMIN — HYDROMORPHONE HYDROCHLORIDE 0.5 MILLIGRAM(S): 2 INJECTION INTRAMUSCULAR; INTRAVENOUS; SUBCUTANEOUS at 22:51

## 2023-01-01 RX ADMIN — SCOPALAMINE 1 PATCH: 1 PATCH, EXTENDED RELEASE TRANSDERMAL at 00:06

## 2023-01-01 RX ADMIN — Medication 81 MILLIGRAM(S): at 11:24

## 2023-01-01 RX ADMIN — Medication 20 MILLIEQUIVALENT(S): at 02:29

## 2023-01-01 RX ADMIN — SODIUM CHLORIDE 1000 MILLILITER(S): 9 INJECTION, SOLUTION INTRAVENOUS at 04:03

## 2023-01-01 RX ADMIN — PANTOPRAZOLE SODIUM 40 MILLIGRAM(S): 20 TABLET, DELAYED RELEASE ORAL at 05:28

## 2023-01-01 RX ADMIN — ATORVASTATIN CALCIUM 40 MILLIGRAM(S): 80 TABLET, FILM COATED ORAL at 21:57

## 2023-01-01 RX ADMIN — HYDROGEN PEROXIDE 1 APPLICATION(S): 0.3 LIQUID TOPICAL at 18:53

## 2023-01-01 RX ADMIN — ROBINUL 0.4 MILLIGRAM(S): 0.2 INJECTION INTRAMUSCULAR; INTRAVENOUS at 17:59

## 2023-01-01 RX ADMIN — Medication 650 MILLIGRAM(S): at 23:07

## 2023-01-01 RX ADMIN — Medication 40 MILLIEQUIVALENT(S): at 12:04

## 2023-01-01 RX ADMIN — HYDROMORPHONE HYDROCHLORIDE 0.5 MILLIGRAM(S): 2 INJECTION INTRAMUSCULAR; INTRAVENOUS; SUBCUTANEOUS at 13:11

## 2023-01-01 RX ADMIN — Medication 650 MILLIGRAM(S): at 06:56

## 2023-01-01 RX ADMIN — ROBINUL 0.4 MILLIGRAM(S): 0.2 INJECTION INTRAMUSCULAR; INTRAVENOUS at 22:27

## 2023-01-01 RX ADMIN — HYDROGEN PEROXIDE 1 APPLICATION(S): 0.3 LIQUID TOPICAL at 13:07

## 2023-01-01 RX ADMIN — ATORVASTATIN CALCIUM 40 MILLIGRAM(S): 80 TABLET, FILM COATED ORAL at 21:36

## 2023-01-01 RX ADMIN — HYDROMORPHONE HYDROCHLORIDE 0.5 MILLIGRAM(S): 2 INJECTION INTRAMUSCULAR; INTRAVENOUS; SUBCUTANEOUS at 02:42

## 2023-01-01 RX ADMIN — TAMSULOSIN HYDROCHLORIDE 0.4 MILLIGRAM(S): 0.4 CAPSULE ORAL at 21:34

## 2023-01-01 RX ADMIN — ROBINUL 0.4 MILLIGRAM(S): 0.2 INJECTION INTRAMUSCULAR; INTRAVENOUS at 06:05

## 2023-01-01 RX ADMIN — POLYETHYLENE GLYCOL 3350 17 GRAM(S): 17 POWDER, FOR SOLUTION ORAL at 22:30

## 2023-01-01 RX ADMIN — MEMANTINE HYDROCHLORIDE 10 MILLIGRAM(S): 10 TABLET ORAL at 17:07

## 2023-01-01 RX ADMIN — Medication 650 MILLIGRAM(S): at 22:27

## 2023-01-01 RX ADMIN — Medication 40 MILLIEQUIVALENT(S): at 08:41

## 2023-01-01 RX ADMIN — ENOXAPARIN SODIUM 40 MILLIGRAM(S): 100 INJECTION SUBCUTANEOUS at 17:55

## 2023-01-01 RX ADMIN — Medication 40 MILLIEQUIVALENT(S): at 16:41

## 2023-01-01 RX ADMIN — POLYETHYLENE GLYCOL 3350 17 GRAM(S): 17 POWDER, FOR SOLUTION ORAL at 06:49

## 2023-01-01 RX ADMIN — ROBINUL 0.4 MILLIGRAM(S): 0.2 INJECTION INTRAMUSCULAR; INTRAVENOUS at 11:09

## 2023-01-01 RX ADMIN — Medication 100 MILLIGRAM(S): at 21:36

## 2023-01-01 RX ADMIN — PANTOPRAZOLE SODIUM 40 MILLIGRAM(S): 20 TABLET, DELAYED RELEASE ORAL at 13:43

## 2023-01-01 RX ADMIN — Medication 1 APPLICATION(S): at 11:21

## 2023-01-01 RX ADMIN — PANTOPRAZOLE SODIUM 40 MILLIGRAM(S): 20 TABLET, DELAYED RELEASE ORAL at 03:18

## 2023-01-01 RX ADMIN — ATORVASTATIN CALCIUM 40 MILLIGRAM(S): 80 TABLET, FILM COATED ORAL at 21:33

## 2023-01-01 RX ADMIN — Medication 1 APPLICATION(S): at 12:02

## 2023-01-01 RX ADMIN — SODIUM CHLORIDE 3000 MILLILITER(S): 9 INJECTION INTRAMUSCULAR; INTRAVENOUS; SUBCUTANEOUS at 11:30

## 2023-01-01 RX ADMIN — HYDROMORPHONE HYDROCHLORIDE 0.5 MILLIGRAM(S): 2 INJECTION INTRAMUSCULAR; INTRAVENOUS; SUBCUTANEOUS at 23:28

## 2023-01-01 RX ADMIN — ENOXAPARIN SODIUM 40 MILLIGRAM(S): 100 INJECTION SUBCUTANEOUS at 18:36

## 2023-01-01 RX ADMIN — MEMANTINE HYDROCHLORIDE 10 MILLIGRAM(S): 10 TABLET ORAL at 17:39

## 2023-01-01 RX ADMIN — SENNA PLUS 2 TABLET(S): 8.6 TABLET ORAL at 22:03

## 2023-01-01 RX ADMIN — Medication 1 APPLICATION(S): at 12:11

## 2023-01-01 RX ADMIN — AMLODIPINE BESYLATE 10 MILLIGRAM(S): 2.5 TABLET ORAL at 05:16

## 2023-01-01 RX ADMIN — AMLODIPINE BESYLATE 10 MILLIGRAM(S): 2.5 TABLET ORAL at 06:49

## 2023-01-01 RX ADMIN — MEMANTINE HYDROCHLORIDE 10 MILLIGRAM(S): 10 TABLET ORAL at 05:28

## 2023-01-01 RX ADMIN — HYDROMORPHONE HYDROCHLORIDE 1 MILLIGRAM(S): 2 INJECTION INTRAMUSCULAR; INTRAVENOUS; SUBCUTANEOUS at 11:42

## 2023-01-01 RX ADMIN — ROBINUL 0.4 MILLIGRAM(S): 0.2 INJECTION INTRAMUSCULAR; INTRAVENOUS at 05:34

## 2023-01-01 RX ADMIN — MEMANTINE HYDROCHLORIDE 10 MILLIGRAM(S): 10 TABLET ORAL at 06:24

## 2023-01-01 RX ADMIN — HYDROMORPHONE HYDROCHLORIDE 0.2 MILLIGRAM(S): 2 INJECTION INTRAMUSCULAR; INTRAVENOUS; SUBCUTANEOUS at 18:09

## 2023-01-01 RX ADMIN — HYDROGEN PEROXIDE 1 APPLICATION(S): 0.3 LIQUID TOPICAL at 12:11

## 2023-01-01 RX ADMIN — POLYETHYLENE GLYCOL 3350 17 GRAM(S): 17 POWDER, FOR SOLUTION ORAL at 18:36

## 2023-01-01 RX ADMIN — Medication 100 MILLIGRAM(S): at 05:38

## 2023-01-01 RX ADMIN — MEMANTINE HYDROCHLORIDE 10 MILLIGRAM(S): 10 TABLET ORAL at 05:38

## 2023-01-01 RX ADMIN — HYDROMORPHONE HYDROCHLORIDE 0.5 MILLIGRAM(S): 2 INJECTION INTRAMUSCULAR; INTRAVENOUS; SUBCUTANEOUS at 13:23

## 2023-01-01 RX ADMIN — Medication 650 MILLIGRAM(S): at 08:28

## 2023-01-01 RX ADMIN — PANTOPRAZOLE SODIUM 40 MILLIGRAM(S): 20 TABLET, DELAYED RELEASE ORAL at 12:20

## 2023-01-01 RX ADMIN — MEMANTINE HYDROCHLORIDE 10 MILLIGRAM(S): 10 TABLET ORAL at 18:00

## 2023-01-01 RX ADMIN — ROBINUL 0.4 MILLIGRAM(S): 0.2 INJECTION INTRAMUSCULAR; INTRAVENOUS at 02:00

## 2023-01-01 RX ADMIN — Medication 250 MILLIGRAM(S): at 11:52

## 2023-01-01 RX ADMIN — ROBINUL 0.4 MILLIGRAM(S): 0.2 INJECTION INTRAMUSCULAR; INTRAVENOUS at 17:34

## 2023-01-01 RX ADMIN — SCOPALAMINE 1 PATCH: 1 PATCH, EXTENDED RELEASE TRANSDERMAL at 07:52

## 2023-01-01 RX ADMIN — HYDROMORPHONE HYDROCHLORIDE 0.5 MILLIGRAM(S): 2 INJECTION INTRAMUSCULAR; INTRAVENOUS; SUBCUTANEOUS at 06:19

## 2023-01-01 RX ADMIN — SCOPALAMINE 1 PATCH: 1 PATCH, EXTENDED RELEASE TRANSDERMAL at 07:45

## 2023-01-01 RX ADMIN — AMLODIPINE BESYLATE 10 MILLIGRAM(S): 2.5 TABLET ORAL at 05:28

## 2023-01-01 RX ADMIN — PANTOPRAZOLE SODIUM 40 MILLIGRAM(S): 20 TABLET, DELAYED RELEASE ORAL at 06:22

## 2023-01-01 RX ADMIN — Medication 81 MILLIGRAM(S): at 12:32

## 2023-01-01 RX ADMIN — Medication 100 MILLIGRAM(S): at 17:07

## 2023-01-01 RX ADMIN — Medication 40 MILLIEQUIVALENT(S): at 11:40

## 2023-01-01 RX ADMIN — Medication 100 MILLIGRAM(S): at 18:06

## 2023-01-01 RX ADMIN — Medication 100 MILLIGRAM(S): at 21:57

## 2023-01-01 RX ADMIN — Medication 2000 MILLILITER(S): at 13:16

## 2023-01-01 RX ADMIN — ATORVASTATIN CALCIUM 40 MILLIGRAM(S): 80 TABLET, FILM COATED ORAL at 21:49

## 2023-01-01 RX ADMIN — Medication 100 MILLIEQUIVALENT(S): at 09:58

## 2023-01-01 RX ADMIN — Medication 1 APPLICATION(S): at 18:53

## 2023-01-01 RX ADMIN — Medication 50 MILLIGRAM(S): at 11:13

## 2023-01-01 RX ADMIN — AMLODIPINE BESYLATE 10 MILLIGRAM(S): 2.5 TABLET ORAL at 05:31

## 2023-01-01 RX ADMIN — Medication 100 MILLIGRAM(S): at 05:31

## 2023-01-01 RX ADMIN — AMLODIPINE BESYLATE 10 MILLIGRAM(S): 2.5 TABLET ORAL at 05:11

## 2023-01-01 RX ADMIN — SENNA PLUS 2 TABLET(S): 8.6 TABLET ORAL at 21:57

## 2023-01-01 RX ADMIN — HYDROMORPHONE HYDROCHLORIDE 1 MILLIGRAM(S): 2 INJECTION INTRAMUSCULAR; INTRAVENOUS; SUBCUTANEOUS at 22:50

## 2023-01-01 RX ADMIN — HYDROMORPHONE HYDROCHLORIDE 0.2 MG/HR: 2 INJECTION INTRAMUSCULAR; INTRAVENOUS; SUBCUTANEOUS at 22:27

## 2023-01-01 RX ADMIN — Medication 400 MILLIGRAM(S): at 01:22

## 2023-01-01 RX ADMIN — ROBINUL 0.4 MILLIGRAM(S): 0.2 INJECTION INTRAMUSCULAR; INTRAVENOUS at 06:02

## 2023-01-01 RX ADMIN — HYDROMORPHONE HYDROCHLORIDE 1 MILLIGRAM(S): 2 INJECTION INTRAMUSCULAR; INTRAVENOUS; SUBCUTANEOUS at 06:25

## 2023-01-01 RX ADMIN — Medication 50 MILLIGRAM(S): at 21:26

## 2023-01-01 RX ADMIN — HYDROMORPHONE HYDROCHLORIDE 0.2 MG/HR: 2 INJECTION INTRAMUSCULAR; INTRAVENOUS; SUBCUTANEOUS at 06:25

## 2023-01-01 RX ADMIN — ROBINUL 0.4 MILLIGRAM(S): 0.2 INJECTION INTRAMUSCULAR; INTRAVENOUS at 23:09

## 2023-01-01 RX ADMIN — SENNA PLUS 2 TABLET(S): 8.6 TABLET ORAL at 21:33

## 2023-01-01 RX ADMIN — SODIUM CHLORIDE 100 MILLILITER(S): 9 INJECTION, SOLUTION INTRAVENOUS at 09:58

## 2023-01-01 RX ADMIN — SCOPALAMINE 1 PATCH: 1 PATCH, EXTENDED RELEASE TRANSDERMAL at 07:35

## 2023-01-01 RX ADMIN — HYDROGEN PEROXIDE 1 APPLICATION(S): 0.3 LIQUID TOPICAL at 12:02

## 2023-01-01 RX ADMIN — ROBINUL 0.4 MILLIGRAM(S): 0.2 INJECTION INTRAMUSCULAR; INTRAVENOUS at 11:37

## 2023-01-01 RX ADMIN — TAMSULOSIN HYDROCHLORIDE 0.4 MILLIGRAM(S): 0.4 CAPSULE ORAL at 21:57

## 2023-01-01 RX ADMIN — HYDROMORPHONE HYDROCHLORIDE 1 MILLIGRAM(S): 2 INJECTION INTRAMUSCULAR; INTRAVENOUS; SUBCUTANEOUS at 06:30

## 2023-01-01 RX ADMIN — SCOPALAMINE 1 PATCH: 1 PATCH, EXTENDED RELEASE TRANSDERMAL at 23:59

## 2023-01-01 RX ADMIN — MEMANTINE HYDROCHLORIDE 10 MILLIGRAM(S): 10 TABLET ORAL at 05:16

## 2023-01-01 RX ADMIN — HYDROMORPHONE HYDROCHLORIDE 0.5 MILLIGRAM(S): 2 INJECTION INTRAMUSCULAR; INTRAVENOUS; SUBCUTANEOUS at 11:11

## 2023-01-01 RX ADMIN — ONDANSETRON 4 MILLIGRAM(S): 8 TABLET, FILM COATED ORAL at 17:11

## 2023-01-01 RX ADMIN — AMLODIPINE BESYLATE 10 MILLIGRAM(S): 2.5 TABLET ORAL at 06:24

## 2023-01-01 RX ADMIN — MEMANTINE HYDROCHLORIDE 10 MILLIGRAM(S): 10 TABLET ORAL at 18:36

## 2023-01-01 RX ADMIN — Medication 50 MILLIGRAM(S): at 15:36

## 2023-01-01 RX ADMIN — Medication 650 MILLIGRAM(S): at 18:48

## 2023-01-01 RX ADMIN — Medication 1 APPLICATION(S): at 12:20

## 2023-01-01 RX ADMIN — Medication 1 ENEMA: at 18:44

## 2023-01-01 RX ADMIN — Medication 100 MILLIGRAM(S): at 18:00

## 2023-01-01 RX ADMIN — Medication 50 MILLIGRAM(S): at 05:12

## 2023-01-01 RX ADMIN — MEMANTINE HYDROCHLORIDE 10 MILLIGRAM(S): 10 TABLET ORAL at 17:30

## 2023-01-01 RX ADMIN — MEMANTINE HYDROCHLORIDE 10 MILLIGRAM(S): 10 TABLET ORAL at 05:12

## 2023-01-01 RX ADMIN — SENNA PLUS 2 TABLET(S): 8.6 TABLET ORAL at 21:41

## 2023-01-01 RX ADMIN — HYDROMORPHONE HYDROCHLORIDE 0.2 MILLIGRAM(S): 2 INJECTION INTRAMUSCULAR; INTRAVENOUS; SUBCUTANEOUS at 18:36

## 2023-01-01 RX ADMIN — DEXTROSE MONOHYDRATE, SODIUM CHLORIDE, AND POTASSIUM CHLORIDE 75 MILLILITER(S): 50; .745; 4.5 INJECTION, SOLUTION INTRAVENOUS at 02:13

## 2023-01-01 RX ADMIN — ROBINUL 0.4 MILLIGRAM(S): 0.2 INJECTION INTRAMUSCULAR; INTRAVENOUS at 10:41

## 2023-01-01 RX ADMIN — Medication 100 MILLIGRAM(S): at 06:24

## 2023-01-01 RX ADMIN — Medication 50 MILLIGRAM(S): at 05:16

## 2023-01-01 RX ADMIN — ROBINUL 0.4 MILLIGRAM(S): 0.2 INJECTION INTRAMUSCULAR; INTRAVENOUS at 22:34

## 2023-01-01 RX ADMIN — PANTOPRAZOLE SODIUM 40 MILLIGRAM(S): 20 TABLET, DELAYED RELEASE ORAL at 06:50

## 2023-01-01 RX ADMIN — HYDROGEN PEROXIDE 1 APPLICATION(S): 0.3 LIQUID TOPICAL at 13:43

## 2023-01-01 RX ADMIN — AMLODIPINE BESYLATE 10 MILLIGRAM(S): 2.5 TABLET ORAL at 05:38

## 2023-01-01 RX ADMIN — SODIUM CHLORIDE 70 MILLILITER(S): 9 INJECTION INTRAMUSCULAR; INTRAVENOUS; SUBCUTANEOUS at 21:34

## 2023-01-01 RX ADMIN — SCOPALAMINE 1 PATCH: 1 PATCH, EXTENDED RELEASE TRANSDERMAL at 20:07

## 2023-01-01 RX ADMIN — HYDROMORPHONE HYDROCHLORIDE 0.2 MILLIGRAM(S): 2 INJECTION INTRAMUSCULAR; INTRAVENOUS; SUBCUTANEOUS at 04:44

## 2023-01-01 RX ADMIN — HYDROMORPHONE HYDROCHLORIDE 0.5 MILLIGRAM(S): 2 INJECTION INTRAMUSCULAR; INTRAVENOUS; SUBCUTANEOUS at 05:34

## 2023-01-01 RX ADMIN — HYDROMORPHONE HYDROCHLORIDE 1 MILLIGRAM(S): 2 INJECTION INTRAMUSCULAR; INTRAVENOUS; SUBCUTANEOUS at 06:02

## 2023-01-01 RX ADMIN — Medication 1 MILLIGRAM(S): at 13:17

## 2023-01-01 RX ADMIN — MEMANTINE HYDROCHLORIDE 10 MILLIGRAM(S): 10 TABLET ORAL at 05:39

## 2023-01-01 RX ADMIN — CHLORHEXIDINE GLUCONATE 1 APPLICATION(S): 213 SOLUTION TOPICAL at 11:36

## 2023-01-01 RX ADMIN — SODIUM CHLORIDE 1000 MILLILITER(S): 9 INJECTION INTRAMUSCULAR; INTRAVENOUS; SUBCUTANEOUS at 13:42

## 2023-01-01 RX ADMIN — HYDROMORPHONE HYDROCHLORIDE 0.5 MILLIGRAM(S): 2 INJECTION INTRAMUSCULAR; INTRAVENOUS; SUBCUTANEOUS at 13:17

## 2023-01-01 RX ADMIN — Medication 40 MILLIEQUIVALENT(S): at 10:52

## 2023-01-01 RX ADMIN — HYDROMORPHONE HYDROCHLORIDE 1 MILLIGRAM(S): 2 INJECTION INTRAMUSCULAR; INTRAVENOUS; SUBCUTANEOUS at 18:18

## 2023-01-01 RX ADMIN — ROBINUL 0.4 MILLIGRAM(S): 0.2 INJECTION INTRAMUSCULAR; INTRAVENOUS at 02:12

## 2023-01-01 RX ADMIN — HYDROMORPHONE HYDROCHLORIDE 0.2 MILLIGRAM(S): 2 INJECTION INTRAMUSCULAR; INTRAVENOUS; SUBCUTANEOUS at 03:12

## 2023-01-01 RX ADMIN — SCOPALAMINE 1 PATCH: 1 PATCH, EXTENDED RELEASE TRANSDERMAL at 08:28

## 2023-01-01 RX ADMIN — HYDROMORPHONE HYDROCHLORIDE 0.2 MG/HR: 2 INJECTION INTRAMUSCULAR; INTRAVENOUS; SUBCUTANEOUS at 06:15

## 2023-01-01 RX ADMIN — ATORVASTATIN CALCIUM 40 MILLIGRAM(S): 80 TABLET, FILM COATED ORAL at 21:26

## 2023-01-01 RX ADMIN — MEMANTINE HYDROCHLORIDE 10 MILLIGRAM(S): 10 TABLET ORAL at 18:01

## 2023-01-01 RX ADMIN — HYDROMORPHONE HYDROCHLORIDE 0.2 MILLIGRAM(S): 2 INJECTION INTRAMUSCULAR; INTRAVENOUS; SUBCUTANEOUS at 19:18

## 2023-01-01 RX ADMIN — SENNA PLUS 2 TABLET(S): 8.6 TABLET ORAL at 21:50

## 2023-01-01 RX ADMIN — DEXTROSE MONOHYDRATE, SODIUM CHLORIDE, AND POTASSIUM CHLORIDE 75 MILLILITER(S): 50; .745; 4.5 INJECTION, SOLUTION INTRAVENOUS at 22:12

## 2023-01-01 RX ADMIN — Medication 100 MILLIGRAM(S): at 05:39

## 2023-01-01 RX ADMIN — Medication 100 MILLIGRAM(S): at 17:30

## 2023-01-01 RX ADMIN — HYDROMORPHONE HYDROCHLORIDE 0.2 MG/HR: 2 INJECTION INTRAMUSCULAR; INTRAVENOUS; SUBCUTANEOUS at 23:07

## 2023-01-01 RX ADMIN — POLYETHYLENE GLYCOL 3350 17 GRAM(S): 17 POWDER, FOR SOLUTION ORAL at 06:24

## 2023-01-01 RX ADMIN — HYDROMORPHONE HYDROCHLORIDE 0.5 MILLIGRAM(S): 2 INJECTION INTRAMUSCULAR; INTRAVENOUS; SUBCUTANEOUS at 13:45

## 2023-01-01 RX ADMIN — HYDROMORPHONE HYDROCHLORIDE 0.5 MILLIGRAM(S): 2 INJECTION INTRAMUSCULAR; INTRAVENOUS; SUBCUTANEOUS at 00:43

## 2023-01-01 RX ADMIN — MEMANTINE HYDROCHLORIDE 10 MILLIGRAM(S): 10 TABLET ORAL at 05:30

## 2023-01-01 RX ADMIN — ENOXAPARIN SODIUM 40 MILLIGRAM(S): 100 INJECTION SUBCUTANEOUS at 18:04

## 2023-01-01 RX ADMIN — Medication 650 MILLIGRAM(S): at 15:13

## 2023-01-01 RX ADMIN — HYDROMORPHONE HYDROCHLORIDE 1 MILLIGRAM(S): 2 INJECTION INTRAMUSCULAR; INTRAVENOUS; SUBCUTANEOUS at 12:14

## 2023-01-01 RX ADMIN — ROBINUL 0.4 MILLIGRAM(S): 0.2 INJECTION INTRAMUSCULAR; INTRAVENOUS at 18:05

## 2023-01-01 RX ADMIN — ATORVASTATIN CALCIUM 40 MILLIGRAM(S): 80 TABLET, FILM COATED ORAL at 21:38

## 2023-01-01 RX ADMIN — Medication 40 MILLIEQUIVALENT(S): at 11:24

## 2023-01-01 RX ADMIN — SODIUM CHLORIDE 70 MILLILITER(S): 9 INJECTION INTRAMUSCULAR; INTRAVENOUS; SUBCUTANEOUS at 12:11

## 2023-01-01 RX ADMIN — ROBINUL 0.4 MILLIGRAM(S): 0.2 INJECTION INTRAMUSCULAR; INTRAVENOUS at 23:28

## 2023-01-01 RX ADMIN — Medication 100 MILLIEQUIVALENT(S): at 13:09

## 2023-01-01 RX ADMIN — AMPICILLIN SODIUM AND SULBACTAM SODIUM 200 GRAM(S): 250; 125 INJECTION, POWDER, FOR SUSPENSION INTRAMUSCULAR; INTRAVENOUS at 11:15

## 2023-01-01 RX ADMIN — SCOPALAMINE 1 PATCH: 1 PATCH, EXTENDED RELEASE TRANSDERMAL at 19:30

## 2023-01-01 RX ADMIN — HYDROMORPHONE HYDROCHLORIDE 0.2 MILLIGRAM(S): 2 INJECTION INTRAMUSCULAR; INTRAVENOUS; SUBCUTANEOUS at 20:08

## 2023-01-01 RX ADMIN — ROBINUL 0.4 MILLIGRAM(S): 0.2 INJECTION INTRAMUSCULAR; INTRAVENOUS at 11:11

## 2023-01-01 RX ADMIN — HYDROMORPHONE HYDROCHLORIDE 0.5 MILLIGRAM(S): 2 INJECTION INTRAMUSCULAR; INTRAVENOUS; SUBCUTANEOUS at 06:31

## 2023-01-01 RX ADMIN — Medication 100 MILLIGRAM(S): at 06:49

## 2023-01-01 RX ADMIN — Medication 1 APPLICATION(S): at 13:42

## 2023-01-01 RX ADMIN — POLYETHYLENE GLYCOL 3350 17 GRAM(S): 17 POWDER, FOR SOLUTION ORAL at 05:28

## 2023-01-01 RX ADMIN — ATORVASTATIN CALCIUM 40 MILLIGRAM(S): 80 TABLET, FILM COATED ORAL at 22:03

## 2023-01-01 RX ADMIN — Medication 100 MILLIGRAM(S): at 12:02

## 2023-01-01 RX ADMIN — ATORVASTATIN CALCIUM 40 MILLIGRAM(S): 80 TABLET, FILM COATED ORAL at 21:06

## 2023-01-01 RX ADMIN — HYDROMORPHONE HYDROCHLORIDE 0.2 MILLIGRAM(S): 2 INJECTION INTRAMUSCULAR; INTRAVENOUS; SUBCUTANEOUS at 13:09

## 2023-01-01 RX ADMIN — Medication 650 MILLIGRAM(S): at 17:35

## 2023-01-01 RX ADMIN — PANTOPRAZOLE SODIUM 40 MILLIGRAM(S): 20 TABLET, DELAYED RELEASE ORAL at 13:15

## 2023-01-01 RX ADMIN — HYDROMORPHONE HYDROCHLORIDE 1 MILLIGRAM(S): 2 INJECTION INTRAMUSCULAR; INTRAVENOUS; SUBCUTANEOUS at 23:30

## 2023-01-01 RX ADMIN — Medication 650 MILLIGRAM(S): at 11:43

## 2023-01-01 RX ADMIN — DEXTROSE MONOHYDRATE, SODIUM CHLORIDE, AND POTASSIUM CHLORIDE 75 MILLILITER(S): 50; .745; 4.5 INJECTION, SOLUTION INTRAVENOUS at 13:18

## 2023-01-01 RX ADMIN — HYDROMORPHONE HYDROCHLORIDE 0.5 MILLIGRAM(S): 2 INJECTION INTRAMUSCULAR; INTRAVENOUS; SUBCUTANEOUS at 22:43

## 2023-02-28 PROBLEM — I10 ESSENTIAL (PRIMARY) HYPERTENSION: Chronic | Status: ACTIVE | Noted: 2022-06-25

## 2023-02-28 PROBLEM — E78.5 HYPERLIPIDEMIA, UNSPECIFIED: Chronic | Status: ACTIVE | Noted: 2022-06-25

## 2023-02-28 PROBLEM — F03.90 UNSPECIFIED DEMENTIA WITHOUT BEHAVIORAL DISTURBANCE: Chronic | Status: ACTIVE | Noted: 2022-06-25

## 2023-02-28 NOTE — ED PROVIDER NOTE - PHYSICAL EXAMINATION
CONSTITUTIONAL: non-toxic, well appearing  SKIN: no rash, no petechiae.  EYES: PERRL, EOMI, pink conjunctiva, anicteric  ENT: tongue and uvular midline, no exudates, moist mucous membranes  NECK: Supple; no meningismus, no JVD  CARD: Bradycardic, regular rhyhtm, no murmurs, equal radial pulses bilaterally 2+  RESP: CTAB, no respiratory distress  ABD: Soft, non-tender, non-distended, no peritoneal signs  EXT: Normal ROM x4. No edema.   NEURO: Alert, oriented. Neuro exam nonfocal  PSYCH: Cooperative, appropriate.

## 2023-02-28 NOTE — ED PROVIDER NOTE - OBJECTIVE STATEMENT
87-year-old male with past medical history dementia, hypertension, hyperlipidemia, tachybradycardia syndrome presents with bradycardia.  History limited from patient, history also obtained from son at bedside.  Per son, patient had physical therapy today and physical therapy as was checking patient's vital signs and noted to be bradycardic to the 20s.  Per son, patient with history of tachybradycardia syndrome.  Patient states he feels well at this time denies any complaints.  Denies any fevers, chest pain, shortness of breath, dizziness, lightheadedness, syncope, vomiting, diarrhea, abdominal pain, numbness, weakness, or rash.  Per son, denies any excess doses of medications or new medications.  Denies any recent illness.  Denies any recent injury or trauma.  Denies any additional complaints.    Meds: metoprolol, donepezil, hydralazine, aspirin, atorvastatin, memantine, vitamin D3, HCTZ

## 2023-02-28 NOTE — H&P ADULT - NSHPPHYSICALEXAM_GEN_ALL_CORE
LOS:     VITALS:   T(C): 36.4 (02-28-23 @ 15:42), Max: 36.4 (02-28-23 @ 15:42)  HR: 64 (02-28-23 @ 15:42) (33 - 64)  BP: 180/80 (02-28-23 @ 11:35) (180/80 - 180/80)  RR: 15 (02-28-23 @ 15:42) (15 - 17)  SpO2: 95% (02-28-23 @ 15:42) (95% - 95%)    GENERAL: NAD, lying in bed comfortably  HEAD:  Atraumatic, Normocephalic  EYES: EOMI, PERRLA, conjunctiva and sclera clear  ENT: Moist mucous membranes  NECK: Supple, No JVD  CHEST/LUNG: Clear to auscultation bilaterally; No rales, rhonchi, wheezing, or rubs. Unlabored respirations  HEART: irregular rate and rhythm; No murmurs, rubs, or gallops  ABDOMEN: BSx4; Soft, nontender, nondistended  EXTREMITIES:  2+ Peripheral Pulses, brisk capillary refill. No clubbing, cyanosis, or edema  NERVOUS SYSTEM:  A&Ox1, no focal deficits   SKIN: No rashes or lesions

## 2023-02-28 NOTE — H&P ADULT - ASSESSMENT
This is an 86 yo M from home lives with wife, 24hr HHA, walks with walker  with pmhx dementia (AAOx1-2 at baseline), tachy- daniel syndrome, HTN, HLD presenting for bradycardia.

## 2023-02-28 NOTE — ED PROVIDER NOTE - CLINICAL SUMMARY MEDICAL DECISION MAKING FREE TEXT BOX
Aleshia: 87-year-old male with past medical history dementia, hypertension, hyperlipidemia, tachybradycardia syndrome presents with bradycardia.  History limited from patient, history also obtained from son at bedside.  Per son, patient had physical therapy today and physical therapy as was checking patient's vital signs and noted to be bradycardic to the 20s.  Per son, patient with history of tachybradycardia syndrome.  Patient states he feels well at this time denies any complaints.  Denies any fevers, chest pain, shortness of breath, dizziness, lightheadedness, syncope, vomiting, diarrhea, abdominal pain, numbness, weakness, or rash.  Per son, denies any excess doses of medications or new medications.  Denies any recent illness.  Denies any recent injury or trauma.  EKG with sinus bradycardia with 1st degree AV block and occasional PVCs, no high grade AV block. Patient on multiple potential juan luis blocking agents including metoprolol and donepezil. Pt well appearing in NAD, asymptomatic, extremities warm dry well perfused. Will obtain labs r/o electrolyte abnormality r/o hypothyroidism, supportive treatment, tele monitoring, likely admit.

## 2023-02-28 NOTE — H&P ADULT - HISTORY OF PRESENT ILLNESS
This is an 86 yo M from home lives with wife, 24hr HHA,  with pmhx dementia (AAOx1-2 at baseline), tachy- daniel, HTN, HLD presenting for bradycardia. Per son, patient was  This is an 88 yo M from home lives with wife, 24hr HHA, walks with walker  with pmhx dementia (AAOx1-2 at baseline), tachy- daniel syndrome, HTN, HLD presenting for bradycardia. Per son, patient was working with Physical Therapy, and when they took his vitals found that his HR was in the 30's, they called EMS. Patient follows with Cardiologist, Dr. Osito Winston, however, hasn't seen him for the past year. Pt denies any chest pain, SOB, N/V/D.

## 2023-02-28 NOTE — ED PROVIDER NOTE - NSICDXPASTMEDICALHX_GEN_ALL_CORE_FT
PAST MEDICAL HISTORY:  Dementia     Dyslipidemia     HLD (hyperlipidemia)     HTN (hypertension)     HTN (hypertension)     Mitral valve regurgitation     Prostate cancer

## 2023-02-28 NOTE — H&P ADULT - ATTENDING COMMENTS
87M PMH dementia, htn, hld, tachybradycardia syndrome p/w bradycardia. Asx. EKG sinus w 1st degree av block.    #asx stable sinus bradycardia  #chronic tachybrady syndrome  #1st degree av block  #htn  #hld  #dementia oriented x1 baseline, 24h HHA    -ekg noted  -consult cards regarding need for inpatient workup  -hold home BB  -resume home hydralazine, norvasc, hctz  -resume home donepezil,memantine  -dvt ppx - lovenox 87M PMH dementia, htn, hld, tachybradycardia syndrome p/w bradycardia. Asx. EKG sinus w 1st degree av block.    #asx stable sinus bradycardia  #chronic tachybrady syndrome  #1st degree av block  #htn  #hld  #dementia oriented x1 baseline, 24h HHA  #BNP elevation  #Cr elevation    -ekg noted  -consult cards regarding need for inpatient workup  -hold home BB  -resume home hydralazine, norvasc, hctz  -resume home donepezil,memantine  -dvt ppx - lovenox 87M PMH dementia, htn, hld, tachybradycardia syndrome p/w bradycardia. Asx. EKG sinus w 1st degree av block.    #asx stable sinus bradycardia  #chronic tachybrady syndrome  #1st degree av block  #htn  #hld  #dementia oriented x1 baseline, 24h HHA  #BNP elevation  #Cr elevation    -ekg noted  -s/p 500 cc NS, trend Cr  -consult cards regarding need for inpatient workup  -hold home BB  -resume home hydralazine, norvasc, hctz  -resume home donepezil,memantine  -dvt ppx - lovenox

## 2023-02-28 NOTE — ED ADULT NURSE NOTE - OBJECTIVE STATEMENT
BIBA from home with son at the bedside for low HR. Pt has hx of daniel-tachy syndrome and dementia. Pt is asymtomatic.

## 2023-02-28 NOTE — ED ADULT TRIAGE NOTE - CHIEF COMPLAINT QUOTE
BIBA from home for low HR in the 20s. hx of daniel-tachy syndrome. Pt is asymptomatic. hx of dementia.

## 2023-02-28 NOTE — H&P ADULT - PROBLEM SELECTOR PLAN 1
Pt with hx of tachy-daniel syndrome  Presenting with asymptomatic bradycardia  Hold home med of metoprolol in setting of bradycardia  f/u TTE  Cardio - Dr. Mcclure consulted

## 2023-02-28 NOTE — H&P ADULT - CONVERSATION DETAILS
An extensive goals of care conversation was held including options, risks and benefits of many medical treatments including CPR, intubation, and tube feeding. Son, states that he does not want his father to suffer in case his heart were to stop or his breathing were to worsen. He would like to continue all medical management and treatments. As per his best interests, pt has been made DNR/DNI. A MOLST has been completed to this effect. An extensive goals of care conversation was held including options, risks and benefits of many medical treatments including CPR, intubation, and tube feeding. Son, Bravo Doll, states that he does not want his father to suffer in case his heart were to stop or his breathing were to worsen. He would like to continue all medical management and treatments. As per his best interests, pt has been made DNR/DNI. A MOLST has been completed to this effect.

## 2023-03-01 NOTE — CONSULT NOTE ADULT - ASSESSMENT
This is an 86 yo M from home lives with wife, 24hr HHA, walks with walker  with pmhx dementia (AAOx1-2 at baseline), tachy- daniel syndrome, HTN, HLD presenting for bradycardia.      Problem/Plan - 1:  ·  Problem: Bradycardia.   ·  Plan: Pt with hx of tachy-daniel syndrome  Presenting with asymptomatic bradycardia  Hold home med of metoprolol in setting of bradycardia  f/u TTE  HR 60-70's on tele  EP consuted, f/u recs.     Problem/Plan - 2:  ·  Problem: HTN (hypertension).   ·  Plan: On hydralazine, amlodipine and HCTZ  c/w home meds  /83 (3/1).     Problem/Plan - 3:  ·  Problem: Dementia.   ·  Plan: on donepezil and memantine  c/w home meds  d/c'd donapezil d/t bradycardia.     Problem/Plan - 4:  ·  Problem: Prophylactic measure.   ·  Plan: Lovenox for DVT ppx.

## 2023-03-01 NOTE — CONSULT NOTE ADULT - TIME BILLING
- Review of records, telemetry, vital signs and daily labs.   - General and cardiovascular physical examination.  - Generation of cardiovascular treatment plan.  - Coordination of care.      Patient was seen and examined by me on 3/1/23,interim events noted,labs and radiology studies reviewed.  Raul Mcclure MD,FACC.  [address] [place], [place] [id number].  [phone]

## 2023-03-01 NOTE — CONSULT NOTE ADULT - PROBLEM SELECTOR RECOMMENDATION 9
-patient asymptomatic  -hold Metoprolol  -obtain echo for structural abnormalities and EF  -continue monitoring off AV node blockers

## 2023-03-01 NOTE — PROGRESS NOTE ADULT - PROBLEM SELECTOR PLAN 1
Pt with hx of tachy-daniel syndrome  Presenting with asymptomatic bradycardia  Hold home med of metoprolol in setting of bradycardia  f/u TTE  Cardio - Dr. Mcclure consulted  HR 60-70's on tele  EP consuted, f/u recs

## 2023-03-01 NOTE — PATIENT PROFILE ADULT - FALL HARM RISK - HARM RISK INTERVENTIONS

## 2023-03-01 NOTE — CONSULT NOTE ADULT - REASON FOR ADMISSION
pt. c/o chest pain with exertion and shortness of breath while walking uphill
Bradycardia
Bradycardia

## 2023-03-01 NOTE — CONSULT NOTE ADULT - ASSESSMENT
This is an 88 yo M from home lives with wife, 24hr HHA, walks with walker  with pmhx dementia (AAOx1-2 at baseline), tachy- daniel syndrome, HTN, HLD presenting for bradycardia. Per son, patient was working with Physical Therapy, and when they took his vitals found that his HR was in the 30's, they called EMS. Patient follows with Cardiologist, Dr. Osito Winston, however, hasn't seen him for the past year. Pt denies any chest pain, SOB, N/V/D.    86 yo M from home lives with wife, 24hr HHA, walks with walker  with pmhx dementia (AAOx1-2 at baseline), tachy- daniel syndrome, HTN, HLD presenting for bradycardia. Per daughter, patient was working with Physical Therapy, and when they took his vitals found that his HR was in the 30's, They called EMS. Patient follows with Cardiologist, Dr. Osito Winston, however, hasn't seen him for the past year. Patient denies chest pain, palpitations, dizziness, syncope, shortness of breath, LE edema, PND/orthopnea.  Electrophysiology was consulted

## 2023-03-01 NOTE — CONSULT NOTE ADULT - SUBJECTIVE AND OBJECTIVE BOX
PATIENT SEEN AND EXAMINED ON :- 3/1/23  DATE OF SERVICE:     3/1/23        Interim events noted,Labs ,Radiological studies and Cardiology tests reviewed .      PMH -reviewed admission note, no change since admission  Heart failure: acute [ ] chronic [ ] acute or chronic [ ] diastolic [ ] systolic [ ] combined systolic and diastolic[ ]  KEV: ATN[ ] renal medullary necrosis [ ] CKD I [ ]CKDII [ ]CKD III [ ]CKD IV [ ]CKD V [ ]Other pathological lesions [ ]    MEDICATIONS  (STANDING):  amLODIPine   Tablet 10 milliGRAM(s) Oral daily  aspirin  chewable 81 milliGRAM(s) Oral daily  atorvastatin 40 milliGRAM(s) Oral at bedtime  enoxaparin Injectable 40 milliGRAM(s) SubCutaneous every 24 hours  hydrALAZINE 50 milliGRAM(s) Oral every 8 hours  hydrochlorothiazide 12.5 milliGRAM(s) Oral daily  memantine 10 milliGRAM(s) Oral two times a day  memantine 10 milliGRAM(s) Oral two times a day    MEDICATIONS  (PRN):      REVIEW OF SYSTEMS:  Constitutional: [ ] fever, [ ]weight loss,  [ ]fatigue  Eyes: [ ] visual changes  Respiratory: [ ]shortness of breath;  [ ] cough, [ ]wheezing, [ ]chills, [ ]hemoptysis  Cardiovascular: [ ] chest pain, [ ]palpitations, [ ]dizziness,  [ ]leg swelling[ ]orthopnea[ ]PND  Gastrointestinal: [ ] abdominal pain, [ ]nausea, [ ]vomiting,  [ ]diarrhea   Genitourinary: [ ] dysuria, [ ] hematuria  Neurologic: [ ] headaches [ ] tremors[ ]weakness  Skin: [ ] itching, [ ]burning, [ ] rashes  Endocrine: [ ] heat or cold intolerance  Musculoskeletal: [ ] joint pain or swelling; [ ] muscle, back, or extremity pain  Psychiatric: [ ] depression, [ ]anxiety, [ ]mood swings, or [ ]difficulty sleeping  Hematologic: [ ] easy bruising, [ ] bleeding gums    [x] All remaining systems negative except as per above.   [ ]Unable to obtain.    Vital Signs Last 24 Hrs  T(C): 37.5 (01 Mar 2023 16:15), Max: 37.5 (01 Mar 2023 16:15)  T(F): 99.5 (01 Mar 2023 16:15), Max: 99.5 (01 Mar 2023 16:15)  HR: 78 (01 Mar 2023 16:15) (62 - 78)  BP: 167/87 (01 Mar 2023 16:15) (150/67 - 167/87)  BP(mean): --  RR: 19 (01 Mar 2023 16:15) (17 - 20)  SpO2: 96% (01 Mar 2023 16:15) (91% - 98%)    Parameters below as of 01 Mar 2023 16:15  Patient On (Oxygen Delivery Method): room air      I&O's Summary    Orthostatic VS      PHYSICAL EXAM:  General: No acute distress BMI-  HEENT: EOMI, PERRL  Neck: Supple, [ ] JVD  Lungs: Equal air entry bilaterally; [ ] rales [ ] wheezing [ ] rhonchi  Heart: Regular rate and rhythm; [ ] murmur   /6 [ ] systolic [ ] diastolic [ ] radiation[ ] rubs [ ]  gallops  Abdomen: Nontender, bowel sounds present  Extremities: No clubbing, cyanosis, [ ] edema  Nervous system:  Alert & Oriented X3, no focal deficits  Psychiatric: Normal affect  Skin: No rashes or lesions    LABS:  03-01    140  |  106  |  22<H>  ----------------------------<  128<H>  3.1<L>   |  26  |  1.27    Ca    9.3      01 Mar 2023 04:57  Phos  4.0     03-01  Mg     2.2     03-01    TPro  7.4  /  Alb  3.2<L>  /  TBili  0.5  /  DBili  x   /  AST  29  /  ALT  19  /  AlkPhos  111  02-28    Creatinine Trend: 1.27<--, 1.31<--                        12.3   8.74  )-----------( 241      ( 01 Mar 2023 04:57 )             37.4     PT/INR - ( 28 Feb 2023 13:20 )   PT: 12.9 sec;   INR: 1.08 ratio         PTT - ( 28 Feb 2023 13:20 )  PTT:31.5 sec  Lipid Panel:   Cardiac Enzymes:     Serum Pro-Brain Natriuretic Peptide: 795 pg/mL (02-28-23 @ 13:20)

## 2023-03-01 NOTE — PATIENT PROFILE ADULT - PATIENT REPRESENTATIVE: ( YOU CAN CHOOSE ANY PERSON THAT CAN ASSIST YOU WITH YOUR HEALTH CARE PREFERENCES, DOES NOT HAVE TO BE A SPOUSE, IMMEDIATE FAMILY OR SIGNIFICANT OTHER/PARTNER)
Per pharmacy: \"Can you please update script for qty of 75 gram. Comes as a box of 30 to equal 75 grams.\"    Updating & resending script per pharmacy request.    Please re-sign script.   yes

## 2023-03-01 NOTE — CONSULT NOTE ADULT - SUBJECTIVE AND OBJECTIVE BOX
CHIEF COMPLAINT:  slow heart rate    HPI:  This is an 88 yo M from home lives with wife, 24hr HHA, walks with walker  with pmhx dementia (AAOx1-2 at baseline), tachy- daniel syndrome, HTN, HLD presenting for bradycardia. Per son, patient was working with Physical Therapy, and when they took his vitals found that his HR was in the 30's, they called EMS. Patient follows with Cardiologist, Dr. Osito Winston, however, hasn't seen him for the past year. Pt denies any chest pain, SOB, N/V/D.       PAST MEDICAL & SURGICAL HISTORY:  HTN (hypertension)      HLD (hyperlipidemia)      Prostate cancer      Mitral valve regurgitation      Dementia      HTN (hypertension)      Dyslipidemia      History of hip replacement          Allergies    No Known Allergies    Intolerances        MEDICATIONS  (STANDING):  amLODIPine   Tablet 10 milliGRAM(s) Oral daily  aspirin  chewable 81 milliGRAM(s) Oral daily  atorvastatin 40 milliGRAM(s) Oral at bedtime  enoxaparin Injectable 40 milliGRAM(s) SubCutaneous every 24 hours  hydrALAZINE 50 milliGRAM(s) Oral every 8 hours  hydrochlorothiazide 12.5 milliGRAM(s) Oral daily  memantine 10 milliGRAM(s) Oral two times a day  memantine 10 milliGRAM(s) Oral two times a day    MEDICATIONS  (PRN):      FAMILY HISTORY:      ***No family history of premature coronary artery disease or sudden cardiac death    SOCIAL HISTORY:  Smoking-  Alcohol-  Illicit Drug use-    REVIEW OF SYSTEMS:  Constitutional: [ ] fever, [ ]weight loss,  [ ]fatigue  Eyes: [ ] visual changes  Respiratory: [ ]shortness of breath;  [ ] cough, [ ]wheezing, [ ]chills, [ ]hemoptysis  Cardiovascular: [ ] chest pain, [ ]palpitations, [ ]dizziness,  [ ]leg swelling [ ]syncope  Gastrointestinal: [ ] abdominal pain, [ ]nausea, [ ]vomiting,  [ ]diarrhea   Genitourinary: [ ] dysuria, [ ] hematuria  Neurologic: [ ] headaches [ ] tremors  [ ] weakness [ ] lightheadedness  Skin: [ ] itching, [ ]burning, [ ] rashes  Endocrine: [ ] heat or cold intolerance  Musculoskeletal: [ ] joint pain or swelling; [ ] muscle, back, or extremity pain  Psychiatric: [ ] depression, [ ]anxiety, [ ]mood swings, or [ ]difficulty sleeping  Hematologic: [ ] easy bruising, [ ] bleeding gums     [ x] All others negative	  [ ] Unable to obtain    Vital Signs Last 24 Hrs  T(C): 37.1 (01 Mar 2023 11:13), Max: 37.1 (01 Mar 2023 11:13)  T(F): 98.8 (01 Mar 2023 11:13), Max: 98.8 (01 Mar 2023 11:13)  HR: 68 (01 Mar 2023 11:13) (62 - 70)  BP: 161/59 (01 Mar 2023 11:13) (150/67 - 167/85)  BP(mean): --  RR: 19 (01 Mar 2023 11:13) (17 - 20)  SpO2: 98% (01 Mar 2023 11:13) (91% - 98%)    Parameters below as of 01 Mar 2023 11:13  Patient On (Oxygen Delivery Method): room air      I&O's Summary      PHYSICAL EXAM:  General: No acute distress  HEENT: EOMI  Neck:  No JVD  Lungs: Clear to auscultation bilaterally; No rales or wheezing  Heart: Regular rate and rhythm; No murmurs, rubs, or gallops  Abdomen: soft, non tender, non distended   Extremities: warm, no edema   Nervous system:  Alert & Oriented X3  Psychiatric: Normal affect  Skin: No rashes or lesions    LABS:  03-01    140  |  106  |  22<H>  ----------------------------<  128<H>  3.1<L>   |  26  |  1.27    Ca    9.3      01 Mar 2023 04:57  Phos  4.0     03-01  Mg     2.2     03-01    TPro  7.4  /  Alb  3.2<L>  /  TBili  0.5  /  DBili  x   /  AST  29  /  ALT  19  /  AlkPhos  111  02-28    Creatinine Trend: 1.27<--, 1.31<--                        12.3   8.74  )-----------( 241      ( 01 Mar 2023 04:57 )             37.4     PT/INR - ( 28 Feb 2023 13:20 )   PT: 12.9 sec;   INR: 1.08 ratio         PTT - ( 28 Feb 2023 13:20 )  PTT:31.5 sec    Lipid Panel:   Cardiac Enzymes:     Serum Pro-Brain Natriuretic Peptide: 795 pg/mL (02-28-23 @ 13:20)        RADIOLOGY:    ECG [my interpretation]:    TELEMETRY:    ECHO:    STRESS TEST:    CATHETERIZATION: CHIEF COMPLAINT:  slow heart rate    Patient is AxOx1. HPI and ROS taken from daughter at bedside      HPI:  This is an 88 yo M from home lives with wife, 24hr HHA, walks with walker  with pmhx dementia (AAOx1-2 at baseline), tachy- daniel syndrome, HTN, HLD presenting for bradycardia. Per daughter, patient was working with Physical Therapy, and when they took his vitals found that his HR was in the 30's, They called EMS. Patient follows with Cardiologist, Dr. Osito Winston, however, hasn't seen him for the past year. Patient denies chest pain, palpitations, dizziness, syncope, shortness of breath, LE edema, PND/orthopnea.   Daughter reports that sometimes the patient has days where he is very fatigued    PAST MEDICAL & SURGICAL HISTORY:  HTN (hypertension)  HLD (hyperlipidemia)  Prostate cancer  Mitral valve regurgitation  Dementia  HTN (hypertension)  Dyslipidemia  History of hip replacement    Allergies    No Known Allergies    Intolerances        MEDICATIONS  (STANDING):  amLODIPine   Tablet 10 milliGRAM(s) Oral daily  aspirin  chewable 81 milliGRAM(s) Oral daily  atorvastatin 40 milliGRAM(s) Oral at bedtime  enoxaparin Injectable 40 milliGRAM(s) SubCutaneous every 24 hours  hydrALAZINE 50 milliGRAM(s) Oral every 8 hours  hydrochlorothiazide 12.5 milliGRAM(s) Oral daily  memantine 10 milliGRAM(s) Oral two times a day  memantine 10 milliGRAM(s) Oral two times a day    MEDICATIONS  (PRN):      FAMILY HISTORY:      ***No family history of premature coronary artery disease or sudden cardiac death    SOCIAL HISTORY:  Smoking-denies  Alcohol-denies  Illicit Drug use-denies    REVIEW OF SYSTEMS:  Constitutional: [ ] fever, [ ]weight loss,  [x ]fatigue occasionally  Eyes: [ ] visual changes  Respiratory: [ ]shortness of breath;  [ ] cough, [ ]wheezing, [ ]chills, [ ]hemoptysis  Cardiovascular: [ ] chest pain, [ ]palpitations, [ ]dizziness,  [ ]leg swelling [ ]syncope  Gastrointestinal: [ ] abdominal pain, [ ]nausea, [ ]vomiting,  [ ]diarrhea   Genitourinary: [ ] dysuria, [ ] hematuria  Neurologic: [ ] headaches [ ] tremors  [ ] weakness [ ] lightheadedness  Skin: [ ] itching, [ ]burning, [ ] rashes  Endocrine: [ ] heat or cold intolerance  Musculoskeletal: [ ] joint pain or swelling; [ ] muscle, back, or extremity pain  Psychiatric: [ ] depression, [ ]anxiety, [ ]mood swings, or [ ]difficulty sleeping  Hematologic: [ ] easy bruising, [ ] bleeding gums     [ x] All others negative	  [ ] Unable to obtain    Vital Signs Last 24 Hrs  T(C): 37.1 (01 Mar 2023 11:13), Max: 37.1 (01 Mar 2023 11:13)  T(F): 98.8 (01 Mar 2023 11:13), Max: 98.8 (01 Mar 2023 11:13)  HR: 68 (01 Mar 2023 11:13) (62 - 70)  BP: 161/59 (01 Mar 2023 11:13) (150/67 - 167/85)  BP(mean): --  RR: 19 (01 Mar 2023 11:13) (17 - 20)  SpO2: 98% (01 Mar 2023 11:13) (91% - 98%)    Parameters below as of 01 Mar 2023 11:13  Patient On (Oxygen Delivery Method): room air      I&O's Summary      PHYSICAL EXAM:  General: No acute distress  HEENT: EOMI  Neck:  No JVD  Lungs: Clear to auscultation bilaterally; No rales or wheezing  Heart: Regular rate and rhythm; No murmurs, rubs, or gallops  Abdomen: soft, non tender, non distended   Extremities: warm, no edema   Nervous system:  Alert & Oriented X1  Psychiatric: Normal affect  Skin: No rashes or lesions    LABS:  03-01    140  |  106  |  22<H>  ----------------------------<  128<H>  3.1<L>   |  26  |  1.27    Ca    9.3      01 Mar 2023 04:57  Phos  4.0     03-01  Mg     2.2     03-01    TPro  7.4  /  Alb  3.2<L>  /  TBili  0.5  /  DBili  x   /  AST  29  /  ALT  19  /  AlkPhos  111  02-28    Creatinine Trend: 1.27<--, 1.31<--                        12.3   8.74  )-----------( 241      ( 01 Mar 2023 04:57 )             37.4     PT/INR - ( 28 Feb 2023 13:20 )   PT: 12.9 sec;   INR: 1.08 ratio         PTT - ( 28 Feb 2023 13:20 )  PTT:31.5 sec    Lipid Panel:   Cardiac Enzymes:     Serum Pro-Brain Natriuretic Peptide: 795 pg/mL (02-28-23 @ 13:20)        RADIOLOGY:< from: Xray Chest 1 View- PORTABLE-Urgent (Xray Chest 1 View- PORTABLE-Urgent .) (02.28.23 @ 15:22) >  IMPRESSION:  Mild cardiomegaly and mild pulmonary venous congestion/perihilar   interstitial edema      < end of copied text >      ECG [my interpretation]:  2/28/2023  11:51:14  Sinus daniel with 1st degree AV block and occasional PVCs  HR 58    TELEMETRY:   Sinus rhythm with bradycardia, 1st degree AV block and occasional PVCs  HR range 58-85pbm

## 2023-03-02 NOTE — PROGRESS NOTE ADULT - SUBJECTIVE AND OBJECTIVE BOX
Medical Student Progress Note discussed with resident and attending    PLEASE CONTACT ON CALL TEAM:  - On Call Team (Please refer to Purvi) FROM 5:00 PM - 8:30PM  - Nightfloat Team FROM 8:30 -7:30 AM      INTERVAL HPI/OVERNIGHT EVENTS:     Pt is not complaining of any significant overnight events.   Pt seen at bedside, NAD, no acute complaints. Denies fevers, chills, CP, SOB, N/V/D, abdominal pain, dysuria, numbness/tingling/weakness in extremities.     REVIEW OF SYSTEMS:  CONSTITUTIONAL: Denies fever, weight loss, or fatigue  RESPIRATORY: Denies cough, wheezing, chills or hemoptysis; Denies shortness of breath  CARDIOVASCULAR: Denies chest pain, palpitations, dizziness, or leg swelling  GASTROINTESTINAL: Denies abdominal pain. Denies nausea, vomiting, or hematemesis; Denies diarrhea or constipation. Denies melena or hematochezia.  GENITOURINARY: Denies dysuria or hematuria, urinary frequency  NEUROLOGICAL: Denies headaches, memory loss, loss of strength, numbness, or tremors  SKIN: Denies itching, burning, rashes, or lesions     MEDICATIONS  (STANDING):  amLODIPine   Tablet 10 milliGRAM(s) Oral daily  aspirin  chewable 81 milliGRAM(s) Oral daily  atorvastatin 40 milliGRAM(s) Oral at bedtime  enoxaparin Injectable 40 milliGRAM(s) SubCutaneous every 24 hours  hydrALAZINE 50 milliGRAM(s) Oral every 8 hours  hydrochlorothiazide 12.5 milliGRAM(s) Oral daily  memantine 10 milliGRAM(s) Oral two times a day  memantine 10 milliGRAM(s) Oral two times a day    MEDICATIONS  (PRN):      Vital Signs Last 24 Hrs  T(C): 36.3 (02 Mar 2023 11:19), Max: 37.5 (01 Mar 2023 16:15)  T(F): 97.3 (02 Mar 2023 11:19), Max: 99.5 (01 Mar 2023 16:15)  HR: 69 (02 Mar 2023 11:19) (62 - 81)  BP: 161/64 (02 Mar 2023 11:19) (160/88 - 215/102)  BP(mean): --  RR: 18 (02 Mar 2023 11:19) (17 - 19)  SpO2: 98% (02 Mar 2023 11:19) (95% - 98%)    Parameters below as of 02 Mar 2023 11:19  Patient On (Oxygen Delivery Method): room air        PHYSICAL EXAMINATION:  GENERAL: NAD, Alert & Oriented X1. Oriented to self only; not time or place  HEAD:  Atraumatic, Normocephalic  EYES:  conjunctiva and sclera clear  NECK: Supple, No JVD  CHEST/LUNG: Clear to auscultation. No rales, rhonchi, wheezing, or rubs  HEART: Regular rate and rhythm  ABDOMEN: Soft, Nontender, Nondistended; Bowel sounds present, no pain or masses on palpation  NERVOUS SYSTEM:  CN 2-12 intact. 5/5 strength in all 4 extremities.   PSYCH: appropriate affect  : voiding well  EXTREMITIES:  2+ Peripheral Pulses, No clubbing, cyanosis, or edema  SKIN: warm dry                          13.2   8.62  )-----------( 240      ( 02 Mar 2023 05:24 )             39.1     03-02    142  |  108  |  23<H>  ----------------------------<  132<H>  3.3<L>   |  26  |  1.48<H>    Ca    9.1      02 Mar 2023 05:24  Phos  3.9     03-02  Mg     2.2     03-02                I&O's Summary    01 Mar 2023 07:01  -  02 Mar 2023 07:00  --------------------------------------------------------  IN: 300 mL / OUT: 0 mL / NET: 300 mL    02 Mar 2023 07:01  -  02 Mar 2023 14:10  --------------------------------------------------------  IN: 177 mL / OUT: 0 mL / NET: 177 mL            CAPILLARY BLOOD GLUCOSE      RADIOLOGY & ADDITIONAL TESTS:                  
PGY-1 Progress Note discussed with attending    PAGER #: [[phone]] TILL 5:00 PM  PLEASE CONTACT ON CALL TEAM:  - On Call Team (Please refer to Purvi) FROM 5:00 PM - 8:30PM  - Nightfloat Team FROM 8:30 -7:30 AM    OVERNIGHT EVENTS:   - No acute overnight events. Pt seen at bedside, NAD, no acute complaints. Denies fevers, chills, CP, SOB, N/V/D, abdominal pain, dysuria, numbness/tingling/weakness in extremities.     REVIEW OF SYSTEMS:  CONSTITUTIONAL: No fever, weight loss, or fatigue  RESPIRATORY: No cough, wheezing, chills or hemoptysis; No shortness of breath  CARDIOVASCULAR: No chest pain, palpitations, dizziness, or leg swelling  GASTROINTESTINAL: No abdominal pain. No nausea, vomiting, or hematemesis; No diarrhea or constipation. No melena or hematochezia.  GENITOURINARY: No dysuria or hematuria, urinary frequency  NEUROLOGICAL: No headaches, memory loss, loss of strength, numbness, or tremors  SKIN: No itching, burning, rashes, or lesions     MEDICATIONS  (STANDING):  amLODIPine   Tablet 10 milliGRAM(s) Oral daily  aspirin  chewable 81 milliGRAM(s) Oral daily  atorvastatin 40 milliGRAM(s) Oral at bedtime  enoxaparin Injectable 40 milliGRAM(s) SubCutaneous every 24 hours  hydrALAZINE 50 milliGRAM(s) Oral every 8 hours  hydrochlorothiazide 12.5 milliGRAM(s) Oral daily  memantine 10 milliGRAM(s) Oral two times a day  memantine 10 milliGRAM(s) Oral two times a day  potassium chloride   Powder 40 milliEquivalent(s) Oral once    MEDICATIONS  (PRN):      Vital Signs Last 24 Hrs  T(C): 36.9 (01 Mar 2023 07:15), Max: 36.9 (01 Mar 2023 07:15)  T(F): 98.4 (01 Mar 2023 07:15), Max: 98.4 (01 Mar 2023 07:15)  HR: 68 (01 Mar 2023 07:15) (33 - 70)  BP: 164/63 (01 Mar 2023 07:15) (150/67 - 180/80)  BP(mean): --  RR: 19 (01 Mar 2023 07:15) (15 - 20)  SpO2: 97% (01 Mar 2023 07:15) (91% - 97%)    Parameters below as of 01 Mar 2023 07:15  Patient On (Oxygen Delivery Method): room air        PHYSICAL EXAMINATION:  GENERAL: NAD, AAOx1, elderly  HEAD: AT/NC  EYES: conjunctiva and sclera clear  NECK: supple, No JVD noted, Normal thyroid  CHEST/LUNG: CTABL; no rales, rhonchi, wheezing, or rubs  HEART: regular rate and rhythm; no murmurs, rubs, or gallops  ABDOMEN: soft, nontender, nondistended; Bowel sounds present  EXTREMITIES:  2+ Peripheral Pulses, No clubbing, cyanosis, or edema  SKIN: warm dry                          12.3   8.74  )-----------( 241      ( 01 Mar 2023 04:57 )             37.4     03-01    140  |  106  |  22<H>  ----------------------------<  128<H>  3.1<L>   |  26  |  1.27    Ca    9.3      01 Mar 2023 04:57  Phos  4.0     03-01  Mg     2.2     03-01    TPro  7.4  /  Alb  3.2<L>  /  TBili  0.5  /  DBili  x   /  AST  29  /  ALT  19  /  AlkPhos  111  02-28    LIVER FUNCTIONS - ( 28 Feb 2023 13:20 )  Alb: 3.2 g/dL / Pro: 7.4 g/dL / ALK PHOS: 111 U/L / ALT: 19 U/L DA / AST: 29 U/L / GGT: x               PT/INR - ( 28 Feb 2023 13:20 )   PT: 12.9 sec;   INR: 1.08 ratio         PTT - ( 28 Feb 2023 13:20 )  PTT:31.5 sec  COVID-19 PCR: NotDetec (28 Feb 2023 13:20)      CAPILLARY BLOOD GLUCOSE          RADIOLOGY & ADDITIONAL TESTS:                  
PATIENT SEEN AND EXAMINED ON :- 3/2/23  DATE OF SERVICE:     3/2/23        Interim events noted,Labs ,Radiological studies and Cardiology tests reviewed .    PMH -reviewed admission note, no change since admission  Heart failure: acute [ ] chronic [ ] acute or chronic [ ] diastolic [ ] systolic [ ] combined systolic and diastolic[ ]  KEV: ATN[ ] renal medullary necrosis [ ] CKD I [ ]CKDII [ ]CKD III [ ]CKD IV [ ]CKD V [ ]Other pathological lesions [ ]    MEDICATIONS  (STANDING):  amLODIPine   Tablet 10 milliGRAM(s) Oral daily  aspirin  chewable 81 milliGRAM(s) Oral daily  atorvastatin 40 milliGRAM(s) Oral at bedtime  enoxaparin Injectable 40 milliGRAM(s) SubCutaneous every 24 hours  hydrALAZINE 100 milliGRAM(s) Oral every 8 hours  hydrochlorothiazide 12.5 milliGRAM(s) Oral daily  memantine 10 milliGRAM(s) Oral two times a day  memantine 10 milliGRAM(s) Oral two times a day    MEDICATIONS  (PRN):              REVIEW OF SYSTEMS:  Constitutional: [ ] fever, [ ]weight loss,  [ ]fatigue  Eyes: [ ] visual changes  Respiratory: [ ]shortness of breath;  [ ] cough, [ ]wheezing, [ ]chills, [ ]hemoptysis  Cardiovascular: [ ] chest pain, [ ]palpitations, [ ]dizziness,  [ ]leg swelling[ ]orthopnea[ ]PND  Gastrointestinal: [ ] abdominal pain, [ ]nausea, [ ]vomiting,  [ ]diarrhea   Genitourinary: [ ] dysuria, [ ] hematuria  Neurologic: [ ] headaches [ ] tremors[ ]weakness  Skin: [ ] itching, [ ]burning, [ ] rashes  Endocrine: [ ] heat or cold intolerance  Musculoskeletal: [ ] joint pain or swelling; [ ] muscle, back, or extremity pain  Psychiatric: [ ] depression, [ ]anxiety, [ ]mood swings, or [ ]difficulty sleeping  Hematologic: [ ] easy bruising, [ ] bleeding gums    [x] All remaining systems negative except as per above.   [ ]Unable to obtain.    Vital Signs Last 24 Hrs  T(C): 37.1 (02 Mar 2023 15:45), Max: 37.1 (02 Mar 2023 15:45)  T(F): 98.7 (02 Mar 2023 15:45), Max: 98.7 (02 Mar 2023 15:45)  HR: 77 (02 Mar 2023 15:45) (62 - 80)  BP: 155/84 (02 Mar 2023 15:45) (155/84 - 215/102)  BP(mean): --  RR: 20 (02 Mar 2023 15:45) (18 - 20)  SpO2: 96% (02 Mar 2023 15:45) (95% - 98%)    Parameters below as of 02 Mar 2023 15:45  Patient On (Oxygen Delivery Method): room air      I&O's Summary    01 Mar 2023 07:01  -  02 Mar 2023 07:00  --------------------------------------------------------  IN: 300 mL / OUT: 0 mL / NET: 300 mL    02 Mar 2023 07:01  -  02 Mar 2023 20:44  --------------------------------------------------------  IN: 177 mL / OUT: 0 mL / NET: 177 mL      Orthostatic VS      PHYSICAL EXAM:  General: No acute distress BMI-  HEENT: EOMI, PERRL  Neck: Supple, [ ] JVD  Lungs: Equal air entry bilaterally; [ ] rales [ ] wheezing [ ] rhonchi  Heart: Regular rate and rhythm; [ ] murmur   /6 [ ] systolic [ ] diastolic [ ] radiation[ ] rubs [ ]  gallops  Abdomen: Nontender, bowel sounds present  Extremities: No clubbing, cyanosis, [ ] edema  Nervous system:  Alert & Oriented X3, no focal deficits  Psychiatric: Normal affect  Skin: No rashes or lesions    LABS:  03-02    142  |  108  |  23<H>  ----------------------------<  132<H>  3.3<L>   |  26  |  1.48<H>    Ca    9.1      02 Mar 2023 05:24  Phos  3.9     03-02  Mg     2.2     03-02      Creatinine Trend: 1.48<--, 1.27<--, 1.31<--                        13.2   8.62  )-----------( 240      ( 02 Mar 2023 05:24 )             39.1       Lipid Panel:   Cardiac Enzymes:

## 2023-03-02 NOTE — PROGRESS NOTE ADULT - ASSESSMENT
This is an 88 yo M from home lives with wife, 24hr HHA, walks with walker  with pmhx dementia (AAOx1-2 at baseline), tachy- daniel syndrome, HTN, HLD presenting for bradycardia. 

## 2023-03-02 NOTE — PROGRESS NOTE ADULT - PROBLEM SELECTOR PLAN 3
on donepezil and memantine  c/w home meds  d/c'd donapezil d/t bradycardia
on donepezil and memantine  c/w home meds  d/c'd donapezil d/t bradycardia.
on donepezil and memantine  c/w home meds  d/c'd donapezil d/t bradycardia.

## 2023-03-02 NOTE — PROGRESS NOTE ADULT - PROBLEM SELECTOR PLAN 2
On hydralazine, amlodipine and HCTZ  /83 (3/1).  /114, 161/64, and 171/84. (3/2)  Spoke to Dr Mcclure, suggested giving Hydralazine 50mg Stat and increase hydralazine 50 mg TID to 100 mg TID On hydralazine, amlodipine and HCTZ  /84. (3/2)  Spoke to Dr Mcclure, suggested giving Hydralazine 50mg Stat and increase hydralazine 50 mg TID to 100 mg TID On hydralazine, amlodipine and HCTZ  /64 (3/2)  will increase hydralazine 50 TID to 100 TID as per cardio

## 2023-03-02 NOTE — PROGRESS NOTE ADULT - ATTENDING COMMENTS
87M PMH dementia, htn, hld, tachybradycardia syndrome p/w bradycardia. Asx. EKG sinus w 1st degree av block.    #Bradycardia - Tachy-daniel syndrome  #1st degree av block  #HTN, HLD  #Dementia oriented x1 baseline  #KEV vs CKD  -hold home BB, donepezil - HR now in 60's on telemetry  -c/w hydralazine, norvasc, hctz  -c/w memantine  - Cardiology, Dr. Mcclure  - EP consulted  -dvt ppx - lovenox
87M PMH dementia, htn, hld, tachybradycardia syndrome p/w bradycardia. Asx. EKG sinus w 1st degree av block.    #Bradycardia - Tachy-daniel syndrome  #1st degree av block  #HTN, HLD  #Dementia oriented x1 baseline  #KEV vs CKD  -hold home BB, donepezil - HR improved  -c/w hydralazine, norvasc, hctz  -c/w memantine  - Cardiology, Dr. Mcclure  - EP consulted - Offering PM placement but patient's family are declining at this time and would like to monitor at home off BB and donepezil and decide later on if pacemaker placement is necessary.  -dvt ppx - lovenox .

## 2023-03-02 NOTE — PROGRESS NOTE ADULT - PROBLEM SELECTOR PLAN 1
Pt with hx of tachy-daniel syndrome  Presenting with asymptomatic bradycardia  Hold home med of metoprolol in setting of bradycardia  f/u TTE  Cardio - Dr. Mcclure consulted  HR 60-70's on tele  EP consulted. Suggested TTE to determine EF and to continue monitoring for AV node block  TTE revealed Grade 1 diastolic dysfunction (impaired relaxation).  EF>55% Pt with hx of tachy-daniel syndrome  Presenting with asymptomatic bradycardia  Hold home med of metoprolol in setting of bradycardia  Cardio - Dr. Mcclure consulted  HR 60-70's on tele  EP consulted. Suggested TTE to determine EF and to continue monitoring for AV node block  TTE revealed Grade 1 diastolic dysfunction (impaired relaxation).  EF>55% Pt with hx of tachy-daniel syndrome  Presenting with asymptomatic bradycardia  Hold home med of metoprolol in setting of bradycardia  Cardio - Dr. Mcclure consulted  HR 60-70's on tele  EP consulted. Suggested TTE to determine EF and to continue monitoring for AV node block  TTE revealed Grade 1 diastolic dysfunction (impaired relaxation).  EF>55%  -possible PPM placement tomorrow (3/3), however, high risk procedure and family not confident in proceeding with procedure  -likely dc tomorrow

## 2023-03-02 NOTE — PROGRESS NOTE ADULT - PROBLEM SELECTOR PLAN 1
Pt with hx of tachy-daniel syndrome  Presenting with asymptomatic bradycardia  Hold home med of metoprolol in setting of bradycardia  Cardio - Dr. Mcclure consulted  HR 60-70's on tele  EP consulted. Suggested TTE to determine EF and to continue monitoring for AV node block  TTE revealed Grade 1 diastolic dysfunction (impaired relaxation).  EF>55%  -possible PPM placement tomorrow (3/3), however, high risk procedure and family not confident in proceeding with procedure  -likely dc tomorrow

## 2023-03-02 NOTE — PROGRESS NOTE ADULT - PROBLEM SELECTOR PLAN 2
On hydralazine, amlodipine and HCTZ  /64 (3/2)  will increase hydralazine 50 TID to 100 TID as per cardio

## 2023-03-02 NOTE — PROGRESS NOTE ADULT - TIME BILLING
- Review of records, telemetry, vital signs and daily labs.   - General and cardiovascular physical examination.  - Generation of cardiovascular treatment plan.  - Coordination of care.      Patient was seen and examined by me on 3/2/23interim events noted,labs and radiology studies reviewed.  Raul Mcclure MD,FACC.  [address] [place], [place] [id number].  [phone]

## 2023-03-03 NOTE — DISCHARGE NOTE PROVIDER - NSDCCPCAREPLAN_GEN_ALL_CORE_FT
PRINCIPAL DISCHARGE DIAGNOSIS  Diagnosis: Bradycardia  Assessment and Plan of Treatment: You were brought to the hospital due to a low heart rate during a phsyical therapy session. You were asymptomatic during admissiona and you were asymptomatic during your hospital course. 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 Your metoprolol medication was held during your admission due to low heart rate on admission. Your heart rate has been normal during your hospital stay. You were evaluated for a possible pacemaker placement, however, due to the high risk, your family declined the procedure. Please do not take metoprolol when you are discharged from the hospital and follow up with your primary care doctor in 1-2 weeks after you are discharged from the hospital.      SECONDARY DISCHARGE DIAGNOSES  Diagnosis: Dementia  Assessment and Plan of Treatment: You have a history of dementia and you take memantine and donapezil at home. Your donapezil was discontinued during your hospital stay because the medication can cause lower heart rate. Pleae do not take donapezil when you are discharged from the hospital and follow up with your primary care doctor in 1-2 weeks.    Diagnosis: HTN (hypertension)  Assessment and Plan of Treatment: You have a history of high blood pressure and you take amlodipine, hydralazine, and hydrochlorothiazide to control your blood pressure. You were evaluated by a cardiologist Dr. Mcclure. Your blood pressure was elevated during your hospital stay so your hydralazine medication was increased from 50mg every 8 hours to 100mg every 8 hours. Please continue taking your blood pressure medications and check your blood pressure frequently. Please follow up with your primary care doctor in 1-2 weeks after you are discharged from the hospital.

## 2023-03-03 NOTE — DISCHARGE NOTE NURSING/CASE MANAGEMENT/SOCIAL WORK - PATIENT PORTAL LINK FT
You can access the FollowMyHealth Patient Portal offered by [facility] by registering at the following website: [email or web address]. By joining [employer]’s FollowMyHealth portal, you will also be able to view your health information using other applications (apps) compatible with our system.

## 2023-03-03 NOTE — DISCHARGE NOTE NURSING/CASE MANAGEMENT/SOCIAL WORK - NSDCPEFALRISK_GEN_ALL_CORE
For information on Fall & Injury Prevention, visit: [email or web address] OR  [email or web address] OR  https://www.cdc.gov/steadi/patient.html

## 2023-03-03 NOTE — DISCHARGE NOTE PROVIDER - NSDCMRMEDTOKEN_GEN_ALL_CORE_FT
amLODIPine 10 mg oral tablet: 1 tab(s) orally once a day  aspirin 81 mg oral tablet, chewable: 1 tab(s) orally once a day  atorvastatin 40 mg oral tablet: 1 tab(s) orally once a day  hydrALAZINE 100 mg oral tablet: 1 tab(s) orally every 8 hours  hydroCHLOROthiazide 12.5 mg oral capsule: 1 cap(s) orally once a day  memantine 10 mg oral tablet: 1 tab(s) orally 2 times a day  Vitamin D3 50 mcg (2000 intl units) oral tablet: 1 tab(s) orally once a day

## 2023-03-03 NOTE — DISCHARGE NOTE PROVIDER - HOSPITAL COURSE
88 yo M from home lives with wife, 24hr HHA, walks with walker  with pmhx dementia (AAOx1-2 at baseline), tachy- daniel syndrome, HTN, HLD presenting for bradycardia. Pt has a history of Tachy-daniel syndrome. Pt presenting with asymptomatic bradycardia. Home med metoprolol was held in the setting of bradycardia. Cardiology Dr. Mcclure was consulted. HR has been ~60-70's on telemetry monitor. Electrophysiology consulted for possible pacemaker placement. TTE showed G1DD with an EF of 55%. Pt was offered pacemaker placement however, family declined procedure as he is  high risk. Pt also has a history of HTN, on hydralazine, amlodipine, and HCTZ, which was resumed during his hospital stay. Hydralazine was increased from 50 TID to 100 TID as per cardio. PT has a history of dementia, on donepezil and memantine at home. Donepezil was discontinued due to bradycardia. Pt has been asymptomatic during his hospital course and has been deemed medically stable for discharge.      86 yo M from home lives with wife, 24hr HHA, walks with walker  with pmhx dementia (AAOx1-2 at baseline), tachy- daniel syndrome, HTN, HLD presenting for bradycardia. Pt has a history of Tachy-daniel syndrome. Pt presenting with asymptomatic bradycardia. Home med metoprolol was held in the setting of bradycardia. Cardiology Dr. Mcclure was consulted. HR has been ~60-70's on telemetry monitor. Electrophysiology consulted for possible pacemaker placement. TTE showed G1DD with an EF of 55%. Pt was offered pacemaker placement however, family declined procedure as he is  high risk. Pt also has a history of HTN, on hydralazine, amlodipine, and HCTZ, which was resumed during his hospital stay. Hydralazine was increased from 50 TID to 100 TID as per cardio. Pt has a history of dementia, on donepezil and memantine at home. Donepezil was discontinued due to bradycardia. Pt has been asymptomatic during his hospital course and has been deemed medically stable for discharge.

## 2023-03-03 NOTE — DISCHARGE NOTE PROVIDER - ATTENDING DISCHARGE PHYSICAL EXAMINATION:
87M PMH dementia, htn, hld, tachybradycardia syndrome p/w bradycardia. Asx. EKG sinus w 1st degree av block. Admit for bradycardia w/ 1st degree av block, dementia, and CKD. Home BB and donepezil was held with improvement of HR. Patient asymptomatic but with some intermittent bradycardia. Cardiology and EP consulted. Offering PM placement but patient's family are declining at this time and would like to monitor at home off BB and donepezil and decide later on if pacemaker placement is necessary. Patient to follow up with outpatient cardiology.   PHYSICAL EXAMINATION:  GENERAL: NAD, Alert & Oriented X1. Oriented to self only; not time or place  HEAD:  Atraumatic, Normocephalic  EYES:  conjunctiva and sclera clear  NECK: Supple, No JVD  CHEST/LUNG: Clear to auscultation. No rales, rhonchi, wheezing, or rubs  HEART: Regular rate and rhythm  ABDOMEN: Soft, Nontender, Nondistended; Bowel sounds present, no pain or masses on palpation  NERVOUS SYSTEM:  CN 2-12 intact. 5/5 strength in all 4 extremities.   PSYCH: appropriate affect  : voiding well  EXTREMITIES:  2+ Peripheral Pulses, No clubbing, cyanosis, or edema  SKIN: warm dry

## 2023-04-24 NOTE — DISCHARGE NOTE PROVIDER - NSDCFUADDINST_GEN_ALL_CORE_FT
Recd order from Dr Colón for repeat BM-please sign pended order. Requested med list-routed to bin per request.    Please assess blood pressure per routine, if needed add on home medications metoprolol and increase frequency of hydralazine.  FAMILY HISTORY:  Family history of diabetes mellitus in mother  Family history of diabetes mellitus in son    Child  Still living? Yes, Estimated age: Age Unknown  Family history of heart attack, Age at diagnosis: Age Unknown

## 2023-05-04 NOTE — ED PROVIDER NOTE - OBJECTIVE STATEMENT
88-year-old man, history of dementia, hypertension, complains of about 5 days of constipation, then nausea and vomiting today with discomfort in the left lower quadrant of the abdomen.  He denies any urinary symptoms/fever/chills/chest pain/shortness of breath.  He is currently on treatment for prostate cancer as well.  He is a relatively poor historian due to due to dementia but his daughter is able to give additional history.

## 2023-05-04 NOTE — ED PROVIDER NOTE - CPE EDP MUSC NORM
Called patient back and informed her that the [facility] was [facility] patient verbalized understanding   
Pt called and would like to know if she was able to have her xanax filled.  
normal...

## 2023-05-04 NOTE — ED PROVIDER NOTE - ENMT, MLM
----- Message from Arturo Vinson MD sent at 4/26/2018  2:27 PM CDT -----  Thyroid is normal   Vit D is very low, add vit D 5000 units daily   Airway patent, Nasal mucosa clear. Mouth with normal mucosa. Throat has no vesicles, no oropharyngeal exudates and uvula is midline.

## 2023-05-04 NOTE — ED PROVIDER NOTE - PROGRESS NOTE DETAILS
Informed general surgery, GREGORIO Carter, and he will see Pt but he says that Pt will need admission under medical service given the numerous issues and the fact that they do not advise operative intervention. Surgical PARaul, evaluated Pt and advised no operative management at this time, but does advise repeat labs, especially Hb/Hct, and nonemergent IR consult as inpatient.  Dr. Guidry, hospitalist, accepts admission.  Kahn placed due to distended bladder and suspected urinary retention.

## 2023-05-04 NOTE — ED PROVIDER NOTE - CLINICAL SUMMARY MEDICAL DECISION MAKING FREE TEXT BOX
88-year-old man, history of dementia, hypertension, complains of about 5 days of constipation, then nausea and vomiting today with discomfort in the left lower quadrant of the abdomen--labs, urine, EKG, CT A/P, IVF, Zofran, reassess.

## 2023-05-04 NOTE — ED PROVIDER NOTE - CARE PLAN
1 Principal Discharge DX:	KEV (acute kidney injury)  Secondary Diagnosis:	Dehydration  Secondary Diagnosis:	Retroperitoneal hematoma  Secondary Diagnosis:	Elevated troponin  Secondary Diagnosis:	Hypokalemia

## 2023-05-05 NOTE — H&P ADULT - PROBLEM SELECTOR PLAN 2
pt presents with elevated Cr  high urine output in mckeon in the setting of constipation  likely pt retaining 2/2 constipation    - monitor Cr  - avoid nephrotoxic agents  - maintain mckeon for now  - if no significant improvement, consider urine lytes and further workup

## 2023-05-05 NOTE — H&P ADULT - PROBLEM SELECTOR PLAN 1
pt with incidental retroperitoneal hematoma 9x4x4 cm on CT  Hb decreasing however pt with 2 L IV NS and hematuria in mckeon  surgery consulted - no acute intervention    - monitor CBC q6h  - maintain active T&S, transfuse if HB < 7 or further drop and pt symptomatic  - IR consult in AM  - follow up surgery recs

## 2023-05-05 NOTE — CONSULT NOTE ADULT - ASSESSMENT
88 year old M with PMH of Dementia (AAOx1-2 at baseline), tachy- daniel syndrome (declined PPM), HTN, HLD presents with complains of about 5 days of constipation, then nausea and vomiting today with discomfort in the left lower quadrant of the abdomen.  Poor historian but additional history by daughter stating he has been holding abdomen  and started having multiple NBNB vomiting since the last 24hrs.  He is currently on treatment for prostate cancer as well.   Surgery was called because of an incidental finding of a large Left-sided retroperitoneal hematoma along the psoas muscle measuring up   to 9.8 x 3.7 x 4.3 cm. Pt is a poor historian but area seems benign no compartment or skin changes.     Monitor CBC , Serial H&H Transfuse prn   Consider IR consult   Other care/ mgmt per primary team   Will follow

## 2023-05-05 NOTE — PATIENT PROFILE ADULT - HAVE YOU HAD COVID IN THE LAST 60 DAYS?
No

## 2023-05-05 NOTE — ED ADULT NURSE REASSESSMENT NOTE - NS ED NURSE REASSESS COMMENT FT1
Received pt is on tele 1st degree AVB on monitor pt has a Kahn blood tinged urine ,  k riders in progress

## 2023-05-05 NOTE — H&P ADULT - HISTORY OF PRESENT ILLNESS
88 year old M with PMH of Dementia (AAOx1-2 at baseline), tachy- daniel syndrome (declined PPM), HTN, HLD presents with     ED Course  Vitals: /90 P 80 R 18 T 97.4F SpO2 96% RA  Meds:  mg, NS 1 L, KCl PO & IV, zofran    amLODIPine 10 mg oral tablet: 1 tab(s) orally once a day  aspirin 81 mg oral tablet, chewable: 1 tab(s) orally once a day  atorvastatin 40 mg oral tablet: 1 tab(s) orally once a day  hydrALAZINE 100 mg oral tablet: 1 tab(s) orally every 8 hours  hydroCHLOROthiazide 12.5 mg oral capsule: 1 cap(s) orally once a day  memantine 10 mg oral tablet: 1 tab(s) orally 2 times a day 88 year old M with PMH of Dementia (AAOx1-2 at baseline), tachy- daniel syndrome (declined PPM), HTN, HLD presents with     ED Course  Vitals: /90 P 80 R 18 T 97.4F SpO2 96% RA  Meds:  mg, NS 1 L, KCl PO & IV, zofran    amLODIPine 10 mg oral tablet: 1 tab(s) orally once a day  aspirin 81 mg oral tablet, chewable: 1 tab(s) orally once a day  atorvastatin 40 mg oral tablet: 1 tab(s) orally once a day  hydrALAZINE 100 mg oral tablet: 1 tab(s) orally every 8 hours  hydroCHLOROthiazide 12.5 mg oral capsule: 1 cap(s) orally once a day  memantine 10 mg oral tablet: 1 tab(s) orally 2 times a day      WBC 13.4k >11.83k  Hb 13.7 > 12.6  K 3.1 > 3.1  , Phos 4.8  BUN/Cr 34/2.4 > 35/2.37  trop 87.7    CT A/P:  Markedly distended urinary bladder with resultant moderate bilateral hydroureteronephrosis.  Moderate stool burden in the colon. Colonic diverticulosis without evidence of diverticulitis.  Left-sided retroperitoneal hematoma along the psoas muscle measuring up to 9.8 x 3.7 x 4.3 cm.    CXR negative 88 year old M with PMH of Dementia (AAOx1-2 at baseline), tachy- daniel syndrome (declined PPM), HTN, HLD presents with abdominal pain. Pt states he does not want to talk. Daughter reports vomiting 1-2x over the past week, decreased swallowing of pills, right side of belly has a hard spot, denies diarrhea. On day of admission, no oral intake and multiple episodes of vomiting with no BM since Sunday. Otherwise reports no fevers, cp, sob, palpitations, or any other new symptoms.     ED Course  Vitals: /90 P 80 R 18 T 97.4F SpO2 96% RA  Meds:  mg, NS 1 L, KCl PO & IV, zofran

## 2023-05-05 NOTE — PATIENT PROFILE ADULT - FUNCTIONAL ASSESSMENT - BASIC MOBILITY 6.
1-calculated by average/Not able to assess (calculate score using [facility] averaging method)

## 2023-05-05 NOTE — H&P ADULT - CONVERSATION DETAILS
An extensive goals of care conversation was held including options, risks and benefits of many medical treatments including CPR, intubation, and tube feeding. Son, Bravo Doll, states that he does not want his father to suffer in case his heart were to stop or his breathing were to worsen. He would like to continue all medical management and treatments. As per his best interests, pt has been made DNR/DNI. A MOLST has been completed to this effect.

## 2023-05-05 NOTE — CHART NOTE - NSCHARTNOTEFT_GEN_A_CORE
No acute complaints. No pain in the left flank or abdomen.   No skin changes. No tenderness.     Vital Signs Last 24 Hrs  T(C): 36.9 (05 May 2023 09:00), Max: 36.9 (05 May 2023 09:00)  T(F): 98.4 (05 May 2023 09:00), Max: 98.4 (05 May 2023 09:00)  HR: 75 (05 May 2023 09:00) (75 - 92)  BP: 152/90 (05 May 2023 09:00) (152/90 - 176/80)  BP(mean): --  RR: 18 (05 May 2023 09:00) (18 - 18)  SpO2: 96% (05 May 2023 09:00) (95% - 96%)    Parameters below as of 05 May 2023 01:00  Patient On (Oxygen Delivery Method): room air    < from: CT Abdomen and Pelvis No Cont (05.04.23 @ 22:27) >  IMPRESSION:  Markedly distended urinary bladder with resultant moderate bilateral   hydroureteronephrosis.  Moderate stool burden in the colon. Colonic diverticulosis without   evidence of diverticulitis.  Left-sided retroperitoneal hematoma along the psoas muscle measuring up   to 9.8 x 3.7 x 4.3 cm.  --- End of Report ---  < end of copied text >    88M with PMH of Dementia (AAOx1-2 at baseline), tachy- daniel syndrome (declined PPM), HTN, HLD with CT findings of a large left-sided retroperitoneal hematoma along the psoas muscle measuring up to 9.8 x 3.7 x 4.3 cm    - Continue to monitor CBC , Serial H&H Transfuse prn   - Closely monitor vital signs  - Consider IR consult should pt need embolization Patient admitted with generalized weakness and worsened mental status past week    No acute complaints. No pain in the left flank or abdomen.   No skin changes. No tenderness.     Vital Signs Last 24 Hrs  T(C): 36.9 (05 May 2023 09:00), Max: 36.9 (05 May 2023 09:00)  T(F): 98.4 (05 May 2023 09:00), Max: 98.4 (05 May 2023 09:00)  HR: 75 (05 May 2023 09:00) (75 - 92)  BP: 152/90 (05 May 2023 09:00) (152/90 - 176/80)  RR: 18 (05 May 2023 09:00) (18 - 18)  SpO2: 96% (05 May 2023 09:00) (95% - 96%) room air    CT Abdomen and Pelvis No Cont (05.04.23 @ 22:27)   IMPRESSION:  Markedly distended urinary bladder with resultant moderate bilateral hydroureteronephrosis.  Moderate stool burden in the colon. Colonic diverticulosis without evidence of diverticulitis.  Left-sided retroperitoneal hematoma along the psoas muscle measuring up to 9.8 x 3.7 x 4.3 cm.      88M with PMH of Dementia (AAOx1-2 at baseline), tachy- daniel syndrome (declined PPM), HTN, HLD with CT findings of a large left-sided retroperitoneal hematoma along the psoas muscle measuring up to 9.8 x 3.7 x 4.3 cm also with fecal impaction and urinary retention needing mckeon catheter    Monitor CBC , Serial H&H Transfuse prn; stable H/H  Spoke with IR; no intervention   Discussed with Sx Dr. Mckenzie; no acute surgical intervention at this time    Discussed with Daughter at bedside with Urology PA and reviewed  Discussed with ACP Can and RN

## 2023-05-05 NOTE — PATIENT PROFILE ADULT - FALL HARM RISK - HARM RISK INTERVENTIONS

## 2023-05-05 NOTE — H&P ADULT - PROBLEM SELECTOR PLAN 4
trop eleveated 87 > 83  no cp or EKG changes noted  likely due to demand ischemia    - telemetry monitoring, serial EKG

## 2023-05-05 NOTE — H&P ADULT - ASSESSMENT
88 year old M with PMH of Dementia (AAOx1-2 at baseline), tachy- daniel syndrome (declined PPM), HTN, HLD, prostate CA presents with abdominal pain admitted for retroperitoneal hematoma, KEV, constipation    ED Course  Vitals: /90 P 80 R 18 T 97.4F SpO2 96% RA  Meds:  mg, NS 1 L, KCl PO & IV, zofran  WBC 13.4k >11.83k  Hb 13.7 > 12.6  K 3.1 > 3.1  , Phos 4.8  BUN/Cr 34/2.4 > 35/2.37  trop 87.7    CT A/P:  Markedly distended urinary bladder with resultant moderate bilateral hydroureteronephrosis.  Moderate stool burden in the colon. Colonic diverticulosis without evidence of diverticulitis.  Left-sided retroperitoneal hematoma along the psoas muscle measuring up to 9.8 x 3.7 x 4.3 cm.    CXR negative

## 2023-05-05 NOTE — H&P ADULT - ATTENDING COMMENTS
87yo M PMHx Dementia, HTN, HLD, tachy-daniel syndrome (declined PPM), and prostate CA presents to ED for 5 days of constipation. Of note he was mainly having constipation and discomfort, however on day of admission he was complaining of having episodes of NBNB emesis and nausea along with LLQ pain. No melena, no hematochezia.    ROS negative except above.     VSS    Physical Exam:  Gen: NAD  Heart: RRR  Lungs: CTAB  Abd: soft, TTP w/ possible mass in LLQ, no guarding, no ecchymoses    Labs signifcant for increased Cr to 2.37 with mild leukocytosis    CTABP: [id number] retroperitoneal hematoma near L psoas muscle. Large distended bladder    ASSESSMENT/PLAN:  87yo M PMHx Dementia, HTN, HLD, tachy-daniel syndrome (declined PPM), and prostate CA presents to ED for 5 days of constipation found to have retroperitoneal hematoma and KEV  - surgery consulted: no acute surgical intervention. Recommend IR   - f/up IR consult  - bladder scan for likely post-renal cause for KEV: if increased place mckeon   - monitor Cr  - if negative bladder scan, send urine studies   - avoid nephrotoxic agents  - renally dose medications  - continue home medications  - reconcile prostate cancer history and treatment  - SCDs for now, avoid anticoagulant medication given hematoma  - f/up coag studies  - stool softeners, if persistent stool burden and constipation, consider enema    Rest of case discussed with resident and agree with their full assessment and plan

## 2023-05-05 NOTE — CONSULT NOTE ADULT - ASSESSMENT
89 y/o male a/w RP hematoma with urinary retention, mckeon placed  VSS, afebrile, Cr mildly improved 2.37 to 2.26    Imaging reviewed by attending.     Plan  - serial BMP, monitor Cr levels  - continue mckeon catheter, monitor output  - mckeon care prn  - remainder of care as per primary team  - discussed with attending and agrees     87 y/o male a/w RP hematoma with urinary retention, bilateral hydronephrosis  VSS, afebrile, Cr mildly improved 2.37 to 2.26    - CT images reviewed  - Labs reviewed  - serial BMP, monitor Cr levels  - continue mckeon catheter, monitor output  - mckeon care prn  - remainder of care as per primary team  - renal US in 3 days to assess resolution of hydronephrosis  - discussed with PA staff

## 2023-05-05 NOTE — CONSULT NOTE ADULT - SUBJECTIVE AND OBJECTIVE BOX
UROLOGY CONSULT   89 y/o male with past medical history of dementia, HTN, prostate CA admitted to medicine for lethargy, abdominal pain and CT findings of retroperitoneal hematoma CT. Urology consulted for urinary retention. CT Abd and Pelvis found to have bladder distension and bilateral hydronephrosis. As per chart, night PA placed coude catheter. Pt seen and examined at bedside. Pt is poor historian, all information obtained from daughter at bedside. Daughter admits pt received radiation and vantas implant in 2020 for treatment of prostate cancer (dx > 10 years ago). Pt outpatient urologist is Dr. Briceño, however, has not followed up since  due to pt's cognitive decline. Admits pt has not had recent issues with urination or placement of mckeon catheter in the past. Denies dysuria, hematuria, difficulty urinating. No use of anticoagulation. Denies fever, chills.     ED Course  Vitals: /90 P 80 R 18 T 97.4F SpO2 96% RA  Meds:  mg, NS 1 L, KCl PO & IV, zofran    (05 May 2023 02:31)    PAST MEDICAL & SURGICAL HISTORY:  HTN (hypertension)  HLD (hyperlipidemia)  Prostate cancer  Mitral valve regurgitation  Dementia  HTN (hypertension)  Dyslipidemia  History of hip replacement    MEDICATIONS  (STANDING):  amLODIPine   Tablet 10 milliGRAM(s) Oral daily  atorvastatin 40 milliGRAM(s) Oral at bedtime  bacitracin   Ointment 1 Application(s) Topical daily  hydrALAZINE 100 milliGRAM(s) Oral two times a day  hydrogen peroxide 3% Solution 1 Application(s) Topical daily  lactated ringers. 1000 milliLiter(s) (100 mL/Hr) IV Continuous <Continuous>  memantine 10 milliGRAM(s) Oral two times a day  pantoprazole  Injectable 40 milliGRAM(s) IV Push daily  polyethylene glycol 3350 17 Gram(s) Oral every 24 hours  potassium chloride  10 mEq/100 mL IVPB 10 milliEquivalent(s) IV Intermittent every 1 hour  senna 2 Tablet(s) Oral at bedtime    MEDICATIONS  (PRN):  ondansetron Injectable 4 milliGRAM(s) IV Push every 6 hours PRN Nausea and/or Vomiting      Allergies    No Known Allergies    Intolerances        Vital Signs Last 24 Hrs  T(C): 37 (05 May 2023 14:41), Max: 37 (05 May 2023 14:41)  T(F): 98.6 (05 May 2023 14:41), Max: 98.6 (05 May 2023 14:41)  HR: 80 (05 May 2023 14:41) (74 - 92)  BP: 177/94 (05 May 2023 14:41) (152/90 - 177/94)  BP(mean): --  RR: 18 (05 May 2023 14:41) (16 - 18)  SpO2: 98% (05 May 2023 14:41) (95% - 98%)    Parameters below as of 05 May 2023 14:41  Patient On (Oxygen Delivery Method): nasal cannula  O2 Flow (L/min): 2      Physical:  Gen: Resting comfortably. AxOx1. NAD.  Resp: Unlabored breathing. Equal chest rise bilaterally. On 2L NC.  Abd: Soft ND, nontender to palpation. No voluntary guarding, no grimacing.  : Mckeon catheter in place, draining clear urine in tubing. Pink-tinged in mckeon bag, ~200cc at bedside. No suprapubic tenderness. Negative CVAT.         I&O's Detail    05 May 2023 07:01  -  05 May 2023 16:39  --------------------------------------------------------  IN:  Total IN: 0 mL    OUT:    Indwelling Catheter - Urethral (mL): 1300 mL  Total OUT: 1300 mL    Total NET: -1300 mL          LABS:                        12.0   10.59 )-----------( 238      ( 05 May 2023 10:10 )             36.3              05-05    145  |  110<H>  |  32<H>  ----------------------------<  138<H>  3.1<L>   |  28  |  2.26<H>    Ca    9.4      05 May 2023 04:53  Phos  4.5     05-05  Mg     2.2     05-05    TPro  8.3  /  Alb  3.1<L>  /  TBili  0.5  /  DBili  x   /  AST  18  /  ALT  19  /  AlkPhos  115  05-04            PT/INR - ( 05 May 2023 04:53 )   PT: 13.7 sec;   INR: 1.15 ratio         PTT - ( 05 May 2023 04:53 )  PTT:28.1 sec  Urinalysis Basic - ( 05 May 2023 02:00 )    Color: Yellow / Appearance: Clear / S.010 / pH: x  Gluc: x / Ketone: Negative  / Bili: Negative / Urobili: Negative   Blood: x / Protein: 15 mg/dL / Nitrite: Negative   Leuk Esterase: Negative / RBC: Negative /HPF / WBC 0-2 /HPF   Sq Epi: x / Non Sq Epi: x / Bacteria: Negative /HPF        RADIOLOGY & ADDITIONAL STUDIES:  < from: CT Abdomen and Pelvis No Cont (23 @ 22:27) >  COMPARISON: 2006.    CONTRAST/COMPLICATIONS:  IV Contrast: NONE  Oral Contrast: NONE  Complications: None reported at time of study completion    PROCEDURE:  CT of the Abdomen and Pelvis was performed.  Sagittal and coronal reformats were performed.    FINDINGS:  LOWER CHEST: Mild bibasilar dependent and platelike atelectasis. Trace   bilateral pleural effusions. Heart is enlarged. Atherosclerotic   calcifications of the thoracic aorta and coronary arteries. Symmetric   bilateral gynecomastia.    LIVER: Within normal limits.  BILE DUCTS: Normal caliber.  GALLBLADDER: Gallbladder mildly distended. No radiodense gallstones. No   pericholecystic inflammatory changes.  SPLEEN: Within normal limits.  PANCREAS: Within normal limits.  ADRENALS: Within normal limits.  KIDNEYS/URETERS: Moderate bilateral hydroureteronephrosis to the level of   the bladder which is markedly distended. No obstructing urinary tract   calculus. Nonobstructing lower pole right intrarenal calculus measuring   11 mm.    BLADDER: Markedlydistended urinary bladder.  REPRODUCTIVE ORGANS: Prostate gland is not enlarged.    BOWEL: Small hiatus hernia. No bowel obstruction. Appendix is normal.   Moderate stool burden in the colon. Colonic diverticulosis without   evidence of diverticulitis.  PERITONEUM: No ascites or pneumoperitoneum. No mesenteric lymphadenopathy.  VESSELS: Heavy atherosclerotic calcifications of the aortoiliac tree.   Normal caliber abdominal aorta.  RETROPERITONEUM/LYMPH NODES: No lymphadenopathy. Asymmetric enlargement   of the left psoas muscle with internal hyperdensity extending along the   left iliac is muscle measuring up to 9.8 x 3.7 x 4.3 cm likely   representing a hematoma.  ABDOMINAL WALL: Small fat-containing umbilical hernia.  BONES: Bilateral femoral arthroplasties. Degenerative changes of the   spine.    IMPRESSION:  Markedly distended urinary bladder with resultant moderate bilateral   hydroureteronephrosis.    Moderate stool burden in the colon. Colonic diverticulosis without   evidence of diverticulitis.    Left-sided retroperitoneal hematoma along the psoas muscle measuring up   to 9.8 x 3.7 x 4.3 cm.    < end of copied text >   UROLOGY CONSULT   87 y/o male with past medical history of dementia, HTN, prostate CA admitted to medicine for lethargy, abdominal pain and CT findings of retroperitoneal hematoma CT. Urology consulted for urinary retention. CT Abd and Pelvis found to have bladder distension and bilateral hydronephrosis. As per chart, night PA placed coude catheter. Pt seen and examined at bedside. Pt is poor historian, all information obtained from daughter at bedside. Daughter admits pt received radiation and vantas implant in 2020 for treatment of prostate cancer (dx > 10 years ago). Pt outpatient urologist is Dr. Briceño, however, has not followed up since  due to pt's cognitive decline. Denies recent issues with urination or placement of mckeon catheter in the past. Denies dysuria, hematuria, difficulty urinating. No use of anticoagulation. Denies fever, chills.     ED Course  Vitals: /90 P 80 R 18 T 97.4F SpO2 96% RA  Meds:  mg, NS 1 L, KCl PO & IV, zofran    (05 May 2023 02:31)    PAST MEDICAL & SURGICAL HISTORY:  HTN (hypertension)  HLD (hyperlipidemia)  Prostate cancer  Mitral valve regurgitation  Dementia  HTN (hypertension)  Dyslipidemia  History of hip replacement    Family History: No family history of  malignancy  Social History: Does not smoke    ROS: All others negative except as noted above.      MEDICATIONS  (STANDING):  amLODIPine   Tablet 10 milliGRAM(s) Oral daily  atorvastatin 40 milliGRAM(s) Oral at bedtime  bacitracin   Ointment 1 Application(s) Topical daily  hydrALAZINE 100 milliGRAM(s) Oral two times a day  hydrogen peroxide 3% Solution 1 Application(s) Topical daily  lactated ringers. 1000 milliLiter(s) (100 mL/Hr) IV Continuous <Continuous>  memantine 10 milliGRAM(s) Oral two times a day  pantoprazole  Injectable 40 milliGRAM(s) IV Push daily  polyethylene glycol 3350 17 Gram(s) Oral every 24 hours  potassium chloride  10 mEq/100 mL IVPB 10 milliEquivalent(s) IV Intermittent every 1 hour  senna 2 Tablet(s) Oral at bedtime    MEDICATIONS  (PRN):  ondansetron Injectable 4 milliGRAM(s) IV Push every 6 hours PRN Nausea and/or Vomiting      Allergies    No Known Allergies      Vital Signs Last 24 Hrs  T(C): 37 (05 May 2023 14:41), Max: 37 (05 May 2023 14:41)  T(F): 98.6 (05 May 2023 14:41), Max: 98.6 (05 May 2023 14:41)  HR: 80 (05 May 2023 14:41) (74 - 92)  BP: 177/94 (05 May 2023 14:41) (152/90 - 177/94)  BP(mean): --  RR: 18 (05 May 2023 14:41) (16 - 18)  SpO2: 98% (05 May 2023 14:41) (95% - 98%)    Parameters below as of 05 May 2023 14:41  Patient On (Oxygen Delivery Method): nasal cannula  O2 Flow (L/min): 2      Physical:  Gen: Resting comfortably. AxOx1. NAD.  Resp: Unlabored breathing. Equal chest rise bilaterally. On 2L NC.  Abd: Soft ND, nontender to palpation. No voluntary guarding, no grimacing.  : Mckeon catheter in place, draining clear urine in tubing. Pink-tinged in mckeon bag, ~200cc at bedside. No suprapubic tenderness. Negative CVAT.         I&O's Detail    05 May 2023 07:01  -  05 May 2023 16:39  --------------------------------------------------------  IN:  Total IN: 0 mL    OUT:    Indwelling Catheter - Urethral (mL): 1300 mL  Total OUT: 1300 mL    Total NET: -1300 mL          LABS:                        12.0   10.59 )-----------( 238      ( 05 May 2023 10:10 )             36.3              05-    145  |  110<H>  |  32<H>  ----------------------------<  138<H>  3.1<L>   |  28  |  2.26<H>    Ca    9.4      05 May 2023 04:53  Phos  4.5     05-05  Mg     2.2     05-05    TPro  8.3  /  Alb  3.1<L>  /  TBili  0.5  /  DBili  x   /  AST  18  /  ALT  19  /  AlkPhos  115  -04            PT/INR - ( 05 May 2023 04:53 )   PT: 13.7 sec;   INR: 1.15 ratio         PTT - ( 05 May 2023 04:53 )  PTT:28.1 sec  Urinalysis Basic - ( 05 May 2023 02:00 )    Color: Yellow / Appearance: Clear / S.010 / pH: x  Gluc: x / Ketone: Negative  / Bili: Negative / Urobili: Negative   Blood: x / Protein: 15 mg/dL / Nitrite: Negative   Leuk Esterase: Negative / RBC: Negative /HPF / WBC 0-2 /HPF   Sq Epi: x / Non Sq Epi: x / Bacteria: Negative /HPF        RADIOLOGY & ADDITIONAL STUDIES:  < from: CT Abdomen and Pelvis No Cont (23 @ 22:27) >  COMPARISON: 2006.    CONTRAST/COMPLICATIONS:  IV Contrast: NONE  Oral Contrast: NONE  Complications: None reported at time of study completion    PROCEDURE:  CT of the Abdomen and Pelvis was performed.  Sagittal and coronal reformats were performed.    FINDINGS:  LOWER CHEST: Mild bibasilar dependent and platelike atelectasis. Trace   bilateral pleural effusions. Heart is enlarged. Atherosclerotic   calcifications of the thoracic aorta and coronary arteries. Symmetric   bilateral gynecomastia.    LIVER: Within normal limits.  BILE DUCTS: Normal caliber.  GALLBLADDER: Gallbladder mildly distended. No radiodense gallstones. No   pericholecystic inflammatory changes.  SPLEEN: Within normal limits.  PANCREAS: Within normal limits.  ADRENALS: Within normal limits.  KIDNEYS/URETERS: Moderate bilateral hydroureteronephrosis to the level of   the bladder which is markedly distended. No obstructing urinary tract   calculus. Nonobstructing lower pole right intrarenal calculus measuring   11 mm.    BLADDER: Markedlydistended urinary bladder.  REPRODUCTIVE ORGANS: Prostate gland is not enlarged.    BOWEL: Small hiatus hernia. No bowel obstruction. Appendix is normal.   Moderate stool burden in the colon. Colonic diverticulosis without   evidence of diverticulitis.  PERITONEUM: No ascites or pneumoperitoneum. No mesenteric lymphadenopathy.  VESSELS: Heavy atherosclerotic calcifications of the aortoiliac tree.   Normal caliber abdominal aorta.  RETROPERITONEUM/LYMPH NODES: No lymphadenopathy. Asymmetric enlargement   of the left psoas muscle with internal hyperdensity extending along the   left iliac is muscle measuring up to 9.8 x 3.7 x 4.3 cm likely   representing a hematoma.  ABDOMINAL WALL: Small fat-containing umbilical hernia.  BONES: Bilateral femoral arthroplasties. Degenerative changes of the   spine.    IMPRESSION:  Markedly distended urinary bladder with resultant moderate bilateral   hydroureteronephrosis.    Moderate stool burden in the colon. Colonic diverticulosis without   evidence of diverticulitis.    Left-sided retroperitoneal hematoma along the psoas muscle measuring up   to 9.8 x 3.7 x 4.3 cm.    < end of copied text >

## 2023-05-05 NOTE — H&P ADULT - PROBLEM SELECTOR PLAN 3
CT shows stool burden    bowel regimen  consider enema, holding off for now given location of hematoma

## 2023-05-05 NOTE — CONSULT NOTE ADULT - SUBJECTIVE AND OBJECTIVE BOX
GENERAL SURGERY CONSULT NOTE     chief complaint of dementia    HPI:  88 year old M with PMH of Dementia (AAOx1-2 at baseline), tachy- daniel syndrome (declined PPM), HTN, HLD presents with complains of about 5 days of constipation, then nausea and vomiting today with discomfort in the left lower quadrant of the abdomen.  Poor historian but additional history by daughter stating he has been holding abdomen  and started having multiple NBNB vomiting since the last 24hrs. He denies any urinary symptoms/fever/chills/chest pain/shortness of breath.  He is currently on treatment for prostate cancer as well.  No other complaints .     Surgery was called because of an incidental finding of a large Left-sided retroperitoneal hematoma along the psoas muscle measuring up   to 9.8 x 3.7 x 4.3 cm. Pt is a poor historian but area seems benign no compartment or skin changes. Denies any other complaints at this time.             PAST MEDICAL & SURGICAL HISTORY:  HTN (hypertension)      HLD (hyperlipidemia)      Prostate cancer      Mitral valve regurgitation      Dementia      HTN (hypertension)      Dyslipidemia      History of hip replacement        MEDICATIONS  (STANDING):  lactated ringers. 1000 milliLiter(s) (100 mL/Hr) IV Continuous <Continuous>  pantoprazole  Injectable 40 milliGRAM(s) IV Push daily    MEDICATIONS  (PRN):  ondansetron Injectable 4 milliGRAM(s) IV Push every 6 hours PRN Nausea and/or Vomiting      Allergies    No Known Allergies    Intolerances        Social History:  lives with wife, 24hr HHA, walks with walker (05 May 2023 02:31)      FAMILY HISTORY:        Physical Exam:  Vital Signs Last 24 Hrs  T(C): 36.2 (04 May 2023 19:56), Max: 36.3 (04 May 2023 15:40)  T(F): 97.1 (04 May 2023 19:56), Max: 97.4 (04 May 2023 15:40)  HR: 91 (04 May 2023 19:56) (80 - 91)  BP: 176/80 (04 May 2023 19:56) (172/90 - 176/80)  BP(mean): --  RR: 18 (04 May 2023 19:56) (18 - 18)  SpO2: 95% (04 May 2023 19:56) (95% - 96%)    Parameters below as of 04 May 2023 19:56  Patient On (Oxygen Delivery Method): room air        General:  A&Ox3,Appears stated age, No acute distress,  Head: NC/AT  EENT: PERRLA. EOMI. Conjunctiva and sclera clear. Pharynx clear.  Neck: Supple. No JVD  Lungs: CTA B/l. Nonlabored Respirations  CV: +S1S2, RRR  Abdomen: Soft, Nondistended,  Nontender, no guarding, no rebound  Extremities: No compartments or skin changes Warm and well perfused. 2+ peripheral pulses b/l. Calf soft, nontender b/l. No pedal edema.            LABS:                        11.5   11.23 )-----------( 240      ( 05 May 2023 04:53 )             35.6     05-05    145  |  110<H>  |  32<H>  ----------------------------<  138<H>  3.1<L>   |  28  |  x     Ca    9.4      05 May 2023 04:53  Phos  4.8     05-05  Mg     2.2     05-    TPro  8.3  /  Alb  3.1<L>  /  TBili  0.5  /  DBili  x   /  AST  18  /  ALT  19  /  AlkPhos  115  05-04    LIVER FUNCTIONS - ( 04 May 2023 16:50 )  Alb: 3.1 g/dL / Pro: 8.3 g/dL / ALK PHOS: 115 U/L / ALT: 19 U/L DA / AST: 18 U/L / GGT: x             Urinalysis Basic - ( 05 May 2023 02:00 )    Color: Yellow / Appearance: Clear / S.010 / pH: x  Gluc: x / Ketone: Negative  / Bili: Negative / Urobili: Negative   Blood: x / Protein: 15 mg/dL / Nitrite: Negative   Leuk Esterase: Negative / RBC: Negative /HPF / WBC 0-2 /HPF   Sq Epi: x / Non Sq Epi: x / Bacteria: Negative /HPF        RADIOLOGY & ADDITIONAL STUDIES:  < from: CT Abdomen and Pelvis No Cont (23 @ 22:27) >  FINDINGS:  LOWER CHEST: Mild bibasilar dependent and platelike atelectasis. Trace   bilateral pleural effusions. Heart is enlarged. Atherosclerotic   calcifications of the thoracic aorta and coronary arteries. Symmetric   bilateral gynecomastia.    LIVER: Within normal limits.  BILE DUCTS: Normal caliber.  GALLBLADDER: Gallbladder mildly distended. No radiodense gallstones. No   pericholecystic inflammatory changes.  SPLEEN: Within normal limits.  PANCREAS: Within normal limits.  ADRENALS: Within normal limits.  KIDNEYS/URETERS: Moderate bilateral hydroureteronephrosis to the level of   the bladder which is markedly distended. No obstructing urinary tract   calculus. Nonobstructing lower pole right intrarenal calculus measuring   11 mm.    BLADDER: Markedlydistended urinary bladder.  REPRODUCTIVE ORGANS: Prostate gland is not enlarged.    BOWEL: Small hiatus hernia. No bowel obstruction. Appendix is normal.   Moderate stool burden in the colon. Colonic diverticulosis without   evidence of diverticulitis.  PERITONEUM: No ascites or pneumoperitoneum. No mesenteric lymphadenopathy.  VESSELS: Heavy atherosclerotic calcifications of the aortoiliac tree.   Normal caliber abdominal aorta.  RETROPERITONEUM/LYMPH NODES: No lymphadenopathy. Asymmetric enlargement   of the left psoas muscle with internal hyperdensity extending along the   left iliac is muscle measuring up to 9.8 x 3.7 x 4.3 cm likely   representing a hematoma.  ABDOMINAL WALL: Small fat-containing umbilical hernia.  BONES: Bilateral femoral arthroplasties. Degenerative changes of the   spine.    IMPRESSION:  Markedly distended urinary bladder with resultant moderate bilateral   hydroureteronephrosis.    Moderate stool burden in the colon. Colonic diverticulosis without   evidence of diverticulitis.    Left-sided retroperitoneal hematoma along the psoas muscle measuring up   to 9.8 x 3.7 x 4.3 cm.      < end of copied text >

## 2023-05-06 NOTE — PHYSICAL THERAPY INITIAL EVALUATION ADULT - PERTINENT HX OF CURRENT PROBLEM, REHAB EVAL
Pt admitted from home with c/o 5 days of constipation found to have retroperitoneal hematoma and KEV  - surgery consulted: no acute surgical intervention.

## 2023-05-06 NOTE — PROGRESS NOTE ADULT - ASSESSMENT
88 year old M with large Left-sided retroperitoneal hematoma along the psoas muscle measuring up   to 9.8 x 3.7 x 4.3 cm.   VSS    -Monitor H/H/transfuse as needed  -serial abdominal exams  -Consider IR consult   -care per primary team   -no acute surgical intervention at this time   -discussed with attending

## 2023-05-06 NOTE — PROGRESS NOTE ADULT - SUBJECTIVE AND OBJECTIVE BOX
INTERVAL HPI/OVERNIGHT EVENTS:  pt seen and examined at bedside.   reports some abdominal pain   npo       MEDICATIONS  (STANDING):  amLODIPine   Tablet 10 milliGRAM(s) Oral daily  atorvastatin 40 milliGRAM(s) Oral at bedtime  bacitracin   Ointment 1 Application(s) Topical daily  hydrALAZINE 100 milliGRAM(s) Oral two times a day  hydrogen peroxide 3% Solution 1 Application(s) Topical daily  memantine 10 milliGRAM(s) Oral two times a day  pantoprazole  Injectable 40 milliGRAM(s) IV Push daily  polyethylene glycol 3350 17 Gram(s) Oral every 24 hours  potassium chloride  10 mEq/100 mL IVPB 10 milliEquivalent(s) IV Intermittent every 1 hour  senna 2 Tablet(s) Oral at bedtime  sodium chloride 0.45% with potassium chloride 20 mEq/L 1000 milliLiter(s) (75 mL/Hr) IV Continuous <Continuous>    MEDICATIONS  (PRN):  ondansetron Injectable 4 milliGRAM(s) IV Push every 6 hours PRN Nausea and/or Vomiting      Vital Signs Last 24 Hrs  T(C): 37 (06 May 2023 11:13), Max: 37.2 (06 May 2023 08:01)  T(F): 98.6 (06 May 2023 11:13), Max: 99 (06 May 2023 08:01)  HR: 65 (06 May 2023 11:13) (65 - 80)  BP: 175/84 (06 May 2023 11:13) (137/80 - 177/94)  BP(mean): --  RR: 18 (06 May 2023 11:13) (18 - 20)  SpO2: 98% (06 May 2023 11:13) (97% - 99%)    Parameters below as of 06 May 2023 11:13  Patient On (Oxygen Delivery Method): nasal cannula  O2 Flow (L/min): 2      Physical:  General: NAD.  Respirations: Unlabored   Abdomen: Softly distended, nontender. no rebound, no guarding, no peritonitis, no overlying skin changes     I&O's Detail    05 May 2023 07:01  -  06 May 2023 07:00  --------------------------------------------------------  IN:  Total IN: 0 mL    OUT:    Indwelling Catheter - Urethral (mL): 1900 mL    Voided (mL): 800 mL  Total OUT: 2700 mL    Total NET: -2700 mL          LABS:                        11.2   10.68 )-----------( 227      ( 06 May 2023 06:31 )             33.9             05-06    145  |  109<H>  |  27<H>  ----------------------------<  120<H>  3.0<L>   |  29  |  1.83<H>    Ca    8.9      06 May 2023 06:31  Phos  4.5     05-05  Mg     2.2     05-05    TPro  8.3  /  Alb  3.1<L>  /  TBili  0.5  /  DBili  x   /  AST  18  /  ALT  19  /  AlkPhos  115  05-04      88y.o. Male

## 2023-05-06 NOTE — PROGRESS NOTE ADULT - SUBJECTIVE AND OBJECTIVE BOX
INCOMPLETE NOTE:    89yo M PMHx Dementia, HTN, HLD, tachy-daniel syndrome (declined PPM), and prostate CA presents to ED for 5 days of constipation. Of note he was mainly having constipation and discomfort, however on day of admission he was complaining of having episodes of NBNB emesis and nausea along with LLQ pain. No melena, no hematochezia.        MEDICATIONS  (STANDING):  amLODIPine   Tablet 10 milliGRAM(s) Oral daily  atorvastatin 40 milliGRAM(s) Oral at bedtime  bacitracin   Ointment 1 Application(s) Topical daily  hydrALAZINE 100 milliGRAM(s) Oral two times a day  hydrogen peroxide 3% Solution 1 Application(s) Topical daily  lactated ringers. 1000 milliLiter(s) (100 mL/Hr) IV Continuous <Continuous>  memantine 10 milliGRAM(s) Oral two times a day  pantoprazole  Injectable 40 milliGRAM(s) IV Push daily  polyethylene glycol 3350 17 Gram(s) Oral every 24 hours  potassium chloride  10 mEq/100 mL IVPB 10 milliEquivalent(s) IV Intermittent every 1 hour  senna 2 Tablet(s) Oral at bedtime    MEDICATIONS  (PRN):  ondansetron Injectable 4 milliGRAM(s) IV Push every 6 hours PRN Nausea and/or Vomiting    Vital Signs Last 24 Hrs  T(C): 37.2 (06 May 2023 08:01), Max: 37.2 (06 May 2023 08:01)  T(F): 99 (06 May 2023 08:01), Max: 99 (06 May 2023 08:01)  HR: 65 (06 May 2023 08:01) (65 - 80)  BP: 163/92 (06 May 2023 08:01) (137/80 - 177/94)  RR: 19 (06 May 2023 08:01) (16 - 20)  SpO2: 97% (06 May 2023 08:01) (97% - 99%)nasal cannula O2 Flow (L/min): 2        Physical Exam:  Gen: NAD  Heart: RRR  Lungs: CTAB  Abd: soft, TTP w/ possible mass in LLQ, no guarding, no ecchymoses    Labs:                        11.2   10.68 )-----------( 227      ( 06 May 2023 06:31 )             33.9   05-06    145  |  109<H>  |  27<H>  ----------------------------<  120<H>  3.0<L>   |  29  |  1.83<H>    Ca    8.9      06 May 2023 06:31  Phos  4.5     05-05  Mg     2.2     05-05    TPro  8.3  /  Alb  3.1<L>  /  TBili  0.5  /  DBili  x   /  AST  18  /  ALT  19  /  AlkPhos  115  05-04    < from: CT Abdomen and Pelvis No Cont (05.04.23 @ 22:27) >  IMPRESSION:  Markedly distended urinary bladder with resultant moderate bilateral hydroureteronephrosis.  Moderate stool burden in the colon. Colonic diverticulosis without evidence of diverticulitis.  Left-sided retroperitoneal hematoma along the psoas muscle measuring up to 9.8 x 3.7 x 4.3 cm.        ASSESSMENT/PLAN:  89yo M PMHx Dementia, HTN, HLD, tachy-daniel syndrome (declined PPM), and prostate CA presents to ED for 5 days of constipation found to have retroperitoneal hematoma and KEV  - surgery consulted: no acute surgical intervention. Recommend IR   - f/up IR consult  - bladder scan for likely post-renal cause for KEV: if increased place mckeon   - monitor Cr  - if negative bladder scan, send urine studies   - avoid nephrotoxic agents  - renally dose medications  - continue home medications  - reconcile prostate cancer history and treatment  - SCDs for now, avoid anticoagulant medication given hematoma  - f/up coag studies  - stool softeners, if persistent stool burden and constipation, consider enema   INCOMPLETE NOTE:    89yo M PMHx Dementia, HTN, HLD, tachy-daniel syndrome (declined PPM), and prostate CA presents to ED for 5 days of constipation. Of note he was mainly having constipation and discomfort, however on day of admission he was complaining of having episodes of NBNB emesis and nausea along with LLQ pain. No melena, no hematochezia.        MEDICATIONS  (STANDING):  amLODIPine   Tablet 10 milliGRAM(s) Oral daily  atorvastatin 40 milliGRAM(s) Oral at bedtime  bacitracin   Ointment 1 Application(s) Topical daily  hydrALAZINE 100 milliGRAM(s) Oral two times a day  hydrogen peroxide 3% Solution 1 Application(s) Topical daily  lactated ringers. 1000 milliLiter(s) (100 mL/Hr) IV Continuous <Continuous>  memantine 10 milliGRAM(s) Oral two times a day  pantoprazole  Injectable 40 milliGRAM(s) IV Push daily  polyethylene glycol 3350 17 Gram(s) Oral every 24 hours  potassium chloride  10 mEq/100 mL IVPB 10 milliEquivalent(s) IV Intermittent every 1 hour  senna 2 Tablet(s) Oral at bedtime    MEDICATIONS  (PRN):  ondansetron Injectable 4 milliGRAM(s) IV Push every 6 hours PRN Nausea and/or Vomiting    Vital Signs Last 24 Hrs  T(C): 37.2 (06 May 2023 08:01), Max: 37.2 (06 May 2023 08:01)  T(F): 99 (06 May 2023 08:01), Max: 99 (06 May 2023 08:01)  HR: 65 (06 May 2023 08:01) (65 - 80)  BP: 163/92 (06 May 2023 08:01) (137/80 - 177/94)  RR: 19 (06 May 2023 08:01) (16 - 20)  SpO2: 97% (06 May 2023 08:01) (97% - 99%)nasal cannula O2 Flow (L/min): 2        Physical Exam:  Gen: NAD  Heart: RRR  Lungs: CTAB  Abd: soft, TTP w/ possible mass in LLQ, no guarding, no ecchymoses    Labs:                        11.2   10.68 )-----------( 227      ( 06 May 2023 06:31 )             33.9   05-06    145  |  109<H>  |  27<H>  ----------------------------<  120<H>  3.0<L>   |  29  |  1.83<H>    Ca    8.9      06 May 2023 06:31  Phos  4.5     05-05  Mg     2.2     05-05    TPro  8.3  /  Alb  3.1<L>  /  TBili  0.5  /  DBili  x   /  AST  18  /  ALT  19  /  AlkPhos  115  05-04    CT Abdomen and Pelvis No Cont (05.04.23 @ 22:27)     IMPRESSION:  Markedly distended urinary bladder with resultant moderate bilateral hydroureteronephrosis.  Moderate stool burden in the colon. Colonic diverticulosis without evidence of diverticulitis.  Left-sided retroperitoneal hematoma along the psoas muscle measuring up to 9.8 x 3.7 x 4.3 cm.         Patient seen and examined this morning around 11.45 AM    87yo M PMH of Dementia, HTN, HLD, tachy-daniel syndrome (declined PPM), and prostate CA presents to ED for 5 days of constipation with worseneing mental staus from baseline. Of note he was mainly having constipation and discomfort, however on day of admission he was complaining of having episodes of NBNB emesis and nausea along with LLQ pain. No melena, no hematochezia.  Patient noted to have Urinary retention needing mckeon and also Fecal impaction    Patient was noted to be alert and awake, comfortable at rest,  conversing well, followed simple commands but has underlying dementia  Patient unable to offer complaints; Had BM with bowel regimen    MEDICATIONS  (STANDING):  amLODIPine   Tablet 10 milliGRAM(s) Oral daily  atorvastatin 40 milliGRAM(s) Oral at bedtime  bacitracin   Ointment 1 Application(s) Topical daily  hydrALAZINE 100 milliGRAM(s) Oral two times a day  hydrogen peroxide 3% Solution 1 Application(s) Topical daily  lactated ringers. 1000 milliLiter(s) (100 mL/Hr) IV Continuous <Continuous>  memantine 10 milliGRAM(s) Oral two times a day  pantoprazole  Injectable 40 milliGRAM(s) IV Push daily  polyethylene glycol 3350 17 Gram(s) Oral every 24 hours  potassium chloride  10 mEq/100 mL IVPB 10 milliEquivalent(s) IV Intermittent every 1 hour  senna 2 Tablet(s) Oral at bedtime    MEDICATIONS  (PRN):  ondansetron Injectable 4 milliGRAM(s) IV Push every 6 hours PRN Nausea and/or Vomiting    Vital Signs Last 24 Hrs  T(C): 37.2 (06 May 2023 08:01), Max: 37.2 (06 May 2023 08:01)  T(F): 99 (06 May 2023 08:01), Max: 99 (06 May 2023 08:01)  HR: 65 (06 May 2023 08:01) (65 - 80)  BP: 163/92 (06 May 2023 08:01) (137/80 - 177/94)  RR: 19 (06 May 2023 08:01) (16 - 20)  SpO2: 97% (06 May 2023 08:01) (97% - 99%)nasal cannula O2 Flow (L/min): 2    P/E:  elderly male comfortable in bed NAD  Psych: AAO x1-2  Neuro: No gross focal deficits;  CVS: S1S2 present, regular, no edema  Resp: BLAE+, No wheeze or Rhonchi  GI: Soft, BS+, Non tender, non distended  Extr: No  calf tenderness B/L Lower extremities  Skin: Warm and moist without any rashes    Mckeon; mildly blood tinged urine but improved    Labs:                        11.2   10.68 )-----------( 227      ( 06 May 2023 06:31 )             33.9   05-06  145  |  109<H>  |  27<H>  ----------------------------<  120<H>  3.0<L>   |  29  |  1.83<H>    Ca    8.9      06 May 2023 06:31  Phos  4.5     05-05  Mg     2.2     05-05  TPro  8.3  /  Alb  3.1<L>  /  TBili  0.5  /  DBili  x   /  AST  18  /  ALT  19  /  AlkPhos  115  05-04    CT Abdomen and Pelvis No Cont (05.04.23 @ 22:27)   IMPRESSION:  Markedly distended urinary bladder with resultant moderate bilateral hydroureteronephrosis.  Moderate stool burden in the colon. Colonic diverticulosis without evidence of diverticulitis.  Left-sided retroperitoneal hematoma along the psoas muscle measuring up to 9.8 x 3.7 x 4.3 cm.

## 2023-05-06 NOTE — PROGRESS NOTE ADULT - ASSESSMENT
ASSESSMENT/PLAN:  89yo M PMHx Dementia, HTN, HLD, tachy-daniel syndrome (declined PPM), and prostate CA presents to ED for 5 days of constipation found to have retroperitoneal hematoma and KEV    Acute Kidney injury suspect Obstructive uropathy with superimposed Prerenal   Obstructive Uropathy   [place]      Plan:    - surgery consulted: no acute surgical intervention. Recommend IR   - f/up IR consult  - bladder scan for likely post-renal cause for KEV: if increased place mckeon   - monitor Cr  - if negative bladder scan, send urine studies   - avoid nephrotoxic agents  - renally dose medications  - continue home medications  - reconcile prostate cancer history and treatment  - SCDs for now, avoid anticoagulant medication given hematoma  - f/up coag studies  - stool softeners, if persistent stool burden and constipation, consider enema   ASSESSMENT/PLAN:  87yo M PMH of Dementia, HTN, HLD, Tachy-Scottie syndrome (declined PPM), and prostate CA presents to ED for 5 days of constipation found to have retroperitoneal hematoma and KEV    D/D:  Acute Kidney injury suspect Obstructive uropathy with superimposed Prerenal   Obstructive Uropathy due to Fecal impaction  Dementia moderate  Hx Prostate Ca s/p RT      Plan:  Continue fluids  Patient is more alert, bedside swallow done; able to swallow apple sauce well  Advance diet to Pureed, later d/w daughter this evening; able to swallow cookies at home; Diet advanced to Soft, bite sized   Patient needs assistance with feeds; d/w RBN; Keep HOB elevated while feeding  Sx f/u noted; d/w Dr. Mckenzie yesterday;  no acute surgical intervention. Recommend IR   Discussed with IR Olegario Saldivar yesterday; no intervention; conservative   Bowel regimen; Once adequate BM achieved will consider TOV Monday morning; no Hx Urinary retention as per daughter  Renal fn improving well Monitor closely; avoid nephrotoxic agents  SCDs for now, avoid anticoagulant medication given hematoma    Discussed with Daughter at length over the phone and updated; verbalized understanding and agree with the plan  Discussed with RN

## 2023-05-06 NOTE — PHYSICAL THERAPY INITIAL EVALUATION ADULT - CRITERIA FOR SKILLED THERAPEUTIC INTERVENTIONS
LOCO pending functional progress/impairments found/functional limitations in following categories/anticipated discharge recommendation

## 2023-05-07 NOTE — DIETITIAN INITIAL EVALUATION ADULT - FACTORS AFF FOOD INTAKE
acute on chronic comorbidities/change in mental status/difficulty feeding self/difficulty with food procurement/preparation/Rastafari/ethnic/cultural/personal food preferences

## 2023-05-07 NOTE — DIETITIAN INITIAL EVALUATION ADULT - OTHER INFO
Pt lives home with family PTA, alert, confused with dementia; constipation x 5d with nausea/vomiting x 1d per MD; Limited intake/wt change history data available at present; No GI distress reported at present, olerating Soft and Bite Sized food, 26/50% intake at time per flowsheet; Unknown food allergies per Chart

## 2023-05-07 NOTE — PROGRESS NOTE ADULT - ASSESSMENT
ASSESSMENT/PLAN:  87yo M PMH of Dementia, HTN, HLD, Tachy-Scottie syndrome (declined PPM), and prostate CA presents to ED for 5 days of constipation found to have retroperitoneal hematoma and KEV    D/D:  Acute Kidney injury suspect Obstructive uropathy with superimposed Prerenal   Obstructive Uropathy due to Fecal impaction  Dementia moderate  Hx Prostate Ca s/p RT      Plan:  Continue fluids  Patient is more alert, bedside swallow done; able to swallow apple sauce well  Advance diet to Pureed, later d/w daughter this evening; able to swallow cookies at home; Diet advanced to Soft, bite sized   Patient needs assistance with feeds; d/w RBN; Keep HOB elevated while feeding  Sx f/u noted; d/w Dr. Mckenzie yesterday;  no acute surgical intervention. Recommend IR   Discussed with IR Olegario Saldivar yesterday; no intervention; conservative   Bowel regimen; Once adequate BM achieved will consider TOV Monday morning; no Hx Urinary retention as per daughter  Renal fn improving well Monitor closely; avoid nephrotoxic agents  SCDs for now, avoid anticoagulant medication given hematoma    Discussed with Daughter at length over the phone and updated; verbalized understanding and agree with the plan  Discussed with RN

## 2023-05-07 NOTE — DIETITIAN INITIAL EVALUATION ADULT - DIET TYPE
May consider oral nutritional supplement if persistently decreased intake as medically feasible May consider oral nutritional supplement if persistently decreased intake as medically feasible.

## 2023-05-07 NOTE — PROGRESS NOTE ADULT - SUBJECTIVE AND OBJECTIVE BOX
Patient seen and examined at bedside with no complaints.     Vital Signs Last 24 Hrs  T(F): 98.2 (05-07-23 @ 11:04), Max: 99.7 (05-06-23 @ 19:33)  HR: 66 (05-07-23 @ 11:04)  BP: 144/79 (05-07-23 @ 11:04)  RR: 17 (05-07-23 @ 11:04)  SpO2: 96% (05-07-23 @ 11:04)    Physical:  General: NAD.  Respirations: Unlabored   Abdomen: Softly distended, nontender. no rebound, no guarding, no peritonitis, no overlying skin changes     I&O's Detail    06 May 2023 07:01  -  07 May 2023 07:00  --------------------------------------------------------  IN:  Total IN: 0 mL    OUT:    Indwelling Catheter - Urethral (mL): 2600 mL  Total OUT: 2600 mL    Total NET: -2600 mL    LABS:                        10.9   9.67  )-----------( 245      ( 07 May 2023 06:40 )             31.6     05-07    143  |  112<H>  |  23<H>  ----------------------------<  112<H>  3.1<L>   |  27  |  1.51<H>    Ca    8.4      07 May 2023 06:40

## 2023-05-07 NOTE — PROGRESS NOTE ADULT - SUBJECTIVE AND OBJECTIVE BOX
Patient seen and examined this morning around 11.45 AM    89yo M PMH of Dementia, HTN, HLD, tachy-daniel syndrome (declined PPM), and prostate CA presents to ED for 5 days of constipation with worseneing mental staus from baseline. Of note he was mainly having constipation and discomfort, however on day of admission he was complaining of having episodes of NBNB emesis and nausea along with LLQ pain. No melena, no hematochezia.  Patient noted to have Urinary retention needing mckeon and also Fecal impaction    Patient was noted to be alert and awake, comfortable at rest,  conversing well, followed simple commands but has underlying dementia  Patient unable to offer complaints; Had BM with bowel regimen    MEDICATIONS  (STANDING):  amLODIPine   Tablet 10 milliGRAM(s) Oral daily  atorvastatin 40 milliGRAM(s) Oral at bedtime  bacitracin   Ointment 1 Application(s) Topical daily  hydrALAZINE 100 milliGRAM(s) Oral two times a day  hydrogen peroxide 3% Solution 1 Application(s) Topical daily  memantine 10 milliGRAM(s) Oral two times a day  pantoprazole  Injectable 40 milliGRAM(s) IV Push daily  polyethylene glycol 3350 17 Gram(s) Oral every 24 hours  potassium chloride   Powder 40 milliEquivalent(s) Oral every 4 hours  potassium chloride  10 mEq/100 mL IVPB 10 milliEquivalent(s) IV Intermittent every 1 hour  senna 2 Tablet(s) Oral at bedtime  sodium chloride 0.45% with potassium chloride 20 mEq/L 1000 milliLiter(s) (75 mL/Hr) IV Continuous <Continuous>    MEDICATIONS  (PRN):  ondansetron Injectable 4 milliGRAM(s) IV Push every 6 hours PRN Nausea and/or Vomiting    Vital Signs Last 24 Hrs  T(C): 36.9 (07 May 2023 07:31), Max: 37.6 (06 May 2023 19:33)  T(F): 98.4 (07 May 2023 07:31), Max: 99.7 (06 May 2023 19:33)  HR: 73 (07 May 2023 07:31) (65 - 81)  BP: 161/72 (07 May 2023 07:31) (146/82 - 193/86)  RR: 17 (07 May 2023 07:31) (17 - 18)  SpO2: 95% (07 May 2023 07:31) (93% - 98%)  nasal cannula O2 Flow (L/min): 2  2    P/E:  elderly male comfortable in bed NAD  Psych: AAO x1-2  Neuro: No gross focal deficits;  CVS: S1S2 present, regular, no edema  Resp: BLAE+, No wheeze or Rhonchi  GI: Soft, BS+, Non tender, non distended  Extr: No  calf tenderness B/L Lower extremities  Skin: Warm and moist without any rashes    Mckeon; mildly blood tinged urine but improved    Labs:                        10.9   9.67  )-----------( 245      ( 07 May 2023 06:40 )             31.6   05-07    143  |  112<H>  |  23<H>  ----------------------------<  112<H>  3.1<L>   |  27  |  1.51<H>    Ca    8.4      07 May 2023 06:40        CT Abdomen and Pelvis No Cont (05.04.23 @ 22:27)   IMPRESSION:  Markedly distended urinary bladder with resultant moderate bilateral hydroureteronephrosis.  Moderate stool burden in the colon. Colonic diverticulosis without evidence of diverticulitis.  Left-sided retroperitoneal hematoma along the psoas muscle measuring up to 9.8 x 3.7 x 4.3 cm.

## 2023-05-07 NOTE — DIETITIAN INITIAL EVALUATION ADULT - PERTINENT MEDS FT
MEDICATIONS  (STANDING):  amLODIPine   Tablet 10 milliGRAM(s) Oral daily  atorvastatin 40 milliGRAM(s) Oral at bedtime  bacitracin   Ointment 1 Application(s) Topical daily  hydrALAZINE 100 milliGRAM(s) Oral two times a day  hydrogen peroxide 3% Solution 1 Application(s) Topical daily  memantine 10 milliGRAM(s) Oral two times a day  pantoprazole  Injectable 40 milliGRAM(s) IV Push daily  polyethylene glycol 3350 17 Gram(s) Oral every 24 hours  potassium chloride   Powder 40 milliEquivalent(s) Oral every 4 hours  potassium chloride  10 mEq/100 mL IVPB 10 milliEquivalent(s) IV Intermittent every 1 hour  senna 2 Tablet(s) Oral at bedtime  sodium chloride 0.45% with potassium chloride 20 mEq/L 1000 milliLiter(s) (75 mL/Hr) IV Continuous <Continuous>    MEDICATIONS  (PRN):  ondansetron Injectable 4 milliGRAM(s) IV Push every 6 hours PRN Nausea and/or Vomiting

## 2023-05-07 NOTE — PROGRESS NOTE ADULT - ASSESSMENT
87 y/o male a/w RP hematoma with urinary retention, bilateral hydronephrosis    - CT images reviewed  - Labs reviewed; creatinine improved  - serial BMP, monitor Cr levels  - continue mckeon catheter, monitor output  - mckeon care prn  - remainder of care as per primary team  - renal US tomorrow to assess resolution of hydronephrosis  - discussed with PA staff

## 2023-05-07 NOTE — DIETITIAN INITIAL EVALUATION ADULT - FEEDING ASSISTANCE
Nursing to continue feeding assistance and encouragement; Provide food choices within diet Rx as available/updated  Nursing to continue feeding assistance and encouragement; Provide food choices within diet Rx as available/updated.

## 2023-05-07 NOTE — PROGRESS NOTE ADULT - SUBJECTIVE AND OBJECTIVE BOX
Patient seen/examined. No events overnight.    Vital Signs Last 24 Hrs  T(C): 36.8 (07 May 2023 11:04), Max: 37.6 (06 May 2023 19:33)  T(F): 98.2 (07 May 2023 11:04), Max: 99.7 (06 May 2023 19:33)  HR: 66 (07 May 2023 11:04) (65 - 81)  BP: 144/79 (07 May 2023 11:04) (144/79 - 193/86)  BP(mean): --  RR: 17 (07 May 2023 11:04) (17 - 18)  SpO2: 96% (07 May 2023 11:04) (93% - 98%)    Parameters below as of 07 May 2023 11:04  Patient On (Oxygen Delivery Method): nasal cannula  O2 Flow (L/min): 2    General: NAD.  Abd: soft. NT/ND                          10.9   9.67  )-----------( 245      ( 07 May 2023 06:40 )             31.6     05-07    143  |  112<H>  |  23<H>  ----------------------------<  112<H>  3.1<L>   |  27  |  1.51<H>    Ca    8.4      07 May 2023 06:40

## 2023-05-07 NOTE — DIETITIAN INITIAL EVALUATION ADULT - NSFNSGIIOFT_GEN_A_CORE
VAN=301 lb   Wt data in EMR: 216.2 lb 2/28/23; 188.9 lb 3/1/23; 205.6 lb 5/5/23; 200.1 lb 5/6/23; 207.2 lb 5/7/23, change may due to scale/fluid variance

## 2023-05-07 NOTE — DIETITIAN INITIAL EVALUATION ADULT - PERTINENT LABORATORY DATA
05-07    143  |  112<H>  |  23<H>  ----------------------------<  112<H>  3.1<L>   |  27  |  1.51<H>    Ca    8.4      07 May 2023 06:40

## 2023-05-08 NOTE — PROGRESS NOTE ADULT - SUBJECTIVE AND OBJECTIVE BOX
Patient seen and examined at bedside with no complaints.     Vital Signs Last 24 Hrs  T(F): 97.3 (05-08-23 @ 11:32), Max: 99.2 (05-07-23 @ 18:40)  HR: 60 (05-08-23 @ 12:05)  BP: 144/74 (05-08-23 @ 11:32)  RR: 18 (05-08-23 @ 11:32)  SpO2: 95% (05-08-23 @ 12:05)    Physical:  General: NAD.  Respirations: Unlabored   Abdomen: Softly distended, nontender. no rebound, no guarding, no peritonitis, no overlying skin changes     I&O's Detail    07 May 2023 07:01  -  08 May 2023 07:00  --------------------------------------------------------  IN:  Total IN: 0 mL    OUT:    Indwelling Catheter - Urethral (mL): 1900 mL  Total OUT: 1900 mL    Total NET: -1900 mL    08 May 2023 07:01  -  08 May 2023 17:18  --------------------------------------------------------  IN:  Total IN: 0 mL    OUT:    Indwelling Catheter - Urethral (mL): 700 mL  Total OUT: 700 mL    Total NET: -700 mL    LABS:                        10.7   8.57  )-----------( 243      ( 08 May 2023 05:42 )             32.2     05-08    143  |  112<H>  |  23<H>  ----------------------------<  108<H>  3.5   |  26  |  1.40<H>    Ca    8.6      08 May 2023 05:42    < from: CT Abdomen and Pelvis No Cont (05.08.23 @ 09:07) >  IMPRESSION:    Mild bilateral hydronephrosis, improved from prior exam.    Small, nonobstructing calcifications measuring up to 3 mm distal right   ureter.    Bladder wall thickening with perivesical stranding; correlate for urinary   tract infection/cystitis.    Rectal fecal retention with perirectal/presacral stranding may be   associated with a stercoral proctitis.    Left retroperitoneal hematoma along the psoas is slightly decreased in   size from prior exam as discussed.    Other findings as discussed above.    --- End of Report ---    MODESTA LEWIS MD; Attending Radiologist  This document has been electronically signed. May  8 2023 12:39PM    < end of copied text >

## 2023-05-08 NOTE — PROGRESS NOTE ADULT - ASSESSMENT
89 y/o male a/w RP hematoma with urinary retention, bilateral hydronephrosis    - AM labs reviewed, Hct stable  - CT images reviewed  - Monitor Cr levels  - Continue mckeon catheter, monitor output  - Mckeon care prn  - Remainder of care as per primary team  - Renal US to assess resolution of hydronephrosis 87 y/o male a/w RP hematoma with urinary retention, bilateral hydronephrosis    - AM labs reviewed, Hct stable  - Repeat CT images reviewed- 3 mm distal right ureteral stone, 7 mm right intrarenal stone  - Trend Cr levels  - Discussed options for management of right kidney and ureteral stones  - Discussed with house staff  - Will follow

## 2023-05-08 NOTE — PROGRESS NOTE ADULT - PROBLEM SELECTOR PLAN 2
CT Abdomen/Pelvis 5/08 repeat showed- Bladder wall thickening with perivesical stranding; correlate for urinary   tract infection/cystitis  - no evidence of infection, WC normalized, afebrile, likely no infection   - pt denies urinary symptoms  - mckeon removed, f/u TOV   - Dr. Corbin ID consulted

## 2023-05-08 NOTE — PROGRESS NOTE ADULT - PROBLEM SELECTOR PLAN 1
pt presents with elevated Cr, now resolving after mckeon placement   KEV 2/2 to post-obstructive uropathy due to fecal impaction   CT Abdomen/Pelvis 5/04- Markedly distended urinary bladder with resultant moderate bilateral hydroureteronephrosis. Moderate stool burden in the colon. Colonic diverticulosis without evidence of diverticulitis. Left-sided retroperitoneal hematoma along the psoas muscle measuring up to 9.8 x 3.7 x 4.3 cm.  - repeat CT Abdomen/Pelvis 5/08- improving hydronephrosis, no expansion of retroperitoneal hematoma   - mckeon removed, f/u TOV

## 2023-05-08 NOTE — PROGRESS NOTE ADULT - ASSESSMENT
88 year old M with PMH of Dementia (AAOx1-2 at baseline), tachy- daniel syndrome (declined PPM), HTN, HLD, prostate CA presents with abdominal pain admitted for  KEV due to post-obstructive uropathy due to fecal impaction, and retroperitoneal hematoma.

## 2023-05-08 NOTE — PROGRESS NOTE ADULT - ATTENDING COMMENTS
Patient seen and examined this morning around 11.45 AM    89 y/o Male PMH of Dementia, HTN, HLD, tachy-daniel syndrome (declined PPM), and prostate CA presents to ED for 5 days of constipation with worseneing mental staus from baseline. Of note he was mainly having constipation and discomfort, however on day of admission he was complaining of having episodes of NBNB emesis and nausea along with LLQ pain. No melena, no hematochezia.  Patient noted to have Urinary retention needing mckeon and also Fecal impaction    Patient was noted to be alert and awake, comfortable at rest,  conversing well, followed simple commands but has underlying dementia  Patient unable to offer complaints; Had BM with bowel regimen    P/E:  elderly male comfortable in bed NAD  Psych: AAO x1-2  Neuro: No gross focal deficits;  CVS: S1S2 present, regular, no edema  Resp: BLAE+, No wheeze or Rhonchi  GI: Soft, BS+, Non tender, non distended  Extr: No  calf tenderness B/L Lower extremities  Skin: Warm and moist without any rashes    Mckeon; mildly blood tinged urine but improved    Labs:                        10.7   8.57  )-----------( 243      ( 08 May 2023 05:42 )             32.2   05-08    143  |  112<H>  |  23<H>  ----------------------------<  108<H>  3.5   |  26  |  1.40<H>    Ca    8.6      08 May 2023 05:42          CT Abdomen and Pelvis No Cont (05.04.23 @ 22:27)   IMPRESSION:  Markedly distended urinary bladder with resultant moderate bilateral hydroureteronephrosis.  Moderate stool burden in the colon. Colonic diverticulosis without evidence of diverticulitis.  Left-sided retroperitoneal hematoma along the psoas muscle measuring up to 9.8 x 3.7 x 4.3 cm.    89yo M PMH of Dementia, HTN, HLD, Tachy-Daniel syndrome (declined PPM), and prostate CA presents to ED for 5 days of constipation found to have retroperitoneal hematoma and KEV    D/D:  Acute Kidney injury suspect Obstructive uropathy with superimposed Prerenal   Obstructive Uropathy due to Fecal impaction  Dementia moderate  Hx Prostate Ca s/p RT      Plan:  Continue fluids  Patient is more alert, bedside swallow done; able to swallow apple sauce well  Advance diet to Pureed, later d/w daughter this evening; able to swallow cookies at home; Diet advanced to Soft, bite sized   Patient needs assistance with feeds; d/w RBN; Keep HOB elevated while feeding  Sx f/u noted; d/w Dr. Mckenzie yesterday;  no acute surgical intervention. Recommend IR   Discussed with IR Olegario Saldivar yesterday; no intervention; conservative   Bowel regimen; Once adequate BM achieved will consider TOV Monday morning; no Hx Urinary retention as per daughter  Renal fn improving well Monitor closely; avoid nephrotoxic agents  SCDs for now, avoid anticoagulant medication given hematoma    Discussed with Daughter at length over the phone and updated; verbalized understanding and agree with the plan  Discussed with RN Patient seen and examined this afternoon.    87 y/o Male PMH of Dementia, HTN, HLD, tachy-scottie syndrome (declined PPM), and prostate CA presents to ED for 5 days of constipation with worsening mental staus from baseline. Of note he was mainly having constipation and discomfort, however on day of admission he was complaining of having episodes of NBNB emesis and nausea along with LLQ pain. No melena, no hematochezia.  Patient noted to have Urinary retention needing mckeon and also Fecal impaction as well as a retroperitoneal hematoma     Patient was noted to be alert and awake, comfortable at rest,  conversing well, followed simple commands but has underlying dementia  Patient unable to offer complaints; continues to have BM with bowel regimen, one large one this afternoon. CT scan repeated to monitor hematoma as well as hydro instead of USG    P/E:  elderly male comfortable in bed NAD  Psych: AAO x1-2  Neuro: No gross focal deficits;  CVS: S1S2 present, regular, no edema  Resp: BLAE+, No wheeze or Rhonchi  GI: Soft, BS+, Non tender, non distended  Extr: No  calf tenderness B/L Lower extremities  Skin: Warm and moist without any rashes  Mckeon; slightly blood tinged urine but much improved    Labs:                        10.7   8.57  )-----------( 243      ( 08 May 2023 05:42 )             32.2   05-08  143  |  112<H>  |  23<H>  ----------------------------<  108<H>  3.5   |  26  |  1.40<H>  Ca    8.6      08 May 2023 05:42    Culture - Urine (05.05.23 @ 02:00)   Specimen Source: Clean Catch Clean Catch (Midstream)  Culture Results: <10,000 CFU/mL Normal Urogenital Becka    CT Abdomen and Pelvis No Cont (05.08.23 @ 09:07)     IMPRESSION:  Mild bilateral hydronephrosis, improved from prior exam.  Small, non obstructing calcifications measuring up to 3 mm distal right ureter.  Bladder wall thickening with perivesical stranding; correlate for urinary tract infection/cystitis.  Rectal fecal retention with perirectal/presacral stranding may be associated with a stercoral proctitis.  Left retroperitoneal hematoma along the psoas is slightly decreased in size from prior exam as discussed.    87yo M PMH of Dementia, HTN, HLD, Tachy-Scottie syndrome (declined PPM), and prostate CA presents to ED for 5 days of constipation found to have retroperitoneal hematoma and KEV with urinary retention and hydronephrosis    D/D:  Acute Kidney injury suspect Obstructive uropathy with superimposed Prerenal azotemia nearly resolved  Obstructive Uropathy due to Fecal impaction  B/L Hydronephrosis resolving well  Non specific bladder wall stranding  Retroperitoneal Psoas Hematoma resolving slowly  Dementia moderate  Hx Prostate Ca s/p RT    Plan:  Elevated WBC count has normalized; There is no fever. Patient with significant improvement in mentation; Negative U/A and Urine Cx negative form admission; Although CT scan shows concern for cystitis there is no clinical evidence of infection at this time. Urinary retention could have caused Bladder wall thickening. Hold off Antibiotics; ID consulted Dr. Corbin; agrees with my assessment; f/u consult  If any leucocytosis or fever will consider a repeat U/A.   Sx f/u noted;  no acute surgical intervention. Recommend IR but no intervention indicated as per iR; conservative monitoring; decreased on repeat CT  Bowel regimen; Continue Senna HS and Miralax;   TOV today; improving hydronephrosis as well as nearly normalized renal fn; no Hx Urinary retention as per daughter  Renal fn Monitor closely; avoid nephrotoxic agents  If continues to retain overnight will attempt Straight Cath once;     Continue fluids overnight and may D/C if oral intake is consistent  Patient is more alert, eating better; Diet advanced to Soft, bite sized   Patient needs assistance with feeds; d/w RN Delia; Keep HOB elevated while feeding  SCDs for now, avoid anticoagulant medication given hematoma    Discussed with Daughter at length over the phone and updated  this afternoon; verbalized understanding and agree with the plan  Daughter was informed of TOV and possible need for D/C with mckeon if retention persist to f/u with Urology outpt; Daughter requesting if patient can get some help at home; Patient will benefit from HHA for few hrs at least few days a week and VNS    Discussed with PGY1 Dr. Covarrubias and PGY 2 Dr. Corbin  Discussed with RN/ Urology Dr. Landry Patient seen and examined this afternoon.    89 y/o Male PMH of Dementia, HTN, HLD, tachy-scottie syndrome (declined PPM), and prostate CA presents to ED for 5 days of constipation with worsening mental staus from baseline. Of note he was mainly having constipation and discomfort, however on day of admission he was complaining of having episodes of NBNB emesis and nausea along with LLQ pain. No melena, no hematochezia.  Patient noted to have Urinary retention needing mckeon and also Fecal impaction as well as a retroperitoneal hematoma     Patient was noted to be alert and awake, comfortable at rest,  conversing well, followed simple commands but has underlying dementia  Patient unable to offer complaints; continues to have BM with bowel regimen, one large one this afternoon. CT scan repeated to monitor hematoma as well as hydro instead of USG    P/E:  elderly male comfortable in bed NAD  Psych: AAO x1-2  Neuro: No gross focal deficits;  CVS: S1S2 present, regular, no edema  Resp: BLAE+, No wheeze or Rhonchi  GI: Soft, BS+, Non tender, non distended  Extr: No  calf tenderness B/L Lower extremities  Skin: Warm and moist without any rashes  Mckeon; slightly blood tinged urine but much improved    Labs:                        10.7   8.57  )-----------( 243      ( 08 May 2023 05:42 )             32.2   05-08  143  |  112<H>  |  23<H>  ----------------------------<  108<H>  3.5   |  26  |  1.40<H>  Ca    8.6      08 May 2023 05:42    Culture - Urine (05.05.23 @ 02:00)   Specimen Source: Clean Catch Clean Catch (Midstream)  Culture Results: <10,000 CFU/mL Normal Urogenital Becka    CT Abdomen and Pelvis No Cont (05.08.23 @ 09:07)     IMPRESSION:  Mild bilateral hydronephrosis, improved from prior exam.  Small, non obstructing calcifications measuring up to 3 mm distal right ureter.  Bladder wall thickening with perivesical stranding; correlate for urinary tract infection/cystitis.  Rectal fecal retention with perirectal/presacral stranding may be associated with a stercoral proctitis.  Left retroperitoneal hematoma along the psoas is slightly decreased in size from prior exam as discussed.    89yo M PMH of Dementia, HTN, HLD, Tachy-Scottie syndrome (declined PPM), and prostate CA presents to ED for 5 days of constipation found to have retroperitoneal hematoma and KEV with urinary retention and hydronephrosis    D/D:  Acute Kidney injury suspect Obstructive uropathy with superimposed Prerenal azotemia nearly resolved  Obstructive Uropathy due to Fecal impaction  B/L Hydronephrosis resolving well  Non specific bladder wall stranding  Retroperitoneal Psoas Hematoma resolving slowly  Dementia moderate  Hx Prostate Ca s/p RT    Plan:  Elevated WBC count has normalized; There is no fever. Patient with significant improvement in mentation; Negative U/A and Urine Cx negative form admission; Although CT scan shows concern for cystitis there is no clinical evidence of infection at this time. Urinary retention could have caused Bladder wall thickening. Hold off Antibiotics; ID consulted Dr. Corbin; agrees with my assessment; f/u consult  If any leucocytosis or fever will consider a repeat U/A.   Sx f/u noted;  no acute surgical intervention. Recommend IR but no intervention indicated as per iR; conservative monitoring; decreased on repeat CT  Bowel regimen; Continue Senna HS and Miralax;   TOV today; improving hydronephrosis as well as nearly normalized renal fn; no Hx Urinary retention as per daughter  Renal fn Monitor closely; avoid nephrotoxic agents  If continues to retain overnight will attempt Straight Cath once;     Continue fluids overnight and may D/C if oral intake is consistent  Patient is more alert, eating better; Diet advanced to Soft, bite sized   Patient needs assistance with feeds; d/w RN Delia; Keep HOB elevated while feeding  SCDs for now, avoid anticoagulant medication given hematoma    Discussed with Daughter at length over the phone and updated  this afternoon; verbalized understanding and agree with the plan  Daughter was informed of TOV and possible need for D/C with mckeon if retention persist to f/u with Urology outpt; Daughter requesting if patient can get some help at home; Patient will benefit from HHA for few hrs at least few days a week and VNS  Case mgmt follow up for Home Care/ VNS and HHA referral    Discussed with PGY1 Dr. Covarrubias and PGY 2 Dr. Corbin  Discussed with RN/ Urology Dr. Landry

## 2023-05-08 NOTE — PROGRESS NOTE ADULT - SUBJECTIVE AND OBJECTIVE BOX
PGY-1 Progress Note discussed with attending    PLEASE CONTACT ON CALL TEAM:  - On Call Team (Please refer to Purvi) FROM 5:00 PM - 8:30PM  - Nightfloat Team FROM 8:30 -7:30 AM    CHIEF COMPLAINT & BRIEF HOSPITAL COURSE:    INTERVAL HPI/OVERNIGHT EVENTS:   MEDICATIONS  (STANDING):  amLODIPine   Tablet 10 milliGRAM(s) Oral daily  atorvastatin 40 milliGRAM(s) Oral at bedtime  bacitracin   Ointment 1 Application(s) Topical daily  hydrALAZINE 100 milliGRAM(s) Oral two times a day  hydrogen peroxide 3% Solution 1 Application(s) Topical daily  memantine 10 milliGRAM(s) Oral two times a day  pantoprazole    Tablet 40 milliGRAM(s) Oral before breakfast  polyethylene glycol 3350 17 Gram(s) Oral <User Schedule>  potassium chloride  10 mEq/100 mL IVPB 10 milliEquivalent(s) IV Intermittent every 1 hour  senna 2 Tablet(s) Oral at bedtime  sodium chloride 0.45% with potassium chloride 20 mEq/L 1000 milliLiter(s) (75 mL/Hr) IV Continuous <Continuous>    MEDICATIONS  (PRN):  ondansetron Injectable 4 milliGRAM(s) IV Push every 6 hours PRN Nausea and/or Vomiting      REVIEW OF SYSTEMS:  CONSTITUTIONAL: No fever, weight loss, or fatigue  RESPIRATORY: No cough, wheezing, chills or hemoptysis; No shortness of breath  CARDIOVASCULAR: No chest pain, palpitations, dizziness, or leg swelling  GASTROINTESTINAL: No abdominal pain. No nausea, vomiting, or hematemesis; No diarrhea or constipation. No melena or hematochezia.  GENITOURINARY: No dysuria or hematuria, urinary frequency  NEUROLOGICAL: No headaches, memory loss, loss of strength, numbness, or tremors  SKIN: No itching, burning, rashes, or lesions     Vital Signs Last 24 Hrs  T(C): 36.3 (08 May 2023 11:32), Max: 37.3 (07 May 2023 18:40)  T(F): 97.3 (08 May 2023 11:32), Max: 99.2 (07 May 2023 18:40)  HR: 60 (08 May 2023 12:05) (60 - 76)  BP: 144/74 (08 May 2023 11:32) (144/74 - 180/70)  BP(mean): --  RR: 18 (08 May 2023 11:32) (18 - 18)  SpO2: 95% (08 May 2023 12:05) (93% - 97%)    Parameters below as of 08 May 2023 12:05  Patient On (Oxygen Delivery Method): room air        PHYSICAL EXAMINATION:  GENERAL: NAD, well built  HEAD:  Atraumatic, Normocephalic  EYES:  conjunctiva and sclera clear  NECK: Supple, No JVD, Normal thyroid  CHEST/LUNG: Clear to auscultation. Clear to percussion bilaterally; No rales, rhonchi, wheezing, or rubs  HEART: Regular rate and rhythm; No murmurs, rubs, or gallops  ABDOMEN: Soft, Nontender, Nondistended; Bowel sounds present  NERVOUS SYSTEM:  Alert & Oriented X3,    EXTREMITIES:  2+ Peripheral Pulses, No clubbing, cyanosis, or edema  SKIN: warm dry                          10.7   8.57  )-----------( 243      ( 08 May 2023 05:42 )             32.2     05-08    143  |  112<H>  |  23<H>  ----------------------------<  108<H>  3.5   |  26  |  1.40<H>    Ca    8.6      08 May 2023 05:42                CAPILLARY BLOOD GLUCOSE      RADIOLOGY & ADDITIONAL TESTS:

## 2023-05-08 NOTE — CHART NOTE - NSCHARTNOTEFT_GEN_A_CORE
Signed out by day team to follow up TOV after mckeon removal today. Pt found to be retaining 725cc on bladder scan. Pt could not void spontaneously after encouragement. Ordered straight cath for residual urine. Will monitor bladder scan in 6-8 hours. Primary team to follow.

## 2023-05-08 NOTE — PROGRESS NOTE ADULT - SUBJECTIVE AND OBJECTIVE BOX
Subjective  No acute events overnight. Patient resting comfortably without complaints.    Objective    Vital signs  T(F): , Max: 99.2 (05-07-23 @ 18:40)  HR: 62 (05-08-23 @ 05:00)  BP: 180/70 (05-08-23 @ 05:00)  SpO2: 97% (05-08-23 @ 05:00)  Wt(kg): --    Output     OUT:    Indwelling Catheter - Urethral (mL): 1900 mL  Total OUT: 1900 mL    Total NET: -1900 mL      OUT:    Indwelling Catheter - Urethral (mL): 200 mL  Total OUT: 200 mL    Total NET: -200 mL          Gen: NAD  Abd: soft, nontender, nondistended  : mckeon secured in place, draining clear yellow urine in tubing, pink-tinged in bag    Labs      05-08 @ 05:42    WBC 8.57  / Hct 32.2  / SCr 1.40     05-07 @ 06:40    WBC 9.67  / Hct 31.6  / SCr 1.51         Culture - Urine (collected 05-05-23 @ 02:00)  Source: Clean Catch Clean Catch (Midstream)  Final Report (05-06-23 @ 07:29):    <10,000 CFU/mL Normal Urogenital Becka Subjective  No acute events overnight. Patient resting comfortably without complaints.    Objective    Vital signs  T(F): , Max: 99.2 (05-07-23 @ 18:40)  HR: 62 (05-08-23 @ 05:00)  BP: 180/70 (05-08-23 @ 05:00)  SpO2: 97% (05-08-23 @ 05:00)  Wt(kg): --    Output     OUT:    Indwelling Catheter - Urethral (mL): 1900 mL  Total OUT: 1900 mL    Total NET: -1900 mL      OUT:    Indwelling Catheter - Urethral (mL): 200 mL  Total OUT: 200 mL    Total NET: -200 mL          Gen: NAD  Abd: soft, nontender, nondistended  : mckeon secured in place, draining clear yellow urine in tubing, pink-tinged in bag    Labs      05-08 @ 05:42    WBC 8.57  / Hct 32.2  / SCr 1.40     05-07 @ 06:40    WBC 9.67  / Hct 31.6  / SCr 1.51         Culture - Urine (collected 05-05-23 @ 02:00)  Source: Clean Catch Clean Catch (Midstream)  Final Report (05-06-23 @ 07:29):    <10,000 CFU/mL Normal Urogenital Becka    ACC: [id number] EXAM:  CT ABDOMEN AND PELVIS   ORDERED BY: ARLENE GUAJARDO     PROCEDURE DATE:  05/08/2023          INTERPRETATION:  CLINICAL INFORMATION: Acute kidney injury.  Urinary retention and hydronephrosis.  Retroperitoneal hematoma.    COMPARISON: CT scan abdomen pelvis 5/4/2023.    CONTRAST/COMPLICATIONS:  IV Contrast: NONE  Oral Contrast: NONE  Complications: None reported at time of study completion    PROCEDURE:  CT of the Abdomen and Pelvis was performed.  Sagittal and coronal reformats were performed.    FINDINGS:    LOWER CHEST:  Small bilateral pleural effusions.  Small pericardial effusion.  Coronary artery calcifications.    Streak artifact degrades image quality.    The evaluation of the solid organ parenchyma is limited without   intravenous contrast.    LIVER: Within normal limits.  BILE DUCTS: Normal caliber.  GALLBLADDER: Within normal limits.  SPLEEN: Within normal limits.  PANCREAS: Within normal limits.  ADRENALS: Within normal limits.  KIDNEYS/URETERS:  Nonobstructing right intrarenal calcifications measuring up to 7 mm.  Mild right-sided hydronephrosis, improved from prior exam.  There are few nonobstructing small calcifications distal right ureter   measuring up to 3 mm.  There is mild left-sided hydronephrosis, improved from prior exam.    There is mild bilateral perinephric stranding.  There is mild stranding along the bilateral retroperitoneal fascial   planes.    Extensive metallic streak artifact related to patient's bilateral hip   prostheses degrades image quality limiting evaluation of the pelvis.    BLADDER:  Diffuse bladder wall thickening with stranding in the perivesical fat.  REPRODUCTIVE ORGANS: Limited.    BOWEL:   No bowel obstruction.  Fecal retention rectum with rectal wall thickening and   perirectal/presacral stranding; findings which may be associated with   stercoral proctitis.  There is colonic diverticulosis, without CT evidence of diverticulitis.  Appendix is normal.  PERITONEUM: No ascites.    VESSELS: Within normal limits.  RETROPERITONEUM/LYMPH NODES:   No lymphadenopathy.  Left psoas retroperitoneal hematoma slightly decreased in size currently   measuring 9.5 x 3.7 x 3.6 cm; previously measuring 9.8 x 3.7 x 4.3 cm.    ABDOMINAL WALL:  Small fat-containing umbilical hernia.    BONES:  Degenerative changes.  Partially imaged bilateral total hip arthroplasties.    IMPRESSION:    Mild bilateral hydronephrosis, improved from prior exam.    Small, nonobstructing calcifications measuring up to 3 mm distal right   ureter.    Bladder wall thickening with perivesical stranding; correlate for urinary   tract infection/cystitis.    Rectal fecal retention with perirectal/presacral stranding may be   associated with a stercoral proctitis.    Left retroperitoneal hematoma along the psoas is slightly decreased in   size from prior exam as discussed.    Other findings as discussed above.    --- End of Report ---            MODESTA LEWIS MD; Attending Radiologist  This document has been electronically signed. May  8 2023 12:39PM

## 2023-05-08 NOTE — PROGRESS NOTE ADULT - PROBLEM SELECTOR PLAN 3
pt with incidental retroperitoneal hematoma 9x4x4 cm on CT  Hb ~10  Sx f/u noted; d/w Dr. Mckenzie 5/6 no acute surgical intervention  Discussed with IR  5/6 no intervention; conservative management  repeat CT on 5/8 showed no expansion of hematoma

## 2023-05-09 NOTE — PROGRESS NOTE ADULT - SUBJECTIVE AND OBJECTIVE BOX
PGY-1 Progress Note discussed with attending    PLEASE CONTACT ON CALL TEAM:  - On Call Team (Please refer to Purvi) FROM 5:00 PM - 8:30PM  - Nightfloat Team FROM 8:30 -7:30 AM    INTERVAL HPI/OVERNIGHT EVENTS: No acute events overnight, denies chest pain, sob, abdominal pain. pt. failed TOV yesterday at 10pm, >700cc in bladder, straight cath, then this am bladder scan showed another 700cc in bladder, so mckeon replaced. Will need to be d/c'd with mckeon to LOCO.     MEDICATIONS  (STANDING):  amLODIPine   Tablet 10 milliGRAM(s) Oral daily  atorvastatin 40 milliGRAM(s) Oral at bedtime  bacitracin   Ointment 1 Application(s) Topical daily  hydrogen peroxide 3% Solution 1 Application(s) Topical daily  memantine 10 milliGRAM(s) Oral two times a day  pantoprazole    Tablet 40 milliGRAM(s) Oral before breakfast  polyethylene glycol 3350 17 Gram(s) Oral <User Schedule>  potassium chloride  10 mEq/100 mL IVPB 10 milliEquivalent(s) IV Intermittent every 1 hour  senna 2 Tablet(s) Oral at bedtime  sodium chloride 0.9%. 1000 milliLiter(s) (70 mL/Hr) IV Continuous <Continuous>  tamsulosin 0.4 milliGRAM(s) Oral at bedtime    MEDICATIONS  (PRN):  ondansetron Injectable 4 milliGRAM(s) IV Push every 6 hours PRN Nausea and/or Vomiting      REVIEW OF SYSTEMS:  CONSTITUTIONAL: No fever, weight loss, or fatigue  RESPIRATORY: No cough, wheezing, chills or hemoptysis; No shortness of breath  CARDIOVASCULAR: No chest pain, palpitations, dizziness, or leg swelling  GASTROINTESTINAL: No abdominal pain. No nausea, vomiting, or hematemesis; No diarrhea or constipation. No melena or hematochezia.  GENITOURINARY: No dysuria or hematuria, urinary frequency  NEUROLOGICAL: No headaches, memory loss, loss of strength, numbness, or tremors  SKIN: No itching, burning, rashes, or lesions     Vital Signs Last 24 Hrs  T(C): 36.5 (09 May 2023 12:24), Max: 36.9 (09 May 2023 05:36)  T(F): 97.7 (09 May 2023 12:24), Max: 98.4 (09 May 2023 05:36)  HR: 69 (09 May 2023 12:24) (68 - 103)  BP: 151/72 (09 May 2023 12:24) (140/69 - 176/91)  BP(mean): --  RR: 17 (09 May 2023 12:24) (17 - 18)  SpO2: 94% (09 May 2023 12:24) (93% - 94%)    Parameters below as of 09 May 2023 12:24  Patient On (Oxygen Delivery Method): room air        PHYSICAL EXAMINATION:  GENERAL: NAD, well built, elderly male   HEAD:  Atraumatic, Normocephalic  EYES:  conjunctiva and sclera clear  NECK: Supple, No JVD, Normal thyroid  CHEST/LUNG: Clear to auscultation. Clear to percussion bilaterally; No rales, rhonchi, wheezing, or rubs  HEART: Regular rate and rhythm; No murmurs, rubs, or gallops  ABDOMEN: Soft, Nontender, Nondistended; Bowel sounds present  NERVOUS SYSTEM:  Alert & Oriented X1-2, no focal neuro deficits     EXTREMITIES:  2+ Peripheral Pulses, No clubbing, cyanosis, or edema  SKIN: warm dry                          12.1   9.32  )-----------( 301      ( 09 May 2023 08:03 )             35.2     05-09    140  |  110<H>  |  21<H>  ----------------------------<  123<H>  3.2<L>   |  24  |  1.35<H>    Ca    9.1      09 May 2023 08:03  Phos  3.6     05-09  Mg     1.8     05-09    TPro  6.6  /  Alb  2.3<L>  /  TBili  0.8  /  DBili  x   /  AST  12  /  ALT  14  /  AlkPhos  92  05-09    LIVER FUNCTIONS - ( 09 May 2023 08:03 )  Alb: 2.3 g/dL / Pro: 6.6 g/dL / ALK PHOS: 92 U/L / ALT: 14 U/L DA / AST: 12 U/L / GGT: x                   CAPILLARY BLOOD GLUCOSE      RADIOLOGY & ADDITIONAL TESTS:

## 2023-05-09 NOTE — PROCEDURE NOTE - ADDITIONAL PROCEDURE DETAILS
Not a difficult Kahn with no resistance encountered. Urology does not need to be called for difficult Kahn in this patient. Discharge patient with catheter.

## 2023-05-09 NOTE — PROGRESS NOTE ADULT - PROBLEM SELECTOR PLAN 1
pt presents with elevated Cr, now resolving after mckeon placement   KEV 2/2 to post-obstructive uropathy due to fecal impaction   CT Abdomen/Pelvis 5/04- Markedly distended urinary bladder with resultant moderate bilateral hydroureteronephrosis. Moderate stool burden in the colon. Colonic diverticulosis without evidence of diverticulitis. Left-sided retroperitoneal hematoma along the psoas muscle measuring up to 9.8 x 3.7 x 4.3 cm. Also R kidney 7 mm renal stone and non obstructing small calcifications distal right ureter 3 mm.   - repeat CT Abdomen/Pelvis 5/08- improving hydronephrosis, no expansion of retroperitoneal hematoma   - mckeon re-placed 5/9 after failing TOV 5/8   - will need to leave with mckeon, started on flomax, maintenance fluids  - urology following

## 2023-05-09 NOTE — PROGRESS NOTE ADULT - ATTENDING COMMENTS
Patient seen and examined this afternoon.    89 y/o Male PMH of Dementia, HTN, HLD, tachy-scottie syndrome (declined PPM), and prostate CA presents to ED for 5 days of constipation with worsening mental staus from baseline. Of note he was mainly having constipation and discomfort, however on day of admission he was complaining of having episodes of NBNB emesis and nausea along with LLQ pain. No melena, no hematochezia.  Patient noted to have Urinary retention needing mckeon and also Fecal impaction as well as a retroperitoneal hematoma     Patient doing well,, comfortable at rest,  followed simple commands but has underlying dementia  Patient unable to offer complaints; continues to have BM with bowel regimen, one large one this afternoon. CT scan repeated to monitor hematoma as well as hydro instead of USG    Vital Signs Last 24 Hrs  T(C): 36.5 (09 May 2023 12:24), Max: 36.9 (09 May 2023 05:36)  T(F): 97.7 (09 May 2023 12:24), Max: 98.4 (09 May 2023 05:36)  HR: 69 (09 May 2023 12:24) (68 - 103)  BP: 151/72 (09 May 2023 12:24) (140/80 - 176/91)  RR: 17 (09 May 2023 12:24) (17 - 18)  SpO2: 94% (09 May 2023 12:24) (93% - 94%) room air    P/E:  elderly male comfortable in bed NAD  Psych: AAO x1-2  Neuro: No gross focal deficits;  CVS: S1S2 present, regular, no edema  Resp: BLAE+, No wheeze or Rhonchi  GI: Soft, BS+, Non tender, non distended  Extr: No  calf tenderness B/L Lower extremities  Skin: Warm and moist without any rashes  Mckeon; slightly blood tinged urine but much improved    Labs:                   12.1   9.32  )-----------( 301      ( 09 May 2023 08:03 )             35.2     05-09  140  |  110<H>  |  21<H>  ----------------------------<  123<H>  3.2<L>   |  24  |  1.35<H>  Ca    9.1      09 May 2023 08:03  Phos  3.6     05-09  Mg     1.8     05-09  TPro  6.6  /  Alb  2.3<L>  /  TBili  0.8  /  DBili  x   /  AST  12  /  ALT  14  /  AlkPhos  92  05-09    Culture - Urine (05.05.23 @ 02:00) Culture Results: <10,000 CFU/mL Normal Urogenital Becka    CT Abdomen and Pelvis No Cont (05.08.23 @ 09:07)     IMPRESSION:  Mild bilateral hydronephrosis, improved from prior exam.  Small, non obstructing calcifications measuring up to 3 mm distal right ureter.  Bladder wall thickening with perivesical stranding; correlate for urinary tract infection/cystitis.  Rectal fecal retention with perirectal/presacral stranding may be associated with a stercoral proctitis.  Left retroperitoneal hematoma along the psoas is slightly decreased in size from prior exam as discussed.    89yo M PMH of Dementia, HTN, HLD, Tachy-Scottie syndrome (declined PPM), and prostate CA presents to ED for 5 days of constipation found to have retroperitoneal hematoma and KEV with urinary retention and hydronephrosis    D/D:  Acute Kidney injury suspect Obstructive uropathy with superimposed Prerenal azotemia nearly resolved; Obstructive Uropathy due to Fecal impaction  B/L Hydronephrosis resolving well  Hypokalemia  Non specific bladder wall stranding  Retroperitoneal Psoas Hematoma resolving slowly  Dementia moderate  Hx Prostate Ca s/p RT    Plan:  No leucocytosis; no fever. mental status at baseline; Negative U/A and Urine Cx negative on admission; CT scan shows concern for cystitis there is no clinical evidence of infection at this time. Urinary retention could have caused Bladder wall thickening. Hold off Antibiotics; ID consulted Dr. Corbin; agrees with my assessment; f/u consult; If any leucocytosis or fever will consider a repeat U/A.   Sx f/u noted; no acute surgical intervention. Recommend IR but no intervention indicated as per iR; conservative monitoring; decreased on repeat CT  Bowel regimen; Continue Senna HS and Miralax;   TOV unsuccessful; Needs mckeon; placed by Urology resident; Added Flomax  Patient is more alert, eating better; Diet advanced to Soft, bite sized; may monitor off fluids  Patient needs assistance with feeds; d/w RN Delia; Keep HOB elevated while feeding  SCDs for now, avoid anticoagulant medication given hematoma    Discussed with Daughter at length over the phone and updated  this afternoon; verbalized understanding and agree with the plan  Daughter was informed of TOV and possible need for D/C with mckeon if retention persist to f/u with Urology outpt; Daughter requesting if patient can get some help at home;   Patient will benefit from HHA for few hrs at least few days a week and VNS  Case mgmt follow up for Home Care/ VNS and HHA referral    Discussed with PGY1 Dr. Covarrubias and PGY 2 Dr. Corbin  Discussed with RN/ Urology Dr. Landry

## 2023-05-09 NOTE — CONSULT NOTE ADULT - ASSESSMENT
HPI:  88 year old M with PMH of Dementia (AAOx1-2 at baseline), tachy- daniel syndrome (declined PPM), HTN, HLD prostate CA who presented from home with poor oral intake, N/V and lower abdominal pain and constipation for 5 days before presentation. Diagnosed with Retroperitoneal Hematoma(L Psoas) unclear if pt had a fall. Found to have urinary retention/hematuria, Kahn placed for urinary retention.  Remained Hemodynamically stable during hospital stay non toxic, UA and urine cx neg, has not been on abx, no fevers or chills, no diarrhea, no chest pain or palpitations, no SOB, no cough.    Dx with Retroperitoneal Hematoma on CT A/P, KEV and constipation unclear etiology of the hematoma, has remained hemodynamically stable.   Repeat CT A/P(f/up) with mild BL hydronephrosis, bladder thickening with perivesical stranding , renal stones ID consulted in view of this most recent CT findings.   Pt is clinically stable with no urinary symptoms, continues to retain urine, failed TOV yesterday.     ED Course  Vitals: /90 P 80 R 18 T 97.4F SpO2 96% RA  Meds:  mg, NS 1 L, KCl PO & IV, zofran     (05 May 2023 02:31)    Allergies: No Known Allergies    PAST MEDICAL & SURGICAL HISTORY:  HTN (hypertension)  HLD (hyperlipidemia)  Prostate cancer Dx 10 yrs ago s/p Vantas implant in 2020, received radiation tx.   Mitral valve regurgitation  Dementia  HTN (hypertension)  Dyslipidemia  History of hip replacement    SOCIAL HISTORY: no toxic habits, lives at home with family, has HHA  FAMILY HISTORY: no pertinent related medical history    REVIEW OF SYSTEMS: Demented but pleasant (unable to obtain)    PHYSICAL EXAM:  VITALS: Vital Signs Last 24 Hrs  T(C): 36.9 (09 May 2023 05:36), Max: 36.9 (09 May 2023 05:36)  T(F): 98.4 (09 May 2023 05:36), Max: 98.4 (09 May 2023 05:36)  HR: 68 (09 May 2023 05:36) (60 - 103)  BP: 167/83 (09 May 2023 05:36) (140/69 - 174/96)  RR: 18 (09 May 2023 05:36) (17 - 18)  SpO2: 93% (09 May 2023 05:36) (93% - 97%)    Parameters below as of 09 May 2023 05:36 Patient On (Oxygen Delivery Method): room air    Gen: AOx  , NAD, non-toxic, pleasant  HEAD:  Atraumatic, Normocephalic  EYES: PERRLA, conjunctiva and sclera clear  ENT: Moist mucous membranes  NECK: Supple, No JVD  CV: S1+S2  Resp: Clear bilat, no resp distress, no crackles/wheezes  Abd: Soft, nontender, +BS  Ext: No LE edema, no cyanosis, LE pulses present  : No Kahn, no dysuria  IV/Skin: No thrombophlebitis, no skin rash  Msk: No low back pain, no arthralgias, no joint swelling  Neuro: AAOx     Psych: no anxiety, no delusional ideas, no suicidal ideation    LABS/DIAGNOSTIC TESTS:                        12.1   9.32  )-----------( 301      ( 09 May 2023 08:03 )             35.2     WBC Count: 9.32 K/uL (05-09 @ 08:03)  WBC Count: 8.57 K/uL (05-08 @ 05:42)  WBC Count: 9.67 K/uL (05-07 @ 06:40)    05-09    140  |  110<H>  |  21<H>  ----------------------------<  123<H>  3.2<L>   |  24  |  1.35<H>    Ca    9.1      09 May 2023 08:03  Phos  3.6     05-09  Mg     1.8     05-09    TPro  6.6  /  Alb  2.3<L>  /  TBili  0.8  /  DBili  x   /  AST  12  /  ALT  14  /  AlkPhos  92  05-09    CULTURES:   Culture - Urine (collected 05-05-23 @ 02:00)  Source: Clean Catch Clean Catch (Midstream)  Final Report (05-06-23 @ 07:29):    <10,000 CFU/mL Normal Urogenital Becka    RADIOLOGY: <  CT Abdomen and Pelvis No Cont (05.04.23 @ 22:27) >  IMPRESSION:  < from: CT Abdomen and Pelvis No Cont (05.04.23 @ 22:27) >  KIDNEYS/URETERS: Moderate bilateral hydroureteronephrosis to the level of the bladder which is markedly distended. No obstructing urinary tract calculus. Non obstructing lower pole right intrarenal calculus measuring 11 mm.  BLADDER: Markedly distended urinary bladder.  REPRODUCTIVE ORGANS: Prostate gland is not enlarged.  Moderate stool burden in the colon. Colonic diverticulosis without evidence of diverticulitis.    Left-sided retroperitoneal hematoma along the psoas muscle measuring up to 9.8 x 3.7 x 4.3 cm.    <  CT Abdomen and Pelvis No Cont (05.08.23 @ 09:07) >    LOWER CHEST:  Small bilateral pleural effusions.  Small pericardial effusion.  Coronary artery calcifications.    Streak artifact degrades image quality.  The evaluation of the solid organ parenchyma is limited without intravenous contrast.    LIVER: Within normal limits.  BILE DUCTS: Normal caliber.  GALLBLADDER: Within normal limits.  SPLEEN: Within normal limits.  PANCREAS: Within normal limits.  ADRENALS: Within normal limits.  KIDNEYS/URETERS:  Non obstructing right intrarenal calcifications measuring up to 7 mm. Mild right-sided hydronephrosis, improved from prior exam. There are few nonobstructing small calcifications distal right ureter   measuring up to 3 mm. There is mild left-sided hydronephrosis, improved from prior exam.    There is mild bilateral perinephric stranding.  There is mild stranding along the bilateral retroperitoneal fascial planes.  Extensive metallic streak artifact related to patient's bilateral hip prostheses degrades image quality limiting evaluation of the pelvis.    BLADDER: Diffuse bladder wall thickening with stranding in the perivesical fat.  REPRODUCTIVE ORGANS: Limited.    BOWEL: No bowel obstruction. Fecal retention rectum with rectal wall thickening and perirectal/presacral stranding; findings which may be associated with stercoral proctitis.  There is colonic diverticulosis, without CT evidence of diverticulitis. Appendix is normal.  PERITONEUM: No ascites.    VESSELS: Within normal limits.  RETROPERITONEUM/LYMPH NODES:   No lymphadenopathy. Left psoas retroperitoneal hematoma slightly decreased in size currently measuring 9.5 x 3.7 x 3.6 cm; previously measuring 9.8 x 3.7 x 4.3 cm.    ABDOMINAL WALL: Small fat-containing umbilical hernia.    BONES: Degenerative changes. Partially imaged bilateral total hip arthroplasties.    IMPRESSION:    Mild bilateral hydronephrosis, improved from prior exam.   Small, nonobstructing calcifications measuring up to 3 mm distal right ureter.  Bladder wall thickening with perivesical stranding; correlate for urinary tract infection/cystitis.    Rectal fecal retention with perirectal/presacral stranding may be associated with a stercoral proctitis.    Left retroperitoneal hematoma along the psoas is slightly decreased in size from prior exam as discussed.      ANTIBIOTICS: None    WBC Count: 9.32 K/uL (05-09 @ 08:03)  WBC Count: 8.57 K/uL (05-08 @ 05:42)  WBC Count: 9.67 K/uL (05-07 @ 06:40)      IMPRESSION:  88 year old M with PMH of Dementia (AAOx1-2 at baseline), tachy- daniel syndrome (declined PPM), HTN, HLD, Prostate cancer Dx 10 yrs ago s/p Vantas implant in 2020, received radiation tx.  Admitted for poor oral intake, N/V lower abd pain CT A/P performed on admission revealed significant urine retention in bladder leading to Moderate Hydro also findings of Left-sided retroperitoneal hematoma along the psoas muscle measuring up to 9.8 x 3.7 x 4.3 cm.   KEV likely in the setting of obstructive uropathy(Hydronephrosis from bladder up)  Mild leukocytosis initially(11 K) likely multifactorial, has now resolved   Pt afebrile, non toxic, UA and urine cx negative  + Kahn inserted in the ED, TOV attempted yesterday(5/8) pt failed, has has no other urinary symptoms than retention.  CT A/P performed yesterday for follow up showed improvement of the L Psoas retroperitoneal Hematoma( slightly decreased in size currently measuring 9.5 x 3.7 x 3.6 cm; previously measuring 9.8 x 3.7 x 4.3 cm).   Pt with non obstructing renal stones right 7 mm, the hydronephrosis improved(mild hydro) however now radiologically finding of mild BL perinephric stranding ad diffuse bladder wall thickening /perivesical stranding  for which ID is consulted(in view of Imaging findings suggesting cystitis /pyelonephritis.     -Acute Urinary retention (Obstructive Nephropathy) in pt with prostate CA moderate hydronephrosis on initial CT A/P which improved after Kahn placed. UA and urine cx 5/5 neg  -Renal Stones(Non obstructing )however larger 11 mm  -KEV in the setting of urinary retention  -Stercoral Proctitis  -Retroperitoneal Hematoma(L Psoas) unclear etiology  -Prostate CA  -Advanced Dementia     PLAN:      Please reach ID with any questions or concerns  Dr. Yennifer Harris  Available in Teams               HPI:  88 year old M with PMH of Dementia (AAOx1-2 at baseline), tachy- daniel syndrome (declined PPM), HTN, HLD prostate CA who presented from home with poor oral intake, N/V and lower abdominal pain and constipation for 5 days before presentation. Diagnosed with Retroperitoneal Hematoma(L Psoas) unclear if pt had a fall. Found to have urinary retention/hematuria, Mckeon placed for urinary retention.  Remained Hemodynamically stable during hospital stay non toxic, UA and urine cx neg, has not been on abx, no fevers or chills, no diarrhea, no chest pain or palpitations, no SOB, no cough.    Dx with Retroperitoneal Hematoma on CT A/P, KEV and constipation unclear etiology of the hematoma, has remained hemodynamically stable.   Repeat CT A/P(f/up) with mild BL hydronephrosis, bladder thickening with perivesical stranding , renal stones ID consulted in view of this most recent CT findings.   Pt is clinically stable with no urinary symptoms, continues to retain urine, failed TOV yesterday.     ED Course  Vitals: /90 P 80 R 18 T 97.4F SpO2 96% RA  Meds:  mg, NS 1 L, KCl PO & IV, zofran     (05 May 2023 02:31)    Allergies: No Known Allergies    PAST MEDICAL & SURGICAL HISTORY:  HTN (hypertension)  HLD (hyperlipidemia)  Prostate cancer Dx 10 yrs ago s/p Vantas implant in 2020, received radiation tx.   Mitral valve regurgitation  Dementia  HTN (hypertension)  Dyslipidemia  History of hip replacement    SOCIAL HISTORY: no toxic habits, lives at home with family, has HHA  FAMILY HISTORY: no pertinent related medical history    REVIEW OF SYSTEMS:  CONSTITUTIONAL:  No fevers or chills  EYES/ENT:  No visual changes  NECK:  No pain or stiffness  RESPIRATORY:  No cough, no SOB  CARDIOVASCULAR:  No chest pain or palpitations  GASTROINTESTINAL:  No abdominal pain, no N/V or diarrhea  GENITOURINARY:  No dysuria, frequency or hematuria  NEUROLOGICAL:  No numbness or weakness  MSK: no back pain, no joint pain  SKIN:  No itching, rashes    PHYSICAL EXAM:  VITALS: Vital Signs Last 24 Hrs  T(C): 36.9 (09 May 2023 05:36), Max: 36.9 (09 May 2023 05:36)  T(F): 98.4 (09 May 2023 05:36), Max: 98.4 (09 May 2023 05:36)  HR: 68 (09 May 2023 05:36) (60 - 103)  BP: 167/83 (09 May 2023 05:36) (140/69 - 174/96)  RR: 18 (09 May 2023 05:36) (17 - 18)  SpO2: 93% (09 May 2023 05:36) (93% - 97%)    Parameters below as of 09 May 2023 05:36 Patient On (Oxygen Delivery Method): room air    Gen: AOx2-3 , NAD, non-toxic, pleasant  HEAD:  Atraumatic, Normocephalic  EYES: PERRLA, conjunctiva and sclera clear  ENT: Moist mucous membranes  NECK: Supple, No JVD  CV: S1+S2  Resp: Clear bilat, no resp distress, no crackles/wheezes  Abd: Soft, nontender, +BS  Ext: No LE edema, no cyanosis, LE pulses present  : no dysuria, + urine retention  IV/Skin: No thrombophlebitis, no skin rash  Msk: No low back pain, no arthralgias, no joint swelling  Neuro: AAOx 2-3 no focal signs   Psych: no anxiety, no delusional ideas, no suicidal ideation    LABS/DIAGNOSTIC TESTS:                        12.1   9.32  )-----------( 301      ( 09 May 2023 08:03 )             35.2     WBC Count: 9.32 K/uL (05-09 @ 08:03)  WBC Count: 8.57 K/uL (05-08 @ 05:42)  WBC Count: 9.67 K/uL (05-07 @ 06:40)    05-09    140  |  110<H>  |  21<H>  ----------------------------<  123<H>  3.2<L>   |  24  |  1.35<H>    Ca    9.1      09 May 2023 08:03  Phos  3.6     05-09  Mg     1.8     05-09    TPro  6.6  /  Alb  2.3<L>  /  TBili  0.8  /  DBili  x   /  AST  12  /  ALT  14  /  AlkPhos  92  05-09    CULTURES:   Culture - Urine (collected 05-05-23 @ 02:00)  Source: Clean Catch Clean Catch (Midstream)  Final Report (05-06-23 @ 07:29):    <10,000 CFU/mL Normal Urogenital Becka    RADIOLOGY: <  CT Abdomen and Pelvis No Cont (05.04.23 @ 22:27) >  IMPRESSION:  < from: CT Abdomen and Pelvis No Cont (05.04.23 @ 22:27) >  KIDNEYS/URETERS: Moderate bilateral hydroureteronephrosis to the level of the bladder which is markedly distended. No obstructing urinary tract calculus. Non obstructing lower pole right intrarenal calculus measuring 11 mm.  BLADDER: Markedly distended urinary bladder.  REPRODUCTIVE ORGANS: Prostate gland is not enlarged.  Moderate stool burden in the colon. Colonic diverticulosis without evidence of diverticulitis.    Left-sided retroperitoneal hematoma along the psoas muscle measuring up to 9.8 x 3.7 x 4.3 cm.    <  CT Abdomen and Pelvis No Cont (05.08.23 @ 09:07) >    LOWER CHEST:  Small bilateral pleural effusions.  Small pericardial effusion.  Coronary artery calcifications.    Streak artifact degrades image quality.  The evaluation of the solid organ parenchyma is limited without intravenous contrast.    LIVER: Within normal limits.  BILE DUCTS: Normal caliber.  GALLBLADDER: Within normal limits.  SPLEEN: Within normal limits.  PANCREAS: Within normal limits.  ADRENALS: Within normal limits.  KIDNEYS/URETERS:  Non obstructing right intrarenal calcifications measuring up to 7 mm. Mild right-sided hydronephrosis, improved from prior exam. There are few nonobstructing small calcifications distal right ureter   measuring up to 3 mm. There is mild left-sided hydronephrosis, improved from prior exam.    There is mild bilateral perinephric stranding.  There is mild stranding along the bilateral retroperitoneal fascial planes.  Extensive metallic streak artifact related to patient's bilateral hip prostheses degrades image quality limiting evaluation of the pelvis.    BLADDER: Diffuse bladder wall thickening with stranding in the perivesical fat.  REPRODUCTIVE ORGANS: Limited.    BOWEL: No bowel obstruction. Fecal retention rectum with rectal wall thickening and perirectal/presacral stranding; findings which may be associated with stercoral proctitis.  There is colonic diverticulosis, without CT evidence of diverticulitis. Appendix is normal.  PERITONEUM: No ascites.    VESSELS: Within normal limits.  RETROPERITONEUM/LYMPH NODES:   No lymphadenopathy. Left psoas retroperitoneal hematoma slightly decreased in size currently measuring 9.5 x 3.7 x 3.6 cm; previously measuring 9.8 x 3.7 x 4.3 cm.    ABDOMINAL WALL: Small fat-containing umbilical hernia.    BONES: Degenerative changes. Partially imaged bilateral total hip arthroplasties.    IMPRESSION:    Mild bilateral hydronephrosis, improved from prior exam.   Small, nonobstructing calcifications measuring up to 3 mm distal right ureter.  Bladder wall thickening with perivesical stranding; correlate for urinary tract infection/cystitis.    Rectal fecal retention with perirectal/presacral stranding may be associated with a stercoral proctitis.    Left retroperitoneal hematoma along the psoas is slightly decreased in size from prior exam as discussed.    ANTIBIOTICS: None    IMPRESSION:  88 year old M with PMH of Dementia (AAOx1-2 at baseline), tachy- daniel syndrome (declined PPM), HTN, HLD, Prostate cancer Dx 10 yrs ago s/p Vantas implant in 2020, received radiation tx.  Admitted for poor oral intake, N/V lower abd pain CT A/P performed on admission revealed significant urine retention in bladder leading to Moderate Hydro also findings of Left-sided retroperitoneal hematoma along the psoas muscle measuring up to 9.8 x 3.7 x 4.3 cm.   KEV likely in the setting of obstructive uropathy(Hydronephrosis from bladder up)  Mild leukocytosis initially(11 K) likely multifactorial, has now resolved   Pt afebrile, non toxic, UA and urine cx negative  + Mckeon inserted in the ED, TOV attempted yesterday(5/8) pt failed and mckeon placed by Urology 5/9(1100 ml urine), has no other urinary symptoms than retention.  CT A/P performed yesterday for follow up showed improvement of the L Psoas retroperitoneal Hematoma( slightly decreased in size currently measuring 9.5 x 3.7 x 3.6 cm; previously measuring 9.8 x 3.7 x 4.3 cm).   Pt with non obstructing renal stones right 7 mm, the hydronephrosis improved(mild hydro) however now radiologically finding of mild BL perinephric stranding ad diffuse bladder wall thickening /perivesical stranding  for which ID is consulted(in view of Imaging findings suggesting cystitis /pyelonephritis.     -Obstructive Uropathy in pt with prostate CA moderate hydronephrosis on initial CT A/P which improved after Mckeon placed. UA and urine cx 5/5 neg.  -Acute Urinary Retention(Failed TOV today, urology had to re place mckeon with 1100 ml of urine obtained) likely obstructive uropathy in the setting of prostate CA.   -Renal Stones(Non obstructing )however larger 11 mm  -KEV in the setting of urinary retention  -Stercoral Proctitis/constipation  -Retroperitoneal Hematoma(L Psoas) unclear etiology  -Prostate CA  -Advanced Dementia     PLAN:  -CT A/P wo contrast findings of mild BL perinephric stranding and bladder wall thickening in the setting of acute urinary retention and no clinical picture of urinary infection is most consistent with obstructive uropathy likely in the setting of his well know prostate CA.  On follow up CT A/P the hydronephrosis improved. He has not been on abx, he is non toxic, clinically stable, no leukocytosis so I'd favor to continue monitoring off antibiotics for now.     -F/up renal stones with urology (R kidnay 7 mm renal stone and non obstructing small calcifications distal right ureter 3 mm)    -Management of the constipation per primary team    -Avoid anticholinergic drugs(urinary retention)    -Urology- Tamsulosin, stones evaluation, now with indwelling mckeon that was recommended to keep chronically    -[place] care and mckeon change q14 hrs     -Discussed with medicine attending    If new concerns for uti arise please send urine samples  ID will sign off    Please reach ID with any questions or concerns  Dr. Yennifer Harris  Available in Teams

## 2023-05-09 NOTE — PROGRESS NOTE ADULT - PROBLEM SELECTOR PLAN 2
CT Abdomen/Pelvis 5/08 repeat showed- Bladder wall thickening with perivesical stranding; correlate for urinary   tract infection/cystitis. Also R kidney 7 mm renal stone and non obstructing small calcifications distal right ureter 3 mm.   - no evidence of infection, WC normalized, afebrile, likely no infection   - pt denies urinary symptoms  - will need to leave with mckeon, started on flomax, maintenance fluids  - urology following   - Dr. Corbin ID consulted  - Urology recommended 1 dose of cefazolin stat 5/9

## 2023-05-09 NOTE — PROGRESS NOTE ADULT - PROBLEM SELECTOR PLAN 5
trop elevated 87 > 83  no cp or EKG changes noted  likely due to demand ischemia    - telemetry monitoring, serial EKG

## 2023-05-10 NOTE — PROGRESS NOTE ADULT - PROBLEM SELECTOR PLAN 5
trop elevated 87 > 83  no cp or EKG changes noted  likely due to demand ischemia    - telemetry monitoring, serial EKG - on amlodipine 10mg qd, hydralazine 100mg TID, hctz 12.5 qd  - c/w bp meds with parameters

## 2023-05-10 NOTE — PROGRESS NOTE ADULT - PROBLEM SELECTOR PLAN 4
CT shows stool burden  increased bowel regimen to miralax 17g BID, given Golytely 2g to clear bowel trop elevated 87 > 83  no cp or EKG changes noted  likely due to demand ischemia    - telemetry monitoring, serial EKG

## 2023-05-10 NOTE — PROGRESS NOTE ADULT - SUBJECTIVE AND OBJECTIVE BOX
Subjective  No acute events overnight.    Objective    Vital signs  T(F): , Max: 98.2 (05-09-23 @ 20:25)  HR: 71 (05-10-23 @ 11:40)  BP: 153/87 (05-10-23 @ 11:40)  SpO2: 96% (05-10-23 @ 11:40)  Wt(kg): --    Output     OUT:    Indwelling Catheter - Urethral (mL): 1400 mL    Voided (mL): 1 mL  Total OUT: 1401 mL    Total NET: -1401 mL      OUT:    Indwelling Catheter - Urethral (mL): 700 mL  Total OUT: 700 mL    Total NET: -700 mL          Gen: NAD  Abd: soft, nontender, nondistended  : mckeon secured in place, draining clear yellow urine    Labs      05-10 @ 07:46    WBC --    / Hct --    / SCr 1.34     05-09 @ 19:10    WBC 11.76 / Hct 36.0  / SCr --           Culture - Urine (collected 05-05-23 @ 02:00)  Source: Clean Catch Clean Catch (Midstream)  Final Report (05-06-23 @ 07:29):    <10,000 CFU/mL Normal Urogenital Becka

## 2023-05-10 NOTE — PROGRESS NOTE ADULT - ASSESSMENT
89 y/o male a/w RP hematoma with urinary retention, bilateral hydronephrosis. Patient failed TOV on 5/9 and catheter replaced by urology without resistance.    - AM labs reviewed, Hct and Cr stable  - Repeat CT images reviewed - 3 mm distal right ureteral stone, 7 mm right intrarenal stone  - Trend Cr levels  - Discussed options for management of right kidney and ureteral stones  - Continue Kahn - should not be removed again on this admission  - Continue Flomax  - Patient should follow up outpatient for TOV and management of nephrolithiasis  - Discussed with house staff

## 2023-05-10 NOTE — PROGRESS NOTE ADULT - ATTENDING COMMENTS
Patient seen and examined this afternoon.    87 y/o Male PMH of Dementia, HTN, HLD, tachy-scottie syndrome (declined PPM), and prostate CA presents to ED for 5 days of constipation with worsening mental staus from baseline. Of note he was mainly having constipation and discomfort, however on day of admission he was complaining of having episodes of NBNB emesis and nausea along with LLQ pain. No melena, no hematochezia.  Patient noted to have Urinary retention needing mckeon and also Fecal impaction as well as a retroperitoneal hematoma     Patient doing well,, comfortable at rest,  followed simple commands but has underlying dementia  Patient unable to offer complaints; continues to have BM with bowel regimen, one large one this afternoon. CT scan repeated to monitor hematoma as well as hydro instead of USG    Vital Signs Last 24 Hrs  T(C): 36.8 (10 May 2023 11:40), Max: 36.8 (10 May 2023 11:40)  T(F): 98.2 (10 May 2023 11:40), Max: 98.2 (10 May 2023 11:40)  HR: 71 (10 May 2023 11:40) (69 - 71)  BP: 153/87 (10 May 2023 11:40) (153/87 - 158/80)  BP(mean): --  RR: 18 (10 May 2023 11:40) (18 - 18)  SpO2: 96% (10 May 2023 11:40) (92% - 96%)    Parameters below as of 10 May 2023 11:40  Patient On (Oxygen Delivery Method): room air    elderly male comfortable in bed NAD  Psych: AAO x1-2  Neuro: No gross focal deficits;  CVS: S1S2 present, regular, no edema  Resp: BLAE+, No wheeze or Rhonchi  GI: Soft, BS+, Non tender, non distended  Extr: No  calf tenderness B/L Lower extremities  Skin: Warm and moist without any rashes  Mckeon; slightly blood tinged urine but much improved    Labs:                              12.2   11.76 )-----------( 325      ( 09 May 2023 19:10 )             36.0   05-10    143  |  112<H>  |  20<H>  ----------------------------<  115<H>  3.1<L>   |  25  |  1.34<H>    Ca    8.4      10 May 2023 07:46  Phos  3.3     05-10  Mg     2.0     05-10    TPro  6.0  /  Alb  2.0<L>  /  TBili  0.5  /  DBili  x   /  AST  9<L>  /  ALT  13  /  AlkPhos  86  05-10      Culture - Urine (05.05.23 @ 02:00) Culture Results: <10,000 CFU/mL Normal Urogenital Becka    CT Abdomen and Pelvis No Cont (05.08.23 @ 09:07)     IMPRESSION:  Mild bilateral hydronephrosis, improved from prior exam.  Small, non obstructing calcifications measuring up to 3 mm distal right ureter.  Bladder wall thickening with perivesical stranding; correlate for urinary tract infection/cystitis.  Rectal fecal retention with perirectal/presacral stranding may be associated with a stercoral proctitis.  Left retroperitoneal hematoma along the psoas is slightly decreased in size from prior exam as discussed.    87yo M PMH of Dementia, HTN, HLD, Tachy-Scottie syndrome (declined PPM), and prostate CA presents to ED for 5 days of constipation found to have retroperitoneal hematoma and KEV with urinary retention and hydronephrosis    D/D:  Acute Kidney injury suspect Obstructive uropathy with superimposed Prerenal azotemia nearly resolved; Obstructive Uropathy due to Fecal impaction  B/L Hydronephrosis resolving well  Hypokalemia  Non specific bladder wall stranding  Retroperitoneal Psoas Hematoma resolving slowly  Dementia moderate  Hx Prostate Ca s/p RT    Plan:  No leucocytosis; no fever. mental status at baseline; Negative U/A and Urine Cx negative on admission; CT scan shows concern for cystitis there is no clinical evidence of infection at this time. Urinary retention could have caused Bladder wall thickening. Hold off Antibiotics; ID consulted Dr. Corbin; agrees with my assessment; f/u consult; If any leucocytosis or fever will consider a repeat U/A.   Sx f/u noted; no acute surgical intervention. Recommend IR but no intervention indicated as per iR; conservative monitoring; decreased on repeat CT  Bowel regimen; Continue Senna HS and Miralax;   TOV unsuccessful; Needs mckeon; placed by Urology resident; Added Flomax  Patient is more alert, eating better; Diet advanced to Soft, bite sized; may monitor off fluids  Patient needs assistance with feeds; d/w RN Delia; Keep HOB elevated while feeding  SCDs for now, avoid anticoagulant medication given hematoma    Daughter requesting if patient can get some help at home;   Patient will benefit from HHA for few hrs at least few days a week and VNS  Case mgmt follow up for Home Care/ VNS and HHA referral    Discussed with PGY1 Dr. Covarrubias and PGY 2 Dr. Corbin

## 2023-05-10 NOTE — PROGRESS NOTE ADULT - PROBLEM SELECTOR PLAN 3
pt with incidental retroperitoneal hematoma 9x4x4 cm on CT  Hb ~10  Sx f/u noted; d/w Dr. Mckenzie 5/6 no acute surgical intervention  Discussed with IR  5/6 no intervention; conservative management  repeat CT on 5/8 showed no expansion of hematoma CT shows stool burden  increased bowel regimen to miralax 17g BID, given Golytely 2g to clear bowel

## 2023-05-10 NOTE — PROGRESS NOTE ADULT - PROBLEM SELECTOR PLAN 6
- on amlodipine 10mg qd, hydralazine 100mg TID, hctz 12.5 qd  - c/w bp meds with parameters - hx of prostate Ca, in remission  - continue to monitor

## 2023-05-10 NOTE — PROGRESS NOTE ADULT - PROBLEM SELECTOR PLAN 7
- hx of prostate Ca, in remission  - continue to monitor hx of HLD on atorvastatin 40mg qd, c/w statin

## 2023-05-10 NOTE — PROGRESS NOTE ADULT - SUBJECTIVE AND OBJECTIVE BOX
PGY-1 Progress Note discussed with attending    PAGER #: [[phone]] TILL 5:00 PM  PLEASE CONTACT ON CALL TEAM:  - On Call Team (Please refer to Purvi) FROM 5:00 PM - 8:30PM  - Nightfloat Team FROM 8:30 -7:30 AM    CHIEF COMPLAINT & BRIEF HOSPITAL COURSE:    INTERVAL HPI/OVERNIGHT EVENTS:   MEDICATIONS  (STANDING):  amLODIPine   Tablet 10 milliGRAM(s) Oral daily  atorvastatin 40 milliGRAM(s) Oral at bedtime  bacitracin   Ointment 1 Application(s) Topical daily  hydrALAZINE 100 milliGRAM(s) Oral every 8 hours  hydrogen peroxide 3% Solution 1 Application(s) Topical daily  memantine 10 milliGRAM(s) Oral two times a day  pantoprazole    Tablet 40 milliGRAM(s) Oral before breakfast  polyethylene glycol 3350 17 Gram(s) Oral <User Schedule>  potassium chloride   Powder 40 milliEquivalent(s) Oral once  potassium chloride  10 mEq/100 mL IVPB 10 milliEquivalent(s) IV Intermittent every 1 hour  senna 2 Tablet(s) Oral at bedtime  sodium chloride 0.9%. 1000 milliLiter(s) (70 mL/Hr) IV Continuous <Continuous>  tamsulosin 0.4 milliGRAM(s) Oral at bedtime    MEDICATIONS  (PRN):  ondansetron Injectable 4 milliGRAM(s) IV Push every 6 hours PRN Nausea and/or Vomiting      REVIEW OF SYSTEMS:  CONSTITUTIONAL: No fever, weight loss, or fatigue  RESPIRATORY: No cough, wheezing, chills or hemoptysis; No shortness of breath  CARDIOVASCULAR: No chest pain, palpitations, dizziness, or leg swelling  GASTROINTESTINAL: No abdominal pain. No nausea, vomiting, or hematemesis; No diarrhea or constipation. No melena or hematochezia.  GENITOURINARY: No dysuria or hematuria, urinary frequency  NEUROLOGICAL: No headaches, memory loss, loss of strength, numbness, or tremors  SKIN: No itching, burning, rashes, or lesions     Vital Signs Last 24 Hrs  T(C): 36.4 (10 May 2023 05:24), Max: 36.8 (09 May 2023 20:25)  T(F): 97.6 (10 May 2023 05:24), Max: 98.2 (09 May 2023 20:25)  HR: 69 (10 May 2023 05:24) (69 - 76)  BP: 158/80 (10 May 2023 05:24) (140/80 - 176/84)  BP(mean): --  RR: 18 (10 May 2023 05:24) (17 - 18)  SpO2: 92% (10 May 2023 05:24) (92% - 94%)    Parameters below as of 10 May 2023 05:24  Patient On (Oxygen Delivery Method): room air        PHYSICAL EXAMINATION:  GENERAL: NAD, well built  HEAD:  Atraumatic, Normocephalic  EYES:  conjunctiva and sclera clear  NECK: Supple, No JVD, Normal thyroid  CHEST/LUNG: Clear to auscultation. Clear to percussion bilaterally; No rales, rhonchi, wheezing, or rubs  HEART: Regular rate and rhythm; No murmurs, rubs, or gallops  ABDOMEN: Soft, Nontender, Nondistended; Bowel sounds present  NERVOUS SYSTEM:  Alert & Oriented X3,    EXTREMITIES:  2+ Peripheral Pulses, No clubbing, cyanosis, or edema  SKIN: warm dry                          12.2   11.76 )-----------( 325      ( 09 May 2023 19:10 )             36.0     05-10    143  |  112<H>  |  20<H>  ----------------------------<  115<H>  3.1<L>   |  25  |  1.34<H>    Ca    8.4      10 May 2023 07:46  Phos  3.3     05-10  Mg     2.0     05-10    TPro  6.0  /  Alb  2.0<L>  /  TBili  0.5  /  DBili  x   /  AST  9<L>  /  ALT  13  /  AlkPhos  86  05-10    LIVER FUNCTIONS - ( 10 May 2023 07:46 )  Alb: 2.0 g/dL / Pro: 6.0 g/dL / ALK PHOS: 86 U/L / ALT: 13 U/L DA / AST: 9 U/L / GGT: x                   CAPILLARY BLOOD GLUCOSE      RADIOLOGY & ADDITIONAL TESTS:                   PGY-1 Progress Note discussed with attending    PLEASE CONTACT ON CALL TEAM:  - On Call Team (Please refer to Purvi) FROM 5:00 PM - 8:30PM  - Nightfloat Team FROM 8:30 -7:30 AM    INTERVAL HPI/OVERNIGHT EVENTS: No acute events overnight. More sleepy today compared to yesterday, but upon arousal pt is back to normal mental status. For d/c to [facility] tomorrow.     MEDICATIONS  (STANDING):  amLODIPine   Tablet 10 milliGRAM(s) Oral daily  atorvastatin 40 milliGRAM(s) Oral at bedtime  bacitracin   Ointment 1 Application(s) Topical daily  hydrALAZINE 100 milliGRAM(s) Oral every 8 hours  hydrogen peroxide 3% Solution 1 Application(s) Topical daily  memantine 10 milliGRAM(s) Oral two times a day  pantoprazole    Tablet 40 milliGRAM(s) Oral before breakfast  polyethylene glycol 3350 17 Gram(s) Oral <User Schedule>  potassium chloride   Powder 40 milliEquivalent(s) Oral once  potassium chloride  10 mEq/100 mL IVPB 10 milliEquivalent(s) IV Intermittent every 1 hour  senna 2 Tablet(s) Oral at bedtime  sodium chloride 0.9%. 1000 milliLiter(s) (70 mL/Hr) IV Continuous <Continuous>  tamsulosin 0.4 milliGRAM(s) Oral at bedtime    MEDICATIONS  (PRN):  ondansetron Injectable 4 milliGRAM(s) IV Push every 6 hours PRN Nausea and/or Vomiting      REVIEW OF SYSTEMS:  CONSTITUTIONAL: No fever, weight loss, or fatigue  RESPIRATORY: No cough, wheezing, chills or hemoptysis; No shortness of breath  CARDIOVASCULAR: No chest pain, palpitations, dizziness, or leg swelling  GASTROINTESTINAL: No abdominal pain. No nausea, vomiting, or hematemesis; No diarrhea or constipation. No melena or hematochezia.  GENITOURINARY: No dysuria or hematuria, urinary frequency  NEUROLOGICAL: No headaches, memory loss, loss of strength, numbness, or tremors  SKIN: No itching, burning, rashes, or lesions     Vital Signs Last 24 Hrs  T(C): 36.4 (10 May 2023 05:24), Max: 36.8 (09 May 2023 20:25)  T(F): 97.6 (10 May 2023 05:24), Max: 98.2 (09 May 2023 20:25)  HR: 69 (10 May 2023 05:24) (69 - 76)  BP: 158/80 (10 May 2023 05:24) (140/80 - 176/84)  BP(mean): --  RR: 18 (10 May 2023 05:24) (17 - 18)  SpO2: 92% (10 May 2023 05:24) (92% - 94%)    Parameters below as of 10 May 2023 05:24  Patient On (Oxygen Delivery Method): room air        PHYSICAL EXAMINATION:  GENERAL: NAD, elderly male   HEAD:  Atraumatic, Normocephalic  EYES:  conjunctiva and sclera clear  NECK: Supple, No JVD, Normal thyroid  CHEST/LUNG: Clear to auscultation. Clear to percussion bilaterally; No rales, rhonchi, wheezing, or rubs  HEART: Regular rate and rhythm; No murmurs, rubs, or gallops  ABDOMEN: Soft, Nontender, Nondistended; Bowel sounds present  NERVOUS SYSTEM:  Alert & Oriented X1-2,  no focal neuro deficits   EXTREMITIES:  2+ Peripheral Pulses, No clubbing, cyanosis, or edema  SKIN: warm dry                          12.2   11.76 )-----------( 325      ( 09 May 2023 19:10 )             36.0     05-10    143  |  112<H>  |  20<H>  ----------------------------<  115<H>  3.1<L>   |  25  |  1.34<H>    Ca    8.4      10 May 2023 07:46  Phos  3.3     05-10  Mg     2.0     05-10    TPro  6.0  /  Alb  2.0<L>  /  TBili  0.5  /  DBili  x   /  AST  9<L>  /  ALT  13  /  AlkPhos  86  05-10    LIVER FUNCTIONS - ( 10 May 2023 07:46 )  Alb: 2.0 g/dL / Pro: 6.0 g/dL / ALK PHOS: 86 U/L / ALT: 13 U/L DA / AST: 9 U/L / GGT: x                   CAPILLARY BLOOD GLUCOSE      RADIOLOGY & ADDITIONAL TESTS:

## 2023-05-10 NOTE — PROGRESS NOTE ADULT - PROBLEM SELECTOR PLAN 8
hx of HLD on atorvastatin 40mg qd, c/w statin hx of dementia on memantine, c/w home med   AOx1 at baseline

## 2023-05-10 NOTE — PROGRESS NOTE ADULT - PROBLEM SELECTOR PLAN 2
CT Abdomen/Pelvis 5/08 repeat showed- Bladder wall thickening with perivesical stranding; correlate for urinary   tract infection/cystitis. Also R kidney 7 mm renal stone and non obstructing small calcifications distal right ureter 3 mm.   - no evidence of infection, WC normalized, afebrile, likely no infection   - pt denies urinary symptoms  - will need to leave with mckeon, started on flomax, maintenance fluids  - urology following   - Dr. Corbin ID consulted  - Urology recommended 1 dose of cefazolin stat 5/9 pt with incidental retroperitoneal hematoma 9x4x4 cm on CT  Hb ~10  Sx f/u noted; d/w Dr. Mckenzie 5/6 no acute surgical intervention  Discussed with IR  5/6 no intervention; conservative management  repeat CT on 5/8 showed no expansion of hematoma

## 2023-05-10 NOTE — PROGRESS NOTE ADULT - PROBLEM SELECTOR PLAN 1
pt presents with elevated Cr, now resolving after mckeon placement   KEV 2/2 to post-obstructive uropathy due to fecal impaction   CT Abdomen/Pelvis 5/04- Markedly distended urinary bladder with resultant moderate bilateral hydroureteronephrosis. Moderate stool burden in the colon. Colonic diverticulosis without evidence of diverticulitis. Left-sided retroperitoneal hematoma along the psoas muscle measuring up to 9.8 x 3.7 x 4.3 cm. Also R kidney 7 mm renal stone and non obstructing small calcifications distal right ureter 3 mm.   - repeat CT Abdomen/Pelvis 5/08- improving hydronephrosis, no expansion of retroperitoneal hematoma   - mckeon re-placed 5/9 after failing TOV 5/8   - will need to leave with mckeon, started on flomax, maintenance fluids  - urology following pt presents with elevated Cr, now resolving after mckeon placement   KEV 2/2 to post-obstructive uropathy due to fecal impaction   - CT Abdomen/Pelvis 5/04- Markedly distended urinary bladder with resultant moderate bilateral hydroureteronephrosis. Moderate stool burden in the colon. Colonic diverticulosis without evidence of diverticulitis. Left-sided retroperitoneal hematoma along the psoas muscle measuring up to 9.8 x 3.7 x 4.3 cm. Also R kidney 7 mm renal stone and non obstructing small calcifications distal right ureter 3 mm.   - CT Abdomen/Pelvis 5/08 repeat showed- Bladder wall thickening with perivesical stranding; [likely due to effects of urinary retention]. Also R kidney 7 mm renal stone and non obstructing small calcifications distal right ureter 3 mm.   - repeat CT Abdomen/Pelvis 5/08- improving hydronephrosis, no expansion of retroperitoneal hematoma   - mckeon re-placed 5/9 after failing TOV 5/8   - will need to leave with mckeon, started on flomax, maintenance fluids  - urology following

## 2023-05-11 NOTE — PROGRESS NOTE ADULT - PROVIDER SPECIALTY LIST ADULT
Hospitalist
Surgery
Urology
Urology
Hospitalist
Urology
Surgery
Surgery
Urology
Internal Medicine

## 2023-05-11 NOTE — DISCHARGE NOTE PROVIDER - ATTENDING DISCHARGE PHYSICAL EXAMINATION:
Drowsy, but responsive elderly man, supine in bed  Vital Signs Last 24 Hrs  T(C): 36.7 (11 May 2023 11:42), Max: 36.8 (10 May 2023 20:40)  T(F): 98.1 (11 May 2023 11:42), Max: 98.2 (10 May 2023 20:40)  HR: 63 (11 May 2023 11:42) (60 - 73)  BP: 186/63 (11 May 2023 11:42) (172/71 - 186/63)  BP(mean): --  RR: 18 (11 May 2023 11:42) (18 - 20)  SpO2: 97% (11 May 2023 11:42) (94% - 97%)    Parameters below as of 11 May 2023 11:42  Patient On (Oxygen Delivery Method): room air  Lungs, clear  Cor, RRR  Abdomen, soft  Kahn catheter is draining clear urine  Neurological, drowsy, responsive, oriented to person, non-focal exam.

## 2023-05-11 NOTE — DISCHARGE NOTE PROVIDER - CARE PROVIDER_API CALL
Bruno Millan (MD)  Urology  [address] [facility], [address]  [place] [address]  Phone: [phone]  Fax: [phone]  Follow Up Time:

## 2023-05-11 NOTE — DISCHARGE NOTE PROVIDER - HOSPITAL COURSE
88 year old M with PMH of Dementia (AAOx1-2 at baseline), tachy- daniel syndrome (declined PPM), HTN, HLD, prostate CA presents with abdominal pain admitted for KEV due to post-obstructive uropathy due to fecal impaction, and retroperitoneal hematoma. CT Abdomen/Pelvis 5/04- Markedly distended urinary bladder with resultant moderate bilateral hydroureteronephrosis. Moderate stool burden in the colon. Colonic diverticulosis without evidence of diverticulitis. Left-sided retroperitoneal hematoma along the psoas muscle measuring up to 9.8 x 3.7 x 4.3 cm. Also R kidney 7 mm renal stone and non obstructing small calcifications distal right ureter 3 mm. CT Abdomen/Pelvis 5/08 repeat showed- Bladder wall thickening with perivesical stranding; [likely due to effects of urinary retention]. Also R kidney 7 mm renal stone and non obstructing small calcifications distal right ureter 3 mm. Mckeon was placed this admission. Pt. failed TOV 5/8, so mckeon re-placed 5/9. Urology Dr. Millan following in-patient, recommended Flomax, and do not remove the mckeon this admission, and close follow up out-patient.   Pt. has an incidental retroperitoneal hematoma 9x4x4 cm on CT. Hb stable around ~10. Dr. Mckenzie on 5/6 stated that there was no acute surgical intervention needed. Discussed with IR  5/6 no intervention; conservative management. Repeat CT on 5/8 showed no expansion of hematoma.  Pt.'s constipation has improved      Problem/Plan - 3:  ·  Problem: Constipation.   ·  Plan: CT shows stool burden  increased bowel regimen to miralax 17g BID, given Golytely 2g to clear bowel.     Problem/Plan - 4:  ·  Problem: Non-ST elevation MI (NSTEMI).   ·  Plan: trop elevated 87 > 83  no cp or EKG changes noted  likely due to demand ischemia    - telemetry monitoring, serial EKG.     Problem/Plan - 5:  ·  Problem: HTN (hypertension).   ·  Plan: - on amlodipine 10mg qd, hydralazine 100mg TID, hctz 12.5 qd  - c/w bp meds with parameters.     Problem/Plan - 6:  ·  Problem: Prostate CA.   ·  Plan: - hx of prostate Ca, in remission  - continue to monitor.     Problem/Plan - 7:  ·  Problem: HLD (hyperlipidemia).   ·  Plan: hx of HLD on atorvastatin 40mg qd, c/w statin.     Problem/Plan - 8:  ·  Problem: Dementia.   ·  Plan: hx of dementia on memantine, c/w home med   AOx1 at baseline.     Problem/Plan - 9:  ·  Problem: Preventive measure.   ·  Plan: dvt ppx SCDs given hematoma.     88 year old M with PMH of Dementia (AAOx1-2 at baseline), tachy- daniel syndrome (declined PPM), HTN, HLD, prostate CA presents with abdominal pain admitted for KEV due to post-obstructive uropathy due to fecal impaction, and retroperitoneal hematoma. CT Abdomen/Pelvis 5/04- Markedly distended urinary bladder with resultant moderate bilateral hydroureteronephrosis. Moderate stool burden in the colon. Colonic diverticulosis without evidence of diverticulitis. Left-sided retroperitoneal hematoma along the psoas muscle measuring up to 9.8 x 3.7 x 4.3 cm. Also R kidney 7 mm renal stone and non obstructing small calcifications distal right ureter 3 mm. CT Abdomen/Pelvis 5/08 repeat showed- Bladder wall thickening with perivesical stranding; [likely due to effects of urinary retention]. Also R kidney 7 mm renal stone and non obstructing small calcifications distal right ureter 3 mm. Mckeon was placed this admission. Pt. failed TOV 5/8, so mckeon re-placed 5/9. Kidney function drastically improved after mckeon insertion, Cr 1.3-1.4. Urology Dr. Millan following in-patient, recommended Flomax, and do not remove the mckeon this admission, and close follow up out-patient.   Pt. has an incidental retroperitoneal hematoma 9x4x4 cm on CT. Hb stable around ~10. Dr. Mckenzie on 5/6 stated that there was no acute surgical intervention needed. Discussed with IR  5/6 no intervention; conservative management. Repeat CT on 5/8 showed no expansion of hematoma.  Pt.'s constipation has improved after aggressive bowel regimen, including miralax, senna, golytely.   Pt. has troponin elevated to 87 then down-trended to 83, no chest pain or ekg changes noted.   Pt. has hx of HTN on amlodipine 10mg qd, hydralazine 100mg TID (increased from hydralazine 100mg BID), hctz 12.5 qd  - c/w bp meds with parameters.     Problem/Plan - 6:  ·  Problem: Prostate CA.   ·  Plan: - hx of prostate Ca, in remission  - continue to monitor.     Problem/Plan - 7:  ·  Problem: HLD (hyperlipidemia).   ·  Plan: hx of HLD on atorvastatin 40mg qd, c/w statin.     Problem/Plan - 8:  ·  Problem: Dementia.   ·  Plan: hx of dementia on memantine, c/w home med   AOx1 at baseline.     Problem/Plan - 9:  ·  Problem: Preventive measure.   ·  Plan: dvt ppx SCDs given hematoma.     88 year old M with PMH of Dementia (AAOx1-2 at baseline), tachy- daniel syndrome (declined PPM), HTN, HLD, prostate CA presents with abdominal pain admitted for KEV due to post-obstructive uropathy due to fecal impaction, and retroperitoneal hematoma. CT Abdomen/Pelvis 5/04- Markedly distended urinary bladder with resultant moderate bilateral hydroureteronephrosis. Moderate stool burden in the colon. Colonic diverticulosis without evidence of diverticulitis. Left-sided retroperitoneal hematoma along the psoas muscle measuring up to 9.8 x 3.7 x 4.3 cm. Also R kidney 7 mm renal stone and non obstructing small calcifications distal right ureter 3 mm. CT Abdomen/Pelvis 5/08 repeat showed- Bladder wall thickening with perivesical stranding; [likely due to effects of urinary retention]. Also R kidney 7 mm renal stone and non obstructing small calcifications distal right ureter 3 mm. Mckeon was placed this admission. Pt. failed TOV 5/8, so mckeon re-placed 5/9. Kidney function drastically improved after mckeon insertion, Cr 1.3-1.4. Urology Dr. Millan following in-patient, recommended Flomax, and do not remove the mckeon this admission, and close follow up out-patient.   Pt. has an incidental retroperitoneal hematoma 9x4x4 cm on CT. Hb stable around ~10. Dr. Mckenzie on 5/6 stated that there was no acute surgical intervention needed. Discussed with IR  5/6 no intervention; conservative management. Repeat CT on 5/8 showed no expansion of hematoma.  Pt.'s constipation has improved after aggressive bowel regimen, including miralax, senna, golytely.   Pt. has troponin elevated to 87 then down-trended to 83, no chest pain or ekg changes noted.   Pt. has hx of HTN on amlodipine 10mg qd, hydralazine 100mg TID (increased from hydralazine 100mg BID), hctz 12.5 qd  Pt. has hx of prostate cancer in remission.   Pt. has hx of HLD on atorvastatin 40mg qd.   Pt. has hx of dementia on memantine, AOx1 at baseline.  Patient is stable for discharge per attending and is advised to follow up with PCP as outpatient  Please refer to patient's complete medical chart with documents for a full hospital course, for this is only a brief summary.     88 year old M with PMH of Dementia (AAOx1-2 at baseline), tachy- daniel syndrome (declined PPM), HTN, HLD, prostate CA presents with abdominal pain admitted for KEV due to post-obstructive uropathy due to fecal impaction, and retroperitoneal hematoma. CT Abdomen/Pelvis 5/04- Markedly distended urinary bladder with resultant moderate bilateral hydroureteronephrosis. Moderate stool burden in the colon. Colonic diverticulosis without evidence of diverticulitis. Left-sided retroperitoneal hematoma along the psoas muscle measuring up to 9.8 x 3.7 x 4.3 cm. Also R kidney 7 mm renal stone and non obstructing small calcifications distal right ureter 3 mm. CT Abdomen/Pelvis 5/08 repeat showed- Bladder wall thickening with perivesical stranding; [likely due to effects of urinary retention]. Also R kidney 7 mm renal stone and non obstructing small calcifications distal right ureter 3 mm. Mckeon was placed this admission. Pt. failed TOV 5/8, so mckeon re-placed 5/9 by urology. Kidney function drastically improved after mckeon insertion, Cr 1.3-1.4. Urology Dr. Millan following in-patient, recommended Flomax, and do not remove the mckeon this admission, and close follow up out-patient.   Pt. has an incidental retroperitoneal hematoma 9x4x4 cm on CT. Hb stable around ~10. Dr. Mckenzie on 5/6 stated that there was no acute surgical intervention needed. Discussed with IR  5/6 no intervention; conservative management. Repeat CT on 5/8 showed decreased of the hematoma.  Pt.'s constipation has improved after aggressive bowel regimen, including miralax, senna, golytely.   Pt. has troponin elevated to 87 then down-trended to 83, no chest pain or ekg changes noted.   Pt. has hx of HTN on amlodipine 10mg qd, hydralazine 100mg TID (increased from hydralazine 100mg BID), hctz 12.5 qd  Pt. has hx of prostate cancer in remission.   Pt. has hx of HLD on atorvastatin 40mg qd.   Pt. has hx of dementia on memantine, AOx1 at baseline.  Patient is stable for discharge per attending and is advised to follow up with PCP as outpatient. He is being discharged to [facility]. Please refer to patient's complete medical chart with documents for a full hospital course, for this is only a brief summary.

## 2023-05-11 NOTE — PROGRESS NOTE ADULT - SUBJECTIVE AND OBJECTIVE BOX
Patient seen/examined. No events overnight.     Vital Signs Last 24 Hrs  T(C): 36.6 (11 May 2023 05:29), Max: 36.8 (10 May 2023 11:40)  T(F): 97.9 (11 May 2023 05:29), Max: 98.2 (10 May 2023 11:40)  HR: 60 (11 May 2023 05:29) (60 - 73)  BP: 172/71 (11 May 2023 05:29) (153/87 - 183/85)  BP(mean): --  RR: 19 (11 May 2023 05:29) (18 - 20)  SpO2: 96% (11 May 2023 05:29) (94% - 96%)    Parameters below as of 11 May 2023 05:29  Patient On (Oxygen Delivery Method): room air      General: NAD.   Abd: soft. NT/ND                          10.6   7.90  )-----------( 268      ( 11 May 2023 06:50 )             31.7     05-11    144  |  115<H>  |  26<H>  ----------------------------<  108<H>  3.5   |  25  |  1.41<H>    Ca    8.7      11 May 2023 06:50  Phos  3.3     05-11  Mg     2.1     05-11    TPro  6.0  /  Alb  2.1<L>  /  TBili  0.6  /  DBili  x   /  AST  19  /  ALT  12  /  AlkPhos  78  05-11

## 2023-05-11 NOTE — DISCHARGE NOTE NURSING/CASE MANAGEMENT/SOCIAL WORK - PATIENT PORTAL LINK FT
You can access the FollowMyHealth Patient Portal offered by [facility] by registering at the following website: [email or web address]. By joining [employer]’s FollowMyHealth portal, you will also be able to view your health information using other applications (apps) compatible with our system.

## 2023-05-11 NOTE — DISCHARGE NOTE PROVIDER - NSDCMRMEDTOKEN_GEN_ALL_CORE_FT
amLODIPine 10 mg oral tablet: 1 tab(s) orally once a day  aspirin 81 mg oral tablet, chewable: 1 tab(s) orally once a day  atorvastatin 40 mg oral tablet: 1 tab(s) orally once a day  hydrALAZINE 100 mg oral tablet: 1 tab(s) orally every 8 hours  hydroCHLOROthiazide 12.5 mg oral capsule: 1 cap(s) orally once a day  memantine 10 mg oral tablet: 1 tab(s) orally 2 times a day  Vitamin D3 50 mcg (2000 intl units) oral tablet: 1 tab(s) orally once a day   amLODIPine 10 mg oral tablet: 1 tab(s) orally once a day  aspirin 81 mg oral tablet, chewable: 1 tab(s) orally once a day  atorvastatin 40 mg oral tablet: 1 tab(s) orally once a day  hydrALAZINE 100 mg oral tablet: 1 tab(s) orally every 8 hours  hydroCHLOROthiazide 12.5 mg oral capsule: 1 cap(s) orally once a day  memantine 10 mg oral tablet: 1 tab(s) orally 2 times a day  polyethylene glycol 3350 oral powder for reconstitution: 17 gram(s) orally 2 times a day  senna leaf extract oral tablet: 2 tab(s) orally once a day (at bedtime)  tamsulosin 0.4 mg oral capsule: 1 cap(s) orally once a day (at bedtime)  Vitamin D3 50 mcg (2000 intl units) oral tablet: 1 tab(s) orally once a day

## 2023-05-11 NOTE — DISCHARGE NOTE PROVIDER - NSDCCPCAREPLAN_GEN_ALL_CORE_FT
PRINCIPAL DISCHARGE DIAGNOSIS  Diagnosis: Obstructive uropathy  Assessment and Plan of Treatment: You were admitted for admitted for post-obstructive uropathy due to fecal impaction, which then led to acute kidney injury. The CT Abdomen/Pelvis showed markedly distended urinary bladder with resultant moderate bilateral hydroureteronephrosis. Moderate stool burden in the colon. A re-peat scan showed bladder wall thickening with perivesical stranding; [likely due to effects of urinary retention. Mckeon was placed this admission. Pt. failed trial of void on 5/8, so the mckeon was re-placed 5/9. Kidney function drastically improved after mckeon insertion and after your constipation resolved with an aggressive bowel regimen, creatitine1.3-1.4. Urology Dr. Millan followed you in-patient, and recommended you start a medication called Flomax, and that you do not remove the mckeon this admission, and close follow up closely out-patient.      SECONDARY DISCHARGE DIAGNOSES  Diagnosis: Retroperitoneal hematoma  Assessment and Plan of Treatment: You were found to have an incidental retroperitoneal hematoma 9x4x4 cm on CT. Your hemoglobin was stable around ~10. Dr. Mckenzie (surgeon) on 5/6 stated that there was no acute surgical intervention needed. We also dicussed with the interventional radiologist and they also stated that there was no need for acute intervention; conservative management. Repeat CT on 5/8 showed no expansion of hematoma. Please follow up with your PCP within 1 week from discharge.    Diagnosis: HTN (hypertension)  Assessment and Plan of Treatment: You have a history of Hypertension.  On this admission, your Blood Pressure was adequately controlled with AMLODIPINE 10MG DAILY, HYDROCHLOROTHIAZIDE 12.5MG DAILY, AND HYDRALAZINE 100MG THREE TIMES A DAY (AN INCREASE FROM YOUR PREVIOUS DOSE THAT WAS TWICE A DAY)  Your blood pressure target is 120-140/80-90, maintain healthy lifestyle, low salt diet, avoid fatty food, weight loss, exercise regularly or stay active as tolerated 30 mins X 3 times per week.  Notify your doctor if you have any of the following symptoms:   (Dizziness, Lightheadedness, Blurry vision, Headache, Chest pain, Shortness of breath.)  Please continue taking your home medications and follow-up with your PCP in 1 week from discharge to adjust medications as needed.    Diagnosis: Elevated troponin  Assessment and Plan of Treatment: Pt. has hx of prostate cancer in remission.   Pt. has hx of HLD on atorvastatin 40mg qd.   Pt. has hx of dementia on memantine, AOx1 at baseline.  Patient is stable for discharge per attending and is advised to follow up with PCP as outpatient  Please refer to patient's complete medical chart with documents for a full hospital course, for this is only a brief summary.      Diagnosis: Prostate CA  Assessment and Plan of Treatment: Pt.'s constipation has improved after aggressive bowel regimen, including miralax, senna, golytely.   Pt. has troponin elevated to 87 then down-trended to 83, no chest pain or ekg changes noted.    Diagnosis: Hypokalemia  Assessment and Plan of Treatment:     Diagnosis: Renal calculus  Assessment and Plan of Treatment: You were found to have a right kidney 7 mm renal stone and non obstructing small calcifications distal right ureter 3 mm on imaging. Urology Dr. Millan was following you     PRINCIPAL DISCHARGE DIAGNOSIS  Diagnosis: Obstructive uropathy  Assessment and Plan of Treatment: You were admitted for admitted for post-obstructive uropathy due to fecal impaction, which then led to acute kidney injury. The CT Abdomen/Pelvis showed markedly distended urinary bladder with resultant moderate bilateral hydroureteronephrosis. Moderate stool burden in the colon. A re-peat scan showed bladder wall thickening with perivesical stranding; [likely due to effects of urinary retention. Mckeon was placed this admission. Pt. failed trial of void on 5/8, so the mckeon was re-placed 5/9. Kidney function drastically improved after mckeon insertion and after your constipation resolved with an aggressive bowel regimen, creatitine1.3-1.4. Urology Dr. Millan followed you in-patient, and recommended you start a medication called Flomax, and that you do not remove the mckeon this admission, and close follow up closely out-patient.      SECONDARY DISCHARGE DIAGNOSES  Diagnosis: Retroperitoneal hematoma  Assessment and Plan of Treatment: You were found to have an incidental retroperitoneal hematoma 9x4x4 cm on CT. Your hemoglobin was stable around ~10. Dr. Mckenzie (surgeon) on 5/6 stated that there was no acute surgical intervention needed. We also dicussed with the interventional radiologist and they also stated that there was no need for acute intervention; conservative management. Repeat CT on 5/8 showed no expansion of hematoma. Please follow up with your PCP within 1 week from discharge.    Diagnosis: HTN (hypertension)  Assessment and Plan of Treatment: You have a history of Hypertension.  On this admission, your Blood Pressure was adequately controlled with AMLODIPINE 10MG DAILY, HYDROCHLOROTHIAZIDE 12.5MG DAILY, AND HYDRALAZINE 100MG THREE TIMES A DAY (AN INCREASE FROM YOUR PREVIOUS DOSE THAT WAS TWICE A DAY)  Your blood pressure target is 120-140/80-90, maintain healthy lifestyle, low salt diet, avoid fatty food, weight loss, exercise regularly or stay active as tolerated 30 mins X 3 times per week.  Notify your doctor if you have any of the following symptoms:   (Dizziness, Lightheadedness, Blurry vision, Headache, Chest pain, Shortness of breath.)  Please continue taking your home medications and follow-up with your PCP in 1 week from discharge to adjust medications as needed.    Diagnosis: Elevated troponin  Assessment and Plan of Treatment: You were found to have an elevated troponin this admission, wihtout any EKG changes and you did not experience any chest pain. Likely due to demand ischemia. Please follow up with your PCP within 1 week from discharge.    Diagnosis: Prostate CA  Assessment and Plan of Treatment: You have a history of prostate cancer in remission. Not on any chemo medications. You were started on flomax for urinary retention and you will be continued on this medication. Please follow up with your PCP within 1 week from discharge.    Diagnosis: Hypokalemia  Assessment and Plan of Treatment: You were found to have low potassium during your admission. You were repleted appropriately. Please follow up with your PCP within 1 week from discharge.    Diagnosis: Renal calculus  Assessment and Plan of Treatment: You were found to have a right kidney 7 mm renal stone and non obstructing small calcifications distal right ureter 3 mm on imaging. Urology Dr. Millan was following you and recommended flomax, and continue with your indwelling urinary catheter. Please follow up with your PCP within 1 week from discharge.     PRINCIPAL DISCHARGE DIAGNOSIS  Diagnosis: Obstructive uropathy  Assessment and Plan of Treatment: You were  admitted for post-obstructive uropathy due to fecal impaction, which then led to acute kidney injury. The CT Abdomen/Pelvis showed markedly distended urinary bladder with resultant moderate bilateral hydroureteronephrosis. Moderate stool burden in the colon. A re-peat scan showed bladder showed wall thickening with perivesical stranding; [likely due to effects of urinary retention. Mckeon was placed this admission.  You failed trial of void on 5/8, so the mckeon was re-placed 5/9. Kidney function drastically improved after mckeon insertion and after your constipation resolved with an aggressive bowel regimen, creatitine1.3-1.4. Urology Dr. Millan followed you in-patient, and recommended you start a medication called Flomax, and that you do not remove the mckeon this admission, and close follow up closely out-patient with Dr. Millan urology to determine when is safe to discontinue the indwelling mckeon catheter.      SECONDARY DISCHARGE DIAGNOSES  Diagnosis: Retroperitoneal hematoma  Assessment and Plan of Treatment: You were found to have an incidental retroperitoneal hematoma 9x4x4 cm on CT. Your hemoglobin was stable around ~10. Dr. Mckenzie (surgeon) on 5/6 stated that there was no acute surgical intervention needed. We also dicussed with the interventional radiologist and they also stated that there was no need for acute intervention; conservative management. Repeat CT on 5/8 showed improvement of the hematoma. Please follow up with your PCP within 1 week from discharge to have repeat CBC and ensure your hemoglobin remains stable.    Diagnosis: Elevated troponin  Assessment and Plan of Treatment: You were found to have an elevated troponin this admission, wihtout any EKG changes and you did not experience any chest pain. Likely due to demand ischemia. Please follow up with your PCP within 1 week from discharge.    Diagnosis: Hypokalemia  Assessment and Plan of Treatment: You were found to have low potassium during your admission. You were repleted appropriately. Please follow up with your PCP within 1 week from discharge.    Diagnosis: HTN (hypertension)  Assessment and Plan of Treatment: You have a history of Hypertension.  On this admission, your Blood Pressure was adequately controlled with AMLODIPINE 10MG DAILY,  AND HYDRALAZINE 100MG THREE TIMES A DAY (AN INCREASE FROM YOUR PREVIOUS DOSE THAT WAS TWICE A DAY). Initailly medicatoin hydroclorothizaide was held due to the transient worsening of your renal function.   Your blood pressure target is 120-140/80-90, maintain healthy lifestyle, low salt diet, avoid fatty food, weight loss, exercise regularly or stay active as tolerated 30 mins X 3 times per week.  Notify your doctor if you have any of the following symptoms:   (Dizziness, Lightheadedness, Blurry vision, Headache, Chest pain, Shortness of breath.)  Please continue taking your home medications AMLODIPINE 10MG DAILY,  AND HYDRALAZINE 100MG THREE TIMES A DAY and hydroclorothizaide 12.5 mg oral daily. Please follow-up with your PCP in 1 week from discharge to adjust medications as needed.    Diagnosis: Prostate CA  Assessment and Plan of Treatment: You have a history of prostate cancer in remission. Not on any chemo medications. You were started on flomax for urinary retention and you will be continued on this medication. Please follow up with your PCP within 1 week from discharge.    Diagnosis: Renal calculus  Assessment and Plan of Treatment: You were found to have a right kidney 7 mm renal stone and non obstructing small calcifications distal right ureter 3 mm on imaging. Urology Dr. Millan was following you and recommended flomax, and continue with your indwelling urinary catheter. Please follow up with your PCP within 1 week from discharge.    Diagnosis: Constipation  Assessment and Plan of Treatment: You were found to have severe constipation which caused you to have urinary retention and acute renal injury. Please continue to use daily medication for bowel movement with Senna and Miralax as indicated.

## 2023-05-11 NOTE — PROGRESS NOTE ADULT - ASSESSMENT
87 y/o male a/w RP hematoma with urinary retention, bilateral hydronephrosis. Patient failed TOV on 5/9 and catheter replaced by urology without resistance.    - Labs reviewed  - CT images reviewed - 3 mm distal right ureteral stone, 7 mm right intrarenal stone  - Trial of stone passage  - Trend Cr levels  - Continue Kahn - should not be removed again on this admission  - Continue Flomax  - Patient should follow up outpatient for TOV and management of nephrolithiasis  - Discussed with house staff

## 2023-06-09 NOTE — HISTORY OF PRESENT ILLNESS
[FreeTextEntry1] : Very pleasant 88-year-old gentleman who presents for follow-up from recent hospitalization for urinary retention, BPH, metastatic prostate cancer.  He was previously treated with [facility] for metastatic prostate cancer.  His last treatment was approximately 2-1/2 years ago.  He recently presented to the hospital and was admitted with urinary retention.  A CT scan at that time demonstrated bilateral hydronephrosis.  He failed a trial of void.  Kahn catheter was replaced and he was discharged to a rehab facility.  He reports no problems with the catheter.  His daughter accompanies him to the visit today.  She reports that he has moderate dementia.

## 2023-06-09 NOTE — PHYSICAL EXAM
[General Appearance - Well Developed] : well developed [General Appearance - Well Nourished] : well nourished [Normal Appearance] : normal appearance [Well Groomed] : well groomed [General Appearance - In No Acute Distress] : no acute distress [Abdomen Soft] : soft [Abdomen Tenderness] : non-tender [Costovertebral Angle Tenderness] : no ~M costovertebral angle tenderness [Urethral Meatus] : meatus normal [Urinary Bladder Findings] : the bladder was normal on palpation [Scrotum] : the scrotum was normal [Testes Mass (___cm)] : there were no testicular masses [No Prostate Nodules] : no prostate nodules [Edema] : no peripheral edema [] : no respiratory distress [Respiration, Rhythm And Depth] : normal respiratory rhythm and effort [Exaggerated Use Of Accessory Muscles For Inspiration] : no accessory muscle use [Affect] : the affect was normal [Mood] : the mood was normal [Not Anxious] : not anxious [No Focal Deficits] : no focal deficits [No Palpable Adenopathy] : no palpable adenopathy [FreeTextEntry1] : Wheelchair-bound

## 2023-06-09 NOTE — ASSESSMENT
[FreeTextEntry1] : Very pleasant 58 year old gentleman who presents for follow up of BPH, urinary retention, metastatic prostate cancer\par -Check PSA today\par -Continue tamsulosin\par -Trial of void at next visit on Tuesday morning\par -We discussed different treatment options for metastatic prostate cancer.  His daughter and he would like to start treatment after obtaining PSA.\par -We discussed potential etiologies of urinary retention, including locally invasive prostate cancer versus BPH versus alternative etiologies\par -Follow-up with trial of void\par -Start Casodex at next visit if PSA is elevated with subsequent Eligard injection

## 2023-06-13 PROBLEM — R33.9 URINARY RETENTION: Status: ACTIVE | Noted: 2023-01-01

## 2023-06-13 NOTE — PHYSICAL EXAM
[General Appearance - Well Developed] : well developed [General Appearance - Well Nourished] : well nourished [Normal Appearance] : normal appearance [Well Groomed] : well groomed [General Appearance - In No Acute Distress] : no acute distress [Abdomen Soft] : soft [Abdomen Tenderness] : non-tender [Costovertebral Angle Tenderness] : no ~M costovertebral angle tenderness [Urinary Bladder Findings] : the bladder was normal on palpation [Edema] : no peripheral edema [] : no respiratory distress [Respiration, Rhythm And Depth] : normal respiratory rhythm and effort [Exaggerated Use Of Accessory Muscles For Inspiration] : no accessory muscle use [Affect] : the affect was normal [Mood] : the mood was normal [Not Anxious] : not anxious [FreeTextEntry1] : Wheelchair-bound [No Focal Deficits] : no focal deficits [No Palpable Adenopathy] : no palpable adenopathy

## 2023-06-13 NOTE — HISTORY OF PRESENT ILLNESS
[FreeTextEntry1] : Very pleasant 88-year-old gentleman who presents for follow-up from recent hospitalization for urinary retention, BPH, metastatic prostate cancer, bilateral hydronephrosis.  He was previously treated with [facility] for metastatic prostate cancer.  His last treatment was approximately 2-1/2 years ago.  He recently presented to the hospital and was admitted with urinary retention.  A CT scan at that time demonstrated bilateral hydronephrosis.  He failed a trial of void in the hospital.  Kahn catheter was replaced and he was discharged to a rehab facility.  \par \par He presents today for repeat trial of void.  Kahn catheter was removed and he was noted to void spontaneously in the diaper.  PVR subsequently noted to decrease to 215 from 280.

## 2023-06-13 NOTE — ASSESSMENT
[FreeTextEntry1] : Very pleasant 88-year-old gentleman who presents for follow-up of metastatic prostate cancer, bilateral hydronephrosis, urinary retention\par -Patient was noted to void spontaneously in the diaper\par - after voiding multiple times\par -We discussed options for management, including catheter replacement, following up tomorrow for repeat PVR, and having his nursing home check the PVR as well.  They wish to have the nursing home check PVR.\par -PSA undetectable\par -We discussed the option to have him follow-up here, or have him be seen by allergist who sees patients at his nursing home.  They wish to follow-up with the urologist there as it is more convenient for him, with which I agree\par -Follow-up as needed\par  Plan: The patient will plan on having his sutures removed in 10-14 days at an outpatient clinic. A surgical wound care sheet was provided to the patient today. He was advised to call our office if he has any further questions or concerns or if he experiences any possible signs of infection.\\n\\nPRE-OP PHOTOS TAKEN Detail Level: Zone

## 2023-09-05 NOTE — CONSULT NOTE ADULT - RESPIRATORY
clear to auscultation bilaterally/no wheezes/no rales/no rhonchi/no use of accessory muscles/breath sounds equal/good air movement/respirations non-labored

## 2023-09-05 NOTE — ED ADULT TRIAGE NOTE - CHIEF COMPLAINT QUOTE
bib/ems notification from NH for respiratory distress , tachypneic , O2 sat RA= 80% per ems   NRM= 15L > 96 % . DNR/DNI

## 2023-09-05 NOTE — CONSULT NOTE ADULT - CONVERSATION DETAILS
Pt's son was at bedside with the Health Care proxy on the phone. They expressed wishes that they do no want anything aggressive to be pursued. Family acknowledged that his wishes are still the same to be DNR/DNI and now with continued health deterioration they family wants limited medical intervention. They do no want any central line or pressor support but they are agreeable to fluids and antibiotics. Main focus for them is his comfort and understand he has a very poor prognosis.
spoke at length with [place] and [place] at bedside - both confirmed DNR/DNI - explained patient's current grave condition. Patient likely had covid that weakened him and developed UTI with sepsis, causing respiratory distress/failure and severe KEV. Very unlikely to recover at this time. Family would not like to pursue trial of abx or IVF, s/p one dose abx and IV bolus in ED.     Wants to focus on supportive care with symptom management only. His wife  in our inpatient hospice unit few weeks ago. Family interested in inpatient hospice for patient at this time for a peaceful end of life care.     support given.      referral done, came and gave support and prayers.

## 2023-09-05 NOTE — CONSULT NOTE ADULT - PROBLEM SELECTOR RECOMMENDATION 9
COVID-19 with sepsis - on NRB  dilaudid 0.2mg IVP PRN  can c/w NRB for now, will wean to comfort     eligible for inpatient hospice, SW referral

## 2023-09-05 NOTE — CONSULT NOTE ADULT - PROBLEM SELECTOR RECOMMENDATION 3
suspected UTI, bacteremia as well  no further abx, bloodwork, IVFs  keep mckeon in place    supportive care only    prognosis likely days

## 2023-09-05 NOTE — H&P ADULT - ASSESSMENT
Patient is a 88y old  Male who presents from [facility] with medical history of Dementia (AAOx1-2 at baseline), tachy- daniel syndrome (declined PPM), HTN, HLD, prostate CA presents a chief complaint of shortness of breath. Pt is admitted to medicine floor for comfort measures.

## 2023-09-05 NOTE — CONSULT NOTE ADULT - CONVERSATION/DISCUSSION
Diagnosis/Prognosis/MOLST Discussed/Treatment Options/Chaplaincy Referral/Hospice Referral
Diagnosis/Prognosis

## 2023-09-05 NOTE — ED PROVIDER NOTE - CLINICAL SUMMARY MEDICAL DECISION MAKING FREE TEXT BOX
88 male with dementia, HTN, HLD, prostate cancer with chronic indwelling Kahn presenting to the ED with hypoxia & respiratory distress.    Hypoxia on room air noted.  + Tachypnea.  O2 sat improves with supplemental O2.  Patient currently DNR/DNI as per MOLST papers.  Febrile on rectal temp.  Symptoms concerning for septic shock.  Patient critically ill.  + Abdominal tenderness diffusely.    -Labs, EKG, CXR, CT r/o bowel obstruction, intra-abdominal infection, appendicitis, diverticulitis, colitis, abscess, or perforation, respiratory support as needed, IV fluids, antibiotics, antipyretics, admit    Lab values with significant leukocytosis.  Lactate elevated.  Elevated troponin.  KEV noted.  Kahn catheter obstructed and replaced with ~1 L urine output.    Independent interpretation of the EKG: afib @106, L axis, normal interval, nonspecific diffuse ST changes, TWI, II/III/aVF    Independent interpretation of XR:  No consolidation/effusion/pntx.    IV fluids, & antibiotics given.  Patient with multiorgan dysfunction on lab values.  Care discussed with ICU team and no indication for high flow oxygen at this time.  Care discussed with family who want plan for comfort care measures.  Palliative care consulted.    Patient requires inpatient admission for further care & stabilization.  Care signed out to inpatient team.

## 2023-09-05 NOTE — ED PROVIDER NOTE - PHYSICAL EXAMINATION
Gen:  Awake, mod distress, chronically ill, NCAT,   Eyes:  PERRL, EOMI, no icterus, normal lids/lashes, normal conjunctivae.  ENT:  External inspection normal, pink/dry membranes.   CV:  S1S2, tachycardic rate irregular rhythm, no murmur/gallops/rubs, no JVD, 2+ pulses b/l, no edema/cords/homans, warm/well-perfused.  Resp:  +Tachypnea, lungs w rhonchi to auscultation b/l  Abd:  Soft abdomen, diffuse tender, no distended/guarding/rebound, no pulsatile mass, no CVA tender.   Musculoskeletal:  N/V intact, FROM all 4 extremities  Neck:  FROM neck, supple, trachea midline, no meningismus.   Skin:  Color normal for race, warm and dry, no rash.  Neuro:  Awake, CN 2-12 intact (grossly), moving all 4ext in response to noxious stimuli  Psych:  Limited due to critical illness

## 2023-09-05 NOTE — CONSULT NOTE ADULT - PROBLEM SELECTOR RECOMMENDATION 4
in setting of progressive dementia, mostly wheelchair bound, tachy daniel syndrome (no PPM)  eligible for inpatient hospice  prognosis likely days

## 2023-09-05 NOTE — H&P ADULT - NSHPPHYSICALEXAM_GEN_ALL_CORE
GENERAL: Lethargic and arousable to only pain.   HEAD:  Atraumatic, Normocephalic  EYES:  PERRL, conjunctiva and sclera clear  NECK: Supple, No JVD, Normal thyroid  NERVOUS SYSTEM:  Lethargic and arousable to only pain.   CHEST/LUNG: Clear to percussion bilaterally; No rales, rhonchi, wheezing, or rubs  HEART: Regular rate and rhythm; No murmurs, rubs, or gallops  ABDOMEN: Tenderness on palpation.   EXTREMITIES:  2+ Peripheral Pulses, No clubbing, cyanosis, or edema  LYMPH: No lymphadenopathy noted  SKIN: No rashes or lesions

## 2023-09-05 NOTE — CONSULT NOTE ADULT - ATTENDING COMMENTS
88y old  Male who presents from [facility] with medical history of Dementia (AAOx1-2 at baseline), tachy- daniel syndrome (declined PPM), HTN, HLD, prostate CA presents a chief complaint of shortness of breath. ICU was consulted for Multiorgan failure.     Assessment:  1. Acute encephalopathy  2. Acute hypoxic respiratory failure  3. Sepsis  4. Aspiration pneumonia  5. Acute renal failure  6. Lactic acidosis  7. Elevated troponin  8. COVID19    Plan:  - Patient minimally responsive, noted to be gurgling on NRB mask  - Son at bedside with daughter on the phone, endorsing limited medical interventions  - Ok with IV fluid hydration and antibiotics  - But no procedures and no aggressive care   - Understand patient's prognosis is poor  - They want comfort focused care  - Consider palliative care consultation  - At this time will not transfer to ICU in view of GOC   - Discussed with Dr. Juarez

## 2023-09-05 NOTE — ED ADULT NURSE NOTE - NS ED NURSE PRESS ULCER STAGE 11
no chills/no dizziness/no vomiting/no decreased eating/drinking/no nausea/no tingling/no weakness/no fever/no numbness
stage I

## 2023-09-05 NOTE — CONSULT NOTE ADULT - GASTROINTESTINAL
soft/nontender/nondistended/no guarding/no rigidity/no organomegaly/no palpable estefania/no masses palpable

## 2023-09-05 NOTE — ED ADULT NURSE NOTE - SEPSIS REFERENCE DATA CRITERIA 1
Pt's tac level 7.5. Pt to decrease slightly 2.5 mg AM and 2 mg PM. Pt repeated dose change.   
Abormal VS: Temp > 100F or < 96.8F; SBP < 90 mmHG; HR > 120bpm; Resp > 24/min

## 2023-09-05 NOTE — ED PROVIDER NOTE - CARE PLAN
1 Principal Discharge DX:	Sepsis   Principal Discharge DX:	Sepsis  Secondary Diagnosis:	COVID-19  Secondary Diagnosis:	KEV (acute kidney injury)  Secondary Diagnosis:	NSTEMI (non-ST elevation myocardial infarction)  Secondary Diagnosis:	Colitis

## 2023-09-05 NOTE — CONSULT NOTE ADULT - SUBJECTIVE AND OBJECTIVE BOX
Patient is a 88y old  Male who presents with a chief complaint of     HPI:      Allergies    No Known Allergies    Intolerances        MEDICATIONS  (STANDING):  acetaminophen  Suppository .. 650 milliGRAM(s) Rectal once    MEDICATIONS  (PRN):      Daily     Daily     Drug Dosing Weight  Height (cm): 177.8 (04 May 2023 15:40)  Weight (kg): 84.459 (05 Sep 2023 11:10)  BMI (kg/m2): 26.7 (05 Sep 2023 11:10)  BSA (m2): 2.02 (05 Sep 2023 11:10)    PAST MEDICAL & SURGICAL HISTORY:  HTN (hypertension)      HLD (hyperlipidemia)      Prostate cancer      Mitral valve regurgitation      Dementia      HTN (hypertension)      Dyslipidemia      History of hip replacement          FAMILY HISTORY:      SOCIAL HISTORY:    ADVANCE DIRECTIVES:    REVIEW OF SYSTEMS:    CONSTITUTIONAL: No fever, weight loss, or fatigue  EYES: No eye pain, visual disturbances, or discharge  ENMT:  No difficulty hearing, tinnitus, vertigo; No sinus or throat pain  NECK: No pain or stiffness  BREASTS: No pain, masses, or nipple discharge  RESPIRATORY: No cough, wheezing, chills or hemoptysis; No shortness of breath  CARDIOVASCULAR: No chest pain, palpitations, dizziness, or leg swelling  GASTROINTESTINAL: No abdominal or epigastric pain. No nausea, vomiting, or hematemesis; No diarrhea or constipation. No melena or hematochezia.  GENITOURINARY: No dysuria, frequency, hematuria, or incontinence  NEUROLOGICAL: No headaches, memory loss, loss of strength, numbness, or tremors  SKIN: No itching, burning, rashes, or lesions   LYMPH NODES: No enlarged glands  ENDOCRINE: No heat or cold intolerance; No hair loss  MUSCULOSKELETAL: No joint pain or swelling; No muscle, back, or extremity pain  PSYCHIATRIC: No depression, anxiety, mood swings, or difficulty sleeping  HEME/LYMPH: No easy bruising, or bleeding gums  ALLERGY AND IMMUNOLOGIC: No hives or eczema          ICU Vital Signs Last 24 Hrs  T(C): 37.6 (05 Sep 2023 12:45), Max: 38.5 (05 Sep 2023 11:10)  T(F): 99.6 (05 Sep 2023 12:45), Max: 101.3 (05 Sep 2023 11:10)  HR: 105 (05 Sep 2023 13:50) (105 - 122)  BP: 120/79 (05 Sep 2023 13:50) (106/80 - 120/79)  BP(mean): 91 (05 Sep 2023 13:50) (79 - 91)  ABP: --  ABP(mean): --  RR: 32 (05 Sep 2023 13:50) (30 - 34)  SpO2: 97% (05 Sep 2023 13:50) (94% - 97%)    O2 Parameters below as of 05 Sep 2023 12:45  Patient On (Oxygen Delivery Method): mask, nonrebreather  O2 Flow (L/min): 10              I&O's Detail      PHYSICAL EXAM:    GENERAL: NAD, well-groomed, well-developed  HEAD:  Atraumatic, Normocephalic  EYES: EOMI, PERRLA, conjunctiva and sclera clear  ENMT: No tonsillar erythema, exudates, or enlargement; Moist mucous membranes, Good dentition, No lesions  NECK: Supple, No JVD, Normal thyroid  NERVOUS SYSTEM:  Alert & Oriented X3, Good concentration; Motor Strength 5/5 B/L upper and lower extremities; DTRs 2+ intact and symmetric  CHEST/LUNG: Clear to percussion bilaterally; No rales, rhonchi, wheezing, or rubs  HEART: Regular rate and rhythm; No murmurs, rubs, or gallops  ABDOMEN: Soft, Nontender, Nondistended; Bowel sounds present  EXTREMITIES:  2+ Peripheral Pulses, No clubbing, cyanosis, or edema  LYMPH: No lymphadenopathy noted  SKIN: No rashes or lesions    LABS:  CBC Full  -  ( 05 Sep 2023 11:10 )  WBC Count : 34.86 K/uL  RBC Count : 5.21 M/uL  Hemoglobin : 14.5 g/dL  Hematocrit : 43.7 %  Platelet Count - Automated : 196 K/uL  Mean Cell Volume : 83.9 fl  Mean Cell Hemoglobin : 27.8 pg  Mean Cell Hemoglobin Concentration : 33.2 gm/dL  Auto Neutrophil # : 31.03 K/uL  Auto Lymphocyte # : 1.05 K/uL  Auto Monocyte # : 2.09 K/uL  Auto Eosinophil # : 0.00 K/uL  Auto Basophil # : 0.00 K/uL  Auto Neutrophil % : 84.0 %  Auto Lymphocyte % : 3.0 %  Auto Monocyte % : 6.0 %  Auto Eosinophil % : 0.0 %  Auto Basophil % : 0.0 %        139  |  103  |  49<H>  ----------------------------<  186<H>  4.0   |  18<L>  |  4.14<H>    Ca    9.4      05 Sep 2023 11:10    TPro  7.5  /  Alb  2.4<L>  /  TBili  0.6  /  DBili  x   /  AST  61<H>  /  ALT  21  /  AlkPhos  102  09-05    CAPILLARY BLOOD GLUCOSE      POCT Blood Glucose.: 181 mg/dL (05 Sep 2023 11:08)    PT/INR - ( 05 Sep 2023 11:10 )   PT: 13.0 sec;   INR: 1.14 ratio         PTT - ( 05 Sep 2023 11:10 )  PTT:35.1 sec  Urinalysis Basic - ( 05 Sep 2023 11:10 )    Color: Orange / Appearance: Turbid / S.016 / pH: x  Gluc: 186 mg/dL / Ketone: Negative mg/dL  / Bili: Negative / Urobili: 0.2 mg/dL   Blood: x / Protein: >=1000 mg/dL / Nitrite: Negative   Leuk Esterase: Moderate / RBC: 5 /HPF / WBC 2 /HPF   Sq Epi: x / Non Sq Epi: x / Bacteria: Many /HPF              EKG:    ECHO, US:    RADIOLOGY:    CRITICAL CARE TIME SPENT:   Patient is a 88y old  Male who [facility] with medical history of Dementia (AAOx1-2 at baseline), tachy- daniel syndrome (declined PPM), HTN, HLD, prostate CA presents a chief complaint of shortness of breath. As per the son bedside he recieved call from the rehab and was told pt was tachycardic, hypotensive and had changes in mental status. He was told pt is being sent to the ED, pt was seen yesterday and was noted to be lethargic.       Allergies    No Known Allergies    Intolerances        MEDICATIONS  (STANDING):  acetaminophen  Suppository .. 650 milliGRAM(s) Rectal once    MEDICATIONS  (PRN):      Daily     Daily     Drug Dosing Weight  Height (cm): 177.8 (04 May 2023 15:40)  Weight (kg): 84.459 (05 Sep 2023 11:10)  BMI (kg/m2): 26.7 (05 Sep 2023 11:10)  BSA (m2): 2.02 (05 Sep 2023 11:10)    PAST MEDICAL & SURGICAL HISTORY:  HTN (hypertension)      HLD (hyperlipidemia)      Prostate cancer      Mitral valve regurgitation      Dementia      HTN (hypertension)      Dyslipidemia      History of hip replacement          FAMILY HISTORY:      SOCIAL HISTORY:    ADVANCE DIRECTIVES:    REVIEW OF SYSTEMS: Could not be assessed as pt is lethargic and only arousable to pain.           ICU Vital Signs Last 24 Hrs  T(C): 37.6 (05 Sep 2023 12:45), Max: 38.5 (05 Sep 2023 11:10)  T(F): 99.6 (05 Sep 2023 12:45), Max: 101.3 (05 Sep 2023 11:10)  HR: 105 (05 Sep 2023 13:50) (105 - 122)  BP: 120/79 (05 Sep 2023 13:50) (106/80 - 120/79)  BP(mean): 91 (05 Sep 2023 13:50) (79 - 91)  ABP: --  ABP(mean): --  RR: 32 (05 Sep 2023 13:50) (30 - 34)  SpO2: 97% (05 Sep 2023 13:50) (94% - 97%)    O2 Parameters below as of 05 Sep 2023 12:45  Patient On (Oxygen Delivery Method): mask, nonrebreather  O2 Flow (L/min): 10              I&O's Detail      PHYSICAL EXAM:    GENERAL: Lethargic and arousable to only pain.   HEAD:  Atraumatic, Normocephalic  EYES: EOMI, PERRLA, conjunctiva and sclera clear  NECK: Supple, No JVD, Normal thyroid  NERVOUS SYSTEM:  Lethargic and arousable to only pain.   CHEST/LUNG: Clear to percussion bilaterally; No rales, rhonchi, wheezing, or rubs  HEART: Regular rate and rhythm; No murmurs, rubs, or gallops  ABDOMEN: Tenderness on palpation.   EXTREMITIES:  2+ Peripheral Pulses, No clubbing, cyanosis, or edema  LYMPH: No lymphadenopathy noted  SKIN: No rashes or lesions    LABS:  CBC Full  -  ( 05 Sep 2023 11:10 )  WBC Count : 34.86 K/uL  RBC Count : 5.21 M/uL  Hemoglobin : 14.5 g/dL  Hematocrit : 43.7 %  Platelet Count - Automated : 196 K/uL  Mean Cell Volume : 83.9 fl  Mean Cell Hemoglobin : 27.8 pg  Mean Cell Hemoglobin Concentration : 33.2 gm/dL  Auto Neutrophil # : 31.03 K/uL  Auto Lymphocyte # : 1.05 K/uL  Auto Monocyte # : 2.09 K/uL  Auto Eosinophil # : 0.00 K/uL  Auto Basophil # : 0.00 K/uL  Auto Neutrophil % : 84.0 %  Auto Lymphocyte % : 3.0 %  Auto Monocyte % : 6.0 %  Auto Eosinophil % : 0.0 %  Auto Basophil % : 0.0 %        139  |  103  |  49<H>  ----------------------------<  186<H>  4.0   |  18<L>  |  4.14<H>    Ca    9.4      05 Sep 2023 11:10    TPro  7.5  /  Alb  2.4<L>  /  TBili  0.6  /  DBili  x   /  AST  61<H>  /  ALT  21  /  AlkPhos  102  -    CAPILLARY BLOOD GLUCOSE      POCT Blood Glucose.: 181 mg/dL (05 Sep 2023 11:08)    PT/INR - ( 05 Sep 2023 11:10 )   PT: 13.0 sec;   INR: 1.14 ratio         PTT - ( 05 Sep 2023 11:10 )  PTT:35.1 sec  Urinalysis Basic - ( 05 Sep 2023 11:10 )    Color: Orange / Appearance: Turbid / S.016 / pH: x  Gluc: 186 mg/dL / Ketone: Negative mg/dL  / Bili: Negative / Urobili: 0.2 mg/dL   Blood: x / Protein: >=1000 mg/dL / Nitrite: Negative   Leuk Esterase: Moderate / RBC: 5 /HPF / WBC 2 /HPF   Sq Epi: x / Non Sq Epi: x / Bacteria: Many /HPF              EKG:    ECHO, US:    RADIOLOGY:    CRITICAL CARE TIME SPENT:   Patient is a 88y old  Male who presents from [facility] rehab with medical history of Dementia (AAOx1-2 at baseline), tachy- daniel syndrome (declined PPM), HTN, HLD, prostate CA presents a chief complaint of shortness of breath. As per the son bedside he recieved call from the rehab and was told pt was tachycardic, hypotensive and had changes in mental status. He was told pt is being sent to the ED, pt was seen yesterday and was noted to be lethargic.       Allergies    No Known Allergies    Intolerances        MEDICATIONS  (STANDING):  acetaminophen  Suppository .. 650 milliGRAM(s) Rectal once    MEDICATIONS  (PRN):      Daily     Daily     Drug Dosing Weight  Height (cm): 177.8 (04 May 2023 15:40)  Weight (kg): 84.459 (05 Sep 2023 11:10)  BMI (kg/m2): 26.7 (05 Sep 2023 11:10)  BSA (m2): 2.02 (05 Sep 2023 11:10)    PAST MEDICAL & SURGICAL HISTORY:  HTN (hypertension)      HLD (hyperlipidemia)      Prostate cancer      Mitral valve regurgitation      Dementia      HTN (hypertension)      Dyslipidemia      History of hip replacement          FAMILY HISTORY:      SOCIAL HISTORY: From rehab    ADVANCE DIRECTIVES:    REVIEW OF SYSTEMS: Could not be assessed as pt is lethargic and only arousable to pain.           ICU Vital Signs Last 24 Hrs  T(C): 37.6 (05 Sep 2023 12:45), Max: 38.5 (05 Sep 2023 11:10)  T(F): 99.6 (05 Sep 2023 12:45), Max: 101.3 (05 Sep 2023 11:10)  HR: 105 (05 Sep 2023 13:50) (105 - 122)  BP: 120/79 (05 Sep 2023 13:50) (106/80 - 120/79)  BP(mean): 91 (05 Sep 2023 13:50) (79 - 91)  ABP: --  ABP(mean): --  RR: 32 (05 Sep 2023 13:50) (30 - 34)  SpO2: 97% (05 Sep 2023 13:50) (94% - 97%)    O2 Parameters below as of 05 Sep 2023 12:45  Patient On (Oxygen Delivery Method): mask, nonrebreather  O2 Flow (L/min): 10              I&O's Detail      PHYSICAL EXAM:    GENERAL: Lethargic and arousable to only pain.   HEAD:  Atraumatic, Normocephalic  EYES:  PERRL, conjunctiva and sclera clear  NECK: Supple, No JVD, Normal thyroid  NERVOUS SYSTEM:  Lethargic and arousable to only pain.   CHEST/LUNG: Clear to percussion bilaterally; No rales, rhonchi, wheezing, or rubs  HEART: Regular rate and rhythm; No murmurs, rubs, or gallops  ABDOMEN: Tenderness on palpation.   EXTREMITIES:  2+ Peripheral Pulses, No clubbing, cyanosis, or edema  LYMPH: No lymphadenopathy noted  SKIN: No rashes or lesions    LABS:  CBC Full  -  ( 05 Sep 2023 11:10 )  WBC Count : 34.86 K/uL  RBC Count : 5.21 M/uL  Hemoglobin : 14.5 g/dL  Hematocrit : 43.7 %  Platelet Count - Automated : 196 K/uL  Mean Cell Volume : 83.9 fl  Mean Cell Hemoglobin : 27.8 pg  Mean Cell Hemoglobin Concentration : 33.2 gm/dL  Auto Neutrophil # : 31.03 K/uL  Auto Lymphocyte # : 1.05 K/uL  Auto Monocyte # : 2.09 K/uL  Auto Eosinophil # : 0.00 K/uL  Auto Basophil # : 0.00 K/uL  Auto Neutrophil % : 84.0 %  Auto Lymphocyte % : 3.0 %  Auto Monocyte % : 6.0 %  Auto Eosinophil % : 0.0 %  Auto Basophil % : 0.0 %        139  |  103  |  49<H>  ----------------------------<  186<H>  4.0   |  18<L>  |  4.14<H>    Ca    9.4      05 Sep 2023 11:10    TPro  7.5  /  Alb  2.4<L>  /  TBili  0.6  /  DBili  x   /  AST  61<H>  /  ALT  21  /  AlkPhos  102  09-    CAPILLARY BLOOD GLUCOSE      POCT Blood Glucose.: 181 mg/dL (05 Sep 2023 11:08)    PT/INR - ( 05 Sep 2023 11:10 )   PT: 13.0 sec;   INR: 1.14 ratio         PTT - ( 05 Sep 2023 11:10 )  PTT:35.1 sec  Urinalysis Basic - ( 05 Sep 2023 11:10 )    Color: Orange / Appearance: Turbid / S.016 / pH: x  Gluc: 186 mg/dL / Ketone: Negative mg/dL  / Bili: Negative / Urobili: 0.2 mg/dL   Blood: x / Protein: >=1000 mg/dL / Nitrite: Negative   Leuk Esterase: Moderate / RBC: 5 /HPF / WBC 2 /HPF   Sq Epi: x / Non Sq Epi: x / Bacteria: Many /HPF              EKG:    ECHO, US:    RADIOLOGY:    CRITICAL CARE TIME SPENT:

## 2023-09-05 NOTE — CONSULT NOTE ADULT - SUBJECTIVE AND OBJECTIVE BOX
[  ] STAT REQUEST              [ x ] ROUTINE REQUEST    Patient is a 88 year old male with diverticulitis. GI consulted to evaluate.        HPI:  Patient is a 88 year old male  from [facility], with past medical history significant for Dementia (AAOx1-2 at baseline), tachy- daniel syndrome (declined PPM), HTN, HLD, prostate CA presented to the emergency room with SOB,    tachycardia, hypotension and changes in mental status. Patient unable to provide history. On CT-Scan patient found to have diverticulitis. No abdominal pain, nausea, vomiting, hematemesis, hematochezia, melena, fever, chills, chest pain, Hematuria, dysuria or diarrhea reported.        PAIN MANAGEMENT:  Pain Scale:                 /10  Pain Location:         PAST MEDICAL HISTORY    HTN (hypertension)    Hyperlipidemia    Prostate cancer    Mitral valve regurgitation    Dementia     Tachy- daniel syndrome              PAST SURGICAL HISTORY    Hip replacement        Allergies    No Known Allergies    Intolerances  None         MEDICATIONS  (PRN):  glycopyrrolate Injectable 0.4 milliGRAM(s) IV Push four times a day PRN secretions  HYDROmorphone  Injectable 0.2 milliGRAM(s) IV Push every 2 hours PRN Moderate pain (4-6), Severe pain (7-10), Respiratory rate greater than 22  LORazepam   Injectable 0.5 milliGRAM(s) IV Push every 4 hours PRN Anxiety  ondansetron Injectable 4 milliGRAM(s) IV Push every 6 hours PRN Nausea and/or Vomiting      SOCIAL HISTORY  Advanced Directives:       [  ] Full Code       [ X ] DNR  Marital Status:         [  ] M      [ X ] S      [  ] D       [  ] W  Children:       [ X ] Yes      [  ] No  Occupation:        [  ] Employed       [ X ] Unemployed       [  ] Retired  Diet:       [ X ] Regular       [  ] PEG feeding          [  ] NG tube feeding  Drug Use:           [  ] Patient denied          [  ] Yes  Alcohol:           [ X ] No             [  ] Yes (socially)         [  ] Yes (chronic)  Tobacco:           [  ] Yes           [ X ] No      FAMILY HISTORY  [ X ] Heart Disease            [ X ] Diabetes             [ X ] HTN             [  ] Colon Cancer             [  ] Stomach Cancer              [  ] Pancreatic Cancer      VITAL SIGNS   Vital Signs Last 24 Hrs  T(C): 37.8 (05 Sep 2023 16:25), Max: 38.5 (05 Sep 2023 11:10)  T(F): 100 (05 Sep 2023 16:25), Max: 101.3 (05 Sep 2023 11:10)  HR: 106 (05 Sep 2023 19:14) (100 - 122)  BP: 110/75 (05 Sep 2023 19:14) (106/80 - 132/69)  BP(mean): 91 (05 Sep 2023 13:50) (79 - 91)  RR: 31 (05 Sep 2023 19:14) (30 - 34)  SpO2: 97% (05 Sep 2023 19:14) (94% - 97%)  Parameters below as of 05 Sep 2023 19:14  Patient On (Oxygen Delivery Method): mask, nonrebreather  O2 Flow (L/min): 10      CBC Full  -  ( 05 Sep 2023 11:10 )  WBC Count : 34.86 K/uL  RBC Count : 5.21 M/uL  Hemoglobin : 14.5 g/dL  Hematocrit : 43.7 %  Platelet Count - Automated : 196 K/uL  Mean Cell Volume : 83.9 fl  Mean Cell Hemoglobin : 27.8 pg  Mean Cell Hemoglobin Concentration : 33.2 gm/dL  Auto Neutrophil # : 31.03 K/uL  Auto Lymphocyte # : 1.05 K/uL  Auto Monocyte # : 2.09 K/uL  Auto Eosinophil # : 0.00 K/uL  Auto Basophil # : 0.00 K/uL  Auto Neutrophil % : 84.0 %  Auto Lymphocyte % : 3.0 %  Auto Monocyte % : 6.0 %  Auto Eosinophil % : 0.0 %  Auto Basophil % : 0.0 %      09-05    139  |  103  |  49<H>  ----------------------------<  186<H>  4.0   |  18<L>  |  4.14<H>    Ca    9.4      05 Sep 2023 11:10    TPro  7.5  /  Alb  2.4<L>  /  TBili  0.6  /  DBili  x   /  AST  61<H>  /  ALT  21  /  AlkPhos  102  09-05     PT/INR - ( 05 Sep 2023 11:10 )   PT: 13.0 sec;   INR: 1.14 ratio      PTT - ( 05 Sep 2023 11:10 )  PTT:35.1 sec    Urinalysis (09.05.23 @ 11:10)   Glucose Qualitative, Urine: Negative mg/dL  Blood, Urine: Small  pH Urine: 8.5  Color: Orange  Urine Appearance: Turbid  Bilirubin: Negative  Ketone - Urine: Negative mg/dL  Specific Gravity: 1.016  Protein, Urine: >=1000 mg/dL  Urobilinogen: 0.2 mg/dL  Nitrite: Negative  Leukocyte Esterase Concentration: Moderate< from: 12 Lead ECG (05.04.23 @ 17:47) >    Ventricular Rate 93 BPM    Atrial Rate 100 BPM    QRS Duration 84 ms    Q-T Interval 394 ms    QTC Calculation(Bazett) 489 ms    R Axis -2 degrees    T Axis -50 degrees    Diagnosis Line *** Poor data quality, interpretation may be adversely affected  Probable Sinus rhythm with frequent Premature ventricular complexes  Nonspecific ST abnormality  Abnormal ECG    < end of copied text >      RADIOLOGY/IMAGING                  ACC: [id number] EXAM:  CT ABDOMEN AND PELVIS   ORDERED BY:  ZENA COTTO     ACC: [id number] EXAM:  CT CHEST   ORDERED BY:  ZENA COTTO     PROCEDURE DATE:  09/05/2023          INTERPRETATION:  CLINICAL INFORMATION: 88 years  Male with sepsis.    COMPARISON: CT abdomen and pelvis 5/8/2023 and chest CT 2/20/2019    CONTRAST/COMPLICATIONS:  IV Contrast: NONE  Oral Contrast: NONE  Complications: None reported at time of study completion    PROCEDURE:  CT of the Chest, Abdomen and Pelviswas performed.  Sagittal and coronal reformats were performed.    LIMITATIONS: Evaluation of the solid organs, vascular structures and GI   tract is limited without oral and IV contrast. Motion artifact. Streak   artifact from patient's arms.    FINDINGS:  CHEST:  LUNGS AND LARGE AIRWAYS: Patent central airways. No pulmonary nodules.   Mild bibasilar dependent atelectasis. Motion artifact.  PLEURA: Trace bilateral pleural effusions.  VESSELS: Coronary artery calcifications versus stents. Atherosclerotic   aorta without aneurysm.  HEART: Cardiomegaly. No pericardial effusion.  MEDIASTINUM AND FUENTES: No lymphadenopathy.  CHEST WALL AND LOWER NECK: Bilateral gynecomastia. Surgical clips in the   left axilla.    ABDOMEN AND PELVIS:  LIVER: Within normal limits.  BILE DUCTS: Normal caliber.  GALLBLADDER: Cholelithiasis.  SPLEEN: Within normal limits.  PANCREAS: Within normal limits.  ADRENALS: Mild bilateral adrenal thickening.  KIDNEYS/URETERS: Mild bilateral hydroureteronephrosis increased from   prior. Mild bilateral renal cortical atrophy unchanged. 2 mm   nonobstructing right midpole calculus and 8 mm nonobstructing right lower   pole calculus.    BLADDER: Marked wall thickening. Partially obscured by streak artifact.   Kahn catheter in situ.  REPRODUCTIVE ORGANS: Obscured by streak artifact.    BOWEL: No bowel obstruction. Appendix is not visualized. No evidence of   inflammation in the pericecal region. Descending and sigmoid colonic   diverticulosis. Trace fat stranding about the descending/sigmoid colonic   junction, likely mild diverticulitis. No intramural or intra-abdominal   abscess. No extraluminal gas. Perirectal fat stranding.  PERITONEUM: No ascites.  VESSELS: Atherosclerotic changes. Dense calcification of the infrarenal   abdominal aorta narrowing the lumen greater than 50%. Calcification of   the proximal renal artery orifices. Probable stenosis at the origin of   the celiac artery. Circumaortic left renal vein.  RETROPERITONEUM/LYMPH NODES: No lymphadenopathy.  ABDOMINAL WALL: Within normal limits.  BONES: Hip arthroplasty. Streak artifact degrades images of the pelvis.   Thoracolumbar degenerative changes.    IMPRESSION:  Mild cardiomegaly. Aortic and coronary atherosclerosis. Trace bilateral   pleuraleffusions.    Probable mild acute uncomplicated diverticulitis at the junction of the   descending and sigmoid colon. Proctitis.    Mild bilateral hydroureteronephrosis increased from prior. Marked bladder   wall thickening. Recommend cystoscopy to exclude underlying malignancy.   Nonobstructing right renal calculi.    Severely stenotic infrarenal abdominal aorta as above, similar to   5/8/2023. 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    Cholelithiasis.

## 2023-09-05 NOTE — ED ADULT NURSE NOTE - OBJECTIVE STATEMENT
Notification for respiratory distress sent from NH. PT tachypneic and hypoxic on arrival. As per EMS, pt was saturating 80% on RA. Pt received with NRB mask at 15L O2, SPo2 96%. Pt p/w urinary indwelling catheter, not draining any urine. EKG done, pt placed on cardiac monitor.

## 2023-09-05 NOTE — ED ADULT NURSE NOTE - NSFALLHARMRISKINTERV_ED_ALL_ED
Assistance OOB with selected safe patient handling equipment if applicable/Assistance with ambulation/Communicate risk of Fall with Harm to all staff, patient, and family/Monitor gait and stability/Monitor for mental status changes and reorient to person, place, and time, as needed/Provide visual cue: red socks, yellow wristband, yellow gown, etc/Reinforce activity limits and safety measures with patient and family/Toileting schedule using arm’s reach rule for commode and bathroom/Use of alarms - bed, stretcher, chair and/or video monitoring/Bed in lowest position, wheels locked, appropriate side rails in place/Call bell, personal items and telephone in reach/Instruct patient to call for assistance before getting out of bed/chair/stretcher/Non-slip footwear applied when patient is off stretcher/[place] to call system/Physically safe environment - no spills, clutter or unnecessary equipment/Purposeful Proactive Rounding/Room/bathroom lighting operational, light cord in reach

## 2023-09-05 NOTE — CONSULT NOTE ADULT - SUBJECTIVE AND OBJECTIVE BOX
Consult to: Discuss complex medical decision making related to goals of care    [facility] Geriatric and Palliative Consult Service:  Kaila Hoff DO: cell ([phone])  Simon Varela MD: cell ([phone])  Krishan Covarrubias NP: cell ([phone])   Ramon Samuel LMSW: cell ([phone])   Keesha Roblero NP: via [employer]    Can contact any Palliative Team member via [employer] for consults and questions    HPI:  Patient is a 88y old  Male who [facility] with medical history of Dementia (AAOx1-2 at baseline), tachy- daniel syndrome (declined PPM), HTN, HLD, prostate CA presents a chief complaint of shortness of breath. As per the son bedside he recieved call from the rehab and was told pt was tachycardic, hypotensive and had changes in mental status. He was told pt is being sent to the ED, pt was seen yesterday and was noted to be lethargic. ICU team was consulted for multiorgan failure. Per ICU team, family expressed wishes that they do no want anything aggressive to be pursued. Family acknowledged that his wishes are still the same to be DNR/DNI and now with continued health deterioration they family wants limited medical intervention. They do no want any central line or pressor support but they are agreeable to fluids and antibiotics. Main focus for them is his comfort and understand he has a very poor prognosis. (05 Sep 2023 19:03)      PAST MEDICAL & SURGICAL HISTORY:  HTN (hypertension)      HLD (hyperlipidemia)      Prostate cancer      Mitral valve regurgitation      Dementia      HTN (hypertension)      Dyslipidemia      History of hip replacement          SOCIAL HISTORY:    Admitted from:  home  (with HHA)           assisted living          [facility]   [ none ] Substance abuse, [ none ] Tobacco hx, [ none ] Alcohol hx, [ none ] Home Opioid Hx  Hinduism:  Language preferred:    FAMILY HISTORY:   unable to obtain from patient due to poor mentation, family unable to give information, see H&P for history  Baseline ADLs (prior to admission):    Allergies    No Known Allergies    Intolerances      Present Symptoms: Mild, Moderate, Severe  Pain:             Location -                               Aggravating factors -             Quality -             Radiation -             Timing-             Severity (0-10 scale):             Minimal acceptable level (0-10 scale):  Fatigue:  Nausea:  Lack of Appetite:   SOB:  Depression:  Anxiety:  Review of Systems: [All others negative or Unable to obtain due to poor mentation]    CPOT:    https://[email or web address]/getattachment/[email or web address]/Critical-Care-Pain-Observation-Tool-(CPOT)  PAIN AD Score:   [email or web address] (press ctrl +  left click to view)      MEDICATIONS  (STANDING):    MEDICATIONS  (PRN):  glycopyrrolate Injectable 0.4 milliGRAM(s) IV Push four times a day PRN secretions  HYDROmorphone  Injectable 0.2 milliGRAM(s) IV Push every 2 hours PRN Moderate pain (4-6), Severe pain (7-10), Respiratory rate greater than 22  LORazepam   Injectable 0.5 milliGRAM(s) IV Push every 4 hours PRN Anxiety  ondansetron Injectable 4 milliGRAM(s) IV Push every 6 hours PRN Nausea and/or Vomiting      PHYSICAL EXAM:  Vital Signs Last 24 Hrs  T(C): 37.8 (05 Sep 2023 16:25), Max: 38.5 (05 Sep 2023 11:10)  T(F): 100 (05 Sep 2023 16:25), Max: 101.3 (05 Sep 2023 11:10)  HR: 106 (05 Sep 2023 19:14) (100 - 122)  BP: 110/75 (05 Sep 2023 19:14) (106/80 - 132/69)  BP(mean): 91 (05 Sep 2023 13:50) (79 - 91)  RR: 31 (05 Sep 2023 19:14) (30 - 34)  SpO2: 97% (05 Sep 2023 19:14) (94% - 97%)    Parameters below as of 05 Sep 2023 19:14  Patient On (Oxygen Delivery Method): mask, nonrebreather  O2 Flow (L/min): 10      General:     lethargic distressed cachexia  nonverbal  unarousable     Palliative Performance Scale/Karnofsky Score: 20%  [email or web address]_performance_scale_[email or web address].pdf    HEENT: no abnormal lesion, dry mouth on NRB  Lungs: tachypnea/labored breathing, audible excessive secretions  CV: RRR, S1S2, tachycardia  GI: soft non distended tender  incontinent  : incontinent  oliguria mckeon  Musculoskeletal: weakness x4 fully bedbound  Skin: no abnormal skin lesions, poor skin turgor,pallor  Neuro: cognitive impairment  Oral intake ability: unable/only mouth care,    LABS:                        14.5   34.86 )-----------( 196      ( 05 Sep 2023 11:10 )             43.7         139  |  103  |  49<H>  ----------------------------<  186<H>  4.0   |  18<L>  |  4.14<H>    Ca    9.4      05 Sep 2023 11:10    TPro  7.5  /  Alb  2.4<L>  /  TBili  0.6  /  DBili  x   /  AST  61<H>  /  ALT  21  /  AlkPhos  102      Urinalysis Basic - ( 05 Sep 2023 11:10 )    Color: Orange / Appearance: Turbid / S.016 / pH: x  Gluc: 186 mg/dL / Ketone: Negative mg/dL  / Bili: Negative / Urobili: 0.2 mg/dL   Blood: x / Protein: >=1000 mg/dL / Nitrite: Negative   Leuk Esterase: Moderate / RBC: 5 /HPF / WBC 2 /HPF   Sq Epi: x / Non Sq Epi: x / Bacteria: Many /HPF    RADIOLOGY & ADDITIONAL STUDIES:  reviewed

## 2023-09-05 NOTE — H&P ADULT - HISTORY OF PRESENT ILLNESS
Patient is a 88y old  Male who presents from [facility] with medical history of Dementia (AAOx1-2 at baseline), tachy- daniel syndrome (declined PPM), HTN, HLD, prostate CA presents a chief complaint of shortness of breath. As per the son bedside he recieved call from the rehab and was told pt was tachycardic, hypotensive and had changes in mental status. He was told pt is being sent to the ED, pt was seen yesterday and was noted to be lethargic. ICU team was consulted for multiorgan failure. Per ICU team, family expressed wishes that they do no want anything aggressive to be pursued. Family acknowledged that his wishes are still the same to be DNR/DNI and now with continued health deterioration they family wants limited medical intervention. They do no want any central line or pressor support but they are agreeable to fluids and antibiotics. Main focus for them is his comfort and understand he has a very poor prognosis. Patient is a 88y old  Male who [facility] with medical history of Dementia (AAOx1-2 at baseline), tachy- daniel syndrome (declined PPM), HTN, HLD, prostate CA presents a chief complaint of shortness of breath. As per the son bedside he recieved call from the rehab and was told pt was tachycardic, hypotensive and had changes in mental status. He was told pt is being sent to the ED, pt was seen yesterday and was noted to be lethargic. ICU team was consulted for multiorgan failure. Per ICU team, family expressed wishes that they do no want anything aggressive to be pursued. Family acknowledged that his wishes are still the same to be DNR/DNI and now with continued health deterioration they family wants limited medical intervention. They do no want any central line or pressor support but they are agreeable to fluids and antibiotics. Main focus for them is his comfort and understand he has a very poor prognosis. Patient is a 88y old  Male [facility] with medical history of Dementia (AAOx1-2 at baseline), tachy- daniel syndrome (declined PPM), HTN, HLD, prostate CA presents a chief complaint of shortness of breath. As per the son bedside he received call from the rehab and was told pt was tachycardic, hypotensive and had changes in mental status. He was told pt is being sent to the ED, pt was seen yesterday and was noted to be lethargic. ICU team was consulted for multiorgan failure. Per ICU team, family expressed wishes that they do no want anything aggressive to be pursued. Family acknowledged that his wishes are still the same to be DNR/DNI and now with continued health deterioration they family wants limited medical intervention. They do not want any central line, pressor support and no fluids and antibiotics. Main focus for them is his comfort and understand he has a very poor prognosis.

## 2023-09-05 NOTE — ED PROVIDER NOTE - OBJECTIVE STATEMENT
88 male with hx of dementia, HTN, HLD, prostate cancer.   Pt presenting to the ED reporting respiratory distress & hypoxia.  History assisted by EMS.  Patient noted to have O2 80s % on room air prior to ED arrival.  Patient is DNR/DNI as per COURTNEY

## 2023-09-05 NOTE — H&P ADULT - PROBLEM SELECTOR PLAN 3
likely 2/2 to covid infection   Desaturating on room air  pt on NRB 10L  no intervention as pt is comfort care

## 2023-09-05 NOTE — CONSULT NOTE ADULT - CONSULT REASON
Diverticulitis
Shortness of breath
sepsis, multiorgan failure, in setting of dementia, eval for hospice

## 2023-09-05 NOTE — CONSULT NOTE ADULT - ASSESSMENT
Assessment:    Plan:  Neuro:    Cardiovascular:    Pulmonary:     Infectious Diseases:    Gastrointestinal:    Renal:    Heme/onc:     Endo:     Skin/ catheter:     Prophylaxis:     Goals of Care: DNR/DNI -     Dispo: Poor prognosis - family understands and does not want anything aggressive.   No role for ICU currently.    Assessment: Patient is a 88y old  Male [facility] medical [facility] of Dementia (AAOx1-2 at baseline), tachy- daniel syndrome (declined PPM), HTN, HLD, prostate CA presents a chief complaint of shortness of breath. ICU was consulted for Multiorgan failure.     Plan:  Neuro:  #Acute Metabolic Encephalopathy  p/w ams  UA Negative  CT head shows  Would consult neuro if pt not comfort measures.         Cardiovascular:  #Stemi  p/w chest pain  recommend ACS protocol - asa, statin, bb  Trop 1 18K, f/u T2, T3  Tele Monitoring  f/u Echo  Would consult cardio if pt not comfort measures.       Pulmonary:   #Acute Hypoxic Respiratory Failure  Suspecting in the setting of Covid.   Currently on 15L NRB.     #Covid  Desaturating on room air  Tylenol prn, tessalon perles, albuterol q6  incentive spirometer  obtain inflammatory markers(d-dimer, procal, LDH, ferritin, ESR, CRP) q2-3 days  Maintain O2 92-95%, o2 support titrate as needed.      Infectious Diseases:  #Septic Shock  p/w fever, HR >90, hypotension, lactate 7.1  trend lactate  received 1L bolus  f/u blood cultures, urine cultures  ua negative   tylenol prn      Gastrointestinal:  NPO due to mental status.     #Proctitis   p/w abdominal pain, diarrhea  send gi pcr, stool culture, cdiff?  CT ap shows: Proctitis  NPO for now, advance diet as tolerated  Start Cipro 500 q12, Flagyl 500 q8  zofran prn  replace electrolytes as needed      #Cholelithiasis  CT - Confirmed cholelithiasis.         Renal:  #Acute Renal Failure.   Pt w/ SCr 4.14 on admission  Baseline SCr - 1.4  F/U Urine Lytes, calculate FeNa  IVF for now, follow BMP daily      Heme/onc:   #No Active Issues.     Endo:   #No Active Issues.     Skin/ catheter: Peripheral IV and Chronic Kahn.     Prophylaxis: Heparin and PPI     Goals of Care: DNR/DNI -     Dispo: Poor prognosis - family understands and does not want anything aggressive.   No role for ICU currently.    Assessment: Patient is a 88y old  Male [facility] with medical history of Dementia (AAOx1-2 at baseline), tachy- daniel syndrome (declined PPM), HTN, HLD, prostate CA presents a chief complaint of shortness of breath. ICU was consulted for Multiorgan failure.       Plan:  Neuro:  #Acute Metabolic Encephalopathy  p/w ams  UA Negative  CT head shows  Would consult neuro if pt not comfort measures.         Cardiovascular:  #Elevated troponin  p/w chest pain  recommend ACS protocol - asa, statin, bb  Trop 1 18K, f/u T2, T3  Tele Monitoring  f/u Echo  Would consult cardio if pt not comfort measures.       Pulmonary:   #Acute Hypoxic Respiratory Failure  Suspecting in the setting of Covid.   Currently on 15L NRB.     #Covid  Desaturating on room air  Tylenol prn, tessalon perles, albuterol q6  incentive spirometer  obtain inflammatory markers(d-dimer, procal, LDH, ferritin, ESR, CRP) q2-3 days  Maintain O2 92-95%, o2 support titrate as needed.      Infectious Diseases:  #Septic Shock  p/w fever, HR >90, hypotension, lactate 7.1  trend lactate  received 1L bolus  f/u blood cultures, urine cultures  ua negative   tylenol prn      Gastrointestinal:  NPO due to mental status.     #Proctitis   p/w abdominal pain, diarrhea  send gi pcr, stool culture, cdiff?  CT ap shows: Proctitis  NPO for now, advance diet as tolerated  Start Cipro 500 q12, Flagyl 500 q8  zofran prn  replace electrolytes as needed      #Cholelithiasis  CT - Confirmed cholelithiasis.         Renal:  #Acute Renal Failure.   Pt w/ SCr 4.14 on admission  Baseline SCr - 1.4  F/U Urine Lytes, calculate FeNa  IVF for now, follow BMP daily      Heme/onc:   #No Active Issues.     Endo:   #No Active Issues.     Skin/ catheter: Peripheral IV and Chronic Kahn.     Prophylaxis: Heparin and PPI     Goals of Care: DNR/DNI -     Dispo: Poor prognosis - family understands and does not want anything aggressive.   No role for ICU currently.

## 2023-09-05 NOTE — CONSULT NOTE ADULT - PROBLEM SELECTOR RECOMMENDATION 2
due to sepsis/covid-10, encephalopathy, not arousable  NPO, no IVFs  oral care  robinul PRN  keep head of bed >45

## 2023-09-05 NOTE — CONSULT NOTE ADULT - ASSESSMENT
1. Diverticulitis  2. Proctitis  3. Cholelithiasis  4. COVID positive    Suggestions:    1. Antibiotics IV  2. IVF hydration  3. NPO  4. Monitor electrolytes  5. Avoid NSAID  6. Protonix daily  7. DVT prophylaxis

## 2023-09-06 NOTE — PROGRESS NOTE ADULT - PROBLEM SELECTOR PLAN 1
Patient COVID-19 with sepsis - on NRB  CT Chest: Mild cardiomegaly. Aortic and coronary atherosclerosis. Trace bilateral  pleural effusions.  dilaudid 0.2mg IVP PRN  can c/w NRB for now, will wean to comfort   eligible for inpatient hospice, SW referral Patient COVID-19 with sepsis - on NRB  CT Chest: Mild cardiomegaly. Aortic and coronary atherosclerosis. Trace bilateral  pleural effusions.  requiring Dilaudid 0.2mg IVP PRN; will transition to standing Dilaudid  can c/w NRB for now, will wean to comfort   eligible for inpatient hospice, SW referral Patient COVID-19 with sepsis - on NRB  CT Chest: Mild cardiomegaly. Aortic and coronary atherosclerosis. Trace bilateral  pleural effusions.  requiring Dilaudid 0.2mg IVP PRN; will transition to standing Dilaudid  - Dilaudid 0.2mg IV q6H  can c/w NRB for now, will wean to comfort   eligible for inpatient hospice, SW referral in process Patient COVID-19 with sepsis - on NRB  CT Chest: Mild cardiomegaly. Aortic and coronary atherosclerosis. Trace bilateral  pleural effusions.  requiring Dilaudid 0.2mg IVP PRN; will transition to standing Dilaudid  - Dilaudid 0.2mg IV q6H  ATC  can c/w NRB for now, will wean to comfort   eligible for inpatient hospice, SW referral in process

## 2023-09-06 NOTE — PROGRESS NOTE ADULT - PROBLEM SELECTOR PLAN 5
p/w abdominal pain, diarrhea  CT ap shows: Proctitis  no intervention as pt is comfort care
Clinical evidence indicates that the patient has Moderate protein calorie malnutrition/ 2nd degree  In context of Chronic Illness (>1 month)--progressive dementia, now complicated by resp failure and COVID, as evidenced by    Energy/Food intake <75% of estimated energy requirement >7 days  Weight loss: Mild  (lbs lost recently)  Body Fat loss: Mild   mild temporal wasting  Muscle mass loss: Moderate, multiple DTIs, albumin 2.4  Fluid Accumulation: Mild    Strength: weakened Mild--patient completely bedbound    Recommend:   Patient remains NPO due to respiratory failure/AMSA--maintain aspiration precautions, keep head of bed at least 45 degrees during feeding    Per [facility] orders/family discussion, no PEG tube

## 2023-09-06 NOTE — H&P ADULT - PROBLEM SELECTOR PLAN 3
suspected UTI, bacteremia as well  no further abx, bloodwork, IVFs  keep mckeon in place    supportive care only    prognosis likely days.

## 2023-09-06 NOTE — CHART NOTE - NSCHARTNOTEFT_GEN_A_CORE
EVENT: Received telephone call from RN that pt ahs a fever of 101.4    HPI: Patient is a 88y old  Male who presents from [facility] with medical history of Dementia (AAOx1-2 at baseline), tachy- daniel syndrome (declined PPM), HTN, HLD, prostate CA presents a chief complaint of shortness of breath. Pt is admitted to medicine floor for comfort measures.     SUBJECTIVE: n/a    OBJECTIVE:  Vital Signs Last 24 Hrs  T(C): 38.6 (06 Sep 2023 00:47), Max: 38.6 (06 Sep 2023 00:47)  T(F): 101.4 (06 Sep 2023 00:47), Max: 101.4 (06 Sep 2023 00:47)  HR: 95 (06 Sep 2023 00:47) (95 - 122)  BP: 131/58 (06 Sep 2023 00:47) (106/80 - 132/69)  BP(mean): 91 (05 Sep 2023 13:50) (79 - 91)  RR: 30 (06 Sep 2023 00:47) (30 - 34)  SpO2: 93% (06 Sep 2023 00:47) (93% - 97%)    Parameters below as of 06 Sep 2023 00:47  Patient On (Oxygen Delivery Method): mask, nonrebreather  O2 Flow (L/min): 10      FOCUSED PHYSICAL EXAM:  Neuro: awake, alert, In NAD  Cardiovascular: Pulses +2 B/L in lower and upper extremities, HR regular, BP stable, No edema.  Respiratory: Respirations regular, unlabored, breath sounds clear B/L.   GI: Abdomen soft, non-tender, positive bowel sounds.  : no bladder distention noted. No complaints at this time.  Skin: Dry, intact, no bruising, no diaphoresis.    LABS:                        14.5   34.86 )-----------( 196      ( 05 Sep 2023 11:10 )             43.7     09-05    139  |  103  |  49<H>  ----------------------------<  186<H>  4.0   |  18<L>  |  4.14<H>    Ca    9.4      05 Sep 2023 11:10    TPro  7.5  /  Alb  2.4<L>  /  TBili  0.6  /  DBili  x   /  AST  61<H>  /  ALT  21  /  AlkPhos  102  09-05      EKG:   IMAGING:    ASSESSMENT/PROBLEM: Fever of 101.4 most likely 2/2 to sepsis      PLAN:   1. Ofirmev 1000 mg, IV x 1 dose ordered  2. Monitor response to treatment  3. Cont present care/treatment  4. Supportive care

## 2023-09-06 NOTE — PROGRESS NOTE ADULT - SUBJECTIVE AND OBJECTIVE BOX
NP Note discussed with  primary attending    Patient is a 88y old  Male who presents with a chief complaint of respiratory distress (05 Sep 2023 15:51)      INTERVAL HPI/OVERNIGHT EVENTS: no new complaints    MEDICATIONS  (STANDING):    MEDICATIONS  (PRN):  glycopyrrolate Injectable 0.4 milliGRAM(s) IV Push four times a day PRN secretions  HYDROmorphone  Injectable 0.2 milliGRAM(s) IV Push every 2 hours PRN Moderate pain (4-6), Severe pain (7-10), Respiratory rate greater than 22  LORazepam   Injectable 0.5 milliGRAM(s) IV Push every 4 hours PRN Anxiety  ondansetron Injectable 4 milliGRAM(s) IV Push every 6 hours PRN Nausea and/or Vomiting      __________________________________________________  REVIEW OF SYSTEMS: Unable to elicit ROS due to declined cognition    CONSTITUTIONAL: No fever,     Vital Signs Last 24 Hrs  T(C): 37.6 (06 Sep 2023 05:31), Max: 38.6 (06 Sep 2023 00:47)  T(F): 99.7 (06 Sep 2023 05:31), Max: 101.4 (06 Sep 2023 00:47)  HR: 86 (06 Sep 2023 05:31) (86 - 109)  BP: 121/64 (06 Sep 2023 05:31) (110/75 - 132/69)  BP(mean): 91 (05 Sep 2023 13:50) (79 - 91)  RR: 24 (06 Sep 2023 05:31) (24 - 34)  SpO2: 98% (06 Sep 2023 05:31) (93% - 98%)    Parameters below as of 06 Sep 2023 05:31  Patient On (Oxygen Delivery Method): mask, nonrebreather  O2 Flow (L/min): 10      ________________________________________________  PHYSICAL EXAM:  GENERAL: NAD  HEENT: Normocephalic;  conjunctivae and sclerae clear; moist mucous membranes;   NECK : supple  CHEST/LUNG: Clear to ausculitation bilaterally with good air entry   HEART: S1 S2  regular; no murmurs, gallops or rubs  ABDOMEN: Soft, Nontender, Nondistended; Bowel sounds present  EXTREMITIES: no cyanosis; no edema; no calf tenderness  SKIN: warm and dry; no rash  NERVOUS SYSTEM:  A&Ox0    _________________________________________________  LABS:                        14.5   34.86 )-----------( 196      ( 05 Sep 2023 11:10 )             43.7         139  |  103  |  49<H>  ----------------------------<  186<H>  4.0   |  18<L>  |  4.14<H>    Ca    9.4      05 Sep 2023 11:10    TPro  7.5  /  Alb  2.4<L>  /  TBili  0.6  /  DBili  x   /  AST  61<H>  /  ALT  21  /  AlkPhos  102  -05    PT/INR - ( 05 Sep 2023 11:10 )   PT: 13.0 sec;   INR: 1.14 ratio         PTT - ( 05 Sep 2023 11:10 )  PTT:35.1 sec  Urinalysis Basic - ( 05 Sep 2023 11:10 )    Color: Orange / Appearance: Turbid / S.016 / pH: x  Gluc: 186 mg/dL / Ketone: Negative mg/dL  / Bili: Negative / Urobili: 0.2 mg/dL   Blood: x / Protein: >=1000 mg/dL / Nitrite: Negative   Leuk Esterase: Moderate / RBC: 5 /HPF / WBC 2 /HPF   Sq Epi: x / Non Sq Epi: x / Bacteria: Many /HPF      CAPILLARY BLOOD GLUCOSE            RADIOLOGY & ADDITIONAL TESTS:  IMPRESSION:  Mild cardiomegaly. Aortic and coronary atherosclerosis. Trace bilateral   pleuraleffusions.    Probable mild acute uncomplicated diverticulitis at the junction of the   descending and sigmoid colon. Proctitis.    Mild bilateral hydroureteronephrosis increased from prior. Marked bladder   wall thickening. Recommend cystoscopy to exclude underlying malignancy.   Nonobstructing right renal calculi.    Severely stenotic infrarenal abdominal aorta as above, similar to   2023. 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    Cholelithiasis.    < end of copied text >    Imaging Personally Reviewed:  YES    Consultant(s) Notes Reviewed:   YES    Care Discussed with Consultants :     Plan of care was discussed with patient and /or primary care giver; all questions and concerns were addressed and care was aligned with patient's wishes.

## 2023-09-06 NOTE — H&P ADULT - PROBLEM SELECTOR PLAN 1
COVID-19 with sepsis - on NRB  dilaudid 0.2mg IVP PRN and q 8hr ATC  can c/w NRB for now, will wean to comfort     low threshold for gtt

## 2023-09-06 NOTE — PHARMACOTHERAPY INTERVENTION NOTE - COMMENTS
Patient is from [facility] and their medical record was used to confirm NKDA status and update the outpatient medication review.
Blood culture reviewed, no additional recommendation at this time; see H&P 9/5/23 at 19:03.    Culture Date/Time: 2023-09-05 11:15  BCID: -  Proteus species  Detec

## 2023-09-06 NOTE — PROGRESS NOTE ADULT - CONVERSATION DETAILS
Additional telephonic meeting held with patient's daughter Asia x 16 minutes to discuss prognosis, ACP, goals of care, and treatment preferences.  Again reviewed that patient has multi system organ failure, primarily severe KEV and respiratory failure, in the context of advancing dementia with COVID, and is unlikely to survive and/or recover to his previous baseline of mental functioning, even with treatment of sepsis/COVID and all aggressive treatments.  Daughter voiced understanding, reaffirmed family wishes for comfort measures only.  Discussed hospice philosophy and benefits of IPU, family in agreement for transfer to hospice IPU for enhanced symptom management at the EOL.

## 2023-09-06 NOTE — PROGRESS NOTE ADULT - PROBLEM SELECTOR PLAN 6
CT confirmed cholelithiasis  no intervention as pt is comfort care
in setting of progressive dementia, mostly wheelchair bound, tachy daniel syndrome (no PPM)  eligible for inpatient hospice given IV pain and anxiety requirement  prognosis likely days  Dr. Varela spoke with daughter/primary decision maker: Asia; daughter agrees to inpatient hospice.

## 2023-09-06 NOTE — PATIENT PROFILE ADULT - FALL HARM RISK - HARM RISK INTERVENTIONS
Assistance with ambulation/Assistance OOB with selected safe patient handling equipment/Communicate Risk of Fall with Harm to all staff/Discuss with provider need for PT consult/Monitor gait and stability/Reinforce activity limits and safety measures with patient and family/Tailored Fall Risk Interventions/Use of alarms - bed, chair and/or voice tab/Visual Cue: Yellow wristband and red socks/Bed in lowest position, wheels locked, appropriate side rails in place/Call bell, personal items and telephone in reach/Instruct patient to call for assistance before getting out of bed or chair/Non-slip footwear when patient is out of bed/[place] to call system/Physically safe environment - no spills, clutter or unnecessary equipment/Purposeful Proactive Rounding/Room/bathroom lighting operational, light cord in reach/Unable to comprehend

## 2023-09-06 NOTE — ADVANCED PRACTICE NURSE CONSULT - ASSESSMENT
This is a 88yr old male patient admitted for Sepsis, presenting with an intact DTI to the Coccyx (1cm x 0.5cm), R. Gluteus (2cm x 0.5cm), L. Heel (0.5cm x 2cm), and R. Heel (0.7cm x 0.7cm) without drainage

## 2023-09-06 NOTE — PROGRESS NOTE ADULT - ATTENDING COMMENTS
d/w staff plan of care
Patient examined and discussed with resident Dr. Chavez.  Overall agree with his assessment and findings as above.    88y old  Male who presents from [facility] with medical history of Dementia (AAOx1-2 at baseline), tachy- daniel syndrome (declined PPM), HTN, HLD, prostate CA presents a chief complaint of shortness of breath. As per the son bedside he received call from the rehab and was told pt was tachycardic, hypotensive and had changes in mental status. He was told pt is being sent to the ED, pt was seen yesterday and was noted to be lethargic. Patient received 1 dose of IV antibiotics and fluid bolus in ER.  ICU team was consulted for multiorgan failure. Per ICU team, family expressed wishes that they do no want anything aggressive to be pursued. Family acknowledged that his wishes are still the same to be DNR/DNI and now with continued health deterioration they family wants limited medical intervention, no ICU, no central lines, pressors, or escalation of care.      Patient found to be COVID +, blood cultures now positive for GNR.  Per discussion with Palliative Care team yesterday 9/5, family elected for no further active treatment of sepsis/KEV, and patient was placed on comfort measures only.  Started on PRN IV glycopyrrolate and IV Dilaudid overnight.      Patient examined at bedside this morning.  Remains lethargic and unresponsive, with continued resting tachypnea.  No other acute issues reported by bedside nursing staff.    Physical exam:    General:  Alert and oriented x zero, unresponsive, remains on NRB  PPS 10%  HEENT:  Anicteric, pharynx clear  Lungs:  respirations occ slightly labored, otherwise clear to ausculation, no overt congestion noted  Heart:  RSR normal S1 and S2  Abd:  soft, nontender, nondistended, normal bowel sounds  Ext:  without C/C/E    Additional family meeting held via telephone with daughter x 16 minutes as documented.  Patient with continued signs of respiratory failure.  Family understands poor prognosis, remains in agreement with comfort measures only, now in agreement with hospice IPU.    Remainder of medical decision making as documented by Dr. Bills as above  Will start IV Dilaudid 0.2 mg IV Q 6 hrs ATC, continue 0.2 mg Q 2 hrs PRN breakthrough pain/IWOB/labored breathing    Continue PRN glycopyrrolate/lorazepam  MEWS exempt  IPU level of care indicated for terminal respiratory failure in setting of advanced dementia, sepsis, KEV, and acute COVID likely to require ongoing titration of IV opioids and multiple parenteral meds    Discussed with medical team  Palliative care team will continue to follow.

## 2023-09-06 NOTE — H&P ADULT - NSHPPHYSICALEXAM_GEN_ALL_CORE
Vital Signs Last 24 Hrs  T(C): 37.6 (06 Sep 2023 13:45), Max: 38.6 (06 Sep 2023 00:47)  T(F): 99.7 (06 Sep 2023 13:45), Max: 101.4 (06 Sep 2023 00:47)  HR: 67 (06 Sep 2023 13:45) (67 - 100)  BP: 117/63 (06 Sep 2023 13:45) (117/63 - 131/58)  BP(mean): --  RR: 20 (06 Sep 2023 13:45) (20 - 31)  SpO2: 98% (06 Sep 2023 13:45) (93% - 98%)    General: alert  oriented x ____    lethargic distressed cachexia  verbal nonverbal  unarousable     Palliative Performance Scale/Karnofsky Score: 20%  http://npcrc.org/files/news/palliative_performance_scale_[email or web address].pdf    HEENT: no abnormal lesion, dry mouth   Lungs: tachypnea/labored breathing, audible excessive secretions  CV: RRR, S1S2, tachycardia  GI: soft non distended non tender  incontinent  : incontinent   mckeon  Musculoskeletal: weakness x4 edema x4   fully bedbound  Skin: no abnormal skin lesions, poor skin turgor, :   Neuro: severe cognitive impairment   Oral intake ability: unable/only mouth care Vital Signs Last 24 Hrs  T(C): 37.6 (06 Sep 2023 13:45), Max: 38.6 (06 Sep 2023 00:47)  T(F): 99.7 (06 Sep 2023 13:45), Max: 101.4 (06 Sep 2023 00:47)  HR: 67 (06 Sep 2023 13:45) (67 - 100)  BP: 117/63 (06 Sep 2023 13:45) (117/63 - 131/58)  BP(mean): --  RR: 20 (06 Sep 2023 13:45) (20 - 31)  SpO2: 98% (06 Sep 2023 13:45) (93% - 98%)        General: alert  oriented x 0   [lethargic distressed unarousable   Karnofsky Performance Score/Palliative Performance Status Version2:   20  %    HEENT: dry mouth    Lungs: tachypnea/labored breathing  excessive secretions  CV: RRR, S1S2, tachycardia  GI: soft non distended non tender  incontinent            : incontinent, mckeon (turbid, dark brown/yellow urine)  Musculoskeletal: weakness x4 fully bedbound  Skin: poor skin turgor  Neuro: severe cognitive impairment   Oral intake ability: unable/only mouth care   Code Status: DNR/DNI

## 2023-09-06 NOTE — PROGRESS NOTE ADULT - PROBLEM SELECTOR PLAN 4
likely 2/2 to Covid infection   p/w fever, HR >90, hypotension, lactate 7.1  no intervention as pt is comfort care

## 2023-09-06 NOTE — H&P ADULT - PROBLEM SELECTOR PLAN 4
Eligible for continued inpatient hospice care admission due to ongoing active symptom management with IV medications  patient is unstable for discharge home or nursing home, acute symptom management cannot be managed outside of inpatient hospice unit at this time  Bowel regimen with Dulcolax suppository PRN  for constipation  Robinul IVP PRN for Oral secretions management   Tylenol suppository PRN for fever  oral hygiene  Comfort care  Palliative/Hospice education and counseling to family/caregivers

## 2023-09-06 NOTE — PROGRESS NOTE ADULT - SUBJECTIVE AND OBJECTIVE BOX
[   ] ICU                                          [   ] CCU                                      [ X  ] Medical Floor      Patient is a 88 year old male with diverticulitis. GI consulted to evaluate.           Patient is a 88 year old male  from [facility], with past medical history significant for Dementia (AAOx1-2 at baseline), tachy- daniel syndrome (declined PPM), HTN, HLD, prostate CA presented to the emergency room with SOB,    tachycardia, hypotension and changes in mental status. Patient unable to provide history. On CT-Scan patient found to have diverticulitis. No abdominal pain, nausea, vomiting, hematemesis, hematochezia, melena, fever, chills, chest pain, Hematuria, dysuria or diarrhea reported.    Patient is comfortable. No new complaints reported, No abdominal pain, N/V, hematemesis, hematochezia, melena, fever, chills, chest pain, SOB, cough or diarrhea reported.        PAIN MANAGEMENT:  Pain Scale:                 /10  Pain Location:         PAST MEDICAL HISTORY    HTN (hypertension)    Hyperlipidemia    Prostate cancer    Mitral valve regurgitation    Dementia     Tachy- daniel syndrome              PAST SURGICAL HISTORY    Hip replacement        Allergies    No Known Allergies    Intolerances  None         MEDICATIONS  (PRN):  glycopyrrolate Injectable 0.4 milliGRAM(s) IV Push four times a day PRN secretions  HYDROmorphone  Injectable 0.2 milliGRAM(s) IV Push every 2 hours PRN Moderate pain (4-6), Severe pain (7-10), Respiratory rate greater than 22  LORazepam   Injectable 0.5 milliGRAM(s) IV Push every 4 hours PRN Anxiety  ondansetron Injectable 4 milliGRAM(s) IV Push every 6 hours PRN Nausea and/or Vomiting      SOCIAL HISTORY  Advanced Directives:       [  ] Full Code       [ X ] DNR  Marital Status:         [  ] M      [ X ] S      [  ] D       [  ] W  Children:       [ X ] Yes      [  ] No  Occupation:        [  ] Employed       [ X ] Unemployed       [  ] Retired  Diet:       [ X ] Regular       [  ] PEG feeding          [  ] NG tube feeding  Drug Use:           [  ] Patient denied          [  ] Yes  Alcohol:           [ X ] No             [  ] Yes (socially)         [  ] Yes (chronic)  Tobacco:           [  ] Yes           [ X ] No      FAMILY HISTORY  [ X ] Heart Disease            [ X ] Diabetes             [ X ] HTN             [  ] Colon Cancer             [  ] Stomach Cancer              [  ] Pancreatic Cancer      VITALS  Vital Signs Last 24 Hrs  T(C): 37.6 (06 Sep 2023 05:31), Max: 38.6 (06 Sep 2023 00:47)  T(F): 99.7 (06 Sep 2023 05:31), Max: 101.4 (06 Sep 2023 00:47)  HR: 86 (06 Sep 2023 05:31) (86 - 122)  BP: 121/64 (06 Sep 2023 05:31) (106/80 - 132/69)  BP(mean): 91 (05 Sep 2023 13:50) (79 - 91)  RR: 24 (06 Sep 2023 05:31) (24 - 34)  SpO2: 98% (06 Sep 2023 05:31) (93% - 98%)  Parameters below as of 06 Sep 2023 05:31  Patient On (Oxygen Delivery Method): mask, nonrebreather  O2 Flow (L/min): 10         MEDICATIONS  (PRN):  glycopyrrolate Injectable 0.4 milliGRAM(s) IV Push four times a day PRN secretions  HYDROmorphone  Injectable 0.2 milliGRAM(s) IV Push every 2 hours PRN Moderate pain (4-6), Severe pain (7-10), Respiratory rate greater than 22  LORazepam   Injectable 0.5 milliGRAM(s) IV Push every 4 hours PRN Anxiety  ondansetron Injectable 4 milliGRAM(s) IV Push every 6 hours PRN Nausea and/or Vomiting                            14.5   34.86 )-----------( 196      ( 05 Sep 2023 11:10 )             43.7       09-05    139  |  103  |  49<H>  ----------------------------<  186<H>  4.0   |  18<L>  |  4.14<H>    Ca    9.4      05 Sep 2023 11:10    TPro  7.5  /  Alb  2.4<L>  /  TBili  0.6  /  DBili  x   /  AST  61<H>  /  ALT  21  /  AlkPhos  102  09-05      PT/INR - ( 05 Sep 2023 11:10 )   PT: 13.0 sec;   INR: 1.14 ratio         PTT - ( 05 Sep 2023 11:10 )  PTT:35.1 sec

## 2023-09-06 NOTE — DISCHARGE NOTE PROVIDER - CARE PROVIDER_API CALL
Ryan Andres  Internal Medicine  [id number] [place] [id number]  Phone: [phone]  Fax: [phone]  Follow Up Time: Routine

## 2023-09-06 NOTE — DISCHARGE NOTE PROVIDER - HOSPITAL COURSE
Patient is a 88y old  Male who presents from [facility] with medical history of Dementia (AAOx1-2 at baseline), tachy- daniel syndrome (declined PPM), HTN, HLD, prostate CA presents a chief complaint of shortness of breath. Pt is admitted to medicine floor for comfort measures.       Problem/Plan - 1:  ·  Problem: Acute metabolic encephalopathy.   ·  Plan: presented AMS  likely 2/2 Covid infection  UA negative  no intervention as pt is comfort care.    Problem/Plan - 2:  ·  Problem: Elevated troponin.   ·  Plan: p/w chest pain  no intervention as pt is comfort care.    Problem/Plan - 3:  ·  Problem: Sepsis with acute hypoxic respiratory failure.   ·  Plan: likely 2/2 to covid infection   Desaturating on room air  pt on NRB 10L  no intervention as pt is comfort care.    Problem/Plan - 4:  ·  Problem: Septic shock.   ·  Plan: likely 2/2 to Covid infection   p/w fever, HR >90, hypotension, lactate 7.1  no intervention as pt is comfort care.    Problem/Plan - 5:  ·  Problem: Proctitis.   ·  Plan: p/w abdominal pain, diarrhea  CT ap shows: Proctitis  no intervention as pt is comfort car

## 2023-09-06 NOTE — DISCHARGE NOTE NURSING/CASE MANAGEMENT/SOCIAL WORK - PATIENT PORTAL LINK FT
You can access the FollowMyHealth Patient Portal offered by [facility] by registering at the following website: [email or web address]. By joining [facility]’s FollowMyHealth portal, you will also be able to view your health information using other applications (apps) compatible with our system.

## 2023-09-06 NOTE — PROGRESS NOTE ADULT - PROBLEM SELECTOR PLAN 4
in setting of progressive dementia, mostly wheelchair bound, tachy daniel syndrome (no PPM)  eligible for inpatient hospice given IV pain and anxiety requirement  prognosis likely days  Dr. Varela spoke with daughter/primary decision maker: [place]; daughter agrees to inpatient hospice. suspected UTI, bacteremia as well  no further abx, bloodwork, IVFs  keep Kahn in place  supportive care only  prognosis likely days.

## 2023-09-06 NOTE — ADVANCED PRACTICE NURSE CONSULT - RECOMMEDATIONS
-Clean all wounds with normal saline and apply skin prep to the surrounding skin  -Apply Adaptic gauze to the Coccyx and R. Gluteal wounds and cover with a Foam dressing Q 72hrs PRN  -Leave the Bilateral Heel wounds open to air  -Elevate/float the patients heels using heel protectors and reposition the patient Q 2hrs using wedges or pillows

## 2023-09-06 NOTE — PROGRESS NOTE ADULT - ASSESSMENT
Patient is a 88y old  [facility] of Dementia (AAOx1-2 at baseline), tachy- daniel syndrome (declined PPM), HTN, HLD, prostate CA presents a chief complaint of shortness of breath. Pt is admitted to medicine floor for comfort measures. 
1. Diverticulitis  2. Proctitis  3. Cholelithiasis  4. COVID positive    Suggestions:    1. Antibiotics IV  2. IVF hydration  3. NPO  4. Monitor electrolytes  5. Avoid NSAID  6. Protonix daily  7. DVT prophylaxis
Patient is a 88y old  Male who presents from [facility] with medical history of Dementia (AAOx1-2 at baseline), tachy- daniel syndrome (declined PPM), HTN, HLD, prostate CA presents with AHRF secondary to COVID-19 PNA, Sepsis secondary to GN bacteremia; palliative care was consulted for inpatient hospice referral; eligible for inpatient hospice due to patient requiring IV pain and anxiety medications; with prognosis likely being days.

## 2023-09-06 NOTE — H&P ADULT - PROBLEM SELECTOR PLAN 2
due to sepsis/covid-10, encephalopathy, not arousable  NPO, no IVFs  oral care  robinul PRN  scopolamine patch  keep head of bed >45.

## 2023-09-06 NOTE — DISCHARGE NOTE PROVIDER - NSDCMRMEDTOKEN_GEN_ALL_CORE_FT
glycopyrrolate 0.2 mg/mL injectable solution: 0.4 milligram(s) injectable 4 times a day as needed for secretion  HYDROmorphone 0.2 mg/mL-NaCl 0.9% intravenous solution: 1 milliliter(s) intravenous every 2 hours As needed Moderate pain (4-6), Severe pain (7-10), Respiratory rate greater than 22  HYDROmorphone 0.2 mg/mL-NaCl 0.9% intravenous solution: 1 milliliter(s) intravenous every 6 hours

## 2023-09-06 NOTE — PROGRESS NOTE ADULT - PROBLEM SELECTOR PLAN 2
due to sepsis/covid-10, encephalopathy, not arousalable   NPO, no IVF  oral care  Robinul PRN  keep head of bed >45. due to sepsis/covid-10, encephalopathy, not arousalable   NPO, no IVF  oral care  required 0.4mg Robinul PRN  keep head of bed >45 Patient alert and oriented x 1-2 at baseline, incontinent, total care in ADLs.  Family continues to report progressive decline.  Now complicated  by acute respiratory failure 2/2 COVID with severe KEV and worsening hydronephrosis.  PPS score 10%.    Patient equivalent to FAST stage 7A.  Family is clear for comfort measures only with no further abx, IV fluids, or escalation of care.  Patient is hospice appropriate and IPU appropriate with likely prognosis of days to < 1 week.

## 2023-09-06 NOTE — H&P ADULT - HISTORY OF PRESENT ILLNESS
Patient is a 88y old  Male who [facility] with medical history of Dementia (AAOx1-2 at baseline), tachy- daniel syndrome (declined PPM), HTN, HLD, prostate CA presents a chief complaint of shortness of breath. As per the son bedside he recieved call from the rehab and was told pt was tachycardic, hypotensive and had changes in mental status. He was told pt is being sent to the ED, pt was seen yesterday and was noted to be lethargic. ICU team was consulted for multiorgan failure. Per ICU team, family expressed wishes that they do no want anything aggressive to be pursued. Family acknowledged that his wishes are still the same to be DNR/DNI and now with continued health deterioration they family wants limited medical intervention. They do no want any central line or pressor support but they are agreeable to fluids and antibiotics. Main focus for them is his comfort and understand he has a very poor prognosis. end of life symptoms are respiratory distress and secretions

## 2023-09-06 NOTE — PROGRESS NOTE ADULT - PROBLEM SELECTOR PLAN 8
Pt is from [facility]  Pt is DNR/DNI, comfort measures  Family is interested in IPU  Palliative on board Pt is from [facility]  Pt is DNR/DNI, comfort measures  Pt accepted IPU, awaiting bed  Palliative on board

## 2023-09-06 NOTE — PROGRESS NOTE ADULT - PROBLEM SELECTOR PLAN 3
suspected UTI, bacteremia as well  no further abx, bloodwork, IVFs  keep Kahn in place  supportive care only  prognosis likely days. due to sepsis/covid-19, encephalopathy, not arouseable   NPO, no IVF  oral care  required 0.4mg Robinul PRN x 1 dose, continue  keep head of bed >45

## 2023-09-07 NOTE — PROGRESS NOTE ADULT - SUBJECTIVE AND OBJECTIVE BOX
TERE SHANTEL                    88y  Male    Allergies    No Known Allergies    Intolerances        [facility]:  Kaila Hoff DO: cell ([phone])  Simon Varela MD: cell ([phone])  Can contact via Microsoft Teams for any patient issues or concerns     Symptoms:  Pain (1-10):  Dyspnea:  Nausea/Vomiting:  Secretions:   Agitation:  Symptom Requiring Inpatient Hospice Admission:    Overnight events/interim history:    HPI:  Patient is a 88y old  Male who presents from [facility] rehab with medical history of Dementia (AAOx1-2 at baseline), tachy- daniel syndrome (declined PPM), HTN, HLD, prostate CA presents a chief complaint of shortness of breath. As per the son bedside he recieved call from the rehab and was told pt was tachycardic, hypotensive and had changes in mental status. He was told pt is being sent to the ED, pt was seen yesterday and was noted to be lethargic. ICU team was consulted for multiorgan failure. Per ICU team, family expressed wishes that they do no want anything aggressive to be pursued. Family acknowledged that his wishes are still the same to be DNR/DNI and now with continued health deterioration they family wants limited medical intervention. They do no want any central line or pressor support but they are agreeable to fluids and antibiotics. Main focus for them is his comfort and understand he has a very poor prognosis. end of life symptoms are respiratory distress and secretions (06 Sep 2023 19:46)            MEDICATIONS  (STANDING):  HYDROmorphone  Injectable 0.5 milliGRAM(s) IV Push every 8 hours  scopolamine 1 mG/72 Hr(s) Patch 1 Patch Transdermal every 72 hours    MEDICATIONS  (PRN):  acetaminophen  Suppository .. 650 milliGRAM(s) Rectal every 6 hours PRN Temp greater or equal to 38C (100.4F), Mild Pain (1 - 3)  bisacodyl Suppository 10 milliGRAM(s) Rectal daily PRN Constipation  glycopyrrolate Injectable 0.4 milliGRAM(s) IV Push every 4 hours PRN Secretions  HYDROmorphone  Injectable 0.5 milliGRAM(s) IV Push every 2 hours PRN Moderate pain (4-6), Severe pain (7-10), Respiratory rate greater than 22  LORazepam   Injectable 1 milliGRAM(s) IV Push every 1 hour PRN Agitation      CBC Full  -  ( 05 Sep 2023 11:10 )  WBC Count : 34.86 K/uL  Hemoglobin : 14.5 g/dL  Hematocrit : 43.7 %  Platelet Count - Automated : 196 K/uL  Mean Cell Volume : 83.9 fl  Mean Cell Hemoglobin : 27.8 pg  Mean Cell Hemoglobin Concentration : 33.2 gm/dL  Auto Neutrophil # : 31.03 K/uL  Auto Lymphocyte # : 1.05 K/uL  Auto Monocyte # : 2.09 K/uL  Auto Eosinophil # : 0.00 K/uL  Auto Basophil # : 0.00 K/uL  Auto Neutrophil % : 84.0 %  Auto Lymphocyte % : 3.0 %  Auto Monocyte % : 6.0 %  Auto Eosinophil % : 0.0 %  Auto Basophil % : 0.0 %                     LIVER FUNCTIONS - ( 05 Sep 2023 11:10 )  Alb: 2.4 g/dL / Pro: 7.5 g/dL / ALK PHOS: 102 U/L / ALT: 21 U/L DA / AST: 61 U/L / GGT: x             Vital Signs Last 24 Hrs  T(C): 37.6 (07 Sep 2023 06:48), Max: 37.6 (06 Sep 2023 13:45)  T(F): 99.7 (07 Sep 2023 06:48), Max: 99.7 (06 Sep 2023 13:45)  HR: 81 (07 Sep 2023 06:48) (67 - 81)  BP: 137/73 (07 Sep 2023 06:48) (117/63 - 137/73)  BP(mean): --  RR: 18 (07 Sep 2023 06:48) (18 - 20)  SpO2: 90% (07 Sep 2023 06:48) (90% - 98%)    Parameters below as of 07 Sep 2023 06:48  Patient On (Oxygen Delivery Method): nasal cannula  O2 Flow (L/min): 2      General: alert  oriented x ____    [lethargic distressed cachexia  nonverbal  unarousable verbal]  Karnofsky Performance Score/Palliative Performance Status Version2:     %    HEENT: dry mouth    Lungs: tachypnea/labored breathing  excessive secretions  CV: RRR, S1S2, tachycardia  GI: soft non distended non tender  incontinent               PEG/NG/OG tube  constipation  last BM:   : incontinent  oliguria/anuria  mckeon  Musculoskeletal: weakness  edema   fully bedbound  Skin: poor skin turgor, pressure ulcer stage:   Neuro: severe cognitive impairment dsyphagia/dysarthria paresis  Oral intake ability: unable/only mouth care   Code Status: DNR/DNI TERE SHANTEL                    88y  Male    Allergies    No Known Allergies    Intolerances        [facility]:  Kaila Hoff DO: cell ([phone])  Simon Varela MD: cell ([phone])  Can contact via Microsoft Teams for any patient issues or concerns     Symptoms:  Symptom Requiring Inpatient Hospice Admission: respiratory distress, secretions    Overnight events/interim history: barely arousable overnight and this morning, able to open eyes and focus on family and myself, occ agitation and discomfort    HPI:  Patient is a 88y old  Male who [facility] from [facility] with medical history of Dementia (AAOx1-2 at baseline), tachy- daniel syndrome (declined PPM), HTN, HLD, prostate CA presents a chief complaint of shortness of breath. As per the son bedside he recieved call from the rehab and was told pt was tachycardic, hypotensive and had changes in mental status. He was told pt is being sent to the ED, pt was seen yesterday and was noted to be lethargic. ICU team was consulted for multiorgan failure. Per ICU team, family expressed wishes that they do no want anything aggressive to be pursued. Family acknowledged that his wishes are still the same to be DNR/DNI and now with continued health deterioration they family wants limited medical intervention. They do no want any central line or pressor support but they are agreeable to fluids and antibiotics. Main focus for them is his comfort and understand he has a very poor prognosis. end of life symptoms are respiratory distress and secretions (06 Sep 2023 19:46)            MEDICATIONS  (STANDING):  HYDROmorphone  Injectable 0.5 milliGRAM(s) IV Push every 8 hours  scopolamine 1 mG/72 Hr(s) Patch 1 Patch Transdermal every 72 hours    MEDICATIONS  (PRN):  acetaminophen  Suppository .. 650 milliGRAM(s) Rectal every 6 hours PRN Temp greater or equal to 38C (100.4F), Mild Pain (1 - 3)  bisacodyl Suppository 10 milliGRAM(s) Rectal daily PRN Constipation  glycopyrrolate Injectable 0.4 milliGRAM(s) IV Push every 4 hours PRN Secretions  HYDROmorphone  Injectable 0.5 milliGRAM(s) IV Push every 2 hours PRN Moderate pain (4-6), Severe pain (7-10), Respiratory rate greater than 22  LORazepam   Injectable 1 milliGRAM(s) IV Push every 1 hour PRN Agitation      CBC Full  -  ( 05 Sep 2023 11:10 )  WBC Count : 34.86 K/uL  Hemoglobin : 14.5 g/dL  Hematocrit : 43.7 %  Platelet Count - Automated : 196 K/uL  Mean Cell Volume : 83.9 fl  Mean Cell Hemoglobin : 27.8 pg  Mean Cell Hemoglobin Concentration : 33.2 gm/dL  Auto Neutrophil # : 31.03 K/uL  Auto Lymphocyte # : 1.05 K/uL  Auto Monocyte # : 2.09 K/uL  Auto Eosinophil # : 0.00 K/uL  Auto Basophil # : 0.00 K/uL  Auto Neutrophil % : 84.0 %  Auto Lymphocyte % : 3.0 %  Auto Monocyte % : 6.0 %  Auto Eosinophil % : 0.0 %  Auto Basophil % : 0.0 %                     LIVER FUNCTIONS - ( 05 Sep 2023 11:10 )  Alb: 2.4 g/dL / Pro: 7.5 g/dL / ALK PHOS: 102 U/L / ALT: 21 U/L DA / AST: 61 U/L / GGT: x             Vital Signs Last 24 Hrs  T(C): 37.6 (07 Sep 2023 06:48), Max: 37.6 (06 Sep 2023 13:45)  T(F): 99.7 (07 Sep 2023 06:48), Max: 99.7 (06 Sep 2023 13:45)  HR: 81 (07 Sep 2023 06:48) (67 - 81)  BP: 137/73 (07 Sep 2023 06:48) (117/63 - 137/73)  BP(mean): --  RR: 18 (07 Sep 2023 06:48) (18 - 20)  SpO2: 90% (07 Sep 2023 06:48) (90% - 98%)    Parameters below as of 07 Sep 2023 06:48  Patient On (Oxygen Delivery Method): nasal cannula  O2 Flow (L/min): 2      General: alert  oriented x 0   [lethargic distressed unarousable   Karnofsky Performance Score/Palliative Performance Status Version2:   20  %    HEENT: dry mouth    Lungs: tachypnea/labored breathing  excessive secretions  CV: RRR, S1S2, tachycardia  GI: soft non distended non tender  incontinent            : incontinent, mckeon (turbid, dark brown/yellow urine)  Musculoskeletal: weakness x4 fully bedbound  Skin: poor skin turgor  Neuro: severe cognitive impairment   Oral intake ability: unable/only mouth care   Code Status: DNR/DNI

## 2023-09-07 NOTE — PATIENT PROFILE ADULT - VISION (WITH CORRECTIVE LENSES IF THE PATIENT USUALLY WEARS THEM):
Pt AMS; unable to obtain./Normal vision: sees adequately in most situations; can see medication labels, newsprint

## 2023-09-07 NOTE — PATIENT PROFILE ADULT - FALL HARM RISK - HARM RISK INTERVENTIONS
Communicate Risk of Fall with Harm to all staff/Reinforce activity limits and safety measures with patient and family/Use of alarms - bed, chair and/or voice tab/Visual Cue: Yellow wristband and red socks/Bed in lowest position, wheels locked, appropriate side rails in place/Call bell, personal items and telephone in reach/Instruct patient to call for assistance before getting out of bed or chair/Non-slip footwear when patient is out of bed/[place] to call system/Physically safe environment - no spills, clutter or unnecessary equipment/Purposeful Proactive Rounding/Room/bathroom lighting operational, light cord in reach/Unable to comprehend

## 2023-09-08 NOTE — PROGRESS NOTE ADULT - SUBJECTIVE AND OBJECTIVE BOX
follow up on:  complex medical decision making related to goals of care    [facility] Geriatric and Palliative Consult Service:  Kaila Hoff DO: cell ([phone])  Simon Varela MD: cell ([phone])  Krishan Covarrubias NP: cell ([phone])   Ramon Samuel LMSW: cell ([phone])   Keesha Roblero NP: via [employer] Teams    Can contact any Palliative Team member via [employer] Teams for consults and questions    OVERNIGHT EVENTS:    Present Symptoms: Mild, Moderate, Severe  Pain:             Location -                               Aggravating factors -             Quality -             Radiation -             Timing-             Severity (0-10 scale):             Minimal acceptable level (0-10 scale):  Fatigue:  Nausea:  Lack of Appetite:   SOB:  Depression:  Anxiety:  Review of Systems: [All others negative or Unable to obtain due to poor mentation]    CPOT:    https://[email or web address]/getattachment/[email or web address]/Critical-Care-Pain-Observation-Tool-(CPOT)  PAIN AD Score:   http://geriatrictoolkit.[email or web address]/painad.pdf (press ctrl +  left click to view)MEDICATIONS  (STANDING):  HYDROmorphone  Injectable 0.5 milliGRAM(s) IV Push every 8 hours  scopolamine 1 mG/72 Hr(s) Patch 1 Patch Transdermal every 72 hours    MEDICATIONS  (PRN):  acetaminophen  Suppository .. 650 milliGRAM(s) Rectal every 6 hours PRN Temp greater or equal to 38C (100.4F), Mild Pain (1 - 3)  bisacodyl Suppository 10 milliGRAM(s) Rectal daily PRN Constipation  glycopyrrolate Injectable 0.4 milliGRAM(s) IV Push every 4 hours PRN Secretions  HYDROmorphone  Injectable 0.5 milliGRAM(s) IV Push every 2 hours PRN Moderate pain (4-6), Severe pain (7-10), Respiratory rate greater than 22  LORazepam   Injectable 1 milliGRAM(s) IV Push every 1 hour PRN Agitation      PHYSICAL EXAM:  Vital Signs Last 24 Hrs  T(C): 37.7 (08 Sep 2023 12:41), Max: 37.7 (07 Sep 2023 22:53)  T(F): 99.9 (08 Sep 2023 12:41), Max: 99.9 (08 Sep 2023 12:41)  HR: 74 (08 Sep 2023 12:41) (70 - 75)  BP: 149/82 (08 Sep 2023 12:41) (137/81 - 149/82)  BP(mean): --  RR: 17 (08 Sep 2023 12:41) (17 - 18)  SpO2: 95% (08 Sep 2023 12:41) (88% - 98%)    Parameters below as of 08 Sep 2023 12:41  Patient On (Oxygen Delivery Method): nasal cannula  O2 Flow (L/min): 2      General: alert  oriented x ____    lethargic distressed cachexia  verbal nonverbal  unarousable     Palliative Performance Scale/Karnofsky Score:  http://npcrc.org/files/news/palliative_performance_scale_ppsv2.pdf    HEENT: no abnormal lesion, dry mouth  ET tube/trach oral lesions:  Lungs: tachypnea/labored breathing, audible excessive secretions  CV: RRR, S1S2, tachycardia  GI: soft non distended non tender  incontinent               PEG/NG/OG tube  constipation  last BM:   : incontinent  oliguria/anuria  mckeon  Musculoskeletal: weakness x4 edema x4    ambulatory with assistance   mostly/fully bedbound/wheelchair bound  Skin: no abnormal skin lesions, poor skin turgor, pressure ulcers stage:   Neuro: no deficits, mild cognitive impairment dsyphagia/dysarthria paresis  Oral intake ability: unable/only mouth care, minimal moderate full capability    LABS:                RADIOLOGY & ADDITIONAL STUDIES: TERE SHANTEL                    88y  Male    Allergies    No Known Allergies    Intolerances        [facility] Service:  Kaila Hoff DO: cell ([phone])  Simon Varela MD: cell ([phone])  Can contact via [employer] Teams for any patient issues or concerns     Symptoms:   Requiring Inpatient Hospice Admission: respiratory distress, and congestion    Overnight events/interim history: increase congestion and PRNs    HPI:  Patient is a 88y old  Male who presents from [facility] rehab with medical history of Dementia (AAOx1-2 at baseline), tachy- daniel syndrome (declined PPM), HTN, HLD, prostate CA presents a chief complaint of shortness of breath. As per the son bedside he recieved call from the rehab and was told pt was tachycardic, hypotensive and had changes in mental status. He was told pt is being sent to the ED, pt was seen yesterday and was noted to be lethargic. ICU team was consulted for multiorgan failure. Per ICU team, family expressed wishes that they do no want anything aggressive to be pursued. Family acknowledged that his wishes are still the same to be DNR/DNI and now with continued health deterioration they family wants limited medical intervention. They do no want any central line or pressor support but they are agreeable to fluids and antibiotics. Main focus for them is his comfort and understand he has a very poor prognosis. end of life symptoms are respiratory distress and secretions (06 Sep 2023 19:46)            MEDICATIONS  (STANDING):  glycopyrrolate Injectable 0.4 milliGRAM(s) IV Push every 4 hours  HYDROmorphone Infusion 0.2 mG/Hr (0.2 mL/Hr) IV Continuous <Continuous>  scopolamine 1 mG/72 Hr(s) Patch 1 Patch Transdermal every 72 hours    MEDICATIONS  (PRN):  acetaminophen  Suppository .. 650 milliGRAM(s) Rectal every 6 hours PRN Temp greater or equal to 38C (100.4F), Mild Pain (1 - 3)  bisacodyl Suppository 10 milliGRAM(s) Rectal daily PRN Constipation  HYDROmorphone  Injectable 1 milliGRAM(s) IV Push every 1 hour PRN Severe Pain (7 - 10)  LORazepam   Injectable 1 milliGRAM(s) IV Push every 1 hour PRN Agitation                             Vital Signs Last 24 Hrs  T(C): 37.7 (08 Sep 2023 12:41), Max: 37.7 (07 Sep 2023 22:53)  T(F): 99.9 (08 Sep 2023 12:41), Max: 99.9 (08 Sep 2023 12:41)  HR: 74 (08 Sep 2023 12:41) (70 - 75)  BP: 149/82 (08 Sep 2023 12:41) (137/81 - 149/82)  BP(mean): --  RR: 17 (08 Sep 2023 12:41) (17 - 18)  SpO2: 95% (08 Sep 2023 12:41) (88% - 98%)    Parameters below as of 08 Sep 2023 12:41  Patient On (Oxygen Delivery Method): nasal cannula  O2 Flow (L/min): 2    General: alert  oriented x 0   [lethargic distressed unarousable   Karnofsky Performance Score/Palliative Performance Status Version2:   20  %    HEENT: dry mouth    Lungs: tachypnea/labored breathing  excessive secretions  CV: RRR, S1S2, tachycardia  GI: soft non distended non tender  incontinent            : incontinent, mckeon (turbid, dark brown/yellow urine)  Musculoskeletal: weakness x4 fully bedbound  Skin: poor skin turgor  Neuro: severe cognitive impairment   Oral intake ability: unable/only mouth care   Code Status: DNR/DNI

## 2023-09-08 NOTE — PROGRESS NOTE ADULT - PROBLEM SELECTOR PLAN 4
Eligible for continued inpatient hospice care admission due to ongoing active symptom management with IV medications  patient is unstable for discharge home or nursing home, acute symptom management cannot be managed outside of inpatient hospice unit at this time  Bowel regimen with Dulcolax suppository PRN  for constipation  Robinul IVP PRN for Oral secretions management   Tylenol suppository PRN for fever  oral hygiene  Comfort care  Palliative/Hospice education and counseling to family/caregivers Eligible for continued inpatient hospice care admission due to ongoing active symptom management with IV medications  patient is unstable for discharge home or nursing home, acute symptom management cannot be managed outside of inpatient hospice unit at this time  Bowel regimen with Dulcolax suppository PRN  for constipation  Robinul IVP PRN for Oral secretions management   Tylenol suppository PRN for fever  oral hygiene  Comfort care  Palliative/Hospice education and counseling to family/caregivers - spoke with [place] on the phone

## 2023-09-08 NOTE — PROGRESS NOTE ADULT - PROBLEM SELECTOR PLAN 2
due to sepsis/covid-10, encephalopathy, not arousable  NPO, no IVFs  oral care  robinul PRN  scopolamine patch  keep head of bed >45. due to sepsis/covid-10, encephalopathy, not arousable  NPO, no IVFs  oral care  change to robinul ATC  scopolamine patch  keep head of bed >45.

## 2023-09-08 NOTE — PROGRESS NOTE ADULT - PROBLEM SELECTOR PLAN 1
COVID-19 with sepsis - on NRB  dilaudid 0.2mg IVP PRN and q 8hr ATC  can c/w NRB for now, will wean to comfort     low threshold for gtt COVID-19 with sepsis - on NC  change to 1mg IVP PRN, start dilaudid 0.2mg gtt

## 2023-09-10 NOTE — PROGRESS NOTE ADULT - SUBJECTIVE AND OBJECTIVE BOX
TERE SHANTEL                    88y  Male    Allergies    No Known Allergies    Intolerances        [facility] Service:  Kaila Hoff DO: cell ([phone])  Simon Varela MD: cell ([phone])  Can contact via [employer] Teams for any patient issues or concerns     Symptoms:  Pain (1-10):  Dyspnea:  Nausea/Vomiting:  Secretions:   Agitation:  Symptom Requiring Inpatient Hospice Admission:    Overnight events/interim history:    HPI:  Patient is a 88y old  Male who presents from [facility] rehab with medical history of Dementia (AAOx1-2 at baseline), tachy- daniel syndrome (declined PPM), HTN, HLD, prostate CA presents a chief complaint of shortness of breath. As per the son bedside he recieved call from the rehab and was told pt was tachycardic, hypotensive and had changes in mental status. He was told pt is being sent to the ED, pt was seen yesterday and was noted to be lethargic. ICU team was consulted for multiorgan failure. Per ICU team, family expressed wishes that they do no want anything aggressive to be pursued. Family acknowledged that his wishes are still the same to be DNR/DNI and now with continued health deterioration they family wants limited medical intervention. They do no want any central line or pressor support but they are agreeable to fluids and antibiotics. Main focus for them is his comfort and understand he has a very poor prognosis. end of life symptoms are respiratory distress and secretions (06 Sep 2023 19:46)            MEDICATIONS  (STANDING):  glycopyrrolate Injectable 0.4 milliGRAM(s) IV Push every 4 hours  HYDROmorphone Infusion 0.2 mG/Hr (0.2 mL/Hr) IV Continuous <Continuous>  scopolamine 1 mG/72 Hr(s) Patch 1 Patch Transdermal every 72 hours    MEDICATIONS  (PRN):  acetaminophen  Suppository .. 650 milliGRAM(s) Rectal every 6 hours PRN Temp greater or equal to 38C (100.4F), Mild Pain (1 - 3)  bisacodyl Suppository 10 milliGRAM(s) Rectal daily PRN Constipation  HYDROmorphone  Injectable 1 milliGRAM(s) IV Push every 1 hour PRN Severe Pain (7 - 10)  LORazepam   Injectable 1 milliGRAM(s) IV Push every 1 hour PRN Agitation                             Vital Signs Last 24 Hrs  T(C): 39.4 (10 Sep 2023 10:44), Max: 39.4 (10 Sep 2023 10:44)  T(F): 103 (10 Sep 2023 10:44), Max: 103 (10 Sep 2023 10:44)  HR: 80 (10 Sep 2023 05:26) (77 - 80)  BP: 158/89 (10 Sep 2023 05:26) (146/90 - 158/89)  BP(mean): --  RR: 18 (10 Sep 2023 05:26) (16 - 18)  SpO2: 90% (10 Sep 2023 05:26) (90% - 90%)    Parameters below as of 10 Sep 2023 05:26  Patient On (Oxygen Delivery Method): nasal cannula  O2 Flow (L/min): 2      General: alert  oriented x ____    [lethargic distressed cachexia  nonverbal  unarousable verbal]  Karnofsky Performance Score/Palliative Performance Status Version2:     %    HEENT: dry mouth    Lungs: tachypnea/labored breathing  excessive secretions  CV: RRR, S1S2, tachycardia  GI: soft non distended non tender  incontinent               PEG/NG/OG tube  constipation  last BM:   : incontinent  oliguria/anuria  mckeon  Musculoskeletal: weakness  edema   fully bedbound  Skin: poor skin turgor, pressure ulcer stage:   Neuro: severe cognitive impairment dsyphagia/dysarthria paresis  Oral intake ability: unable/only mouth care   Code Status: DNR/DNI

## 2023-09-10 NOTE — PROGRESS NOTE ADULT - PROBLEM SELECTOR PLAN 4
Eligible for continued inpatient hospice care admission due to ongoing active symptom management with IV medications  patient is unstable for discharge home or nursing home, acute symptom management cannot be managed outside of inpatient hospice unit at this time  Bowel regimen with Dulcolax suppository PRN  for constipation  Robinul IVP PRN for Oral secretions management   Tylenol suppository PRN for fever  oral hygiene  Comfort care  Palliative/Hospice education and counseling to family/caregivers - spoke with [place] on the phone

## 2023-09-10 NOTE — PROGRESS NOTE ADULT - PROBLEM SELECTOR PLAN 2
due to sepsis/covid-10, encephalopathy, not arousable  NPO, no IVFs  oral care  change to robinul ATC  scopolamine patch  keep head of bed >45.

## 2023-09-11 NOTE — DISCHARGE NOTE FOR THE EXPIRED PATIENT - HOSPITAL COURSE
88 year old  Male who presents from [facility] with medical history of Dementia (AAOx1-2 at baseline), tachy- daniel syndrome (declined PPM), HTN, HLD, prostate CA presents a chief complaint of shortness of breath. As per the son bedside he received call from the rehab and was told pt was tachycardic, hypotensive and had changes in mental status. He was told pt is being sent to the ED, pt was seen yesterday and was noted to be lethargic. ICU team was consulted for multiorgan failure. Per ICU team, family expressed wishes that they do no want anything aggressive to be pursued. Family acknowledged that his wishes are still the same to be DNR/DNI and now with continued health deterioration they family wants limited medical intervention. They do no want any central line or pressor support but they are agreeable to fluids and antibiotics.    : Called to see patient for unresponsiveness. On physical exam, the patient did not respond to verbal or physical stimuli. Absent heart and breath sounds. Absent peripheral pulses. Pupils fixed and dilated. Patient was pronounced  at 6:33 AM. Attending Dr. Hoff informed. Next of kin/family notified: Duyen Tenorio (daughter) Organ donation confirmation [phone]. Nursing supervisor aware. Hospice care network called.

## 2023-09-28 NOTE — DIETITIAN INITIAL EVALUATION ADULT. - NS AS NUTRI INTERV MEALS SNACK
General/healthful diet/Recommend change diet to DASH/TLC therapeutic diet to promote heart health and controlled BP. Bilateral Rotation Flap Text: The defect edges were debeveled with a #15 scalpel blade. Given the location of the defect, shape of the defect and the proximity to free margins a bilateral rotation flap was deemed most appropriate. Using a sterile surgical marker, an appropriate rotation flap was drawn incorporating the defect and placing the expected incisions within the relaxed skin tension lines where possible. The area thus outlined was incised deep to adipose tissue with a #15 scalpel blade. The skin margins were undermined to an appropriate distance in all directions utilizing iris scissors. Following this, the designed flap was carried over into the primary defect and sutured into place.

## 2023-11-11 NOTE — DISCHARGE NOTE NURSING/CASE MANAGEMENT/SOCIAL WORK - NSDCCRNUMBER_GEN_ALL_CORE_FT
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     Patient seen and evaluated on hemodialysis, tolerating treatment, see HD flowsheet for vitals and assessments.      No Hypotension, chest pain, shortness of breath, cramping, nausea or vomiting.     Target UF: 2 L as tolerated, keep MAP >65.  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

## 2023-12-13 NOTE — PROGRESS NOTE ADULT - ASSESSMENT
Influenza A infection: rapidly improved, wheezing fully resolved on solumedrol    REC    DC solumedrol  Prednisone 20 mg po bid - continue per status 2-3 days  DC planning sat or sun per primary team Labs/EKG/Imaging Studies

## 2024-01-01 NOTE — PATIENT PROFILE ADULT - FUNCTIONAL SCREEN CURRENT LEVEL: COMMUNICATION, MLM
Well , [place]  Well-child exams are visits with a health care provider to check your child's growth and development at certain ages. The following information tells you what to expect during this visit and gives you some helpful tips about caring for your .  What immunizations does my baby need?  Hepatitis B vaccine.  For more information about vaccines, talk to your baby's health care provider or go to the Centers for Disease Control and Prevention website for immunization schedules: www.cdc.gov/vaccines/schedules  What tests does my baby need?  Physical exam  Your baby's health care provider will do a physical exam of your baby.  Your baby's length, weight, and head size (head circumference) will be measured and compared to a growth chart.  Hearing    Your  will have a hearing test while he or she is in the hospital. If your  does not pass the first test, a follow-up hearing test may be done.  Other tests  Your  will be evaluated and given an Apgar score at 1 minute and 5 minutes after birth. The Apgar score is based on five observations including muscle tone, heart rate, grimace reflex response, color, and breathing.  The 1-minute score tells how well your  tolerated delivery.  The 5-minute score tells how your  is adapting to life outside the uterus.  Your  will have blood drawn for a  metabolic screening test before leaving the hospital.  Your  will be screened for rare but serious heart defects that may be present at birth (critical congenital heart defects).  Your  will be screened for developmental dysplasia of the hip (DDH). DDH is a condition in which the leg bone is not properly attached to the hip. The condition is present at birth (congenital). Screening involves a physical exam and imaging tests.  Treatment  Your  may be given eye drops or ointment after birth to prevent an eye infection.  Your  may be given  "a vitamin K injection to treat low levels of this vitamin. A  with a low level of vitamin K is at risk for bleeding.  Caring for your baby  Bonding  Hold, rock, and cuddle your . This can be skin-to-skin contact.  Look into your 's eyes when talking to him or her. Your  can see best when things are 8-12 inches (20-30 cm) away from his or her face.  Talk or sing to your  often.  Touch or caress your  often. This includes stroking his or her face.  Skin care  Your baby's skin may appear dry, flaky, or peeling. Small red blotches on the face and chest are common.  Your  may develop a rash if he or she is exposed to high temperatures.  Many newborns develop a yellow color in the skin and the whites of the eyes in the first week of life (jaundice). Jaundice may not require any treatment. It is important to keep follow-up visits with your baby's health care provider so your  gets checked for jaundice.  Use only mild skin care products on your baby. Avoid products with smells or colors (dyes) because they may irritate your baby's sensitive skin.  Do not use powders on your baby. Powders may be inhaled and could cause breathing problems.  Use a mild baby detergent to wash your baby's clothes. Avoid using fabric softener.  Sleep  Your  may sleep for up to 17 hours each day. All newborns develop different sleep patterns that change over time. Get as much rest as you can. Try to sleep when the baby sleeps.  Dress your  as you would dress for the temperature indoors or outdoors. You may add a thin extra layer, such as a T-shirt or bodysuit, when dressing your .  Car seats and other sitting devices are not recommended for routine sleep.  When awake and supervised, your  may be placed on his or her tummy. \"Tummy time\" helps to prevent flattening of your baby's head.  Umbilical cord care    Your 's umbilical cord was clamped and cut shortly " after he or she was born. When the cord has dried, you can remove the cord clamp. The remaining cord should fall off and heal within 1-4 weeks.  Folding down the front part of the diaper away from the umbilical cord can help the cord dry and fall off more quickly.  You may notice a bad odor before the umbilical cord falls off.  Keep the umbilical cord and the area around the bottom of the cord clean and dry. If the area gets dirty, wash it with plain water and let it air-dry. These areas do not need any other specific care.  Parenting tips  Have a plan for how to handle challenging infant behaviors, such as excessive crying. Never shake your baby.  If you begin to get frustrated or overwhelmed, set your baby down in a safe place, and leave the room. It is okay to take a break and let your baby cry alone for 10 to 15 minutes.  Get support from your family members, friends, or other new parents. You may want to join a support group.  General instructions  Talk with your baby's health care provider if you are worried about access to food or housing.  What's next?  Your next visit will happen when your baby is 3-5 days old.  Summary  Your  will have multiple tests before leaving the hospital. These include hearing, vision, and screening tests.  Practice behaviors that increase bonding. These include holding or cuddling your  with skin-to-skin contact, talking or singing to your , and touching or caressing your .  Use only mild skin care products on your baby. Avoid products with smells or colors (dyes) because they may irritate your baby's sensitive skin.  Your  may sleep for up to 17 hours each day, but all newborns develop different sleep patterns that change over time.  The umbilical cord and the area around the bottom of the cord do not need specific care, but they should be kept clean and dry.  This information is not intended to replace advice given to you by your health care  provider. Make sure you discuss any questions you have with your health care provider.  Document Revised: 12/16/2022 Document Reviewed: 12/16/2022  Elsevier Patient Education © 2023 [employer] Inc[employer]     3 = unable to understand or speak (not related to language barrier)

## 2024-02-09 NOTE — ED ADULT NURSE NOTE - NSFALLRSKUNASSIST_ED_ALL_ED
Pt A&Ox4, rr even and unlabored. Afib on monitor. No complaints at this time. Medicated per orders. Pt A&Ox4, rr even and unlabored. Afib on monitor. No complaints at this time. CLAY Collado made aware of HR. Medicated per orders. no

## 2024-05-07 NOTE — ED ADULT NURSE NOTE - NS ED NURSE DISCH DISPOSITION
show
[facility] Physician [facility]  [place] [address]  Scheduled Appointment: 05/07/2024    Nate Gong  [facility]  Scheduled Appointment: 05/07/2024    Nate Gong  [facility]  Scheduled Appointment: 06/14/2024    [facility]  [place]  Scheduled Appointment: 06/14/2024    Nate Gong  [facility] Physician [facility]  [place] [address]  Scheduled Appointment: 07/12/2024    Nate Gong  [facility]  Scheduled Appointment: 07/12/2024    [facility]  [place]  Scheduled Appointment: 07/12/2024    
Admitted

## 2024-06-19 NOTE — DISCHARGE NOTE ADULT - NS MD DC FALL RISK RISK
Called pt and appt made for 6/27. No past records   For information on Fall & Injury Prevention, visit [email or web address]

## 2024-08-01 NOTE — PHYSICAL THERAPY INITIAL EVALUATION ADULT - TRANSFER SKILLS, REHAB EVAL
HCC coding opportunities       Chart reviewed, no opportunity found: CHART REVIEWED, NO OPPORTUNITY FOUND  [facility] Review     Patients Insurance     Medicare Insurance: [facility] Medicare Advantage          
independent

## 2025-01-03 NOTE — DISCHARGE NOTE PROVIDER - NSDCQMSTROKE_NEU_ALL_CORE
Physical Therapy Visit    Visit Type: Daily Treatment Note  Visit: 8  Referring Provider: Isaias Herrera MD  Next referring provider visit: 2/7/2025  Medical Diagnosis (from order): M17.11 - Arthritis of right knee     SUBJECTIVE                                                                                                               Patient reports doing okay, cutting back on the oxycodone. Did his stretches already this AM. Does them 2-3x/day.    Starting to do the step ups at home- does about 6 of these at home.  Getting a little stronger but still weak with it.     Symptoms/pain not formally rated.      OBJECTIVE                                                                                                                     Range of Motion (ROM)   (degrees unless noted; active unless noted; norms in ( ); negative=lacking to 0, positive=beyond 0)  Knee:   - Extension (0-10):       Right:   Passive: 0                 Treatment     Therapeutic Exercise  -nu step upper and lower extremity, level 5 x 8 minutes- subjective history taken during, closed kinetic chain mobility and strengthening    -knee extension passive range of motion with therapist OP  -front step up x 10 right  -sit<>stand x 5, x 2  -lateral step up x 10 left/ x 5 right   -minisquat at bar x 8  -standing hamstring curl x 10 right, x 5 left  -advise can likely transition to rollator in home ambulation  -HEP handout provided      Manual Therapy   Soft tissue mobilization to distal hamstrings, quads in supine prior to passive range of motion to increase knee mobility.   Scar mobilization - instruction on how to perform and purpose with handout provided      Skilled input: verbal instruction/cues, tactile instruction/cues, demonstration and as detailed above    Writer verbally educated and received verbal consent for hand placement, positioning of patient, and techniques to be performed today from patient for hand placement and palpation for  techniques, therapist position for techniques and clothing adjustments for techniques as described above and how they are pertinent to the patient's plan of care.  Home Exercise Program  Access Code: [email or web address]  URL: [email or web address] Center[facility] Hospital[facility] [email or web address]/  Date: 01/06/2025  Prepared by: Daria Resendiz    Exercises  - Supine Ankle Pumps  - 7 x weekly - 10 reps  - Supine Quadricep Sets  - 3-4 x daily - 7 x weekly - 10 reps - 5 hold  - Supine Heel Slide  - 3-4 x daily - 7 x weekly - 1 sets - 10 reps - 5 hold  - Supine Short Arc Quad  - 3-4 x daily - 7 x weekly - 1 sets - 10 reps - 3-5 hold  - Seated Long Arc Quad  - 3-4 x daily - 7 x weekly - 1 sets - 10 reps - 5 hold  - Seated Knee Flexion Slide   - 3-4 x daily - 7 x weekly - 1 sets - 10 reps - 5 hold  - Forward Step Up  - 1 x daily - 7 x weekly - 1-2 sets - 5-10 reps  - Heel Raises with Counter Support  - 1 x daily - 7 x weekly - 1-2 sets - 10 reps  - Sit to Stand with Armchair  - 1 x daily - 7 x weekly - 1-2 sets - 5-10 reps    Patient Education  - Scar Massage      ASSESSMENT                                                                                                            After completion of manual therapy and knee extension passive range of motion with therapist OP, able to obtain 0 degrees of knee extension passive range of motion.  The patient continues to have difficulty with terminal knee extension with activities such as seated LAQ. Improving terminal knee extension/quadriceps activation with closed kinetic chain activities such as front step up though.   The patient will benefit from continued skilled intervention to improve strength and range of motion of the lower extremity so he may return to his previos level of function.    Pain after session: not formally rated.  Education:   - Results of above outlined education: Verbalizes understanding, Needs reinforcement and Demonstrates understanding    PLAN                                                                                                                            Suggestions for next session as indicated: Progress per plan of care, progress quad strengthening, knee range of motion- terminal knee extension focus, manual therapy prn, update HEP prn, PN in a couple of visits       Therapy procedure time and total treatment time can be found documented on the Time Entry flowsheet                             No

## 2025-04-18 NOTE — DISCHARGE NOTE ADULT - PROVIDER TOKENS
No PROVIDER:[TOKEN:[[id number]]],PROVIDER:[TOKEN:[[id number]]],PROVIDER:[TOKEN:[[id number]]]

## 2025-05-08 NOTE — ED ADULT NURSE NOTE - CHPI ED NUR TIMING2
May 8, 2025     Patient: Levi Jin  YOB: 1968  Date of Visit: 5/8/2025      To Whom it May Concern:    Levi Jin is under my professional care. Levi was seen in my office on 5/8/2025. Levi may return to work with limitations on 5/19/2025. No lifting, pushing, or pulling greater than 50 pounds. No climbing or crawling on uneven surfaces. No use of ladders. He will be re-evaluated in 6 weeks .    If you have any questions or concerns, please don't hesitate to call.         Sincerely,          Sanjay Jackson MD        CC: No Recipients  
gradual onset

## 2025-06-20 NOTE — ED PROVIDER NOTE - ADMIT DISPOSITION PRESENT ON ADMISSION SEPSIS Q1 - RE-EVALUATED PATIENT FLUID AND VITAL SIGNS
DASH Diet: After Your Visit  Your Care Instructions  The DASH diet is an eating plan that can help lower your blood pressure. DASH stands for Dietary Approaches to Stop Hypertension. Hypertension is high blood pressure.  The DASH diet focuses on eating foods that are high in calcium, potassium, and magnesium. These nutrients can lower blood pressure. The foods that are highest in these nutrients are fruits, vegetables, low-fat dairy products, nuts, seeds, and legumes. But taking calcium, potassium, and magnesium supplements instead of eating foods that are high in those nutrients does not have the same effect. The DASH diet also includes whole grains, fish, and poultry.  The DASH diet is one of several lifestyle changes your doctor may recommend to lower your high blood pressure. Your doctor may also want you to decrease the amount of sodium in your diet. Lowering sodium while following the DASH diet can lower blood pressure even further than just the DASH diet alone.  Follow-up care is a key part of your treatment and safety. Be sure to make and go to all appointments, and call your doctor if you are having problems. It’s also a good idea to know your test results and keep a list of the medicines you take.  How can you care for yourself at home?  Following the DASH diet  Eat 4 to 5 servings of fruit each day. A serving is 1 medium-sized piece of fruit, ½ cup chopped or canned fruit, 1/4 cup dried fruit, or 4 ounces (½ cup) of fruit juice. Choose fruit more often than fruit juice.  Eat 4 to 5 servings of vegetables each day. A serving is 1 cup of lettuce or raw leafy vegetables, ½ cup of chopped or cooked vegetables, or 4 ounces (½ cup) of vegetable juice. Choose vegetables more often than vegetable juice.  Get 2 to 3 servings of low-fat and fat-free dairy each day. A serving is 8 ounces of milk, 1 cup of yogurt, or 1 ½ ounces of cheese.  Eat 7 to 8 servings of grains each day. A serving is 1 slice of bread, 1 
I have re-evaluated the patient's fluid status and reviewed vital signs. Clinical perfusion assessment was performed.